# Patient Record
Sex: FEMALE | Race: WHITE | NOT HISPANIC OR LATINO | Employment: OTHER | URBAN - NONMETROPOLITAN AREA
[De-identification: names, ages, dates, MRNs, and addresses within clinical notes are randomized per-mention and may not be internally consistent; named-entity substitution may affect disease eponyms.]

---

## 2020-01-01 ENCOUNTER — OFFICE VISIT (OUTPATIENT)
Dept: SURGERY | Facility: CLINIC | Age: 65
End: 2020-01-01

## 2020-01-01 ENCOUNTER — TELEPHONE (OUTPATIENT)
Dept: GYNECOLOGIC ONCOLOGY | Facility: CLINIC | Age: 65
End: 2020-01-01

## 2020-01-01 ENCOUNTER — APPOINTMENT (OUTPATIENT)
Dept: PREADMISSION TESTING | Facility: HOSPITAL | Age: 65
End: 2020-01-01

## 2020-01-01 ENCOUNTER — APPOINTMENT (OUTPATIENT)
Dept: CT IMAGING | Facility: HOSPITAL | Age: 65
End: 2020-01-01

## 2020-01-01 ENCOUNTER — ANESTHESIA (OUTPATIENT)
Dept: GASTROENTEROLOGY | Facility: HOSPITAL | Age: 65
End: 2020-01-01

## 2020-01-01 ENCOUNTER — HOSPITAL ENCOUNTER (EMERGENCY)
Facility: HOSPITAL | Age: 65
Discharge: HOME | End: 2020-11-21
Attending: EMERGENCY MEDICINE | Admitting: EMERGENCY MEDICINE

## 2020-01-01 ENCOUNTER — APPOINTMENT (OUTPATIENT)
Dept: GENERAL RADIOLOGY | Facility: HOSPITAL | Age: 65
End: 2020-01-01

## 2020-01-01 ENCOUNTER — HOSPITAL ENCOUNTER (EMERGENCY)
Facility: HOSPITAL | Age: 65
Discharge: HOME | End: 2020-12-01
Attending: STUDENT IN AN ORGANIZED HEALTH CARE EDUCATION/TRAINING PROGRAM | Admitting: STUDENT IN AN ORGANIZED HEALTH CARE EDUCATION/TRAINING PROGRAM

## 2020-01-01 ENCOUNTER — ANESTHESIA EVENT (OUTPATIENT)
Dept: PERIOP | Facility: HOSPITAL | Age: 65
End: 2020-01-01

## 2020-01-01 ENCOUNTER — OFFICE VISIT (OUTPATIENT)
Dept: ONCOLOGY | Facility: CLINIC | Age: 65
End: 2020-01-01

## 2020-01-01 ENCOUNTER — LAB (OUTPATIENT)
Dept: LAB | Facility: HOSPITAL | Age: 65
End: 2020-01-01

## 2020-01-01 ENCOUNTER — TELEPHONE (OUTPATIENT)
Dept: ONCOLOGY | Facility: CLINIC | Age: 65
End: 2020-01-01

## 2020-01-01 ENCOUNTER — OFFICE VISIT (OUTPATIENT)
Dept: INTERNAL MEDICINE | Facility: CLINIC | Age: 65
End: 2020-01-01

## 2020-01-01 ENCOUNTER — INFUSION (OUTPATIENT)
Dept: ONCOLOGY | Facility: HOSPITAL | Age: 65
End: 2020-01-01

## 2020-01-01 ENCOUNTER — HOSPITAL ENCOUNTER (OUTPATIENT)
Facility: HOSPITAL | Age: 65
Setting detail: HOSPITAL OUTPATIENT SURGERY
Discharge: HOME | End: 2020-11-25
Attending: SURGERY | Admitting: SURGERY

## 2020-01-01 ENCOUNTER — TELEPHONE (OUTPATIENT)
Dept: UROLOGY | Facility: CLINIC | Age: 65
End: 2020-01-01

## 2020-01-01 ENCOUNTER — APPOINTMENT (OUTPATIENT)
Dept: CARDIOLOGY | Facility: HOSPITAL | Age: 65
End: 2020-01-01

## 2020-01-01 ENCOUNTER — HOSPITAL ENCOUNTER (EMERGENCY)
Facility: HOSPITAL | Age: 65
Discharge: HOME | End: 2020-10-07
Attending: EMERGENCY MEDICINE | Admitting: EMERGENCY MEDICINE

## 2020-01-01 ENCOUNTER — OFFICE VISIT (OUTPATIENT)
Dept: GYNECOLOGIC ONCOLOGY | Facility: CLINIC | Age: 65
End: 2020-01-01

## 2020-01-01 ENCOUNTER — PREP FOR SURGERY (OUTPATIENT)
Dept: OTHER | Facility: HOSPITAL | Age: 65
End: 2020-01-01

## 2020-01-01 ENCOUNTER — TELEPHONE (OUTPATIENT)
Dept: INTERNAL MEDICINE | Facility: CLINIC | Age: 65
End: 2020-01-01

## 2020-01-01 ENCOUNTER — HOSPITAL ENCOUNTER (OUTPATIENT)
Facility: HOSPITAL | Age: 65
Setting detail: HOSPITAL OUTPATIENT SURGERY
Discharge: HOME | End: 2020-10-12
Attending: SURGERY | Admitting: SURGERY

## 2020-01-01 ENCOUNTER — CONSULT (OUTPATIENT)
Dept: ONCOLOGY | Facility: CLINIC | Age: 65
End: 2020-01-01

## 2020-01-01 ENCOUNTER — ANESTHESIA EVENT (OUTPATIENT)
Dept: GASTROENTEROLOGY | Facility: HOSPITAL | Age: 65
End: 2020-01-01

## 2020-01-01 ENCOUNTER — OUTSIDE FACILITY SERVICE (OUTPATIENT)
Dept: INTERNAL MEDICINE | Facility: CLINIC | Age: 65
End: 2020-01-01

## 2020-01-01 ENCOUNTER — OFFICE VISIT (OUTPATIENT)
Dept: UROLOGY | Facility: CLINIC | Age: 65
End: 2020-01-01

## 2020-01-01 ENCOUNTER — RESULTS ENCOUNTER (OUTPATIENT)
Dept: INTERNAL MEDICINE | Facility: CLINIC | Age: 65
End: 2020-01-01

## 2020-01-01 ENCOUNTER — ANESTHESIA (OUTPATIENT)
Dept: PERIOP | Facility: HOSPITAL | Age: 65
End: 2020-01-01

## 2020-01-01 ENCOUNTER — DOCUMENTATION (OUTPATIENT)
Dept: SOCIAL WORK | Facility: HOSPITAL | Age: 65
End: 2020-01-01

## 2020-01-01 ENCOUNTER — HOSPITAL ENCOUNTER (INPATIENT)
Facility: HOSPITAL | Age: 65
LOS: 8 days | Discharge: HOSPICE | End: 2020-12-16
Attending: EMERGENCY MEDICINE | Admitting: FAMILY MEDICINE

## 2020-01-01 ENCOUNTER — HOSPITAL ENCOUNTER (INPATIENT)
Facility: HOSPITAL | Age: 65
LOS: 13 days | Discharge: NURSING, REHAB OR LONG-TERM CARE | End: 2020-10-29
Attending: FAMILY MEDICINE | Admitting: INTERNAL MEDICINE

## 2020-01-01 ENCOUNTER — DOCUMENTATION (OUTPATIENT)
Dept: NUTRITION | Facility: HOSPITAL | Age: 65
End: 2020-01-01

## 2020-01-01 ENCOUNTER — TELEPHONE (OUTPATIENT)
Dept: SURGERY | Facility: CLINIC | Age: 65
End: 2020-01-01

## 2020-01-01 ENCOUNTER — HOSPITAL ENCOUNTER (OUTPATIENT)
Facility: HOSPITAL | Age: 65
Setting detail: HOSPITAL OUTPATIENT SURGERY
End: 2020-01-01
Attending: UROLOGY | Admitting: UROLOGY

## 2020-01-01 VITALS
DIASTOLIC BLOOD PRESSURE: 102 MMHG | HEIGHT: 65 IN | HEART RATE: 102 BPM | BODY MASS INDEX: 22.49 KG/M2 | TEMPERATURE: 98.7 F | RESPIRATION RATE: 16 BRPM | SYSTOLIC BLOOD PRESSURE: 178 MMHG | WEIGHT: 135 LBS

## 2020-01-01 VITALS
SYSTOLIC BLOOD PRESSURE: 112 MMHG | TEMPERATURE: 98.2 F | OXYGEN SATURATION: 98 % | HEIGHT: 65 IN | WEIGHT: 135.6 LBS | HEART RATE: 97 BPM | DIASTOLIC BLOOD PRESSURE: 84 MMHG | BODY MASS INDEX: 22.59 KG/M2

## 2020-01-01 VITALS
TEMPERATURE: 98.1 F | SYSTOLIC BLOOD PRESSURE: 133 MMHG | WEIGHT: 133 LBS | BODY MASS INDEX: 22.16 KG/M2 | RESPIRATION RATE: 12 BRPM | HEART RATE: 122 BPM | DIASTOLIC BLOOD PRESSURE: 82 MMHG | HEIGHT: 65 IN | OXYGEN SATURATION: 98 %

## 2020-01-01 VITALS
WEIGHT: 135 LBS | HEART RATE: 101 BPM | HEIGHT: 65 IN | TEMPERATURE: 98.7 F | BODY MASS INDEX: 22.49 KG/M2 | OXYGEN SATURATION: 98 %

## 2020-01-01 VITALS
SYSTOLIC BLOOD PRESSURE: 107 MMHG | BODY MASS INDEX: 22.33 KG/M2 | DIASTOLIC BLOOD PRESSURE: 63 MMHG | WEIGHT: 134 LBS | HEIGHT: 65 IN | OXYGEN SATURATION: 100 % | RESPIRATION RATE: 15 BRPM | TEMPERATURE: 100.2 F | HEART RATE: 118 BPM

## 2020-01-01 VITALS
OXYGEN SATURATION: 98 % | WEIGHT: 135 LBS | DIASTOLIC BLOOD PRESSURE: 85 MMHG | HEIGHT: 65 IN | TEMPERATURE: 98.2 F | BODY MASS INDEX: 22.49 KG/M2 | RESPIRATION RATE: 17 BRPM | HEART RATE: 98 BPM | SYSTOLIC BLOOD PRESSURE: 143 MMHG

## 2020-01-01 VITALS
SYSTOLIC BLOOD PRESSURE: 151 MMHG | OXYGEN SATURATION: 99 % | HEIGHT: 65 IN | WEIGHT: 134 LBS | HEART RATE: 79 BPM | DIASTOLIC BLOOD PRESSURE: 80 MMHG | BODY MASS INDEX: 22.33 KG/M2 | RESPIRATION RATE: 16 BRPM | TEMPERATURE: 99.6 F

## 2020-01-01 VITALS
BODY MASS INDEX: 21.83 KG/M2 | HEART RATE: 110 BPM | HEIGHT: 65 IN | WEIGHT: 131 LBS | SYSTOLIC BLOOD PRESSURE: 108 MMHG | TEMPERATURE: 97.9 F | OXYGEN SATURATION: 93 % | DIASTOLIC BLOOD PRESSURE: 75 MMHG | RESPIRATION RATE: 17 BRPM

## 2020-01-01 VITALS
HEART RATE: 90 BPM | SYSTOLIC BLOOD PRESSURE: 148 MMHG | TEMPERATURE: 99.7 F | WEIGHT: 140.8 LBS | OXYGEN SATURATION: 97 % | RESPIRATION RATE: 18 BRPM | BODY MASS INDEX: 23.46 KG/M2 | HEIGHT: 65 IN | DIASTOLIC BLOOD PRESSURE: 87 MMHG

## 2020-01-01 VITALS
WEIGHT: 140 LBS | OXYGEN SATURATION: 95 % | DIASTOLIC BLOOD PRESSURE: 60 MMHG | BODY MASS INDEX: 23.32 KG/M2 | HEART RATE: 60 BPM | HEIGHT: 65 IN | SYSTOLIC BLOOD PRESSURE: 100 MMHG

## 2020-01-01 VITALS
WEIGHT: 133 LBS | BODY MASS INDEX: 22.16 KG/M2 | OXYGEN SATURATION: 97 % | HEART RATE: 97 BPM | SYSTOLIC BLOOD PRESSURE: 119 MMHG | RESPIRATION RATE: 16 BRPM | TEMPERATURE: 98.1 F | HEIGHT: 65 IN | DIASTOLIC BLOOD PRESSURE: 75 MMHG

## 2020-01-01 VITALS
RESPIRATION RATE: 18 BRPM | BODY MASS INDEX: 28.16 KG/M2 | HEIGHT: 65 IN | SYSTOLIC BLOOD PRESSURE: 112 MMHG | HEART RATE: 84 BPM | TEMPERATURE: 96.8 F | WEIGHT: 169 LBS | DIASTOLIC BLOOD PRESSURE: 71 MMHG

## 2020-01-01 VITALS
RESPIRATION RATE: 16 BRPM | HEIGHT: 65 IN | BODY MASS INDEX: 22.49 KG/M2 | WEIGHT: 135 LBS | DIASTOLIC BLOOD PRESSURE: 87 MMHG | TEMPERATURE: 98 F | OXYGEN SATURATION: 98 % | SYSTOLIC BLOOD PRESSURE: 150 MMHG | HEART RATE: 108 BPM

## 2020-01-01 VITALS
WEIGHT: 169.97 LBS | SYSTOLIC BLOOD PRESSURE: 119 MMHG | HEART RATE: 94 BPM | BODY MASS INDEX: 28.32 KG/M2 | DIASTOLIC BLOOD PRESSURE: 61 MMHG | HEIGHT: 65 IN | OXYGEN SATURATION: 97 % | TEMPERATURE: 98.6 F | RESPIRATION RATE: 16 BRPM

## 2020-01-01 VITALS — SYSTOLIC BLOOD PRESSURE: 126 MMHG | HEART RATE: 95 BPM | DIASTOLIC BLOOD PRESSURE: 74 MMHG

## 2020-01-01 VITALS
WEIGHT: 135 LBS | DIASTOLIC BLOOD PRESSURE: 86 MMHG | HEIGHT: 65 IN | TEMPERATURE: 99.2 F | SYSTOLIC BLOOD PRESSURE: 133 MMHG | HEART RATE: 104 BPM | OXYGEN SATURATION: 98 % | BODY MASS INDEX: 22.49 KG/M2 | RESPIRATION RATE: 18 BRPM

## 2020-01-01 VITALS
SYSTOLIC BLOOD PRESSURE: 142 MMHG | BODY MASS INDEX: 22.49 KG/M2 | OXYGEN SATURATION: 98 % | WEIGHT: 135 LBS | HEIGHT: 65 IN | DIASTOLIC BLOOD PRESSURE: 88 MMHG | HEART RATE: 114 BPM

## 2020-01-01 DIAGNOSIS — R80.9 PROTEINURIA, UNSPECIFIED TYPE: ICD-10-CM

## 2020-01-01 DIAGNOSIS — R30.0 DYSURIA: ICD-10-CM

## 2020-01-01 DIAGNOSIS — N93.9 VAGINAL BLEEDING: ICD-10-CM

## 2020-01-01 DIAGNOSIS — C78.7 LIVER METASTASES: ICD-10-CM

## 2020-01-01 DIAGNOSIS — C56.3 MALIGNANT NEOPLASM OF BOTH OVARIES (HCC): Primary | ICD-10-CM

## 2020-01-01 DIAGNOSIS — Z86.718 HISTORY OF DVT (DEEP VEIN THROMBOSIS): ICD-10-CM

## 2020-01-01 DIAGNOSIS — N39.0 ACUTE UTI: ICD-10-CM

## 2020-01-01 DIAGNOSIS — R10.31 RIGHT LOWER QUADRANT ABDOMINAL PAIN: Primary | ICD-10-CM

## 2020-01-01 DIAGNOSIS — K62.5 RECTAL BLEEDING: Primary | ICD-10-CM

## 2020-01-01 DIAGNOSIS — E87.6 HYPOKALEMIA: ICD-10-CM

## 2020-01-01 DIAGNOSIS — C56.9 RECURRENT MALIGNANT NEOPLASM OF OVARY (HCC): ICD-10-CM

## 2020-01-01 DIAGNOSIS — N13.30 BILATERAL HYDRONEPHROSIS: ICD-10-CM

## 2020-01-01 DIAGNOSIS — C18.7 MALIGNANT NEOPLASM OF SIGMOID COLON (HCC): ICD-10-CM

## 2020-01-01 DIAGNOSIS — R19.00 PELVIC MASS: Primary | ICD-10-CM

## 2020-01-01 DIAGNOSIS — Z78.9 IMPAIRED MOBILITY AND ACTIVITIES OF DAILY LIVING: ICD-10-CM

## 2020-01-01 DIAGNOSIS — K56.609 LARGE BOWEL OBSTRUCTION (HCC): Primary | ICD-10-CM

## 2020-01-01 DIAGNOSIS — R53.83 FATIGUE, UNSPECIFIED TYPE: Primary | ICD-10-CM

## 2020-01-01 DIAGNOSIS — C56.9 RECURRENT MALIGNANT NEOPLASM OF OVARY (HCC): Primary | ICD-10-CM

## 2020-01-01 DIAGNOSIS — R06.09 DYSPNEA ON EXERTION: ICD-10-CM

## 2020-01-01 DIAGNOSIS — B96.20 E-COLI UTI: Primary | ICD-10-CM

## 2020-01-01 DIAGNOSIS — C56.3 MALIGNANT NEOPLASM OF BOTH OVARIES (HCC): ICD-10-CM

## 2020-01-01 DIAGNOSIS — N13.30 HYDRONEPHROSIS, UNSPECIFIED HYDRONEPHROSIS TYPE: ICD-10-CM

## 2020-01-01 DIAGNOSIS — R19.00 PELVIC MASS: ICD-10-CM

## 2020-01-01 DIAGNOSIS — R31.0 GROSS HEMATURIA: ICD-10-CM

## 2020-01-01 DIAGNOSIS — Z79.899 CONTROLLED SUBSTANCE AGREEMENT SIGNED: ICD-10-CM

## 2020-01-01 DIAGNOSIS — C78.7 LIVER METASTASES: Primary | ICD-10-CM

## 2020-01-01 DIAGNOSIS — R73.09 ELEVATED GLUCOSE: ICD-10-CM

## 2020-01-01 DIAGNOSIS — Z74.09 IMPAIRED MOBILITY AND ACTIVITIES OF DAILY LIVING: ICD-10-CM

## 2020-01-01 DIAGNOSIS — Z01.818 PRE-OP TESTING: Primary | ICD-10-CM

## 2020-01-01 DIAGNOSIS — K63.89 COLONIC MASS: ICD-10-CM

## 2020-01-01 DIAGNOSIS — Z01.818 PRE-OP TESTING: ICD-10-CM

## 2020-01-01 DIAGNOSIS — Z98.890 POST-OPERATIVE STATE: ICD-10-CM

## 2020-01-01 DIAGNOSIS — N39.0 URINARY TRACT INFECTION WITHOUT HEMATURIA, SITE UNSPECIFIED: ICD-10-CM

## 2020-01-01 DIAGNOSIS — D64.9 ANEMIA, UNSPECIFIED TYPE: ICD-10-CM

## 2020-01-01 DIAGNOSIS — K63.89 MASS OF COLON: ICD-10-CM

## 2020-01-01 DIAGNOSIS — Z79.899 LONG TERM USE OF DRUG: ICD-10-CM

## 2020-01-01 DIAGNOSIS — N39.0 E-COLI UTI: Primary | ICD-10-CM

## 2020-01-01 DIAGNOSIS — R81 GLUCOSE FOUND IN URINE ON EXAMINATION: ICD-10-CM

## 2020-01-01 DIAGNOSIS — R10.32 LEFT LOWER QUADRANT ABDOMINAL PAIN: ICD-10-CM

## 2020-01-01 DIAGNOSIS — E55.9 VITAMIN D INSUFFICIENCY: ICD-10-CM

## 2020-01-01 DIAGNOSIS — R31.9 HEMATURIA, UNSPECIFIED TYPE: ICD-10-CM

## 2020-01-01 DIAGNOSIS — Z86.711 HISTORY OF PULMONARY EMBOLUS (PE): ICD-10-CM

## 2020-01-01 DIAGNOSIS — C56.9 MALIGNANT NEOPLASM OF OVARY, UNSPECIFIED LATERALITY (HCC): ICD-10-CM

## 2020-01-01 DIAGNOSIS — N13.39 OTHER HYDRONEPHROSIS: ICD-10-CM

## 2020-01-01 DIAGNOSIS — E78.2 MIXED HYPERLIPIDEMIA: ICD-10-CM

## 2020-01-01 DIAGNOSIS — Z79.899 CHRONIC PRESCRIPTION BENZODIAZEPINE USE: ICD-10-CM

## 2020-01-01 DIAGNOSIS — R16.0 LIVER MASS: ICD-10-CM

## 2020-01-01 DIAGNOSIS — R07.9 CHEST PAIN, UNSPECIFIED TYPE: ICD-10-CM

## 2020-01-01 DIAGNOSIS — Z85.3 HISTORY OF BREAST CANCER: ICD-10-CM

## 2020-01-01 DIAGNOSIS — R19.07 GENERALIZED ABDOMINAL MASS: ICD-10-CM

## 2020-01-01 DIAGNOSIS — N93.9 VAGINA BLEEDING: ICD-10-CM

## 2020-01-01 DIAGNOSIS — C56.9 MALIGNANT NEOPLASM OF OVARY, UNSPECIFIED LATERALITY (HCC): Primary | ICD-10-CM

## 2020-01-01 DIAGNOSIS — R56.9 SEIZURES (HCC): ICD-10-CM

## 2020-01-01 DIAGNOSIS — R10.9 ACUTE ABDOMINAL PAIN: ICD-10-CM

## 2020-01-01 DIAGNOSIS — R33.9 URINARY RETENTION: Primary | ICD-10-CM

## 2020-01-01 DIAGNOSIS — R19.8 RECTAL DISCHARGE: Primary | ICD-10-CM

## 2020-01-01 DIAGNOSIS — K63.89 MASS OF COLON: Primary | ICD-10-CM

## 2020-01-01 LAB
ABO GROUP BLD: NORMAL
ALBUMIN SERPL-MCNC: 2.3 G/DL (ref 3.5–5.2)
ALBUMIN SERPL-MCNC: 2.6 G/DL (ref 3.5–5.2)
ALBUMIN SERPL-MCNC: 2.7 G/DL (ref 3.5–5.2)
ALBUMIN SERPL-MCNC: 2.9 G/DL (ref 3.5–5.2)
ALBUMIN SERPL-MCNC: 3 G/DL (ref 3.5–5.2)
ALBUMIN SERPL-MCNC: 3.5 G/DL (ref 3.5–5.2)
ALBUMIN SERPL-MCNC: 3.9 G/DL (ref 3.5–5.2)
ALBUMIN/GLOB SERPL: 0.9 G/DL
ALBUMIN/GLOB SERPL: 1 G/DL
ALBUMIN/GLOB SERPL: 1.1 G/DL
ALBUMIN/GLOB SERPL: 1.1 G/DL
ALBUMIN/GLOB SERPL: 1.3 G/DL
ALP SERPL-CCNC: 48 U/L (ref 39–117)
ALP SERPL-CCNC: 52 U/L (ref 39–117)
ALP SERPL-CCNC: 52 U/L (ref 39–117)
ALP SERPL-CCNC: 54 U/L (ref 39–117)
ALP SERPL-CCNC: 54 U/L (ref 39–117)
ALP SERPL-CCNC: 69 U/L (ref 39–117)
ALP SERPL-CCNC: 75 U/L (ref 39–117)
ALP SERPL-CCNC: 76 U/L (ref 39–117)
ALP SERPL-CCNC: 78 U/L (ref 39–117)
ALP SERPL-CCNC: 81 U/L (ref 39–117)
ALT SERPL W P-5'-P-CCNC: 10 U/L (ref 1–33)
ALT SERPL W P-5'-P-CCNC: 10 U/L (ref 1–33)
ALT SERPL W P-5'-P-CCNC: 14 U/L (ref 1–33)
ALT SERPL W P-5'-P-CCNC: 16 U/L (ref 1–33)
ALT SERPL W P-5'-P-CCNC: 6 U/L (ref 1–33)
ALT SERPL W P-5'-P-CCNC: 7 U/L (ref 1–33)
ALT SERPL W P-5'-P-CCNC: 8 U/L (ref 1–33)
AMPHET+METHAMPHET UR QL: NEGATIVE
AMPHETAMINES UR QL: NEGATIVE
ANION GAP SERPL CALCULATED.3IONS-SCNC: 10 MMOL/L (ref 5–15)
ANION GAP SERPL CALCULATED.3IONS-SCNC: 10 MMOL/L (ref 5–15)
ANION GAP SERPL CALCULATED.3IONS-SCNC: 11 MMOL/L (ref 5–15)
ANION GAP SERPL CALCULATED.3IONS-SCNC: 12 MMOL/L (ref 5–15)
ANION GAP SERPL CALCULATED.3IONS-SCNC: 12.2 MMOL/L (ref 5–15)
ANION GAP SERPL CALCULATED.3IONS-SCNC: 13 MMOL/L (ref 5–15)
ANION GAP SERPL CALCULATED.3IONS-SCNC: 13 MMOL/L (ref 5–15)
ANION GAP SERPL CALCULATED.3IONS-SCNC: 13.5 MMOL/L (ref 5–15)
ANION GAP SERPL CALCULATED.3IONS-SCNC: 14 MMOL/L (ref 5–15)
ANION GAP SERPL CALCULATED.3IONS-SCNC: 15 MMOL/L (ref 5–15)
ANION GAP SERPL CALCULATED.3IONS-SCNC: 5 MMOL/L (ref 5–15)
ANION GAP SERPL CALCULATED.3IONS-SCNC: 5 MMOL/L (ref 5–15)
ANION GAP SERPL CALCULATED.3IONS-SCNC: 6 MMOL/L (ref 5–15)
ANION GAP SERPL CALCULATED.3IONS-SCNC: 6 MMOL/L (ref 5–15)
ANION GAP SERPL CALCULATED.3IONS-SCNC: 7 MMOL/L (ref 5–15)
ANION GAP SERPL CALCULATED.3IONS-SCNC: 7 MMOL/L (ref 5–15)
ANION GAP SERPL CALCULATED.3IONS-SCNC: 8 MMOL/L (ref 5–15)
ANION GAP SERPL CALCULATED.3IONS-SCNC: 9 MMOL/L (ref 5–15)
AST SERPL-CCNC: 14 U/L (ref 1–32)
AST SERPL-CCNC: 14 U/L (ref 1–32)
AST SERPL-CCNC: 16 U/L (ref 1–32)
AST SERPL-CCNC: 20 U/L (ref 1–32)
AST SERPL-CCNC: 21 U/L (ref 1–32)
AST SERPL-CCNC: 24 U/L (ref 1–32)
AST SERPL-CCNC: 28 U/L (ref 1–32)
AST SERPL-CCNC: 30 U/L (ref 1–32)
BACTERIA SPEC AEROBE CULT: NORMAL
BACTERIA UR QL AUTO: ABNORMAL /HPF
BARBITURATES UR QL SCN: NEGATIVE
BASOPHILS # BLD AUTO: 0.01 10*3/MM3 (ref 0–0.2)
BASOPHILS # BLD AUTO: 0.01 10*3/MM3 (ref 0–0.2)
BASOPHILS # BLD AUTO: 0.02 10*3/MM3 (ref 0–0.2)
BASOPHILS # BLD AUTO: 0.03 10*3/MM3 (ref 0–0.2)
BASOPHILS # BLD AUTO: 0.04 10*3/MM3 (ref 0–0.2)
BASOPHILS # BLD AUTO: 0.05 10*3/MM3 (ref 0–0.2)
BASOPHILS # BLD AUTO: 0.05 10*3/MM3 (ref 0–0.2)
BASOPHILS NFR BLD AUTO: 0.1 % (ref 0–1.5)
BASOPHILS NFR BLD AUTO: 0.2 % (ref 0–1.5)
BASOPHILS NFR BLD AUTO: 0.3 % (ref 0–1.5)
BASOPHILS NFR BLD AUTO: 0.4 % (ref 0–1.5)
BASOPHILS NFR BLD AUTO: 0.5 % (ref 0–1.5)
BASOPHILS NFR BLD AUTO: 0.5 % (ref 0–1.5)
BASOPHILS NFR BLD AUTO: 0.6 % (ref 0–1.5)
BENZODIAZ UR QL SCN: POSITIVE
BH BB BLOOD EXPIRATION DATE: NORMAL
BH BB BLOOD EXPIRATION DATE: NORMAL
BH BB BLOOD TYPE BARCODE: 5100
BH BB BLOOD TYPE BARCODE: 5100
BH BB DISPENSE STATUS: NORMAL
BH BB DISPENSE STATUS: NORMAL
BH BB PRODUCT CODE: NORMAL
BH BB PRODUCT CODE: NORMAL
BH BB UNIT NUMBER: NORMAL
BH BB UNIT NUMBER: NORMAL
BH CV ECHO MEAS - AO MAX PG (FULL): 2.4 MMHG
BH CV ECHO MEAS - AO MAX PG: 7.5 MMHG
BH CV ECHO MEAS - AO ROOT AREA (BSA CORRECTED): 2.4
BH CV ECHO MEAS - AO ROOT AREA: 13.3 CM^2
BH CV ECHO MEAS - AO ROOT DIAM: 4.1 CM
BH CV ECHO MEAS - AO V2 MAX: 136.8 CM/SEC
BH CV ECHO MEAS - AVA(V,A): 2.9 CM^2
BH CV ECHO MEAS - AVA(V,D): 2.9 CM^2
BH CV ECHO MEAS - BSA(HAYCOCK): 1.7 M^2
BH CV ECHO MEAS - BSA: 1.7 M^2
BH CV ECHO MEAS - BZI_BMI: 23.3 KILOGRAMS/M^2
BH CV ECHO MEAS - BZI_METRIC_HEIGHT: 165.1 CM
BH CV ECHO MEAS - BZI_METRIC_WEIGHT: 63.5 KG
BH CV ECHO MEAS - EDV(CUBED): 131.5 ML
BH CV ECHO MEAS - EDV(MOD-SP2): 62 ML
BH CV ECHO MEAS - EDV(MOD-SP4): 75 ML
BH CV ECHO MEAS - EDV(TEICH): 123 ML
BH CV ECHO MEAS - EF(CUBED): 75.4 %
BH CV ECHO MEAS - EF(MOD-BP): 68 %
BH CV ECHO MEAS - EF(MOD-SP2): 67.7 %
BH CV ECHO MEAS - EF(MOD-SP4): 69.3 %
BH CV ECHO MEAS - EF(TEICH): 67.1 %
BH CV ECHO MEAS - ESV(CUBED): 32.3 ML
BH CV ECHO MEAS - ESV(MOD-SP2): 20 ML
BH CV ECHO MEAS - ESV(MOD-SP4): 23 ML
BH CV ECHO MEAS - ESV(TEICH): 40.5 ML
BH CV ECHO MEAS - FS: 37.4 %
BH CV ECHO MEAS - IVS/LVPW: 0.81
BH CV ECHO MEAS - IVSD: 0.76 CM
BH CV ECHO MEAS - LA DIMENSION: 3.9 CM
BH CV ECHO MEAS - LA/AO: 0.95
BH CV ECHO MEAS - LAD MAJOR: 4.9 CM
BH CV ECHO MEAS - LAT PEAK E' VEL: 13.2 CM/SEC
BH CV ECHO MEAS - LATERAL E/E' RATIO: 5.5
BH CV ECHO MEAS - LV DIASTOLIC VOL/BSA (35-75): 44.1 ML/M^2
BH CV ECHO MEAS - LV MASS(C)D: 149.8 GRAMS
BH CV ECHO MEAS - LV MASS(C)DI: 88.1 GRAMS/M^2
BH CV ECHO MEAS - LV MAX PG: 5 MMHG
BH CV ECHO MEAS - LV MEAN PG: 2.5 MMHG
BH CV ECHO MEAS - LV SYSTOLIC VOL/BSA (12-30): 13.5 ML/M^2
BH CV ECHO MEAS - LV V1 MAX: 112.3 CM/SEC
BH CV ECHO MEAS - LV V1 MEAN: 70.6 CM/SEC
BH CV ECHO MEAS - LV V1 VTI: 16.2 CM
BH CV ECHO MEAS - LVIDD: 5.1 CM
BH CV ECHO MEAS - LVIDS: 3.2 CM
BH CV ECHO MEAS - LVLD AP2: 6.6 CM
BH CV ECHO MEAS - LVLD AP4: 6.8 CM
BH CV ECHO MEAS - LVLS AP2: 5.2 CM
BH CV ECHO MEAS - LVLS AP4: 5.3 CM
BH CV ECHO MEAS - LVOT AREA (M): 3.5 CM^2
BH CV ECHO MEAS - LVOT AREA: 3.6 CM^2
BH CV ECHO MEAS - LVOT DIAM: 2.1 CM
BH CV ECHO MEAS - LVPWD: 0.93 CM
BH CV ECHO MEAS - MED PEAK E' VEL: 6.1 CM/SEC
BH CV ECHO MEAS - MEDIAL E/E' RATIO: 11.7
BH CV ECHO MEAS - MV A MAX VEL: 121.5 CM/SEC
BH CV ECHO MEAS - MV DEC TIME: 0.16 SEC
BH CV ECHO MEAS - MV E MAX VEL: 74 CM/SEC
BH CV ECHO MEAS - MV E/A: 0.61
BH CV ECHO MEAS - PA ACC SLOPE: 1083 CM/SEC^2
BH CV ECHO MEAS - PA ACC TIME: 0.09 SEC
BH CV ECHO MEAS - PA MAX PG: 6.5 MMHG
BH CV ECHO MEAS - PA PR(ACCEL): 38.6 MMHG
BH CV ECHO MEAS - PA V2 MAX: 127.6 CM/SEC
BH CV ECHO MEAS - RAP SYSTOLE: 3 MMHG
BH CV ECHO MEAS - RVSP: 37 MMHG
BH CV ECHO MEAS - SI(CUBED): 58.4 ML/M^2
BH CV ECHO MEAS - SI(LVOT): 34 ML/M^2
BH CV ECHO MEAS - SI(MOD-SP2): 24.7 ML/M^2
BH CV ECHO MEAS - SI(MOD-SP4): 30.6 ML/M^2
BH CV ECHO MEAS - SI(TEICH): 48.5 ML/M^2
BH CV ECHO MEAS - SV(CUBED): 99.2 ML
BH CV ECHO MEAS - SV(LVOT): 57.7 ML
BH CV ECHO MEAS - SV(MOD-SP2): 42 ML
BH CV ECHO MEAS - SV(MOD-SP4): 52 ML
BH CV ECHO MEAS - SV(TEICH): 82.5 ML
BH CV ECHO MEAS - TAPSE (>1.6): 1.1 CM
BH CV ECHO MEAS - TR MAX PG: 34 MMHG
BH CV ECHO MEAS - TR MAX VEL: 291 CM/SEC
BH CV ECHO MEASUREMENTS AVERAGE E/E' RATIO: 7.67
BH CV VAS BP LEFT ARM: NORMAL MMHG
BH CV XLRA - RV BASE: 2.6 CM
BH CV XLRA - RV LENGTH: 6.3 CM
BH CV XLRA - RV MID: 2.1 CM
BH CV XLRA - TDI S': 12 CM/SEC
BILIRUB BLD-MCNC: NEGATIVE MG/DL
BILIRUB BLD-MCNC: NEGATIVE MG/DL
BILIRUB SERPL-MCNC: 0.2 MG/DL (ref 0–1.2)
BILIRUB SERPL-MCNC: 0.3 MG/DL (ref 0–1.2)
BILIRUB UR QL STRIP: NEGATIVE
BLD GP AB SCN SERPL QL: NEGATIVE
BLD GP AB SCN SERPL QL: NEGATIVE
BUN SERPL-MCNC: 10 MG/DL (ref 8–23)
BUN SERPL-MCNC: 11 MG/DL (ref 8–23)
BUN SERPL-MCNC: 12 MG/DL (ref 8–23)
BUN SERPL-MCNC: 14 MG/DL (ref 8–23)
BUN SERPL-MCNC: 15 MG/DL (ref 8–23)
BUN SERPL-MCNC: 16 MG/DL (ref 8–23)
BUN SERPL-MCNC: 17 MG/DL (ref 8–23)
BUN SERPL-MCNC: 20 MG/DL (ref 8–23)
BUN SERPL-MCNC: 23 MG/DL (ref 8–23)
BUN SERPL-MCNC: 24 MG/DL (ref 8–23)
BUN SERPL-MCNC: 8 MG/DL (ref 8–23)
BUN SERPL-MCNC: 8 MG/DL (ref 8–23)
BUN SERPL-MCNC: 9 MG/DL (ref 8–23)
BUN SERPL-MCNC: 9 MG/DL (ref 8–23)
BUN/CREAT SERPL: 18.5 (ref 7–25)
BUN/CREAT SERPL: 19.6 (ref 7–25)
BUN/CREAT SERPL: 20 (ref 7–25)
BUN/CREAT SERPL: 21.1 (ref 7–25)
BUN/CREAT SERPL: 21.3 (ref 7–25)
BUN/CREAT SERPL: 21.3 (ref 7–25)
BUN/CREAT SERPL: 23.2 (ref 7–25)
BUN/CREAT SERPL: 23.4 (ref 7–25)
BUN/CREAT SERPL: 23.5 (ref 7–25)
BUN/CREAT SERPL: 23.5 (ref 7–25)
BUN/CREAT SERPL: 23.7 (ref 7–25)
BUN/CREAT SERPL: 23.7 (ref 7–25)
BUN/CREAT SERPL: 25.6 (ref 7–25)
BUN/CREAT SERPL: 26.7 (ref 7–25)
BUN/CREAT SERPL: 26.8 (ref 7–25)
BUN/CREAT SERPL: 27.1 (ref 7–25)
BUN/CREAT SERPL: 29.3 (ref 7–25)
BUN/CREAT SERPL: 30.8 (ref 7–25)
BUN/CREAT SERPL: 31.3 (ref 7–25)
BUN/CREAT SERPL: 33.3 (ref 7–25)
BUN/CREAT SERPL: 33.8 (ref 7–25)
BUN/CREAT SERPL: 37.7 (ref 7–25)
BUN/CREAT SERPL: 39.2 (ref 7–25)
BUPRENORPHINE SERPL-MCNC: NEGATIVE NG/ML
CA-I SERPL ISE-MCNC: 1.2 MMOL/L (ref 1.12–1.32)
CA-I SERPL ISE-MCNC: 1.2 MMOL/L (ref 1.12–1.32)
CA-I SERPL ISE-MCNC: 1.26 MMOL/L (ref 1.12–1.32)
CA-I SERPL ISE-MCNC: 1.29 MMOL/L (ref 1.12–1.32)
CALCIUM SPEC-SCNC: 7.7 MG/DL (ref 8.6–10.5)
CALCIUM SPEC-SCNC: 8.3 MG/DL (ref 8.6–10.5)
CALCIUM SPEC-SCNC: 8.4 MG/DL (ref 8.6–10.5)
CALCIUM SPEC-SCNC: 8.5 MG/DL (ref 8.6–10.5)
CALCIUM SPEC-SCNC: 8.5 MG/DL (ref 8.6–10.5)
CALCIUM SPEC-SCNC: 8.6 MG/DL (ref 8.6–10.5)
CALCIUM SPEC-SCNC: 8.6 MG/DL (ref 8.6–10.5)
CALCIUM SPEC-SCNC: 8.7 MG/DL (ref 8.6–10.5)
CALCIUM SPEC-SCNC: 8.8 MG/DL (ref 8.6–10.5)
CALCIUM SPEC-SCNC: 8.8 MG/DL (ref 8.6–10.5)
CALCIUM SPEC-SCNC: 9 MG/DL (ref 8.6–10.5)
CALCIUM SPEC-SCNC: 9 MG/DL (ref 8.6–10.5)
CALCIUM SPEC-SCNC: 9.3 MG/DL (ref 8.6–10.5)
CALCIUM SPEC-SCNC: 9.4 MG/DL (ref 8.6–10.5)
CALCIUM SPEC-SCNC: 9.7 MG/DL (ref 8.6–10.5)
CALCIUM SPEC-SCNC: 9.9 MG/DL (ref 8.6–10.5)
CANCER AG125 SERPL QL: 1250 U/ML (ref 0–38.1)
CANNABINOIDS SERPL QL: NEGATIVE
CEA SERPL-MCNC: 2 NG/ML (ref 0–4.7)
CHLORIDE SERPL-SCNC: 100 MMOL/L (ref 98–107)
CHLORIDE SERPL-SCNC: 100 MMOL/L (ref 98–107)
CHLORIDE SERPL-SCNC: 102 MMOL/L (ref 98–107)
CHLORIDE SERPL-SCNC: 102 MMOL/L (ref 98–107)
CHLORIDE SERPL-SCNC: 103 MMOL/L (ref 98–107)
CHLORIDE SERPL-SCNC: 93 MMOL/L (ref 98–107)
CHLORIDE SERPL-SCNC: 94 MMOL/L (ref 98–107)
CHLORIDE SERPL-SCNC: 94 MMOL/L (ref 98–107)
CHLORIDE SERPL-SCNC: 95 MMOL/L (ref 98–107)
CHLORIDE SERPL-SCNC: 95 MMOL/L (ref 98–107)
CHLORIDE SERPL-SCNC: 96 MMOL/L (ref 98–107)
CHLORIDE SERPL-SCNC: 97 MMOL/L (ref 98–107)
CHLORIDE SERPL-SCNC: 97 MMOL/L (ref 98–107)
CHLORIDE SERPL-SCNC: 98 MMOL/L (ref 98–107)
CHLORIDE SERPL-SCNC: 99 MMOL/L (ref 98–107)
CHOLEST SERPL-MCNC: 98 MG/DL (ref 0–200)
CLARITY UR: ABNORMAL
CLARITY, POC: ABNORMAL
CLARITY, POC: ABNORMAL
CO2 SERPL-SCNC: 23 MMOL/L (ref 22–29)
CO2 SERPL-SCNC: 24 MMOL/L (ref 22–29)
CO2 SERPL-SCNC: 25 MMOL/L (ref 22–29)
CO2 SERPL-SCNC: 26 MMOL/L (ref 22–29)
CO2 SERPL-SCNC: 27 MMOL/L (ref 22–29)
CO2 SERPL-SCNC: 27 MMOL/L (ref 22–29)
CO2 SERPL-SCNC: 28.8 MMOL/L (ref 22–29)
CO2 SERPL-SCNC: 29 MMOL/L (ref 22–29)
CO2 SERPL-SCNC: 30 MMOL/L (ref 22–29)
CO2 SERPL-SCNC: 30 MMOL/L (ref 22–29)
CO2 SERPL-SCNC: 31 MMOL/L (ref 22–29)
CO2 SERPL-SCNC: 31.5 MMOL/L (ref 22–29)
CO2 SERPL-SCNC: 32 MMOL/L (ref 22–29)
CO2 SERPL-SCNC: 32 MMOL/L (ref 22–29)
CO2 SERPL-SCNC: 33 MMOL/L (ref 22–29)
CO2 SERPL-SCNC: 34 MMOL/L (ref 22–29)
CO2 SERPL-SCNC: 35 MMOL/L (ref 22–29)
COCAINE UR QL: NEGATIVE
COLOR UR: ABNORMAL
COLOR UR: YELLOW
CREAT SERPL-MCNC: 0.34 MG/DL (ref 0.57–1)
CREAT SERPL-MCNC: 0.38 MG/DL (ref 0.57–1)
CREAT SERPL-MCNC: 0.38 MG/DL (ref 0.57–1)
CREAT SERPL-MCNC: 0.39 MG/DL (ref 0.57–1)
CREAT SERPL-MCNC: 0.46 MG/DL (ref 0.57–1)
CREAT SERPL-MCNC: 0.47 MG/DL (ref 0.57–1)
CREAT SERPL-MCNC: 0.47 MG/DL (ref 0.57–1)
CREAT SERPL-MCNC: 0.5 MG/DL (ref 0.57–1)
CREAT SERPL-MCNC: 0.51 MG/DL (ref 0.57–1)
CREAT SERPL-MCNC: 0.51 MG/DL (ref 0.57–1)
CREAT SERPL-MCNC: 0.53 MG/DL (ref 0.57–1)
CREAT SERPL-MCNC: 0.54 MG/DL (ref 0.57–1)
CREAT SERPL-MCNC: 0.56 MG/DL (ref 0.57–1)
CREAT SERPL-MCNC: 0.58 MG/DL (ref 0.57–1)
CREAT SERPL-MCNC: 0.59 MG/DL (ref 0.57–1)
CREAT SERPL-MCNC: 0.6 MG/DL (ref 0.57–1)
CREAT SERPL-MCNC: 0.64 MG/DL (ref 0.57–1)
CREAT SERPL-MCNC: 0.68 MG/DL (ref 0.57–1)
CREAT SERPL-MCNC: 0.69 MG/DL (ref 0.57–1)
CREAT SERPL-MCNC: 0.71 MG/DL (ref 0.57–1)
CREAT SERPL-MCNC: 0.78 MG/DL (ref 0.57–1)
CREAT SERPL-MCNC: 0.8 MG/DL (ref 0.57–1)
CREAT SERPL-MCNC: 0.85 MG/DL (ref 0.57–1)
CROSSMATCH INTERPRETATION: NORMAL
CROSSMATCH INTERPRETATION: NORMAL
CRP SERPL-MCNC: 16.11 MG/DL (ref 0–0.5)
CRP SERPL-MCNC: 3.27 MG/DL (ref 0–0.5)
D-LACTATE SERPL-SCNC: 0.7 MMOL/L (ref 0.5–2)
D-LACTATE SERPL-SCNC: 1.4 MMOL/L (ref 0.5–2)
DEPRECATED RDW RBC AUTO: 38.2 FL (ref 37–54)
DEPRECATED RDW RBC AUTO: 40.4 FL (ref 37–54)
DEPRECATED RDW RBC AUTO: 40.8 FL (ref 37–54)
DEPRECATED RDW RBC AUTO: 41 FL (ref 37–54)
DEPRECATED RDW RBC AUTO: 41.4 FL (ref 37–54)
DEPRECATED RDW RBC AUTO: 41.7 FL (ref 37–54)
DEPRECATED RDW RBC AUTO: 41.8 FL (ref 37–54)
DEPRECATED RDW RBC AUTO: 41.9 FL (ref 37–54)
DEPRECATED RDW RBC AUTO: 42.3 FL (ref 37–54)
DEPRECATED RDW RBC AUTO: 42.3 FL (ref 37–54)
DEPRECATED RDW RBC AUTO: 42.5 FL (ref 37–54)
DEPRECATED RDW RBC AUTO: 42.5 FL (ref 37–54)
DEPRECATED RDW RBC AUTO: 42.7 FL (ref 37–54)
DEPRECATED RDW RBC AUTO: 42.7 FL (ref 37–54)
DEPRECATED RDW RBC AUTO: 43.2 FL (ref 37–54)
DEPRECATED RDW RBC AUTO: 43.3 FL (ref 37–54)
DEPRECATED RDW RBC AUTO: 43.3 FL (ref 37–54)
DEPRECATED RDW RBC AUTO: 43.4 FL (ref 37–54)
DEPRECATED RDW RBC AUTO: 43.6 FL (ref 37–54)
DEPRECATED RDW RBC AUTO: 44.2 FL (ref 37–54)
EOSINOPHIL # BLD AUTO: 0 10*3/MM3 (ref 0–0.4)
EOSINOPHIL # BLD AUTO: 0.01 10*3/MM3 (ref 0–0.4)
EOSINOPHIL # BLD AUTO: 0.02 10*3/MM3 (ref 0–0.4)
EOSINOPHIL # BLD AUTO: 0.11 10*3/MM3 (ref 0–0.4)
EOSINOPHIL # BLD AUTO: 0.15 10*3/MM3 (ref 0–0.4)
EOSINOPHIL # BLD AUTO: 0.19 10*3/MM3 (ref 0–0.4)
EOSINOPHIL # BLD AUTO: 0.21 10*3/MM3 (ref 0–0.4)
EOSINOPHIL # BLD AUTO: 0.23 10*3/MM3 (ref 0–0.4)
EOSINOPHIL # BLD AUTO: 0.23 10*3/MM3 (ref 0–0.4)
EOSINOPHIL # BLD AUTO: 0.24 10*3/MM3 (ref 0–0.4)
EOSINOPHIL # BLD AUTO: 0.26 10*3/MM3 (ref 0–0.4)
EOSINOPHIL # BLD AUTO: 0.28 10*3/MM3 (ref 0–0.4)
EOSINOPHIL # BLD AUTO: 0.29 10*3/MM3 (ref 0–0.4)
EOSINOPHIL NFR BLD AUTO: 0 % (ref 0.3–6.2)
EOSINOPHIL NFR BLD AUTO: 0.1 % (ref 0.3–6.2)
EOSINOPHIL NFR BLD AUTO: 0.3 % (ref 0.3–6.2)
EOSINOPHIL NFR BLD AUTO: 1 % (ref 0.3–6.2)
EOSINOPHIL NFR BLD AUTO: 1.4 % (ref 0.3–6.2)
EOSINOPHIL NFR BLD AUTO: 1.8 % (ref 0.3–6.2)
EOSINOPHIL NFR BLD AUTO: 1.8 % (ref 0.3–6.2)
EOSINOPHIL NFR BLD AUTO: 2 % (ref 0.3–6.2)
EOSINOPHIL NFR BLD AUTO: 2 % (ref 0.3–6.2)
EOSINOPHIL NFR BLD AUTO: 2.2 % (ref 0.3–6.2)
EOSINOPHIL NFR BLD AUTO: 2.3 % (ref 0.3–6.2)
EOSINOPHIL NFR BLD AUTO: 2.6 % (ref 0.3–6.2)
EOSINOPHIL NFR BLD AUTO: 2.9 % (ref 0.3–6.2)
EOSINOPHIL NFR BLD AUTO: 3.4 % (ref 0.3–6.2)
EOSINOPHIL NFR BLD AUTO: 3.5 % (ref 0.3–6.2)
EOSINOPHIL NFR BLD AUTO: 3.8 % (ref 0.3–6.2)
EOSINOPHIL NFR BLD AUTO: 4 % (ref 0.3–6.2)
ERYTHROCYTE [DISTWIDTH] IN BLOOD BY AUTOMATED COUNT: 11.7 % (ref 12.3–15.4)
ERYTHROCYTE [DISTWIDTH] IN BLOOD BY AUTOMATED COUNT: 12.1 % (ref 12.3–15.4)
ERYTHROCYTE [DISTWIDTH] IN BLOOD BY AUTOMATED COUNT: 12.2 % (ref 12.3–15.4)
ERYTHROCYTE [DISTWIDTH] IN BLOOD BY AUTOMATED COUNT: 12.2 % (ref 12.3–15.4)
ERYTHROCYTE [DISTWIDTH] IN BLOOD BY AUTOMATED COUNT: 12.4 % (ref 12.3–15.4)
ERYTHROCYTE [DISTWIDTH] IN BLOOD BY AUTOMATED COUNT: 12.5 % (ref 12.3–15.4)
ERYTHROCYTE [DISTWIDTH] IN BLOOD BY AUTOMATED COUNT: 12.7 % (ref 12.3–15.4)
ERYTHROCYTE [DISTWIDTH] IN BLOOD BY AUTOMATED COUNT: 12.7 % (ref 12.3–15.4)
ERYTHROCYTE [DISTWIDTH] IN BLOOD BY AUTOMATED COUNT: 12.8 % (ref 12.3–15.4)
ERYTHROCYTE [DISTWIDTH] IN BLOOD BY AUTOMATED COUNT: 12.9 % (ref 12.3–15.4)
ERYTHROCYTE [DISTWIDTH] IN BLOOD BY AUTOMATED COUNT: 13 % (ref 12.3–15.4)
ERYTHROCYTE [DISTWIDTH] IN BLOOD BY AUTOMATED COUNT: 13.2 % (ref 12.3–15.4)
ERYTHROCYTE [DISTWIDTH] IN BLOOD BY AUTOMATED COUNT: 13.3 % (ref 12.3–15.4)
ERYTHROCYTE [DISTWIDTH] IN BLOOD BY AUTOMATED COUNT: 13.4 % (ref 12.3–15.4)
ERYTHROCYTE [DISTWIDTH] IN BLOOD BY AUTOMATED COUNT: 13.5 % (ref 12.3–15.4)
ERYTHROCYTE [DISTWIDTH] IN BLOOD BY AUTOMATED COUNT: 13.6 % (ref 12.3–15.4)
ERYTHROCYTE [DISTWIDTH] IN BLOOD BY AUTOMATED COUNT: 14.4 % (ref 12.3–15.4)
GFR SERPL CREATININE-BSD FRML MDRD: 100 ML/MIN/1.73
GFR SERPL CREATININE-BSD FRML MDRD: 102 ML/MIN/1.73
GFR SERPL CREATININE-BSD FRML MDRD: 104 ML/MIN/1.73
GFR SERPL CREATININE-BSD FRML MDRD: 108 ML/MIN/1.73
GFR SERPL CREATININE-BSD FRML MDRD: 113 ML/MIN/1.73
GFR SERPL CREATININE-BSD FRML MDRD: 115 ML/MIN/1.73
GFR SERPL CREATININE-BSD FRML MDRD: 120 ML/MIN/1.73
GFR SERPL CREATININE-BSD FRML MDRD: 120 ML/MIN/1.73
GFR SERPL CREATININE-BSD FRML MDRD: 123 ML/MIN/1.73
GFR SERPL CREATININE-BSD FRML MDRD: 132 ML/MIN/1.73
GFR SERPL CREATININE-BSD FRML MDRD: 132 ML/MIN/1.73
GFR SERPL CREATININE-BSD FRML MDRD: 135 ML/MIN/1.73
GFR SERPL CREATININE-BSD FRML MDRD: 67 ML/MIN/1.73
GFR SERPL CREATININE-BSD FRML MDRD: 72 ML/MIN/1.73
GFR SERPL CREATININE-BSD FRML MDRD: 74 ML/MIN/1.73
GFR SERPL CREATININE-BSD FRML MDRD: 82 ML/MIN/1.73
GFR SERPL CREATININE-BSD FRML MDRD: 85 ML/MIN/1.73
GFR SERPL CREATININE-BSD FRML MDRD: 86 ML/MIN/1.73
GFR SERPL CREATININE-BSD FRML MDRD: 93 ML/MIN/1.73
GFR SERPL CREATININE-BSD FRML MDRD: >150 ML/MIN/1.73
GLOBULIN UR ELPH-MCNC: 2.4 GM/DL
GLOBULIN UR ELPH-MCNC: 2.5 GM/DL
GLOBULIN UR ELPH-MCNC: 2.5 GM/DL
GLOBULIN UR ELPH-MCNC: 2.6 GM/DL
GLOBULIN UR ELPH-MCNC: 2.9 GM/DL
GLOBULIN UR ELPH-MCNC: 2.9 GM/DL
GLOBULIN UR ELPH-MCNC: 3.1 GM/DL
GLOBULIN UR ELPH-MCNC: 3.6 GM/DL
GLOBULIN UR ELPH-MCNC: 3.8 GM/DL
GLOBULIN UR ELPH-MCNC: 4 GM/DL
GLUCOSE BLDC GLUCOMTR-MCNC: 101 MG/DL (ref 70–130)
GLUCOSE BLDC GLUCOMTR-MCNC: 110 MG/DL (ref 70–130)
GLUCOSE BLDC GLUCOMTR-MCNC: 117 MG/DL (ref 70–130)
GLUCOSE BLDC GLUCOMTR-MCNC: 122 MG/DL (ref 70–130)
GLUCOSE BLDC GLUCOMTR-MCNC: 122 MG/DL (ref 70–130)
GLUCOSE BLDC GLUCOMTR-MCNC: 125 MG/DL (ref 70–130)
GLUCOSE BLDC GLUCOMTR-MCNC: 129 MG/DL (ref 70–130)
GLUCOSE BLDC GLUCOMTR-MCNC: 132 MG/DL (ref 70–130)
GLUCOSE BLDC GLUCOMTR-MCNC: 139 MG/DL (ref 70–130)
GLUCOSE BLDC GLUCOMTR-MCNC: 140 MG/DL (ref 70–130)
GLUCOSE BLDC GLUCOMTR-MCNC: 142 MG/DL (ref 70–130)
GLUCOSE BLDC GLUCOMTR-MCNC: 143 MG/DL (ref 70–130)
GLUCOSE BLDC GLUCOMTR-MCNC: 144 MG/DL (ref 70–130)
GLUCOSE BLDC GLUCOMTR-MCNC: 146 MG/DL (ref 70–130)
GLUCOSE BLDC GLUCOMTR-MCNC: 149 MG/DL (ref 70–130)
GLUCOSE BLDC GLUCOMTR-MCNC: 150 MG/DL (ref 70–130)
GLUCOSE BLDC GLUCOMTR-MCNC: 152 MG/DL (ref 70–130)
GLUCOSE BLDC GLUCOMTR-MCNC: 153 MG/DL (ref 70–130)
GLUCOSE BLDC GLUCOMTR-MCNC: 154 MG/DL (ref 70–130)
GLUCOSE BLDC GLUCOMTR-MCNC: 155 MG/DL (ref 70–130)
GLUCOSE BLDC GLUCOMTR-MCNC: 160 MG/DL (ref 70–130)
GLUCOSE BLDC GLUCOMTR-MCNC: 88 MG/DL (ref 70–130)
GLUCOSE BLDC GLUCOMTR-MCNC: 89 MG/DL (ref 70–130)
GLUCOSE BLDC GLUCOMTR-MCNC: 90 MG/DL (ref 70–130)
GLUCOSE BLDC GLUCOMTR-MCNC: 91 MG/DL (ref 70–130)
GLUCOSE BLDC GLUCOMTR-MCNC: 96 MG/DL (ref 70–130)
GLUCOSE SERPL-MCNC: 100 MG/DL (ref 65–99)
GLUCOSE SERPL-MCNC: 101 MG/DL (ref 65–99)
GLUCOSE SERPL-MCNC: 105 MG/DL (ref 65–99)
GLUCOSE SERPL-MCNC: 108 MG/DL (ref 65–99)
GLUCOSE SERPL-MCNC: 109 MG/DL (ref 65–99)
GLUCOSE SERPL-MCNC: 110 MG/DL (ref 65–99)
GLUCOSE SERPL-MCNC: 111 MG/DL (ref 65–99)
GLUCOSE SERPL-MCNC: 112 MG/DL (ref 65–99)
GLUCOSE SERPL-MCNC: 117 MG/DL (ref 65–99)
GLUCOSE SERPL-MCNC: 119 MG/DL (ref 65–99)
GLUCOSE SERPL-MCNC: 119 MG/DL (ref 65–99)
GLUCOSE SERPL-MCNC: 121 MG/DL (ref 65–99)
GLUCOSE SERPL-MCNC: 126 MG/DL (ref 65–99)
GLUCOSE SERPL-MCNC: 127 MG/DL (ref 65–99)
GLUCOSE SERPL-MCNC: 132 MG/DL (ref 65–99)
GLUCOSE SERPL-MCNC: 166 MG/DL (ref 65–99)
GLUCOSE SERPL-MCNC: 75 MG/DL (ref 65–99)
GLUCOSE SERPL-MCNC: 79 MG/DL (ref 65–99)
GLUCOSE SERPL-MCNC: 80 MG/DL (ref 65–99)
GLUCOSE SERPL-MCNC: 88 MG/DL (ref 65–99)
GLUCOSE SERPL-MCNC: 93 MG/DL (ref 65–99)
GLUCOSE SERPL-MCNC: 98 MG/DL (ref 65–99)
GLUCOSE SERPL-MCNC: 99 MG/DL (ref 65–99)
GLUCOSE UR STRIP-MCNC: ABNORMAL MG/DL
GLUCOSE UR STRIP-MCNC: NEGATIVE MG/DL
HBA1C MFR BLD: 5.3 % (ref 4.8–5.6)
HCT VFR BLD AUTO: 23.6 % (ref 34–46.6)
HCT VFR BLD AUTO: 23.7 % (ref 34–46.6)
HCT VFR BLD AUTO: 23.7 % (ref 34–46.6)
HCT VFR BLD AUTO: 24 % (ref 34–46.6)
HCT VFR BLD AUTO: 25.4 % (ref 34–46.6)
HCT VFR BLD AUTO: 25.7 % (ref 34–46.6)
HCT VFR BLD AUTO: 25.9 % (ref 34–46.6)
HCT VFR BLD AUTO: 25.9 % (ref 34–46.6)
HCT VFR BLD AUTO: 26.5 % (ref 34–46.6)
HCT VFR BLD AUTO: 26.6 % (ref 34–46.6)
HCT VFR BLD AUTO: 26.6 % (ref 34–46.6)
HCT VFR BLD AUTO: 26.7 % (ref 34–46.6)
HCT VFR BLD AUTO: 26.8 % (ref 34–46.6)
HCT VFR BLD AUTO: 26.9 % (ref 34–46.6)
HCT VFR BLD AUTO: 27.1 % (ref 34–46.6)
HCT VFR BLD AUTO: 27.3 % (ref 34–46.6)
HCT VFR BLD AUTO: 27.6 % (ref 34–46.6)
HCT VFR BLD AUTO: 28.3 % (ref 34–46.6)
HCT VFR BLD AUTO: 28.5 % (ref 34–46.6)
HCT VFR BLD AUTO: 28.7 % (ref 34–46.6)
HCT VFR BLD AUTO: 28.9 % (ref 34–46.6)
HCT VFR BLD AUTO: 29 % (ref 34–46.6)
HCT VFR BLD AUTO: 29.6 % (ref 34–46.6)
HCT VFR BLD AUTO: 30.1 % (ref 34–46.6)
HCT VFR BLD AUTO: 30.7 % (ref 34–46.6)
HCT VFR BLD AUTO: 31.9 % (ref 34–46.6)
HCT VFR BLD AUTO: 32.2 % (ref 34–46.6)
HCT VFR BLD AUTO: 32.6 % (ref 34–46.6)
HCT VFR BLD AUTO: 33.1 % (ref 34–46.6)
HCT VFR BLD AUTO: 33.6 % (ref 34–46.6)
HCT VFR BLD AUTO: 33.9 % (ref 34–46.6)
HCT VFR BLD AUTO: 35.6 % (ref 34–46.6)
HGB BLD-MCNC: 10.1 G/DL (ref 12–15.9)
HGB BLD-MCNC: 10.2 G/DL (ref 12–15.9)
HGB BLD-MCNC: 10.4 G/DL (ref 12–15.9)
HGB BLD-MCNC: 10.5 G/DL (ref 12–15.9)
HGB BLD-MCNC: 10.7 G/DL (ref 12–15.9)
HGB BLD-MCNC: 10.9 G/DL (ref 12–15.9)
HGB BLD-MCNC: 11.2 G/DL (ref 12–15.9)
HGB BLD-MCNC: 7.2 G/DL (ref 12–15.9)
HGB BLD-MCNC: 7.2 G/DL (ref 12–15.9)
HGB BLD-MCNC: 7.5 G/DL (ref 12–15.9)
HGB BLD-MCNC: 7.5 G/DL (ref 12–15.9)
HGB BLD-MCNC: 7.8 G/DL (ref 12–15.9)
HGB BLD-MCNC: 7.8 G/DL (ref 12–15.9)
HGB BLD-MCNC: 7.9 G/DL (ref 12–15.9)
HGB BLD-MCNC: 8 G/DL (ref 12–15.9)
HGB BLD-MCNC: 8.1 G/DL (ref 12–15.9)
HGB BLD-MCNC: 8.2 G/DL (ref 12–15.9)
HGB BLD-MCNC: 8.2 G/DL (ref 12–15.9)
HGB BLD-MCNC: 8.3 G/DL (ref 12–15.9)
HGB BLD-MCNC: 8.3 G/DL (ref 12–15.9)
HGB BLD-MCNC: 8.4 G/DL (ref 12–15.9)
HGB BLD-MCNC: 8.8 G/DL (ref 12–15.9)
HGB BLD-MCNC: 8.9 G/DL (ref 12–15.9)
HGB BLD-MCNC: 8.9 G/DL (ref 12–15.9)
HGB BLD-MCNC: 9 G/DL (ref 12–15.9)
HGB BLD-MCNC: 9.1 G/DL (ref 12–15.9)
HGB BLD-MCNC: 9.2 G/DL (ref 12–15.9)
HGB BLD-MCNC: 9.5 G/DL (ref 12–15.9)
HGB BLD-MCNC: 9.5 G/DL (ref 12–15.9)
HGB BLD-MCNC: 9.7 G/DL (ref 12–15.9)
HGB UR QL STRIP.AUTO: ABNORMAL
HOLD SPECIMEN: NORMAL
HYALINE CASTS UR QL AUTO: ABNORMAL /LPF
IMM GRANULOCYTES # BLD AUTO: 0.02 10*3/MM3 (ref 0–0.05)
IMM GRANULOCYTES # BLD AUTO: 0.04 10*3/MM3 (ref 0–0.05)
IMM GRANULOCYTES # BLD AUTO: 0.05 10*3/MM3 (ref 0–0.05)
IMM GRANULOCYTES # BLD AUTO: 0.06 10*3/MM3 (ref 0–0.05)
IMM GRANULOCYTES # BLD AUTO: 0.06 10*3/MM3 (ref 0–0.05)
IMM GRANULOCYTES # BLD AUTO: 0.07 10*3/MM3 (ref 0–0.05)
IMM GRANULOCYTES # BLD AUTO: 0.07 10*3/MM3 (ref 0–0.05)
IMM GRANULOCYTES # BLD AUTO: 0.08 10*3/MM3 (ref 0–0.05)
IMM GRANULOCYTES NFR BLD AUTO: 0.2 % (ref 0–0.5)
IMM GRANULOCYTES NFR BLD AUTO: 0.2 % (ref 0–0.5)
IMM GRANULOCYTES NFR BLD AUTO: 0.3 % (ref 0–0.5)
IMM GRANULOCYTES NFR BLD AUTO: 0.3 % (ref 0–0.5)
IMM GRANULOCYTES NFR BLD AUTO: 0.4 % (ref 0–0.5)
IMM GRANULOCYTES NFR BLD AUTO: 0.5 % (ref 0–0.5)
IMM GRANULOCYTES NFR BLD AUTO: 0.5 % (ref 0–0.5)
IMM GRANULOCYTES NFR BLD AUTO: 0.6 % (ref 0–0.5)
IMM GRANULOCYTES NFR BLD AUTO: 0.7 % (ref 0–0.5)
IMM GRANULOCYTES NFR BLD AUTO: 0.8 % (ref 0–0.5)
IMM GRANULOCYTES NFR BLD AUTO: 0.9 % (ref 0–0.5)
IMM GRANULOCYTES NFR BLD AUTO: 1.1 % (ref 0–0.5)
INR PPP: 1.02 (ref 0.9–1.1)
INR PPP: 1.12 (ref 0.85–1.16)
KETONES UR QL STRIP: NEGATIVE
KETONES UR QL: ABNORMAL
KETONES UR QL: NEGATIVE
LAB AP CASE REPORT: NORMAL
LEFT ATRIUM VOLUME INDEX: 30.6 ML/M^2
LEFT ATRIUM VOLUME: 52 ML
LEUKOCYTE EST, POC: ABNORMAL
LEUKOCYTE EST, POC: ABNORMAL
LEUKOCYTE ESTERASE UR QL STRIP.AUTO: ABNORMAL
LIPASE SERPL-CCNC: 24 U/L (ref 13–60)
LIPASE SERPL-CCNC: 45 U/L (ref 13–60)
LV EF 2D ECHO EST: 70 %
LYMPHOCYTES # BLD AUTO: 0.83 10*3/MM3 (ref 0.7–3.1)
LYMPHOCYTES # BLD AUTO: 0.91 10*3/MM3 (ref 0.7–3.1)
LYMPHOCYTES # BLD AUTO: 1.02 10*3/MM3 (ref 0.7–3.1)
LYMPHOCYTES # BLD AUTO: 1.04 10*3/MM3 (ref 0.7–3.1)
LYMPHOCYTES # BLD AUTO: 1.07 10*3/MM3 (ref 0.7–3.1)
LYMPHOCYTES # BLD AUTO: 1.08 10*3/MM3 (ref 0.7–3.1)
LYMPHOCYTES # BLD AUTO: 1.16 10*3/MM3 (ref 0.7–3.1)
LYMPHOCYTES # BLD AUTO: 1.21 10*3/MM3 (ref 0.7–3.1)
LYMPHOCYTES # BLD AUTO: 1.22 10*3/MM3 (ref 0.7–3.1)
LYMPHOCYTES # BLD AUTO: 1.24 10*3/MM3 (ref 0.7–3.1)
LYMPHOCYTES # BLD AUTO: 1.29 10*3/MM3 (ref 0.7–3.1)
LYMPHOCYTES # BLD AUTO: 1.31 10*3/MM3 (ref 0.7–3.1)
LYMPHOCYTES # BLD AUTO: 1.34 10*3/MM3 (ref 0.7–3.1)
LYMPHOCYTES # BLD AUTO: 1.42 10*3/MM3 (ref 0.7–3.1)
LYMPHOCYTES # BLD AUTO: 1.68 10*3/MM3 (ref 0.7–3.1)
LYMPHOCYTES # BLD AUTO: 1.98 10*3/MM3 (ref 0.7–3.1)
LYMPHOCYTES # BLD AUTO: 2.19 10*3/MM3 (ref 0.7–3.1)
LYMPHOCYTES # BLD AUTO: 2.3 10*3/MM3 (ref 0.7–3.1)
LYMPHOCYTES NFR BLD AUTO: 11.5 % (ref 19.6–45.3)
LYMPHOCYTES NFR BLD AUTO: 12.7 % (ref 19.6–45.3)
LYMPHOCYTES NFR BLD AUTO: 13.3 % (ref 19.6–45.3)
LYMPHOCYTES NFR BLD AUTO: 14.7 % (ref 19.6–45.3)
LYMPHOCYTES NFR BLD AUTO: 15 % (ref 19.6–45.3)
LYMPHOCYTES NFR BLD AUTO: 15.3 % (ref 19.6–45.3)
LYMPHOCYTES NFR BLD AUTO: 15.7 % (ref 19.6–45.3)
LYMPHOCYTES NFR BLD AUTO: 17.3 % (ref 19.6–45.3)
LYMPHOCYTES NFR BLD AUTO: 18.7 % (ref 19.6–45.3)
LYMPHOCYTES NFR BLD AUTO: 19.1 % (ref 19.6–45.3)
LYMPHOCYTES NFR BLD AUTO: 19.3 % (ref 19.6–45.3)
LYMPHOCYTES NFR BLD AUTO: 19.6 % (ref 19.6–45.3)
LYMPHOCYTES NFR BLD AUTO: 22.2 % (ref 19.6–45.3)
LYMPHOCYTES NFR BLD AUTO: 6.9 % (ref 19.6–45.3)
LYMPHOCYTES NFR BLD AUTO: 8.3 % (ref 19.6–45.3)
LYMPHOCYTES NFR BLD AUTO: 8.8 % (ref 19.6–45.3)
LYMPHOCYTES NFR BLD AUTO: 9 % (ref 19.6–45.3)
LYMPHOCYTES NFR BLD AUTO: 9.4 % (ref 19.6–45.3)
MAGNESIUM SERPL-MCNC: 1.3 MG/DL (ref 1.6–2.4)
MAGNESIUM SERPL-MCNC: 1.4 MG/DL (ref 1.6–2.4)
MAGNESIUM SERPL-MCNC: 1.5 MG/DL (ref 1.6–2.4)
MAGNESIUM SERPL-MCNC: 1.6 MG/DL (ref 1.6–2.4)
MAGNESIUM SERPL-MCNC: 1.7 MG/DL (ref 1.6–2.4)
MAGNESIUM SERPL-MCNC: 1.8 MG/DL (ref 1.6–2.4)
MAGNESIUM SERPL-MCNC: 1.9 MG/DL (ref 1.6–2.4)
MAGNESIUM SERPL-MCNC: 2.4 MG/DL (ref 1.6–2.4)
MAXIMAL PREDICTED HEART RATE: 153 BPM
MCH RBC QN AUTO: 26.1 PG (ref 26.6–33)
MCH RBC QN AUTO: 26.2 PG (ref 26.6–33)
MCH RBC QN AUTO: 26.7 PG (ref 26.6–33)
MCH RBC QN AUTO: 26.9 PG (ref 26.6–33)
MCH RBC QN AUTO: 27.3 PG (ref 26.6–33)
MCH RBC QN AUTO: 27.4 PG (ref 26.6–33)
MCH RBC QN AUTO: 27.9 PG (ref 26.6–33)
MCH RBC QN AUTO: 28.2 PG (ref 26.6–33)
MCH RBC QN AUTO: 28.7 PG (ref 26.6–33)
MCH RBC QN AUTO: 28.8 PG (ref 26.6–33)
MCH RBC QN AUTO: 29.1 PG (ref 26.6–33)
MCH RBC QN AUTO: 29.2 PG (ref 26.6–33)
MCH RBC QN AUTO: 29.2 PG (ref 26.6–33)
MCH RBC QN AUTO: 29.4 PG (ref 26.6–33)
MCH RBC QN AUTO: 29.5 PG (ref 26.6–33)
MCH RBC QN AUTO: 29.7 PG (ref 26.6–33)
MCH RBC QN AUTO: 29.9 PG (ref 26.6–33)
MCH RBC QN AUTO: 30.1 PG (ref 26.6–33)
MCH RBC QN AUTO: 30.5 PG (ref 26.6–33)
MCHC RBC AUTO-ENTMCNC: 30.4 G/DL (ref 31.5–35.7)
MCHC RBC AUTO-ENTMCNC: 30.5 G/DL (ref 31.5–35.7)
MCHC RBC AUTO-ENTMCNC: 30.7 G/DL (ref 31.5–35.7)
MCHC RBC AUTO-ENTMCNC: 30.8 G/DL (ref 31.5–35.7)
MCHC RBC AUTO-ENTMCNC: 30.9 G/DL (ref 31.5–35.7)
MCHC RBC AUTO-ENTMCNC: 31 G/DL (ref 31.5–35.7)
MCHC RBC AUTO-ENTMCNC: 31.3 G/DL (ref 31.5–35.7)
MCHC RBC AUTO-ENTMCNC: 31.3 G/DL (ref 31.5–35.7)
MCHC RBC AUTO-ENTMCNC: 31.4 G/DL (ref 31.5–35.7)
MCHC RBC AUTO-ENTMCNC: 31.5 G/DL (ref 31.5–35.7)
MCHC RBC AUTO-ENTMCNC: 31.6 G/DL (ref 31.5–35.7)
MCHC RBC AUTO-ENTMCNC: 31.7 G/DL (ref 31.5–35.7)
MCHC RBC AUTO-ENTMCNC: 32.1 G/DL (ref 31.5–35.7)
MCHC RBC AUTO-ENTMCNC: 33 G/DL (ref 31.5–35.7)
MCHC RBC AUTO-ENTMCNC: 33.4 G/DL (ref 31.5–35.7)
MCV RBC AUTO: 84.6 FL (ref 79–97)
MCV RBC AUTO: 84.6 FL (ref 79–97)
MCV RBC AUTO: 85.8 FL (ref 79–97)
MCV RBC AUTO: 86 FL (ref 79–97)
MCV RBC AUTO: 86.1 FL (ref 79–97)
MCV RBC AUTO: 86.5 FL (ref 79–97)
MCV RBC AUTO: 90.1 FL (ref 79–97)
MCV RBC AUTO: 90.8 FL (ref 79–97)
MCV RBC AUTO: 91.1 FL (ref 79–97)
MCV RBC AUTO: 91.6 FL (ref 79–97)
MCV RBC AUTO: 91.7 FL (ref 79–97)
MCV RBC AUTO: 92 FL (ref 79–97)
MCV RBC AUTO: 92.6 FL (ref 79–97)
MCV RBC AUTO: 92.6 FL (ref 79–97)
MCV RBC AUTO: 92.9 FL (ref 79–97)
MCV RBC AUTO: 93 FL (ref 79–97)
MCV RBC AUTO: 93.5 FL (ref 79–97)
MCV RBC AUTO: 94.4 FL (ref 79–97)
MCV RBC AUTO: 95.9 FL (ref 79–97)
METHADONE UR QL SCN: NEGATIVE
MONOCYTES # BLD AUTO: 0.15 10*3/MM3 (ref 0.1–0.9)
MONOCYTES # BLD AUTO: 0.56 10*3/MM3 (ref 0.1–0.9)
MONOCYTES # BLD AUTO: 0.63 10*3/MM3 (ref 0.1–0.9)
MONOCYTES # BLD AUTO: 0.66 10*3/MM3 (ref 0.1–0.9)
MONOCYTES # BLD AUTO: 0.67 10*3/MM3 (ref 0.1–0.9)
MONOCYTES # BLD AUTO: 0.67 10*3/MM3 (ref 0.1–0.9)
MONOCYTES # BLD AUTO: 0.68 10*3/MM3 (ref 0.1–0.9)
MONOCYTES # BLD AUTO: 0.69 10*3/MM3 (ref 0.1–0.9)
MONOCYTES # BLD AUTO: 0.72 10*3/MM3 (ref 0.1–0.9)
MONOCYTES # BLD AUTO: 0.72 10*3/MM3 (ref 0.1–0.9)
MONOCYTES # BLD AUTO: 0.79 10*3/MM3 (ref 0.1–0.9)
MONOCYTES # BLD AUTO: 0.81 10*3/MM3 (ref 0.1–0.9)
MONOCYTES # BLD AUTO: 0.9 10*3/MM3 (ref 0.1–0.9)
MONOCYTES # BLD AUTO: 0.92 10*3/MM3 (ref 0.1–0.9)
MONOCYTES # BLD AUTO: 0.93 10*3/MM3 (ref 0.1–0.9)
MONOCYTES # BLD AUTO: 0.97 10*3/MM3 (ref 0.1–0.9)
MONOCYTES # BLD AUTO: 1.01 10*3/MM3 (ref 0.1–0.9)
MONOCYTES # BLD AUTO: 1.02 10*3/MM3 (ref 0.1–0.9)
MONOCYTES NFR BLD AUTO: 10.2 % (ref 5–12)
MONOCYTES NFR BLD AUTO: 11.2 % (ref 5–12)
MONOCYTES NFR BLD AUTO: 12.1 % (ref 5–12)
MONOCYTES NFR BLD AUTO: 2.3 % (ref 5–12)
MONOCYTES NFR BLD AUTO: 5 % (ref 5–12)
MONOCYTES NFR BLD AUTO: 5.7 % (ref 5–12)
MONOCYTES NFR BLD AUTO: 5.7 % (ref 5–12)
MONOCYTES NFR BLD AUTO: 5.9 % (ref 5–12)
MONOCYTES NFR BLD AUTO: 6 % (ref 5–12)
MONOCYTES NFR BLD AUTO: 6.2 % (ref 5–12)
MONOCYTES NFR BLD AUTO: 7.6 % (ref 5–12)
MONOCYTES NFR BLD AUTO: 8.1 % (ref 5–12)
MONOCYTES NFR BLD AUTO: 8.2 % (ref 5–12)
MONOCYTES NFR BLD AUTO: 8.4 % (ref 5–12)
MONOCYTES NFR BLD AUTO: 9.3 % (ref 5–12)
MONOCYTES NFR BLD AUTO: 9.4 % (ref 5–12)
MONOCYTES NFR BLD AUTO: 9.5 % (ref 5–12)
MONOCYTES NFR BLD AUTO: 9.7 % (ref 5–12)
NEUTROPHILS NFR BLD AUTO: 10.16 10*3/MM3 (ref 1.7–7)
NEUTROPHILS NFR BLD AUTO: 10.85 10*3/MM3 (ref 1.7–7)
NEUTROPHILS NFR BLD AUTO: 13.64 10*3/MM3 (ref 1.7–7)
NEUTROPHILS NFR BLD AUTO: 4.04 10*3/MM3 (ref 1.7–7)
NEUTROPHILS NFR BLD AUTO: 4.92 10*3/MM3 (ref 1.7–7)
NEUTROPHILS NFR BLD AUTO: 5.06 10*3/MM3 (ref 1.7–7)
NEUTROPHILS NFR BLD AUTO: 5.17 10*3/MM3 (ref 1.7–7)
NEUTROPHILS NFR BLD AUTO: 5.37 10*3/MM3 (ref 1.7–7)
NEUTROPHILS NFR BLD AUTO: 6.18 10*3/MM3 (ref 1.7–7)
NEUTROPHILS NFR BLD AUTO: 6.4 10*3/MM3 (ref 1.7–7)
NEUTROPHILS NFR BLD AUTO: 6.45 10*3/MM3 (ref 1.7–7)
NEUTROPHILS NFR BLD AUTO: 67.7 % (ref 42.7–76)
NEUTROPHILS NFR BLD AUTO: 68.4 % (ref 42.7–76)
NEUTROPHILS NFR BLD AUTO: 69.4 % (ref 42.7–76)
NEUTROPHILS NFR BLD AUTO: 69.4 % (ref 42.7–76)
NEUTROPHILS NFR BLD AUTO: 7.08 10*3/MM3 (ref 1.7–7)
NEUTROPHILS NFR BLD AUTO: 7.2 10*3/MM3 (ref 1.7–7)
NEUTROPHILS NFR BLD AUTO: 7.7 10*3/MM3 (ref 1.7–7)
NEUTROPHILS NFR BLD AUTO: 70.3 % (ref 42.7–76)
NEUTROPHILS NFR BLD AUTO: 70.5 % (ref 42.7–76)
NEUTROPHILS NFR BLD AUTO: 72.2 % (ref 42.7–76)
NEUTROPHILS NFR BLD AUTO: 72.4 % (ref 42.7–76)
NEUTROPHILS NFR BLD AUTO: 72.8 % (ref 42.7–76)
NEUTROPHILS NFR BLD AUTO: 73.6 % (ref 42.7–76)
NEUTROPHILS NFR BLD AUTO: 74 % (ref 42.7–76)
NEUTROPHILS NFR BLD AUTO: 77 % (ref 42.7–76)
NEUTROPHILS NFR BLD AUTO: 77.1 % (ref 42.7–76)
NEUTROPHILS NFR BLD AUTO: 8.22 10*3/MM3 (ref 1.7–7)
NEUTROPHILS NFR BLD AUTO: 8.32 10*3/MM3 (ref 1.7–7)
NEUTROPHILS NFR BLD AUTO: 80.1 % (ref 42.7–76)
NEUTROPHILS NFR BLD AUTO: 82.2 % (ref 42.7–76)
NEUTROPHILS NFR BLD AUTO: 84.8 % (ref 42.7–76)
NEUTROPHILS NFR BLD AUTO: 84.9 % (ref 42.7–76)
NEUTROPHILS NFR BLD AUTO: 86.2 % (ref 42.7–76)
NEUTROPHILS NFR BLD AUTO: 9.38 10*3/MM3 (ref 1.7–7)
NEUTROPHILS NFR BLD AUTO: 9.56 10*3/MM3 (ref 1.7–7)
NITRITE UR QL STRIP: NEGATIVE
NITRITE UR-MCNC: NEGATIVE MG/ML
NITRITE UR-MCNC: NEGATIVE MG/ML
NRBC BLD AUTO-RTO: 0 /100 WBC (ref 0–0.2)
NT-PROBNP SERPL-MCNC: 123.4 PG/ML (ref 0–900)
OPIATES UR QL: NEGATIVE
OXYCODONE UR QL SCN: NEGATIVE
PATH REPORT.ADDENDUM SPEC: NORMAL
PATH REPORT.FINAL DX SPEC: NORMAL
PCP UR QL SCN: NEGATIVE
PH UR STRIP.AUTO: 5.5 [PH] (ref 5–8)
PH UR STRIP.AUTO: 5.5 [PH] (ref 5–8)
PH UR STRIP.AUTO: 7.5 [PH] (ref 5–8)
PH UR: 6 [PH] (ref 5–8)
PH UR: 6 [PH] (ref 5–8)
PHOSPHATE SERPL-MCNC: 2.3 MG/DL (ref 2.5–4.5)
PHOSPHATE SERPL-MCNC: 2.4 MG/DL (ref 2.5–4.5)
PHOSPHATE SERPL-MCNC: 2.4 MG/DL (ref 2.5–4.5)
PHOSPHATE SERPL-MCNC: 2.8 MG/DL (ref 2.5–4.5)
PHOSPHATE SERPL-MCNC: 3 MG/DL (ref 2.5–4.5)
PHOSPHATE SERPL-MCNC: 3 MG/DL (ref 2.5–4.5)
PHOSPHATE SERPL-MCNC: 3.1 MG/DL (ref 2.5–4.5)
PHOSPHATE SERPL-MCNC: 3.3 MG/DL (ref 2.5–4.5)
PHOSPHATE SERPL-MCNC: 3.3 MG/DL (ref 2.5–4.5)
PHOSPHATE SERPL-MCNC: 3.5 MG/DL (ref 2.5–4.5)
PHOSPHATE SERPL-MCNC: 3.9 MG/DL (ref 2.5–4.5)
PHOSPHATE SERPL-MCNC: 4.1 MG/DL (ref 2.5–4.5)
PLATELET # BLD AUTO: 231 10*3/MM3 (ref 140–450)
PLATELET # BLD AUTO: 252 10*3/MM3 (ref 140–450)
PLATELET # BLD AUTO: 261 10*3/MM3 (ref 140–450)
PLATELET # BLD AUTO: 263 10*3/MM3 (ref 140–450)
PLATELET # BLD AUTO: 264 10*3/MM3 (ref 140–450)
PLATELET # BLD AUTO: 269 10*3/MM3 (ref 140–450)
PLATELET # BLD AUTO: 277 10*3/MM3 (ref 140–450)
PLATELET # BLD AUTO: 286 10*3/MM3 (ref 140–450)
PLATELET # BLD AUTO: 294 10*3/MM3 (ref 140–450)
PLATELET # BLD AUTO: 297 10*3/MM3 (ref 140–450)
PLATELET # BLD AUTO: 311 10*3/MM3 (ref 140–450)
PLATELET # BLD AUTO: 319 10*3/MM3 (ref 140–450)
PLATELET # BLD AUTO: 335 10*3/MM3 (ref 140–450)
PLATELET # BLD AUTO: 352 10*3/MM3 (ref 140–450)
PLATELET # BLD AUTO: 356 10*3/MM3 (ref 140–450)
PLATELET # BLD AUTO: 360 10*3/MM3 (ref 140–450)
PLATELET # BLD AUTO: 378 10*3/MM3 (ref 140–450)
PLATELET # BLD AUTO: 383 10*3/MM3 (ref 140–450)
PLATELET # BLD AUTO: 384 10*3/MM3 (ref 140–450)
PLATELET # BLD AUTO: 500 10*3/MM3 (ref 140–450)
PLATELET # BLD AUTO: 545 10*3/MM3 (ref 140–450)
PMV BLD AUTO: 10.4 FL (ref 6–12)
PMV BLD AUTO: 10.5 FL (ref 6–12)
PMV BLD AUTO: 10.5 FL (ref 6–12)
PMV BLD AUTO: 10.6 FL (ref 6–12)
PMV BLD AUTO: 10.6 FL (ref 6–12)
PMV BLD AUTO: 10.8 FL (ref 6–12)
PMV BLD AUTO: 10.9 FL (ref 6–12)
PMV BLD AUTO: 10.9 FL (ref 6–12)
PMV BLD AUTO: 11 FL (ref 6–12)
PMV BLD AUTO: 11.1 FL (ref 6–12)
PMV BLD AUTO: 11.2 FL (ref 6–12)
PMV BLD AUTO: 11.3 FL (ref 6–12)
PMV BLD AUTO: 11.4 FL (ref 6–12)
PMV BLD AUTO: 11.8 FL (ref 6–12)
PMV BLD AUTO: 8.1 FL (ref 6–12)
POTASSIUM SERPL-SCNC: 2.6 MMOL/L (ref 3.5–5.2)
POTASSIUM SERPL-SCNC: 2.6 MMOL/L (ref 3.5–5.2)
POTASSIUM SERPL-SCNC: 2.8 MMOL/L (ref 3.5–5.2)
POTASSIUM SERPL-SCNC: 2.8 MMOL/L (ref 3.5–5.2)
POTASSIUM SERPL-SCNC: 3 MMOL/L (ref 3.5–5.2)
POTASSIUM SERPL-SCNC: 3 MMOL/L (ref 3.5–5.2)
POTASSIUM SERPL-SCNC: 3.1 MMOL/L (ref 3.5–5.2)
POTASSIUM SERPL-SCNC: 3.3 MMOL/L (ref 3.5–5.2)
POTASSIUM SERPL-SCNC: 3.3 MMOL/L (ref 3.5–5.2)
POTASSIUM SERPL-SCNC: 3.5 MMOL/L (ref 3.5–5.2)
POTASSIUM SERPL-SCNC: 3.5 MMOL/L (ref 3.5–5.2)
POTASSIUM SERPL-SCNC: 3.6 MMOL/L (ref 3.5–5.2)
POTASSIUM SERPL-SCNC: 3.6 MMOL/L (ref 3.5–5.2)
POTASSIUM SERPL-SCNC: 3.7 MMOL/L (ref 3.5–5.2)
POTASSIUM SERPL-SCNC: 3.8 MMOL/L (ref 3.5–5.2)
POTASSIUM SERPL-SCNC: 3.8 MMOL/L (ref 3.5–5.2)
POTASSIUM SERPL-SCNC: 3.9 MMOL/L (ref 3.5–5.2)
POTASSIUM SERPL-SCNC: 4 MMOL/L (ref 3.5–5.2)
POTASSIUM SERPL-SCNC: 4.1 MMOL/L (ref 3.5–5.2)
POTASSIUM SERPL-SCNC: 4.2 MMOL/L (ref 3.5–5.2)
POTASSIUM SERPL-SCNC: 4.2 MMOL/L (ref 3.5–5.2)
POTASSIUM SERPL-SCNC: 4.4 MMOL/L (ref 3.5–5.2)
POTASSIUM SERPL-SCNC: 4.4 MMOL/L (ref 3.5–5.2)
PREALB SERPL-MCNC: 11.2 MG/DL (ref 20–40)
PREALB SERPL-MCNC: 4.8 MG/DL (ref 20–40)
PROCALCITONIN SERPL-MCNC: 0.12 NG/ML (ref 0–0.25)
PROPOXYPH UR QL: NEGATIVE
PROT SERPL-MCNC: 4.8 G/DL (ref 6–8.5)
PROT SERPL-MCNC: 5.1 G/DL (ref 6–8.5)
PROT SERPL-MCNC: 5.1 G/DL (ref 6–8.5)
PROT SERPL-MCNC: 5.5 G/DL (ref 6–8.5)
PROT SERPL-MCNC: 6.8 G/DL (ref 6–8.5)
PROT SERPL-MCNC: 6.8 G/DL (ref 6–8.5)
PROT SERPL-MCNC: 7 G/DL (ref 6–8.5)
PROT SERPL-MCNC: 7.1 G/DL (ref 6–8.5)
PROT SERPL-MCNC: 7.7 G/DL (ref 6–8.5)
PROT SERPL-MCNC: 7.9 G/DL (ref 6–8.5)
PROT UR QL STRIP: ABNORMAL
PROT UR STRIP-MCNC: ABNORMAL MG/DL
PROT UR STRIP-MCNC: ABNORMAL MG/DL
PROTHROMBIN TIME: 13.8 SECONDS (ref 12–15.1)
PROTHROMBIN TIME: 14.2 SECONDS (ref 11.5–14)
QT INTERVAL: 336 MS
QTC INTERVAL: 428 MS
RBC # BLD AUTO: 2.68 10*6/MM3 (ref 3.77–5.28)
RBC # BLD AUTO: 2.75 10*6/MM3 (ref 3.77–5.28)
RBC # BLD AUTO: 2.89 10*6/MM3 (ref 3.77–5.28)
RBC # BLD AUTO: 2.89 10*6/MM3 (ref 3.77–5.28)
RBC # BLD AUTO: 2.94 10*6/MM3 (ref 3.77–5.28)
RBC # BLD AUTO: 2.98 10*6/MM3 (ref 3.77–5.28)
RBC # BLD AUTO: 3.01 10*6/MM3 (ref 3.77–5.28)
RBC # BLD AUTO: 3.09 10*6/MM3 (ref 3.77–5.28)
RBC # BLD AUTO: 3.09 10*6/MM3 (ref 3.77–5.28)
RBC # BLD AUTO: 3.1 10*6/MM3 (ref 3.77–5.28)
RBC # BLD AUTO: 3.22 10*6/MM3 (ref 3.77–5.28)
RBC # BLD AUTO: 3.23 10*6/MM3 (ref 3.77–5.28)
RBC # BLD AUTO: 3.37 10*6/MM3 (ref 3.77–5.28)
RBC # BLD AUTO: 3.38 10*6/MM3 (ref 3.77–5.28)
RBC # BLD AUTO: 3.62 10*6/MM3 (ref 3.77–5.28)
RBC # BLD AUTO: 3.63 10*6/MM3 (ref 3.77–5.28)
RBC # BLD AUTO: 3.66 10*6/MM3 (ref 3.77–5.28)
RBC # BLD AUTO: 3.69 10*6/MM3 (ref 3.77–5.28)
RBC # BLD AUTO: 3.74 10*6/MM3 (ref 3.77–5.28)
RBC # BLD AUTO: 3.82 10*6/MM3 (ref 3.77–5.28)
RBC # BLD AUTO: 3.83 10*6/MM3 (ref 3.77–5.28)
RBC # UR STRIP: ABNORMAL /UL
RBC # UR STRIP: ABNORMAL /UL
RBC # UR: ABNORMAL /HPF
REF LAB TEST METHOD: ABNORMAL
RENAL EPI CELLS #/AREA URNS HPF: ABNORMAL /HPF
RH BLD: POSITIVE
SARS-COV-2 RDRP RESP QL NAA+PROBE: NOT DETECTED
SARS-COV-2 RNA RESP QL NAA+PROBE: NOT DETECTED
SODIUM SERPL-SCNC: 131 MMOL/L (ref 136–145)
SODIUM SERPL-SCNC: 133 MMOL/L (ref 136–145)
SODIUM SERPL-SCNC: 134 MMOL/L (ref 136–145)
SODIUM SERPL-SCNC: 134 MMOL/L (ref 136–145)
SODIUM SERPL-SCNC: 135 MMOL/L (ref 136–145)
SODIUM SERPL-SCNC: 135 MMOL/L (ref 136–145)
SODIUM SERPL-SCNC: 136 MMOL/L (ref 136–145)
SODIUM SERPL-SCNC: 137 MMOL/L (ref 136–145)
SODIUM SERPL-SCNC: 138 MMOL/L (ref 136–145)
SODIUM SERPL-SCNC: 139 MMOL/L (ref 136–145)
SODIUM SERPL-SCNC: 140 MMOL/L (ref 136–145)
SODIUM SERPL-SCNC: 142 MMOL/L (ref 136–145)
SP GR UR STRIP: 1.01 (ref 1–1.03)
SP GR UR STRIP: 1.02 (ref 1–1.03)
SP GR UR STRIP: 1.02 (ref 1–1.03)
SP GR UR: 1.01 (ref 1–1.03)
SP GR UR: 1.03 (ref 1–1.03)
SPECIMEN STATUS: NORMAL
SQUAMOUS #/AREA URNS HPF: ABNORMAL /HPF
STRESS TARGET HR: 130 BPM
T&S EXPIRATION DATE: NORMAL
T&S EXPIRATION DATE: NORMAL
T4 FREE SERPL-MCNC: 1.44 NG/DL (ref 0.93–1.7)
TRICYCLICS UR QL SCN: NEGATIVE
TRIGL SERPL-MCNC: 110 MG/DL (ref 0–150)
TROPONIN T SERPL-MCNC: <0.01 NG/ML (ref 0–0.03)
TSH SERPL DL<=0.05 MIU/L-ACNC: 1.06 UIU/ML (ref 0.27–4.2)
UNIT  ABO: NORMAL
UNIT  ABO: NORMAL
UNIT  RH: NORMAL
UNIT  RH: NORMAL
UROBILINOGEN UR QL STRIP: ABNORMAL
UROBILINOGEN UR QL: NORMAL
UROBILINOGEN UR QL: NORMAL
VANCOMYCIN TROUGH SERPL-MCNC: 9.9 MCG/ML (ref 5–20)
WBC # BLD AUTO: 10.4 10*3/MM3 (ref 3.4–10.8)
WBC # BLD AUTO: 10.58 10*3/MM3 (ref 3.4–10.8)
WBC # BLD AUTO: 10.79 10*3/MM3 (ref 3.4–10.8)
WBC # BLD AUTO: 11.37 10*3/MM3 (ref 3.4–10.8)
WBC # BLD AUTO: 11.43 10*3/MM3 (ref 3.4–10.8)
WBC # BLD AUTO: 11.67 10*3/MM3 (ref 3.4–10.8)
WBC # BLD AUTO: 11.93 10*3/MM3 (ref 3.4–10.8)
WBC # BLD AUTO: 11.96 10*3/MM3 (ref 3.4–10.8)
WBC # BLD AUTO: 12.58 10*3/MM3 (ref 3.4–10.8)
WBC # BLD AUTO: 16.09 10*3/MM3 (ref 3.4–10.8)
WBC # BLD AUTO: 3.92 10*3/MM3 (ref 3.4–10.8)
WBC # BLD AUTO: 5.7 10*3/MM3 (ref 3.4–10.8)
WBC # BLD AUTO: 5.96 10*3/MM3 (ref 3.4–10.8)
WBC # BLD AUTO: 6.57 10*3/MM3 (ref 3.4–10.8)
WBC # BLD AUTO: 6.8 10*3/MM3 (ref 3.4–10.8)
WBC # BLD AUTO: 7.56 10*3/MM3 (ref 3.4–10.8)
WBC # BLD AUTO: 7.73 10*3/MM3 (ref 3.4–10.8)
WBC # BLD AUTO: 8.72 10*3/MM3 (ref 3.4–10.8)
WBC # BLD AUTO: 8.79 10*3/MM3 (ref 3.4–10.8)
WBC # BLD AUTO: 9.09 10*3/MM3 (ref 3.4–10.8)
WBC # BLD AUTO: 9.62 10*3/MM3 (ref 3.4–10.8)
WBC UR QL AUTO: ABNORMAL /HPF
WHOLE BLOOD HOLD SPECIMEN: NORMAL

## 2020-01-01 PROCEDURE — 99233 SBSQ HOSP IP/OBS HIGH 50: CPT | Performed by: INTERNAL MEDICINE

## 2020-01-01 PROCEDURE — 85025 COMPLETE CBC W/AUTO DIFF WBC: CPT | Performed by: EMERGENCY MEDICINE

## 2020-01-01 PROCEDURE — 85027 COMPLETE CBC AUTOMATED: CPT | Performed by: NURSE PRACTITIONER

## 2020-01-01 PROCEDURE — 83690 ASSAY OF LIPASE: CPT | Performed by: EMERGENCY MEDICINE

## 2020-01-01 PROCEDURE — 87040 BLOOD CULTURE FOR BACTERIA: CPT | Performed by: FAMILY MEDICINE

## 2020-01-01 PROCEDURE — 96374 THER/PROPH/DIAG INJ IV PUSH: CPT

## 2020-01-01 PROCEDURE — 85014 HEMATOCRIT: CPT | Performed by: INTERNAL MEDICINE

## 2020-01-01 PROCEDURE — P9041 ALBUMIN (HUMAN),5%, 50ML: HCPCS | Performed by: NURSE ANESTHETIST, CERTIFIED REGISTERED

## 2020-01-01 PROCEDURE — 97530 THERAPEUTIC ACTIVITIES: CPT

## 2020-01-01 PROCEDURE — 44139 MOBILIZATION OF COLON: CPT | Performed by: OBSTETRICS & GYNECOLOGY

## 2020-01-01 PROCEDURE — 83735 ASSAY OF MAGNESIUM: CPT | Performed by: INTERNAL MEDICINE

## 2020-01-01 PROCEDURE — 82330 ASSAY OF CALCIUM: CPT | Performed by: INTERNAL MEDICINE

## 2020-01-01 PROCEDURE — 82962 GLUCOSE BLOOD TEST: CPT

## 2020-01-01 PROCEDURE — 83735 ASSAY OF MAGNESIUM: CPT | Performed by: STUDENT IN AN ORGANIZED HEALTH CARE EDUCATION/TRAINING PROGRAM

## 2020-01-01 PROCEDURE — 25010000003 POTASSIUM CHLORIDE 10 MEQ/100ML SOLUTION: Performed by: NURSE PRACTITIONER

## 2020-01-01 PROCEDURE — 87086 URINE CULTURE/COLONY COUNT: CPT | Performed by: NURSE PRACTITIONER

## 2020-01-01 PROCEDURE — 93010 ELECTROCARDIOGRAM REPORT: CPT | Performed by: INTERNAL MEDICINE

## 2020-01-01 PROCEDURE — 99214 OFFICE O/P EST MOD 30 MIN: CPT | Performed by: INTERNAL MEDICINE

## 2020-01-01 PROCEDURE — 80053 COMPREHEN METABOLIC PANEL: CPT | Performed by: NURSE PRACTITIONER

## 2020-01-01 PROCEDURE — 99232 SBSQ HOSP IP/OBS MODERATE 35: CPT | Performed by: INTERNAL MEDICINE

## 2020-01-01 PROCEDURE — 25010000002 SUCCINYLCHOLINE PER 20 MG: Performed by: NURSE ANESTHETIST, CERTIFIED REGISTERED

## 2020-01-01 PROCEDURE — 97110 THERAPEUTIC EXERCISES: CPT

## 2020-01-01 PROCEDURE — 85025 COMPLETE CBC W/AUTO DIFF WBC: CPT

## 2020-01-01 PROCEDURE — 88305 TISSUE EXAM BY PATHOLOGIST: CPT | Performed by: SURGERY

## 2020-01-01 PROCEDURE — 97535 SELF CARE MNGMENT TRAINING: CPT | Performed by: OCCUPATIONAL THERAPIST

## 2020-01-01 PROCEDURE — 25010000002 FUROSEMIDE PER 20 MG: Performed by: OBSTETRICS & GYNECOLOGY

## 2020-01-01 PROCEDURE — 25010000002 IOPAMIDOL 61 % SOLUTION: Performed by: EMERGENCY MEDICINE

## 2020-01-01 PROCEDURE — 25010000002 FUROSEMIDE PER 20 MG: Performed by: INTERNAL MEDICINE

## 2020-01-01 PROCEDURE — U0004 COV-19 TEST NON-CDC HGH THRU: HCPCS | Performed by: INTERNAL MEDICINE

## 2020-01-01 PROCEDURE — 51798 US URINE CAPACITY MEASURE: CPT | Performed by: UROLOGY

## 2020-01-01 PROCEDURE — 94799 UNLISTED PULMONARY SVC/PX: CPT

## 2020-01-01 PROCEDURE — 97110 THERAPEUTIC EXERCISES: CPT | Performed by: PHYSICAL THERAPIST

## 2020-01-01 PROCEDURE — 99284 EMERGENCY DEPT VISIT MOD MDM: CPT

## 2020-01-01 PROCEDURE — 84132 ASSAY OF SERUM POTASSIUM: CPT | Performed by: INTERNAL MEDICINE

## 2020-01-01 PROCEDURE — 25010000002 PIPERACILLIN SOD-TAZOBACTAM PER 1 G: Performed by: INTERNAL MEDICINE

## 2020-01-01 PROCEDURE — 97116 GAIT TRAINING THERAPY: CPT

## 2020-01-01 PROCEDURE — 80053 COMPREHEN METABOLIC PANEL: CPT | Performed by: INTERNAL MEDICINE

## 2020-01-01 PROCEDURE — 25010000002 METHYLNALTREXONE 12 MG/0.6ML SOLUTION: Performed by: INTERNAL MEDICINE

## 2020-01-01 PROCEDURE — 85025 COMPLETE CBC W/AUTO DIFF WBC: CPT | Performed by: STUDENT IN AN ORGANIZED HEALTH CARE EDUCATION/TRAINING PROGRAM

## 2020-01-01 PROCEDURE — 76000 FLUOROSCOPY <1 HR PHYS/QHP: CPT

## 2020-01-01 PROCEDURE — 25010000002 METOCLOPRAMIDE PER 10 MG: Performed by: INTERNAL MEDICINE

## 2020-01-01 PROCEDURE — 99283 EMERGENCY DEPT VISIT LOW MDM: CPT

## 2020-01-01 PROCEDURE — 96375 TX/PRO/DX INJ NEW DRUG ADDON: CPT

## 2020-01-01 PROCEDURE — 25010000002 VANCOMYCIN PER 500 MG

## 2020-01-01 PROCEDURE — 77412 RADIATION TX DELIVERY LVL 3: CPT | Performed by: RADIOLOGY

## 2020-01-01 PROCEDURE — 83735 ASSAY OF MAGNESIUM: CPT | Performed by: NURSE PRACTITIONER

## 2020-01-01 PROCEDURE — 80048 BASIC METABOLIC PNL TOTAL CA: CPT | Performed by: OBSTETRICS & GYNECOLOGY

## 2020-01-01 PROCEDURE — G0180 MD CERTIFICATION HHA PATIENT: HCPCS | Performed by: NURSE PRACTITIONER

## 2020-01-01 PROCEDURE — 99213 OFFICE O/P EST LOW 20 MIN: CPT | Performed by: SURGERY

## 2020-01-01 PROCEDURE — 71045 X-RAY EXAM CHEST 1 VIEW: CPT

## 2020-01-01 PROCEDURE — 85610 PROTHROMBIN TIME: CPT | Performed by: INTERNAL MEDICINE

## 2020-01-01 PROCEDURE — 25010000002 DIPHENHYDRAMINE PER 50 MG: Performed by: NURSE PRACTITIONER

## 2020-01-01 PROCEDURE — 80048 BASIC METABOLIC PNL TOTAL CA: CPT | Performed by: NURSE PRACTITIONER

## 2020-01-01 PROCEDURE — 88341 IMHCHEM/IMCYTCHM EA ADD ANTB: CPT | Performed by: SURGERY

## 2020-01-01 PROCEDURE — 25010000002 CALCIUM GLUCONATE PER 10 ML

## 2020-01-01 PROCEDURE — 83605 ASSAY OF LACTIC ACID: CPT | Performed by: NURSE PRACTITIONER

## 2020-01-01 PROCEDURE — 25010000003 MAGNESIUM SULFATE 4 GM/100ML SOLUTION: Performed by: OBSTETRICS & GYNECOLOGY

## 2020-01-01 PROCEDURE — 25010000002 ENOXAPARIN PER 10 MG: Performed by: OBSTETRICS & GYNECOLOGY

## 2020-01-01 PROCEDURE — 25010000002 CARBOPLATIN PER 50 MG: Performed by: INTERNAL MEDICINE

## 2020-01-01 PROCEDURE — 25010000002 PIPERACILLIN SOD-TAZOBACTAM PER 1 G: Performed by: FAMILY MEDICINE

## 2020-01-01 PROCEDURE — 82330 ASSAY OF CALCIUM: CPT

## 2020-01-01 PROCEDURE — 80053 COMPREHEN METABOLIC PANEL: CPT

## 2020-01-01 PROCEDURE — 96367 TX/PROPH/DG ADDL SEQ IV INF: CPT

## 2020-01-01 PROCEDURE — 85018 HEMOGLOBIN: CPT | Performed by: INTERNAL MEDICINE

## 2020-01-01 PROCEDURE — 80069 RENAL FUNCTION PANEL: CPT | Performed by: INTERNAL MEDICINE

## 2020-01-01 PROCEDURE — 25010000002 HEPARIN (PORCINE) PER 1000 UNITS: Performed by: SURGERY

## 2020-01-01 PROCEDURE — 80048 BASIC METABOLIC PNL TOTAL CA: CPT | Performed by: INTERNAL MEDICINE

## 2020-01-01 PROCEDURE — C1894 INTRO/SHEATH, NON-LASER: HCPCS

## 2020-01-01 PROCEDURE — 96368 THER/DIAG CONCURRENT INF: CPT

## 2020-01-01 PROCEDURE — 80048 BASIC METABOLIC PNL TOTAL CA: CPT

## 2020-01-01 PROCEDURE — 85025 COMPLETE CBC W/AUTO DIFF WBC: CPT | Performed by: INTERNAL MEDICINE

## 2020-01-01 PROCEDURE — 84100 ASSAY OF PHOSPHORUS: CPT | Performed by: INTERNAL MEDICINE

## 2020-01-01 PROCEDURE — 25010000002 METOCLOPRAMIDE PER 10 MG: Performed by: PHYSICIAN ASSISTANT

## 2020-01-01 PROCEDURE — 25010000002 FENTANYL CITRATE (PF) 100 MCG/2ML SOLUTION: Performed by: NURSE ANESTHETIST, CERTIFIED REGISTERED

## 2020-01-01 PROCEDURE — 86304 IMMUNOASSAY TUMOR CA 125: CPT

## 2020-01-01 PROCEDURE — 77300 RADIATION THERAPY DOSE PLAN: CPT | Performed by: RADIOLOGY

## 2020-01-01 PROCEDURE — 99223 1ST HOSP IP/OBS HIGH 75: CPT | Performed by: FAMILY MEDICINE

## 2020-01-01 PROCEDURE — 77417 THER RADIOLOGY PORT IMAGE(S): CPT | Performed by: RADIOLOGY

## 2020-01-01 PROCEDURE — 25010000002 MAGNESIUM SULFATE PER 500 MG OF MAGNESIUM

## 2020-01-01 PROCEDURE — 85610 PROTHROMBIN TIME: CPT | Performed by: EMERGENCY MEDICINE

## 2020-01-01 PROCEDURE — 44141 PARTIAL REMOVAL OF COLON: CPT | Performed by: OBSTETRICS & GYNECOLOGY

## 2020-01-01 PROCEDURE — 25010000002 ONDANSETRON PER 1 MG: Performed by: EMERGENCY MEDICINE

## 2020-01-01 PROCEDURE — 86923 COMPATIBILITY TEST ELECTRIC: CPT

## 2020-01-01 PROCEDURE — 85027 COMPLETE CBC AUTOMATED: CPT | Performed by: INTERNAL MEDICINE

## 2020-01-01 PROCEDURE — 80048 BASIC METABOLIC PNL TOTAL CA: CPT | Performed by: ANESTHESIOLOGY

## 2020-01-01 PROCEDURE — 25010000002 PALONOSETRON 0.25 MG/5ML SOLUTION PREFILLED SYRINGE: Performed by: INTERNAL MEDICINE

## 2020-01-01 PROCEDURE — 82378 CARCINOEMBRYONIC ANTIGEN: CPT

## 2020-01-01 PROCEDURE — 99222 1ST HOSP IP/OBS MODERATE 55: CPT | Performed by: INTERNAL MEDICINE

## 2020-01-01 PROCEDURE — 86850 RBC ANTIBODY SCREEN: CPT | Performed by: OBSTETRICS & GYNECOLOGY

## 2020-01-01 PROCEDURE — 25010000002 ONDANSETRON PER 1 MG: Performed by: NURSE PRACTITIONER

## 2020-01-01 PROCEDURE — 25010000002 HYDROMORPHONE PER 4 MG: Performed by: INTERNAL MEDICINE

## 2020-01-01 PROCEDURE — 25010000003 MAGNESIUM SULFATE 4 GM/100ML SOLUTION: Performed by: INTERNAL MEDICINE

## 2020-01-01 PROCEDURE — 36415 COLL VENOUS BLD VENIPUNCTURE: CPT

## 2020-01-01 PROCEDURE — 25010000002 MAGNESIUM SULFATE 2 GM/50ML SOLUTION: Performed by: OBSTETRICS & GYNECOLOGY

## 2020-01-01 PROCEDURE — 77295 3-D RADIOTHERAPY PLAN: CPT | Performed by: RADIOLOGY

## 2020-01-01 PROCEDURE — 74177 CT ABD & PELVIS W/CONTRAST: CPT

## 2020-01-01 PROCEDURE — 84100 ASSAY OF PHOSPHORUS: CPT | Performed by: STUDENT IN AN ORGANIZED HEALTH CARE EDUCATION/TRAINING PROGRAM

## 2020-01-01 PROCEDURE — 86140 C-REACTIVE PROTEIN: CPT

## 2020-01-01 PROCEDURE — 25010000002 ENOXAPARIN PER 10 MG: Performed by: INTERNAL MEDICINE

## 2020-01-01 PROCEDURE — 84134 ASSAY OF PREALBUMIN: CPT

## 2020-01-01 PROCEDURE — 99232 SBSQ HOSP IP/OBS MODERATE 35: CPT | Performed by: NURSE PRACTITIONER

## 2020-01-01 PROCEDURE — 84132 ASSAY OF SERUM POTASSIUM: CPT | Performed by: OBSTETRICS & GYNECOLOGY

## 2020-01-01 PROCEDURE — 99204 OFFICE O/P NEW MOD 45 MIN: CPT | Performed by: SURGERY

## 2020-01-01 PROCEDURE — U0004 COV-19 TEST NON-CDC HGH THRU: HCPCS

## 2020-01-01 PROCEDURE — 25010000003 LIDOCAINE 1 % SOLUTION: Performed by: SURGERY

## 2020-01-01 PROCEDURE — 25010000002 ALBUMIN HUMAN 5% PER 50 ML: Performed by: NURSE ANESTHETIST, CERTIFIED REGISTERED

## 2020-01-01 PROCEDURE — 94640 AIRWAY INHALATION TREATMENT: CPT

## 2020-01-01 PROCEDURE — 84100 ASSAY OF PHOSPHORUS: CPT

## 2020-01-01 PROCEDURE — 93005 ELECTROCARDIOGRAM TRACING: CPT | Performed by: NURSE PRACTITIONER

## 2020-01-01 PROCEDURE — 25010000002 INFLUENZA VAC SPLIT QUAD 0.5 ML SUSPENSION PREFILLED SYRINGE: Performed by: SURGERY

## 2020-01-01 PROCEDURE — 81003 URINALYSIS AUTO W/O SCOPE: CPT | Performed by: UROLOGY

## 2020-01-01 PROCEDURE — 25010000002 MAGNESIUM SULFATE 2 GM/50ML SOLUTION: Performed by: INTERNAL MEDICINE

## 2020-01-01 PROCEDURE — 99223 1ST HOSP IP/OBS HIGH 75: CPT | Performed by: OBSTETRICS & GYNECOLOGY

## 2020-01-01 PROCEDURE — 74018 RADEX ABDOMEN 1 VIEW: CPT

## 2020-01-01 PROCEDURE — 80048 BASIC METABOLIC PNL TOTAL CA: CPT | Performed by: STUDENT IN AN ORGANIZED HEALTH CARE EDUCATION/TRAINING PROGRAM

## 2020-01-01 PROCEDURE — 97162 PT EVAL MOD COMPLEX 30 MIN: CPT

## 2020-01-01 PROCEDURE — 97535 SELF CARE MNGMENT TRAINING: CPT

## 2020-01-01 PROCEDURE — 86900 BLOOD TYPING SEROLOGIC ABO: CPT

## 2020-01-01 PROCEDURE — 25010000002 HYDROMORPHONE PER 4 MG: Performed by: NURSE PRACTITIONER

## 2020-01-01 PROCEDURE — 25010000002 IOPAMIDOL 61 % SOLUTION: Performed by: FAMILY MEDICINE

## 2020-01-01 PROCEDURE — 25010000003 POTASSIUM CHLORIDE 10 MEQ/100ML SOLUTION: Performed by: INTERNAL MEDICINE

## 2020-01-01 PROCEDURE — 99239 HOSP IP/OBS DSCHRG MGMT >30: CPT | Performed by: INTERNAL MEDICINE

## 2020-01-01 PROCEDURE — C9803 HOPD COVID-19 SPEC COLLECT: HCPCS

## 2020-01-01 PROCEDURE — 25010000002 POTASSIUM CHLORIDE PER 2 MEQ OF POTASSIUM

## 2020-01-01 PROCEDURE — 86900 BLOOD TYPING SEROLOGIC ABO: CPT | Performed by: EMERGENCY MEDICINE

## 2020-01-01 PROCEDURE — 84443 ASSAY THYROID STIM HORMONE: CPT | Performed by: NURSE PRACTITIONER

## 2020-01-01 PROCEDURE — 25010000002 ALBUMIN HUMAN 5% PER 50 ML: Performed by: INTERNAL MEDICINE

## 2020-01-01 PROCEDURE — 25010000002 IOPAMIDOL 61 % SOLUTION: Performed by: INTERNAL MEDICINE

## 2020-01-01 PROCEDURE — 25010000003 POTASSIUM CHLORIDE 10 MEQ/100ML SOLUTION: Performed by: OBSTETRICS & GYNECOLOGY

## 2020-01-01 PROCEDURE — C1788 PORT, INDWELLING, IMP: HCPCS | Performed by: SURGERY

## 2020-01-01 PROCEDURE — 87635 SARS-COV-2 COVID-19 AMP PRB: CPT | Performed by: UROLOGY

## 2020-01-01 PROCEDURE — 74270 X-RAY XM COLON 1CNTRST STD: CPT

## 2020-01-01 PROCEDURE — 77001 FLUOROGUIDE FOR VEIN DEVICE: CPT | Performed by: SURGERY

## 2020-01-01 PROCEDURE — 83735 ASSAY OF MAGNESIUM: CPT | Performed by: FAMILY MEDICINE

## 2020-01-01 PROCEDURE — 86140 C-REACTIVE PROTEIN: CPT | Performed by: INTERNAL MEDICINE

## 2020-01-01 PROCEDURE — P9047 ALBUMIN (HUMAN), 25%, 50ML: HCPCS | Performed by: INTERNAL MEDICINE

## 2020-01-01 PROCEDURE — 25010000002 FOSAPREPITANT PER 1 MG: Performed by: INTERNAL MEDICINE

## 2020-01-01 PROCEDURE — 25010000002 DEXAMETHASONE SODIUM PHOSPHATE 10 MG/ML SOLUTION: Performed by: ANESTHESIOLOGY

## 2020-01-01 PROCEDURE — 80053 COMPREHEN METABOLIC PANEL: CPT | Performed by: FAMILY MEDICINE

## 2020-01-01 PROCEDURE — 88342 IMHCHEM/IMCYTCHM 1ST ANTB: CPT | Performed by: SURGERY

## 2020-01-01 PROCEDURE — 85025 COMPLETE CBC W/AUTO DIFF WBC: CPT | Performed by: PHYSICIAN ASSISTANT

## 2020-01-01 PROCEDURE — 81003 URINALYSIS AUTO W/O SCOPE: CPT | Performed by: NURSE PRACTITIONER

## 2020-01-01 PROCEDURE — 82465 ASSAY BLD/SERUM CHOLESTEROL: CPT | Performed by: INTERNAL MEDICINE

## 2020-01-01 PROCEDURE — 25010000002 PROPOFOL 10 MG/ML EMULSION: Performed by: NURSE ANESTHETIST, CERTIFIED REGISTERED

## 2020-01-01 PROCEDURE — 36430 TRANSFUSION BLD/BLD COMPNT: CPT

## 2020-01-01 PROCEDURE — 99233 SBSQ HOSP IP/OBS HIGH 50: CPT | Performed by: FAMILY MEDICINE

## 2020-01-01 PROCEDURE — 25010000002 VANCOMYCIN 10 G RECONSTITUTED SOLUTION

## 2020-01-01 PROCEDURE — 25010000002 ALBUMIN HUMAN 25% PER 50 ML: Performed by: INTERNAL MEDICINE

## 2020-01-01 PROCEDURE — 25010000002 ERTAPENEM PER 500 MG: Performed by: OBSTETRICS & GYNECOLOGY

## 2020-01-01 PROCEDURE — 99205 OFFICE O/P NEW HI 60 MIN: CPT | Performed by: OBSTETRICS & GYNECOLOGY

## 2020-01-01 PROCEDURE — 63710000001 DIPHENHYDRAMINE PER 50 MG: Performed by: INTERNAL MEDICINE

## 2020-01-01 PROCEDURE — 80053 COMPREHEN METABOLIC PANEL: CPT | Performed by: PHYSICIAN ASSISTANT

## 2020-01-01 PROCEDURE — 93306 TTE W/DOPPLER COMPLETE: CPT

## 2020-01-01 PROCEDURE — 86850 RBC ANTIBODY SCREEN: CPT | Performed by: EMERGENCY MEDICINE

## 2020-01-01 PROCEDURE — 83735 ASSAY OF MAGNESIUM: CPT

## 2020-01-01 PROCEDURE — 85027 COMPLETE CBC AUTOMATED: CPT | Performed by: FAMILY MEDICINE

## 2020-01-01 PROCEDURE — 85025 COMPLETE CBC W/AUTO DIFF WBC: CPT | Performed by: NURSE PRACTITIONER

## 2020-01-01 PROCEDURE — 80202 ASSAY OF VANCOMYCIN: CPT

## 2020-01-01 PROCEDURE — 99204 OFFICE O/P NEW MOD 45 MIN: CPT | Performed by: INTERNAL MEDICINE

## 2020-01-01 PROCEDURE — 25010000002 ERTAPENEM 1 GM/100ML SOLUTION: Performed by: OBSTETRICS & GYNECOLOGY

## 2020-01-01 PROCEDURE — 84478 ASSAY OF TRIGLYCERIDES: CPT | Performed by: INTERNAL MEDICINE

## 2020-01-01 PROCEDURE — 25010000002 METHYLNALTREXONE 12 MG/0.6ML SOLUTION: Performed by: PHYSICIAN ASSISTANT

## 2020-01-01 PROCEDURE — 81001 URINALYSIS AUTO W/SCOPE: CPT | Performed by: PHYSICIAN ASSISTANT

## 2020-01-01 PROCEDURE — 83880 ASSAY OF NATRIURETIC PEPTIDE: CPT | Performed by: EMERGENCY MEDICINE

## 2020-01-01 PROCEDURE — 97165 OT EVAL LOW COMPLEX 30 MIN: CPT

## 2020-01-01 PROCEDURE — 86900 BLOOD TYPING SEROLOGIC ABO: CPT | Performed by: OBSTETRICS & GYNECOLOGY

## 2020-01-01 PROCEDURE — 25010000002 DEXAMETHASONE PER 1 MG: Performed by: INTERNAL MEDICINE

## 2020-01-01 PROCEDURE — 87635 SARS-COV-2 COVID-19 AMP PRB: CPT | Performed by: NURSE PRACTITIONER

## 2020-01-01 PROCEDURE — 80306 DRUG TEST PRSMV INSTRMNT: CPT | Performed by: NURSE PRACTITIONER

## 2020-01-01 PROCEDURE — 57100 BIOPSY VAGINAL MUCOSA SIMPLE: CPT | Performed by: OBSTETRICS & GYNECOLOGY

## 2020-01-01 PROCEDURE — 81001 URINALYSIS AUTO W/SCOPE: CPT | Performed by: EMERGENCY MEDICINE

## 2020-01-01 PROCEDURE — 80053 COMPREHEN METABOLIC PANEL: CPT | Performed by: EMERGENCY MEDICINE

## 2020-01-01 PROCEDURE — 86901 BLOOD TYPING SEROLOGIC RH(D): CPT | Performed by: EMERGENCY MEDICINE

## 2020-01-01 PROCEDURE — 25010000002 NEOSTIGMINE 10 MG/10ML SOLUTION: Performed by: NURSE ANESTHETIST, CERTIFIED REGISTERED

## 2020-01-01 PROCEDURE — 97116 GAIT TRAINING THERAPY: CPT | Performed by: PHYSICAL THERAPIST

## 2020-01-01 PROCEDURE — P9016 RBC LEUKOCYTES REDUCED: HCPCS

## 2020-01-01 PROCEDURE — 77290 THER RAD SIMULAJ FIELD CPLX: CPT | Performed by: RADIOLOGY

## 2020-01-01 PROCEDURE — 77336 RADIATION PHYSICS CONSULT: CPT | Performed by: RADIOLOGY

## 2020-01-01 PROCEDURE — 25010000002 HYDROMORPHONE PER 4 MG: Performed by: OBSTETRICS & GYNECOLOGY

## 2020-01-01 PROCEDURE — 84132 ASSAY OF SERUM POTASSIUM: CPT | Performed by: FAMILY MEDICINE

## 2020-01-01 PROCEDURE — 99213 OFFICE O/P EST LOW 20 MIN: CPT | Performed by: OBSTETRICS & GYNECOLOGY

## 2020-01-01 PROCEDURE — G0008 ADMIN INFLUENZA VIRUS VAC: HCPCS | Performed by: SURGERY

## 2020-01-01 PROCEDURE — 85025 COMPLETE CBC W/AUTO DIFF WBC: CPT | Performed by: OBSTETRICS & GYNECOLOGY

## 2020-01-01 PROCEDURE — 84132 ASSAY OF SERUM POTASSIUM: CPT | Performed by: ANESTHESIOLOGY

## 2020-01-01 PROCEDURE — 96415 CHEMO IV INFUSION ADDL HR: CPT

## 2020-01-01 PROCEDURE — 25010000002 CEFTRIAXONE PER 250 MG: Performed by: FAMILY MEDICINE

## 2020-01-01 PROCEDURE — 99222 1ST HOSP IP/OBS MODERATE 55: CPT | Performed by: PHYSICIAN ASSISTANT

## 2020-01-01 PROCEDURE — 25010000002 DIPHENHYDRAMINE PER 50 MG: Performed by: INTERNAL MEDICINE

## 2020-01-01 PROCEDURE — 25010000002 BEVACIZUMAB-AWWB 400 MG/16ML SOLUTION 16 ML VIAL: Performed by: INTERNAL MEDICINE

## 2020-01-01 PROCEDURE — C1751 CATH, INF, PER/CENT/MIDLINE: HCPCS | Performed by: ANESTHESIOLOGY

## 2020-01-01 PROCEDURE — 25010000002 ONDANSETRON PER 1 MG: Performed by: NURSE ANESTHETIST, CERTIFIED REGISTERED

## 2020-01-01 PROCEDURE — 25010000002 BEVACIZUMAB-AWWB 100 MG/4ML SOLUTION 4 ML VIAL: Performed by: INTERNAL MEDICINE

## 2020-01-01 PROCEDURE — 25010000002 MIDAZOLAM PER 1MG: Performed by: NURSE ANESTHETIST, CERTIFIED REGISTERED

## 2020-01-01 PROCEDURE — 99239 HOSP IP/OBS DSCHRG MGMT >30: CPT | Performed by: NURSE PRACTITIONER

## 2020-01-01 PROCEDURE — 25010000002 DEXAMETHASONE PER 1 MG: Performed by: NURSE ANESTHETIST, CERTIFIED REGISTERED

## 2020-01-01 PROCEDURE — 99223 1ST HOSP IP/OBS HIGH 75: CPT | Performed by: INTERNAL MEDICINE

## 2020-01-01 PROCEDURE — 25010000002 HYDROMORPHONE PER 4 MG: Performed by: EMERGENCY MEDICINE

## 2020-01-01 PROCEDURE — 87040 BLOOD CULTURE FOR BACTERIA: CPT | Performed by: NURSE PRACTITIONER

## 2020-01-01 PROCEDURE — 25010000002 CEFTRIAXONE PER 250 MG: Performed by: NURSE PRACTITIONER

## 2020-01-01 PROCEDURE — 99231 SBSQ HOSP IP/OBS SF/LOW 25: CPT | Performed by: INTERNAL MEDICINE

## 2020-01-01 PROCEDURE — 25010000002 LORAZEPAM PER 2 MG: Performed by: OBSTETRICS & GYNECOLOGY

## 2020-01-01 PROCEDURE — 3E0436Z INTRODUCTION OF NUTRITIONAL SUBSTANCE INTO CENTRAL VEIN, PERCUTANEOUS APPROACH: ICD-10-PCS | Performed by: INTERNAL MEDICINE

## 2020-01-01 PROCEDURE — 25010000002 KETOROLAC TROMETHAMINE PER 15 MG: Performed by: NURSE PRACTITIONER

## 2020-01-01 PROCEDURE — 99214 OFFICE O/P EST MOD 30 MIN: CPT | Performed by: NURSE PRACTITIONER

## 2020-01-01 PROCEDURE — 96376 TX/PRO/DX INJ SAME DRUG ADON: CPT

## 2020-01-01 PROCEDURE — 74176 CT ABD & PELVIS W/O CONTRAST: CPT

## 2020-01-01 PROCEDURE — 25010000002 PACLITAXEL PER 30 MG: Performed by: INTERNAL MEDICINE

## 2020-01-01 PROCEDURE — C1751 CATH, INF, PER/CENT/MIDLINE: HCPCS

## 2020-01-01 PROCEDURE — 87635 SARS-COV-2 COVID-19 AMP PRB: CPT | Performed by: INTERNAL MEDICINE

## 2020-01-01 PROCEDURE — 83690 ASSAY OF LIPASE: CPT | Performed by: NURSE PRACTITIONER

## 2020-01-01 PROCEDURE — P9041 ALBUMIN (HUMAN),5%, 50ML: HCPCS | Performed by: INTERNAL MEDICINE

## 2020-01-01 PROCEDURE — 99204 OFFICE O/P NEW MOD 45 MIN: CPT | Performed by: UROLOGY

## 2020-01-01 PROCEDURE — 96413 CHEMO IV INFUSION 1 HR: CPT

## 2020-01-01 PROCEDURE — 84100 ASSAY OF PHOSPHORUS: CPT | Performed by: FAMILY MEDICINE

## 2020-01-01 PROCEDURE — 25010000003 HEPARIN LOCK FLUSH PER 10 UNITS: Performed by: INTERNAL MEDICINE

## 2020-01-01 PROCEDURE — 02HV33Z INSERTION OF INFUSION DEVICE INTO SUPERIOR VENA CAVA, PERCUTANEOUS APPROACH: ICD-10-PCS | Performed by: INTERNAL MEDICINE

## 2020-01-01 PROCEDURE — 93306 TTE W/DOPPLER COMPLETE: CPT | Performed by: INTERNAL MEDICINE

## 2020-01-01 PROCEDURE — 80048 BASIC METABOLIC PNL TOTAL CA: CPT | Performed by: FAMILY MEDICINE

## 2020-01-01 PROCEDURE — 99215 OFFICE O/P EST HI 40 MIN: CPT | Performed by: NURSE PRACTITIONER

## 2020-01-01 PROCEDURE — 84484 ASSAY OF TROPONIN QUANT: CPT | Performed by: EMERGENCY MEDICINE

## 2020-01-01 PROCEDURE — 77334 RADIATION TREATMENT AID(S): CPT | Performed by: RADIOLOGY

## 2020-01-01 PROCEDURE — 0D1M0Z4 BYPASS DESCENDING COLON TO CUTANEOUS, OPEN APPROACH: ICD-10-PCS | Performed by: OBSTETRICS & GYNECOLOGY

## 2020-01-01 PROCEDURE — 96417 CHEMO IV INFUS EACH ADDL SEQ: CPT

## 2020-01-01 PROCEDURE — 83605 ASSAY OF LACTIC ACID: CPT | Performed by: FAMILY MEDICINE

## 2020-01-01 PROCEDURE — 25010000003 HEPARIN LOCK FLUSH PER 10 UNITS: Performed by: FAMILY MEDICINE

## 2020-01-01 PROCEDURE — 36561 INSERT TUNNELED CV CATH: CPT | Performed by: SURGERY

## 2020-01-01 PROCEDURE — 71260 CT THORAX DX C+: CPT

## 2020-01-01 PROCEDURE — 85018 HEMOGLOBIN: CPT | Performed by: OBSTETRICS & GYNECOLOGY

## 2020-01-01 PROCEDURE — 84145 PROCALCITONIN (PCT): CPT | Performed by: NURSE PRACTITIONER

## 2020-01-01 PROCEDURE — 90686 IIV4 VACC NO PRSV 0.5 ML IM: CPT | Performed by: SURGERY

## 2020-01-01 PROCEDURE — 84439 ASSAY OF FREE THYROXINE: CPT | Performed by: NURSE PRACTITIONER

## 2020-01-01 PROCEDURE — 85014 HEMATOCRIT: CPT | Performed by: OBSTETRICS & GYNECOLOGY

## 2020-01-01 PROCEDURE — 93005 ELECTROCARDIOGRAM TRACING: CPT | Performed by: EMERGENCY MEDICINE

## 2020-01-01 PROCEDURE — 0 DIATRIZOATE MEGLUMINE & SODIUM PER 1 ML: Performed by: INTERNAL MEDICINE

## 2020-01-01 PROCEDURE — 86901 BLOOD TYPING SEROLOGIC RH(D): CPT

## 2020-01-01 PROCEDURE — 0 DIATRIZOATE MEGLUMINE & SODIUM PER 1 ML: Performed by: OBSTETRICS & GYNECOLOGY

## 2020-01-01 PROCEDURE — 25010000002 IOPAMIDOL 61 % SOLUTION: Performed by: STUDENT IN AN ORGANIZED HEALTH CARE EDUCATION/TRAINING PROGRAM

## 2020-01-01 PROCEDURE — 86901 BLOOD TYPING SEROLOGIC RH(D): CPT | Performed by: OBSTETRICS & GYNECOLOGY

## 2020-01-01 PROCEDURE — 85025 COMPLETE CBC W/AUTO DIFF WBC: CPT | Performed by: FAMILY MEDICINE

## 2020-01-01 PROCEDURE — 99231 SBSQ HOSP IP/OBS SF/LOW 25: CPT | Performed by: OBSTETRICS & GYNECOLOGY

## 2020-01-01 PROCEDURE — 83036 HEMOGLOBIN GLYCOSYLATED A1C: CPT | Performed by: NURSE PRACTITIONER

## 2020-01-01 PROCEDURE — 81001 URINALYSIS AUTO W/SCOPE: CPT | Performed by: NURSE PRACTITIONER

## 2020-01-01 PROCEDURE — 84134 ASSAY OF PREALBUMIN: CPT | Performed by: INTERNAL MEDICINE

## 2020-01-01 DEVICE — PROXIMATE LINEAR CUTTER  RELOAD (THICK)
Type: IMPLANTABLE DEVICE | Site: STOMACH | Status: FUNCTIONAL
Brand: PROXIMATE

## 2020-01-01 DEVICE — PROXIMATE RELOADABLE LINEAR CUTTER WITH SAFETY LOCK-OUT.  75MM LINEAR CUTTER.
Type: IMPLANTABLE DEVICE | Site: STOMACH | Status: FUNCTIONAL
Brand: PROXIMATE

## 2020-01-01 DEVICE — POWERPORT CLEARVUE IMPLANTABLE PORT WITH ATTACHABLE 8F POLYURETHANE OPEN-ENDED SINGLE-LUMEN VENOUS CATHETER INTERMEDIATE KIT
Type: IMPLANTABLE DEVICE | Status: FUNCTIONAL
Brand: POWERPORT CLEARVUE

## 2020-01-01 RX ORDER — ONDANSETRON 2 MG/ML
4 INJECTION INTRAMUSCULAR; INTRAVENOUS EVERY 6 HOURS PRN
Status: DISCONTINUED | OUTPATIENT
Start: 2020-01-01 | End: 2020-01-01 | Stop reason: HOSPADM

## 2020-01-01 RX ORDER — ALBUTEROL SULFATE 90 UG/1
2 AEROSOL, METERED RESPIRATORY (INHALATION) EVERY 4 HOURS PRN
Status: DISCONTINUED | OUTPATIENT
Start: 2020-01-01 | End: 2020-01-01 | Stop reason: HOSPADM

## 2020-01-01 RX ORDER — LIDOCAINE HYDROCHLORIDE 10 MG/ML
0.5 INJECTION, SOLUTION EPIDURAL; INFILTRATION; INTRACAUDAL; PERINEURAL ONCE AS NEEDED
Status: DISCONTINUED | OUTPATIENT
Start: 2020-01-01 | End: 2020-01-01

## 2020-01-01 RX ORDER — SODIUM CHLORIDE 0.9 % (FLUSH) 0.9 %
10 SYRINGE (ML) INJECTION AS NEEDED
Status: DISCONTINUED | OUTPATIENT
Start: 2020-01-01 | End: 2020-01-01

## 2020-01-01 RX ORDER — CLOPIDOGREL BISULFATE 75 MG/1
75 TABLET ORAL DAILY
Status: DISCONTINUED | OUTPATIENT
Start: 2020-01-01 | End: 2020-01-01 | Stop reason: HOSPADM

## 2020-01-01 RX ORDER — MAGNESIUM SULFATE HEPTAHYDRATE 40 MG/ML
4 INJECTION, SOLUTION INTRAVENOUS AS NEEDED
Status: DISCONTINUED | OUTPATIENT
Start: 2020-01-01 | End: 2020-01-01 | Stop reason: HOSPADM

## 2020-01-01 RX ORDER — METOCLOPRAMIDE 10 MG/1
10 TABLET ORAL EVERY 6 HOURS SCHEDULED
Status: DISCONTINUED | OUTPATIENT
Start: 2020-01-01 | End: 2020-01-01 | Stop reason: HOSPADM

## 2020-01-01 RX ORDER — POTASSIUM CHLORIDE 7.45 MG/ML
10 INJECTION INTRAVENOUS
Status: DISCONTINUED | OUTPATIENT
Start: 2020-01-01 | End: 2020-01-01

## 2020-01-01 RX ORDER — MAGNESIUM SULFATE HEPTAHYDRATE 40 MG/ML
2 INJECTION, SOLUTION INTRAVENOUS AS NEEDED
Status: DISCONTINUED | OUTPATIENT
Start: 2020-01-01 | End: 2020-01-01 | Stop reason: HOSPADM

## 2020-01-01 RX ORDER — POTASSIUM CHLORIDE 29.8 MG/ML
20 INJECTION INTRAVENOUS
Status: DISCONTINUED | OUTPATIENT
Start: 2020-01-01 | End: 2020-01-01 | Stop reason: HOSPADM

## 2020-01-01 RX ORDER — PROPOFOL 10 MG/ML
VIAL (ML) INTRAVENOUS AS NEEDED
Status: DISCONTINUED | OUTPATIENT
Start: 2020-01-01 | End: 2020-01-01 | Stop reason: SURG

## 2020-01-01 RX ORDER — AMLODIPINE BESYLATE 10 MG/1
10 TABLET ORAL DAILY
Status: DISCONTINUED | OUTPATIENT
Start: 2020-01-01 | End: 2020-01-01

## 2020-01-01 RX ORDER — POTASSIUM CHLORIDE 750 MG/1
40 CAPSULE, EXTENDED RELEASE ORAL ONCE
Status: COMPLETED | OUTPATIENT
Start: 2020-01-01 | End: 2020-01-01

## 2020-01-01 RX ORDER — ONDANSETRON 4 MG/1
4 TABLET, ORALLY DISINTEGRATING ORAL EVERY 6 HOURS PRN
Qty: 20 TABLET | Refills: 0 | Status: SHIPPED | OUTPATIENT
Start: 2020-01-01 | End: 2020-01-01 | Stop reason: HOSPADM

## 2020-01-01 RX ORDER — PANTOPRAZOLE SODIUM 40 MG/1
40 TABLET, DELAYED RELEASE ORAL
Status: DISCONTINUED | OUTPATIENT
Start: 2020-01-01 | End: 2020-01-01 | Stop reason: HOSPADM

## 2020-01-01 RX ORDER — SODIUM CHLORIDE 0.9 % (FLUSH) 0.9 %
10 SYRINGE (ML) INJECTION AS NEEDED
Status: DISCONTINUED | OUTPATIENT
Start: 2020-01-01 | End: 2020-01-01 | Stop reason: HOSPADM

## 2020-01-01 RX ORDER — LACTULOSE 10 G/15ML
20 SOLUTION ORAL 2 TIMES DAILY PRN
Status: DISCONTINUED | OUTPATIENT
Start: 2020-01-01 | End: 2020-01-01 | Stop reason: HOSPADM

## 2020-01-01 RX ORDER — SODIUM CHLORIDE 0.9 % (FLUSH) 0.9 %
20 SYRINGE (ML) INJECTION AS NEEDED
Status: DISCONTINUED | OUTPATIENT
Start: 2020-01-01 | End: 2020-01-01 | Stop reason: HOSPADM

## 2020-01-01 RX ORDER — CLINDAMYCIN PHOSPHATE 900 MG/50ML
900 INJECTION, SOLUTION INTRAVENOUS ONCE
Status: CANCELLED | OUTPATIENT
Start: 2020-01-01 | End: 2020-01-01

## 2020-01-01 RX ORDER — IPRATROPIUM BROMIDE AND ALBUTEROL SULFATE 2.5; .5 MG/3ML; MG/3ML
3 SOLUTION RESPIRATORY (INHALATION)
Status: DISCONTINUED | OUTPATIENT
Start: 2020-01-01 | End: 2020-01-01

## 2020-01-01 RX ORDER — DIPHENHYDRAMINE HCL 25 MG
25 CAPSULE ORAL NIGHTLY PRN
Status: DISCONTINUED | OUTPATIENT
Start: 2020-01-01 | End: 2020-01-01

## 2020-01-01 RX ORDER — OXYCODONE HYDROCHLORIDE 5 MG/1
5 TABLET ORAL EVERY 6 HOURS
Status: DISCONTINUED | OUTPATIENT
Start: 2020-01-01 | End: 2020-01-01 | Stop reason: HOSPADM

## 2020-01-01 RX ORDER — CLINDAMYCIN PHOSPHATE 900 MG/50ML
900 INJECTION, SOLUTION INTRAVENOUS ONCE
Status: COMPLETED | OUTPATIENT
Start: 2020-01-01 | End: 2020-01-01

## 2020-01-01 RX ORDER — MIDAZOLAM HYDROCHLORIDE 2 MG/2ML
INJECTION, SOLUTION INTRAMUSCULAR; INTRAVENOUS AS NEEDED
Status: DISCONTINUED | OUTPATIENT
Start: 2020-01-01 | End: 2020-01-01 | Stop reason: SURG

## 2020-01-01 RX ORDER — ALBUTEROL SULFATE 90 UG/1
2 AEROSOL, METERED RESPIRATORY (INHALATION) EVERY 4 HOURS PRN
COMMUNITY

## 2020-01-01 RX ORDER — ONDANSETRON 4 MG/1
4 TABLET, FILM COATED ORAL EVERY 6 HOURS PRN
Status: DISCONTINUED | OUTPATIENT
Start: 2020-01-01 | End: 2020-01-01

## 2020-01-01 RX ORDER — SODIUM CHLORIDE 9 MG/ML
250 INJECTION, SOLUTION INTRAVENOUS ONCE
Status: CANCELLED | OUTPATIENT
Start: 2021-01-01

## 2020-01-01 RX ORDER — HEPARIN SODIUM 5000 [USP'U]/ML
5000 INJECTION, SOLUTION INTRAVENOUS; SUBCUTANEOUS EVERY 8 HOURS SCHEDULED
Status: DISCONTINUED | OUTPATIENT
Start: 2020-01-01 | End: 2020-01-01 | Stop reason: HOSPADM

## 2020-01-01 RX ORDER — DIPHENHYDRAMINE HYDROCHLORIDE 50 MG/ML
50 INJECTION INTRAMUSCULAR; INTRAVENOUS AS NEEDED
Status: CANCELLED | OUTPATIENT
Start: 2021-01-01

## 2020-01-01 RX ORDER — PHYSOSTIGMINE SALICYLATE 1 MG/ML
0.01 INJECTION INTRAVENOUS ONCE
Status: COMPLETED | OUTPATIENT
Start: 2020-01-01 | End: 2020-01-01

## 2020-01-01 RX ORDER — KETOROLAC TROMETHAMINE 30 MG/ML
15 INJECTION, SOLUTION INTRAMUSCULAR; INTRAVENOUS ONCE
Status: COMPLETED | OUTPATIENT
Start: 2020-01-01 | End: 2020-01-01

## 2020-01-01 RX ORDER — ROCURONIUM BROMIDE 10 MG/ML
INJECTION, SOLUTION INTRAVENOUS AS NEEDED
Status: DISCONTINUED | OUTPATIENT
Start: 2020-01-01 | End: 2020-01-01 | Stop reason: SURG

## 2020-01-01 RX ORDER — PALONOSETRON 0.05 MG/ML
0.25 INJECTION, SOLUTION INTRAVENOUS ONCE
Status: CANCELLED | OUTPATIENT
Start: 2020-01-01

## 2020-01-01 RX ORDER — FAMOTIDINE 10 MG/ML
20 INJECTION, SOLUTION INTRAVENOUS AS NEEDED
Status: CANCELLED | OUTPATIENT
Start: 2020-01-01

## 2020-01-01 RX ORDER — SODIUM CHLORIDE, SODIUM LACTATE, POTASSIUM CHLORIDE, CALCIUM CHLORIDE 600; 310; 30; 20 MG/100ML; MG/100ML; MG/100ML; MG/100ML
1000 INJECTION, SOLUTION INTRAVENOUS CONTINUOUS
Status: DISCONTINUED | OUTPATIENT
Start: 2020-01-01 | End: 2020-01-01 | Stop reason: HOSPADM

## 2020-01-01 RX ORDER — FAMOTIDINE 10 MG/ML
20 INJECTION, SOLUTION INTRAVENOUS ONCE
Status: CANCELLED | OUTPATIENT
Start: 2021-01-01

## 2020-01-01 RX ORDER — LORAZEPAM 2 MG/ML
0.5 INJECTION INTRAMUSCULAR EVERY 6 HOURS PRN
Status: DISCONTINUED | OUTPATIENT
Start: 2020-01-01 | End: 2020-01-01 | Stop reason: SDUPTHER

## 2020-01-01 RX ORDER — OXYCODONE HYDROCHLORIDE 5 MG/1
5 TABLET ORAL
Status: DISCONTINUED | OUTPATIENT
Start: 2020-01-01 | End: 2020-01-01

## 2020-01-01 RX ORDER — HYDROMORPHONE HYDROCHLORIDE 1 MG/ML
0.5 INJECTION, SOLUTION INTRAMUSCULAR; INTRAVENOUS; SUBCUTANEOUS
Status: DISCONTINUED | OUTPATIENT
Start: 2020-01-01 | End: 2020-01-01 | Stop reason: HOSPADM

## 2020-01-01 RX ORDER — SODIUM CHLORIDE 9 MG/ML
100 INJECTION, SOLUTION INTRAVENOUS CONTINUOUS
Status: DISCONTINUED | OUTPATIENT
Start: 2020-01-01 | End: 2020-01-01

## 2020-01-01 RX ORDER — CALCIUM CARBONATE 750 MG/1
1500 TABLET, CHEWABLE ORAL 2 TIMES DAILY PRN
Status: DISCONTINUED | OUTPATIENT
Start: 2020-01-01 | End: 2020-01-01

## 2020-01-01 RX ORDER — PALONOSETRON 0.05 MG/ML
0.25 INJECTION, SOLUTION INTRAVENOUS ONCE
Status: COMPLETED | OUTPATIENT
Start: 2020-01-01 | End: 2020-01-01

## 2020-01-01 RX ORDER — PANTOPRAZOLE SODIUM 40 MG/10ML
40 INJECTION, POWDER, LYOPHILIZED, FOR SOLUTION INTRAVENOUS
Status: DISCONTINUED | OUTPATIENT
Start: 2020-01-01 | End: 2020-01-01

## 2020-01-01 RX ORDER — ALBUTEROL SULFATE 2.5 MG/3ML
2.5 SOLUTION RESPIRATORY (INHALATION) EVERY 4 HOURS PRN
Status: DISCONTINUED | OUTPATIENT
Start: 2020-01-01 | End: 2020-01-01 | Stop reason: HOSPADM

## 2020-01-01 RX ORDER — ACETAMINOPHEN 160 MG/5ML
650 SOLUTION ORAL EVERY 4 HOURS PRN
Status: DISCONTINUED | OUTPATIENT
Start: 2020-01-01 | End: 2020-01-01

## 2020-01-01 RX ORDER — ACETAMINOPHEN 325 MG/1
650 TABLET ORAL EVERY 6 HOURS PRN
Start: 2020-01-01

## 2020-01-01 RX ORDER — CEFAZOLIN SODIUM 2 G/50ML
2 SOLUTION INTRAVENOUS ONCE
Status: CANCELLED | OUTPATIENT
Start: 2020-01-01 | End: 2020-01-01

## 2020-01-01 RX ORDER — AMOXICILLIN AND CLAVULANATE POTASSIUM 875; 125 MG/1; MG/1
1 TABLET, FILM COATED ORAL 2 TIMES DAILY
Qty: 20 TABLET | Refills: 0 | Status: SHIPPED | OUTPATIENT
Start: 2020-01-01 | End: 2020-01-01

## 2020-01-01 RX ORDER — ISOSORBIDE MONONITRATE 60 MG/1
60 TABLET, EXTENDED RELEASE ORAL DAILY
Status: DISCONTINUED | OUTPATIENT
Start: 2020-01-01 | End: 2020-01-01 | Stop reason: HOSPADM

## 2020-01-01 RX ORDER — POLYETHYLENE GLYCOL 3350 17 G/17G
17 POWDER, FOR SOLUTION ORAL DAILY
Status: DISCONTINUED | OUTPATIENT
Start: 2020-01-01 | End: 2020-01-01

## 2020-01-01 RX ORDER — ACETAMINOPHEN 325 MG/1
650 TABLET ORAL EVERY 4 HOURS PRN
Status: DISCONTINUED | OUTPATIENT
Start: 2020-01-01 | End: 2020-01-01

## 2020-01-01 RX ORDER — ACETAMINOPHEN 325 MG/1
650 TABLET ORAL EVERY 6 HOURS PRN
Status: DISCONTINUED | OUTPATIENT
Start: 2020-01-01 | End: 2020-01-01 | Stop reason: HOSPADM

## 2020-01-01 RX ORDER — FERRIC SUBSULFATE 0.21 G/G
LIQUID TOPICAL AS NEEDED
Status: DISCONTINUED | OUTPATIENT
Start: 2020-01-01 | End: 2020-01-01 | Stop reason: HOSPADM

## 2020-01-01 RX ORDER — FENTANYL CITRATE 50 UG/ML
INJECTION, SOLUTION INTRAMUSCULAR; INTRAVENOUS AS NEEDED
Status: DISCONTINUED | OUTPATIENT
Start: 2020-01-01 | End: 2020-01-01 | Stop reason: SURG

## 2020-01-01 RX ORDER — NEOSTIGMINE METHYLSULFATE 1 MG/ML
INJECTION, SOLUTION INTRAVENOUS AS NEEDED
Status: DISCONTINUED | OUTPATIENT
Start: 2020-01-01 | End: 2020-01-01 | Stop reason: SURG

## 2020-01-01 RX ORDER — OXYCODONE HYDROCHLORIDE 5 MG/1
10 TABLET ORAL EVERY 4 HOURS PRN
Status: DISPENSED | OUTPATIENT
Start: 2020-01-01 | End: 2020-01-01

## 2020-01-01 RX ORDER — ECHINACEA PURPUREA EXTRACT 125 MG
1 TABLET ORAL AS NEEDED
Start: 2020-01-01

## 2020-01-01 RX ORDER — SODIUM CHLORIDE 0.9 % (FLUSH) 0.9 %
10 SYRINGE (ML) INJECTION EVERY 12 HOURS SCHEDULED
Status: DISCONTINUED | OUTPATIENT
Start: 2020-01-01 | End: 2020-01-01 | Stop reason: HOSPADM

## 2020-01-01 RX ORDER — FLUOXETINE HYDROCHLORIDE 20 MG/1
20 CAPSULE ORAL DAILY
Status: DISCONTINUED | OUTPATIENT
Start: 2020-01-01 | End: 2020-01-01 | Stop reason: HOSPADM

## 2020-01-01 RX ORDER — DEXTROSE AND SODIUM CHLORIDE 5; .45 G/100ML; G/100ML
100 INJECTION, SOLUTION INTRAVENOUS CONTINUOUS
Status: DISCONTINUED | OUTPATIENT
Start: 2020-01-01 | End: 2020-01-01

## 2020-01-01 RX ORDER — OXYCODONE HYDROCHLORIDE 5 MG/1
5 TABLET ORAL EVERY 4 HOURS PRN
Status: DISCONTINUED | OUTPATIENT
Start: 2020-01-01 | End: 2020-01-01

## 2020-01-01 RX ORDER — OXYCODONE HYDROCHLORIDE 10 MG/1
10 TABLET ORAL EVERY 4 HOURS PRN
Qty: 18 TABLET | Refills: 0 | Status: SHIPPED | OUTPATIENT
Start: 2020-01-01 | End: 2020-01-01

## 2020-01-01 RX ORDER — OXYCODONE HYDROCHLORIDE 10 MG/1
TABLET ORAL
Start: 2020-01-01 | End: 2021-01-01 | Stop reason: SDUPTHER

## 2020-01-01 RX ORDER — HYDROMORPHONE HYDROCHLORIDE 1 MG/ML
0.5 INJECTION, SOLUTION INTRAMUSCULAR; INTRAVENOUS; SUBCUTANEOUS
Status: DISCONTINUED | OUTPATIENT
Start: 2020-01-01 | End: 2020-01-01 | Stop reason: SDUPTHER

## 2020-01-01 RX ORDER — ONDANSETRON 4 MG/1
4 TABLET, FILM COATED ORAL ONCE AS NEEDED
Status: DISCONTINUED | OUTPATIENT
Start: 2020-01-01 | End: 2020-01-01 | Stop reason: HOSPADM

## 2020-01-01 RX ORDER — ACETAMINOPHEN 650 MG/1
650 SUPPOSITORY RECTAL EVERY 4 HOURS PRN
Status: DISCONTINUED | OUTPATIENT
Start: 2020-01-01 | End: 2020-01-01

## 2020-01-01 RX ORDER — SODIUM CHLORIDE 9 MG/ML
INJECTION, SOLUTION INTRAVENOUS CONTINUOUS PRN
Status: DISCONTINUED | OUTPATIENT
Start: 2020-01-01 | End: 2020-01-01 | Stop reason: SURG

## 2020-01-01 RX ORDER — NALOXONE HCL 0.4 MG/ML
0.4 VIAL (ML) INJECTION
Status: DISCONTINUED | OUTPATIENT
Start: 2020-01-01 | End: 2020-01-01 | Stop reason: HOSPADM

## 2020-01-01 RX ORDER — CEPHALEXIN 500 MG/1
500 CAPSULE ORAL 2 TIMES DAILY
Qty: 10 CAPSULE | Refills: 0 | Status: SHIPPED | OUTPATIENT
Start: 2020-01-01 | End: 2020-01-01

## 2020-01-01 RX ORDER — ALBUMIN, HUMAN INJ 5% 5 %
SOLUTION INTRAVENOUS CONTINUOUS PRN
Status: DISCONTINUED | OUTPATIENT
Start: 2020-01-01 | End: 2020-01-01 | Stop reason: SURG

## 2020-01-01 RX ORDER — POTASSIUM CHLORIDE 20 MEQ/1
20 TABLET, EXTENDED RELEASE ORAL DAILY
Qty: 7 TABLET | Refills: 0 | Status: SHIPPED | OUTPATIENT
Start: 2020-01-01 | End: 2020-01-01 | Stop reason: HOSPADM

## 2020-01-01 RX ORDER — MAGNESIUM HYDROXIDE 1200 MG/15ML
LIQUID ORAL AS NEEDED
Status: DISCONTINUED | OUTPATIENT
Start: 2020-01-01 | End: 2020-01-01 | Stop reason: HOSPADM

## 2020-01-01 RX ORDER — GLYCOPYRROLATE 0.2 MG/ML
INJECTION INTRAMUSCULAR; INTRAVENOUS AS NEEDED
Status: DISCONTINUED | OUTPATIENT
Start: 2020-01-01 | End: 2020-01-01 | Stop reason: SURG

## 2020-01-01 RX ORDER — OXCARBAZEPINE 150 MG/1
150 TABLET, FILM COATED ORAL 2 TIMES DAILY
Status: DISCONTINUED | OUTPATIENT
Start: 2020-01-01 | End: 2020-01-01

## 2020-01-01 RX ORDER — POTASSIUM CHLORIDE 750 MG/1
40 CAPSULE, EXTENDED RELEASE ORAL AS NEEDED
Status: DISCONTINUED | OUTPATIENT
Start: 2020-01-01 | End: 2020-01-01

## 2020-01-01 RX ORDER — DEXAMETHASONE 4 MG/1
TABLET ORAL
Qty: 6 TABLET | Refills: 5 | Status: SHIPPED | OUTPATIENT
Start: 2020-01-01 | End: 2020-01-01 | Stop reason: SDUPTHER

## 2020-01-01 RX ORDER — FLUTICASONE PROPIONATE 50 MCG
2 SPRAY, SUSPENSION (ML) NASAL DAILY
Status: DISCONTINUED | OUTPATIENT
Start: 2020-01-01 | End: 2020-01-01 | Stop reason: HOSPADM

## 2020-01-01 RX ORDER — ISOSORBIDE MONONITRATE 60 MG/1
60 TABLET, EXTENDED RELEASE ORAL DAILY
Status: DISCONTINUED | OUTPATIENT
Start: 2020-01-01 | End: 2020-01-01

## 2020-01-01 RX ORDER — ACETAMINOPHEN 325 MG/1
650 TABLET ORAL EVERY 6 HOURS SCHEDULED
Status: DISCONTINUED | OUTPATIENT
Start: 2020-01-01 | End: 2020-01-01

## 2020-01-01 RX ORDER — SUCCINYLCHOLINE CHLORIDE 20 MG/ML
INJECTION INTRAMUSCULAR; INTRAVENOUS AS NEEDED
Status: DISCONTINUED | OUTPATIENT
Start: 2020-01-01 | End: 2020-01-01 | Stop reason: SURG

## 2020-01-01 RX ORDER — ISOSORBIDE MONONITRATE 60 MG/1
60 TABLET, EXTENDED RELEASE ORAL DAILY
COMMUNITY

## 2020-01-01 RX ORDER — MINERAL OIL 100 G/100G
1 OIL RECTAL ONCE
Status: DISCONTINUED | OUTPATIENT
Start: 2020-01-01 | End: 2020-01-01

## 2020-01-01 RX ORDER — FAMOTIDINE 10 MG/ML
20 INJECTION, SOLUTION INTRAVENOUS AS NEEDED
Status: CANCELLED | OUTPATIENT
Start: 2021-01-01

## 2020-01-01 RX ORDER — HYDROCODONE BITARTRATE AND ACETAMINOPHEN 5; 325 MG/1; MG/1
1 TABLET ORAL EVERY 6 HOURS PRN
Qty: 12 TABLET | Refills: 0 | Status: SHIPPED | OUTPATIENT
Start: 2020-01-01 | End: 2020-01-01

## 2020-01-01 RX ORDER — CEFUROXIME AXETIL 250 MG/1
250 TABLET ORAL EVERY 12 HOURS SCHEDULED
Status: DISCONTINUED | OUTPATIENT
Start: 2020-01-01 | End: 2020-01-01

## 2020-01-01 RX ORDER — ONDANSETRON 2 MG/ML
4 INJECTION INTRAMUSCULAR; INTRAVENOUS EVERY 6 HOURS PRN
Status: DISCONTINUED | OUTPATIENT
Start: 2020-01-01 | End: 2020-01-01 | Stop reason: SDUPTHER

## 2020-01-01 RX ORDER — ALBUMIN (HUMAN) 12.5 G/50ML
12.5 SOLUTION INTRAVENOUS ONCE
Status: COMPLETED | OUTPATIENT
Start: 2020-01-01 | End: 2020-01-01

## 2020-01-01 RX ORDER — FAMOTIDINE 10 MG/ML
20 INJECTION, SOLUTION INTRAVENOUS ONCE
Status: CANCELLED | OUTPATIENT
Start: 2020-01-01

## 2020-01-01 RX ORDER — SODIUM CHLORIDE 0.9 % (FLUSH) 0.9 %
10 SYRINGE (ML) INJECTION EVERY 12 HOURS SCHEDULED
Status: DISCONTINUED | OUTPATIENT
Start: 2020-01-01 | End: 2020-01-01

## 2020-01-01 RX ORDER — SODIUM CHLORIDE, SODIUM LACTATE, POTASSIUM CHLORIDE, CALCIUM CHLORIDE 600; 310; 30; 20 MG/100ML; MG/100ML; MG/100ML; MG/100ML
60 INJECTION, SOLUTION INTRAVENOUS CONTINUOUS
Status: ACTIVE | OUTPATIENT
Start: 2020-01-01 | End: 2020-01-01

## 2020-01-01 RX ORDER — CHOLECALCIFEROL (VITAMIN D3) 125 MCG
10 CAPSULE ORAL NIGHTLY
Start: 2020-01-01 | End: 2020-01-01

## 2020-01-01 RX ORDER — HEPARIN SODIUM (PORCINE) LOCK FLUSH IV SOLN 100 UNIT/ML 100 UNIT/ML
500 SOLUTION INTRAVENOUS ONCE
Status: COMPLETED | OUTPATIENT
Start: 2020-01-01 | End: 2020-01-01

## 2020-01-01 RX ORDER — ONDANSETRON 2 MG/ML
4 INJECTION INTRAMUSCULAR; INTRAVENOUS ONCE AS NEEDED
Status: DISCONTINUED | OUTPATIENT
Start: 2020-01-01 | End: 2020-01-01 | Stop reason: HOSPADM

## 2020-01-01 RX ORDER — MULTIPLE VITAMINS W/ MINERALS TAB 9MG-400MCG
1 TAB ORAL DAILY
Status: DISCONTINUED | OUTPATIENT
Start: 2020-01-01 | End: 2020-01-01

## 2020-01-01 RX ORDER — PALONOSETRON 0.05 MG/ML
0.25 INJECTION, SOLUTION INTRAVENOUS ONCE
Status: CANCELLED | OUTPATIENT
Start: 2021-01-01

## 2020-01-01 RX ORDER — HYDROMORPHONE HYDROCHLORIDE 1 MG/ML
0.5 INJECTION, SOLUTION INTRAMUSCULAR; INTRAVENOUS; SUBCUTANEOUS
Status: DISPENSED | OUTPATIENT
Start: 2020-01-01 | End: 2020-01-01

## 2020-01-01 RX ORDER — MINERAL OIL 100 G/100G
1 OIL RECTAL ONCE AS NEEDED
Status: DISCONTINUED | OUTPATIENT
Start: 2020-01-01 | End: 2020-01-01 | Stop reason: HOSPADM

## 2020-01-01 RX ORDER — OXYCODONE HYDROCHLORIDE 5 MG/1
10 TABLET ORAL EVERY 4 HOURS PRN
Qty: 84 TABLET | Refills: 0 | Status: SHIPPED | OUTPATIENT
Start: 2020-01-01 | End: 2020-01-01 | Stop reason: SDUPTHER

## 2020-01-01 RX ORDER — LABETALOL HYDROCHLORIDE 5 MG/ML
10 INJECTION, SOLUTION INTRAVENOUS EVERY 4 HOURS PRN
Status: DISCONTINUED | OUTPATIENT
Start: 2020-01-01 | End: 2020-01-01 | Stop reason: HOSPADM

## 2020-01-01 RX ORDER — FAMOTIDINE 20 MG/1
20 TABLET, FILM COATED ORAL ONCE
Status: CANCELLED | OUTPATIENT
Start: 2020-01-01 | End: 2020-01-01

## 2020-01-01 RX ORDER — ALBUMIN, HUMAN INJ 5% 5 %
SOLUTION INTRAVENOUS
Status: DISPENSED
Start: 2020-01-01 | End: 2020-01-01

## 2020-01-01 RX ORDER — BISACODYL 5 MG/1
10 TABLET, DELAYED RELEASE ORAL DAILY PRN
Status: DISCONTINUED | OUTPATIENT
Start: 2020-01-01 | End: 2020-01-01 | Stop reason: HOSPADM

## 2020-01-01 RX ORDER — OXYCODONE HYDROCHLORIDE 5 MG/1
5 TABLET ORAL EVERY 4 HOURS PRN
Status: DISCONTINUED | OUTPATIENT
Start: 2020-01-01 | End: 2020-01-01 | Stop reason: HOSPADM

## 2020-01-01 RX ORDER — SODIUM CHLORIDE 9 MG/ML
250 INJECTION, SOLUTION INTRAVENOUS ONCE
Status: CANCELLED | OUTPATIENT
Start: 2020-01-01

## 2020-01-01 RX ORDER — ONDANSETRON 2 MG/ML
4 INJECTION INTRAMUSCULAR; INTRAVENOUS EVERY 6 HOURS PRN
Status: DISCONTINUED | OUTPATIENT
Start: 2020-01-01 | End: 2020-01-01

## 2020-01-01 RX ORDER — LIDOCAINE HYDROCHLORIDE 10 MG/ML
INJECTION, SOLUTION EPIDURAL; INFILTRATION; INTRACAUDAL; PERINEURAL AS NEEDED
Status: DISCONTINUED | OUTPATIENT
Start: 2020-01-01 | End: 2020-01-01 | Stop reason: SURG

## 2020-01-01 RX ORDER — ONDANSETRON 2 MG/ML
4 INJECTION INTRAMUSCULAR; INTRAVENOUS ONCE
Status: COMPLETED | OUTPATIENT
Start: 2020-01-01 | End: 2020-01-01

## 2020-01-01 RX ORDER — PROMETHAZINE HYDROCHLORIDE 12.5 MG/1
12.5 TABLET ORAL EVERY 6 HOURS PRN
Status: DISCONTINUED | OUTPATIENT
Start: 2020-01-01 | End: 2020-01-01

## 2020-01-01 RX ORDER — HEPARIN SODIUM (PORCINE) LOCK FLUSH IV SOLN 100 UNIT/ML 100 UNIT/ML
500 SOLUTION INTRAVENOUS AS NEEDED
Status: CANCELLED | OUTPATIENT
Start: 2020-01-01

## 2020-01-01 RX ORDER — METOCLOPRAMIDE 10 MG/1
10 TABLET ORAL EVERY 6 HOURS PRN
Start: 2020-01-01

## 2020-01-01 RX ORDER — OXYCODONE HYDROCHLORIDE 15 MG/1
7.5 TABLET ORAL EVERY 4 HOURS PRN
Status: DISCONTINUED | OUTPATIENT
Start: 2020-01-01 | End: 2020-01-01

## 2020-01-01 RX ORDER — HYDROCODONE BITARTRATE AND ACETAMINOPHEN 7.5; 325 MG/1; MG/1
1 TABLET ORAL EVERY 6 HOURS PRN
Qty: 15 TABLET | Refills: 0 | Status: SHIPPED | OUTPATIENT
Start: 2020-01-01 | End: 2020-01-01

## 2020-01-01 RX ORDER — POTASSIUM CHLORIDE 1.5 G/1.77G
40 POWDER, FOR SOLUTION ORAL AS NEEDED
Status: DISCONTINUED | OUTPATIENT
Start: 2020-01-01 | End: 2020-01-01 | Stop reason: HOSPADM

## 2020-01-01 RX ORDER — OXCARBAZEPINE 150 MG/1
150 TABLET, FILM COATED ORAL 2 TIMES DAILY
Status: DISCONTINUED | OUTPATIENT
Start: 2020-01-01 | End: 2020-01-01 | Stop reason: HOSPADM

## 2020-01-01 RX ORDER — AMLODIPINE BESYLATE 5 MG/1
5 TABLET ORAL DAILY
Status: DISCONTINUED | OUTPATIENT
Start: 2020-01-01 | End: 2020-01-01

## 2020-01-01 RX ORDER — ONDANSETRON 4 MG/1
4 TABLET, FILM COATED ORAL EVERY 6 HOURS PRN
Status: DISCONTINUED | OUTPATIENT
Start: 2020-01-01 | End: 2020-01-01 | Stop reason: HOSPADM

## 2020-01-01 RX ORDER — ONDANSETRON 2 MG/ML
INJECTION INTRAMUSCULAR; INTRAVENOUS AS NEEDED
Status: DISCONTINUED | OUTPATIENT
Start: 2020-01-01 | End: 2020-01-01 | Stop reason: SURG

## 2020-01-01 RX ORDER — POTASSIUM CHLORIDE 750 MG/1
40 CAPSULE, EXTENDED RELEASE ORAL AS NEEDED
Status: DISCONTINUED | OUTPATIENT
Start: 2020-01-01 | End: 2020-01-01 | Stop reason: HOSPADM

## 2020-01-01 RX ORDER — FUROSEMIDE 10 MG/ML
20 INJECTION INTRAMUSCULAR; INTRAVENOUS ONCE
Status: COMPLETED | OUTPATIENT
Start: 2020-01-01 | End: 2020-01-01

## 2020-01-01 RX ORDER — ALBUMIN, HUMAN INJ 5% 5 %
500 SOLUTION INTRAVENOUS ONCE AS NEEDED
Status: COMPLETED | OUTPATIENT
Start: 2020-01-01 | End: 2020-01-01

## 2020-01-01 RX ORDER — CLOBAZAM 10 MG/1
10 TABLET ORAL 2 TIMES DAILY
COMMUNITY
Start: 2020-09-05 | End: 2020-01-01 | Stop reason: HOSPADM

## 2020-01-01 RX ORDER — LORAZEPAM 2 MG/ML
0.5 INJECTION INTRAMUSCULAR EVERY 12 HOURS
Status: DISCONTINUED | OUTPATIENT
Start: 2020-01-01 | End: 2020-01-01

## 2020-01-01 RX ORDER — CEPHALEXIN 500 MG/1
500 CAPSULE ORAL 2 TIMES DAILY
Qty: 14 CAPSULE | Refills: 0 | Status: SHIPPED | OUTPATIENT
Start: 2020-01-01 | End: 2020-01-01

## 2020-01-01 RX ORDER — KETAMINE HYDROCHLORIDE 50 MG/ML
INJECTION, SOLUTION, CONCENTRATE INTRAMUSCULAR; INTRAVENOUS AS NEEDED
Status: DISCONTINUED | OUTPATIENT
Start: 2020-01-01 | End: 2020-01-01 | Stop reason: SURG

## 2020-01-01 RX ORDER — CHLORAL HYDRATE 500 MG
1000 CAPSULE ORAL
COMMUNITY
End: 2020-01-01 | Stop reason: HOSPADM

## 2020-01-01 RX ORDER — AMOXICILLIN 250 MG
2 CAPSULE ORAL 2 TIMES DAILY
Status: DISCONTINUED | OUTPATIENT
Start: 2020-01-01 | End: 2020-01-01 | Stop reason: HOSPADM

## 2020-01-01 RX ORDER — VANCOMYCIN HYDROCHLORIDE 1 G/200ML
1000 INJECTION, SOLUTION INTRAVENOUS EVERY 12 HOURS SCHEDULED
Status: DISCONTINUED | OUTPATIENT
Start: 2020-01-01 | End: 2020-01-01

## 2020-01-01 RX ORDER — ACETAMINOPHEN 325 MG/1
650 TABLET ORAL EVERY 6 HOURS SCHEDULED
Status: DISCONTINUED | OUTPATIENT
Start: 2020-01-01 | End: 2020-01-01 | Stop reason: HOSPADM

## 2020-01-01 RX ORDER — OXYCODONE HYDROCHLORIDE 5 MG/1
10 TABLET ORAL EVERY 4 HOURS PRN
Status: DISCONTINUED | OUTPATIENT
Start: 2020-01-01 | End: 2020-01-01

## 2020-01-01 RX ORDER — OXYCODONE HYDROCHLORIDE 15 MG/1
7.5 TABLET ORAL 3 TIMES DAILY
Status: DISCONTINUED | OUTPATIENT
Start: 2020-01-01 | End: 2020-01-01

## 2020-01-01 RX ORDER — DOCUSATE SODIUM 100 MG/1
100 CAPSULE, LIQUID FILLED ORAL 2 TIMES DAILY
Status: DISCONTINUED | OUTPATIENT
Start: 2020-01-01 | End: 2020-01-01

## 2020-01-01 RX ORDER — PANTOPRAZOLE SODIUM 40 MG/1
40 TABLET, DELAYED RELEASE ORAL DAILY
Qty: 30 TABLET | Refills: 0
Start: 2020-01-01 | End: 2020-01-01

## 2020-01-01 RX ORDER — FAMOTIDINE 10 MG/ML
20 INJECTION, SOLUTION INTRAVENOUS ONCE
Status: COMPLETED | OUTPATIENT
Start: 2020-01-01 | End: 2020-01-01

## 2020-01-01 RX ORDER — OXYCODONE HYDROCHLORIDE 5 MG/1
5 TABLET ORAL EVERY 6 HOURS
Status: DISCONTINUED | OUTPATIENT
Start: 2020-01-01 | End: 2020-01-01

## 2020-01-01 RX ORDER — FLUTICASONE PROPIONATE 50 MCG
2 SPRAY, SUSPENSION (ML) NASAL DAILY
COMMUNITY
End: 2020-01-01 | Stop reason: HOSPADM

## 2020-01-01 RX ORDER — CLOBAZAM 10 MG/1
10 TABLET ORAL 2 TIMES DAILY
COMMUNITY
End: 2020-01-01 | Stop reason: HOSPADM

## 2020-01-01 RX ORDER — DEXAMETHASONE SODIUM PHOSPHATE 10 MG/ML
INJECTION, SOLUTION INTRAMUSCULAR; INTRAVENOUS
Status: COMPLETED | OUTPATIENT
Start: 2020-01-01 | End: 2020-01-01

## 2020-01-01 RX ORDER — HYDROMORPHONE HYDROCHLORIDE 1 MG/ML
0.5 INJECTION, SOLUTION INTRAMUSCULAR; INTRAVENOUS; SUBCUTANEOUS ONCE
Status: COMPLETED | OUTPATIENT
Start: 2020-01-01 | End: 2020-01-01

## 2020-01-01 RX ORDER — IPRATROPIUM BROMIDE AND ALBUTEROL SULFATE 2.5; .5 MG/3ML; MG/3ML
3 SOLUTION RESPIRATORY (INHALATION) 4 TIMES DAILY PRN
Status: DISCONTINUED | OUTPATIENT
Start: 2020-01-01 | End: 2020-01-01 | Stop reason: HOSPADM

## 2020-01-01 RX ORDER — OXYCODONE HYDROCHLORIDE 10 MG/1
10 TABLET ORAL EVERY 4 HOURS PRN
Qty: 180 TABLET | Refills: 0 | Status: ON HOLD | OUTPATIENT
Start: 2020-01-01 | End: 2020-01-01 | Stop reason: SDUPTHER

## 2020-01-01 RX ORDER — ASPIRIN 81 MG/1
81 TABLET ORAL DAILY
COMMUNITY
End: 2020-01-01 | Stop reason: HOSPADM

## 2020-01-01 RX ORDER — OXYCODONE HYDROCHLORIDE 5 MG/1
10 TABLET ORAL EVERY 4 HOURS PRN
Status: DISCONTINUED | OUTPATIENT
Start: 2020-01-01 | End: 2020-01-01 | Stop reason: HOSPADM

## 2020-01-01 RX ORDER — POTASSIUM CHLORIDE 7.45 MG/ML
10 INJECTION INTRAVENOUS
Status: DISCONTINUED | OUTPATIENT
Start: 2020-01-01 | End: 2020-01-01 | Stop reason: HOSPADM

## 2020-01-01 RX ORDER — DIPHENHYDRAMINE HYDROCHLORIDE 50 MG/ML
50 INJECTION INTRAMUSCULAR; INTRAVENOUS AS NEEDED
Status: CANCELLED | OUTPATIENT
Start: 2020-01-01

## 2020-01-01 RX ORDER — FENTANYL CITRATE 50 UG/ML
50 INJECTION, SOLUTION INTRAMUSCULAR; INTRAVENOUS
Status: DISCONTINUED | OUTPATIENT
Start: 2020-01-01 | End: 2020-01-01 | Stop reason: HOSPADM

## 2020-01-01 RX ORDER — FUROSEMIDE 10 MG/ML
40 INJECTION INTRAMUSCULAR; INTRAVENOUS ONCE
Status: COMPLETED | OUTPATIENT
Start: 2020-01-01 | End: 2020-01-01

## 2020-01-01 RX ORDER — LIDOCAINE AND PRILOCAINE 25; 25 MG/G; MG/G
CREAM TOPICAL AS NEEDED
Qty: 30 G | Refills: 3 | Status: SHIPPED | OUTPATIENT
Start: 2020-01-01 | End: 2020-01-01 | Stop reason: HOSPADM

## 2020-01-01 RX ORDER — NALOXONE HCL 0.4 MG/ML
0.4 VIAL (ML) INJECTION
Status: DISCONTINUED | OUTPATIENT
Start: 2020-01-01 | End: 2020-01-01

## 2020-01-01 RX ORDER — LACTULOSE 10 G/15ML
20 SOLUTION ORAL 2 TIMES DAILY
Status: DISCONTINUED | OUTPATIENT
Start: 2020-01-01 | End: 2020-01-01

## 2020-01-01 RX ORDER — NALOXONE HCL 0.4 MG/ML
0.1 VIAL (ML) INJECTION
Status: DISCONTINUED | OUTPATIENT
Start: 2020-01-01 | End: 2020-01-01 | Stop reason: SDUPTHER

## 2020-01-01 RX ORDER — SODIUM CHLORIDE, SODIUM LACTATE, POTASSIUM CHLORIDE, CALCIUM CHLORIDE 600; 310; 30; 20 MG/100ML; MG/100ML; MG/100ML; MG/100ML
100 INJECTION, SOLUTION INTRAVENOUS CONTINUOUS
Status: CANCELLED | OUTPATIENT
Start: 2020-01-01

## 2020-01-01 RX ORDER — FAMOTIDINE 10 MG/ML
20 INJECTION, SOLUTION INTRAVENOUS ONCE
Status: DISCONTINUED | OUTPATIENT
Start: 2020-01-01 | End: 2020-01-01

## 2020-01-01 RX ORDER — OXYCODONE HCL 5 MG/5 ML
5 SOLUTION, ORAL ORAL EVERY 4 HOURS PRN
Status: DISCONTINUED | OUTPATIENT
Start: 2020-01-01 | End: 2020-01-01

## 2020-01-01 RX ORDER — ECHINACEA PURPUREA EXTRACT 125 MG
1 TABLET ORAL AS NEEDED
Status: DISCONTINUED | OUTPATIENT
Start: 2020-01-01 | End: 2020-01-01 | Stop reason: HOSPADM

## 2020-01-01 RX ORDER — LORAZEPAM 2 MG/ML
0.5 INJECTION INTRAMUSCULAR EVERY 4 HOURS PRN
Status: DISCONTINUED | OUTPATIENT
Start: 2020-01-01 | End: 2020-01-01

## 2020-01-01 RX ORDER — SODIUM CHLORIDE 0.9 % (FLUSH) 0.9 %
10 SYRINGE (ML) INJECTION AS NEEDED
Status: CANCELLED | OUTPATIENT
Start: 2020-01-01

## 2020-01-01 RX ORDER — HEPARIN SODIUM 1000 [USP'U]/ML
INJECTION, SOLUTION INTRAVENOUS; SUBCUTANEOUS AS NEEDED
Status: DISCONTINUED | OUTPATIENT
Start: 2020-01-01 | End: 2020-01-01 | Stop reason: HOSPADM

## 2020-01-01 RX ORDER — CHOLECALCIFEROL (VITAMIN D3) 125 MCG
10 CAPSULE ORAL NIGHTLY
Status: DISCONTINUED | OUTPATIENT
Start: 2020-01-01 | End: 2020-01-01

## 2020-01-01 RX ORDER — BUPIVACAINE HYDROCHLORIDE 2.5 MG/ML
INJECTION, SOLUTION EPIDURAL; INFILTRATION; INTRACAUDAL
Status: COMPLETED | OUTPATIENT
Start: 2020-01-01 | End: 2020-01-01

## 2020-01-01 RX ORDER — OXYCODONE HYDROCHLORIDE 5 MG/1
5 TABLET ORAL EVERY 4 HOURS PRN
Status: DISPENSED | OUTPATIENT
Start: 2020-01-01 | End: 2020-01-01

## 2020-01-01 RX ORDER — ONDANSETRON HYDROCHLORIDE 8 MG/1
8 TABLET, FILM COATED ORAL 3 TIMES DAILY PRN
Qty: 30 TABLET | Refills: 5 | Status: SHIPPED | OUTPATIENT
Start: 2020-01-01 | End: 2020-01-01

## 2020-01-01 RX ORDER — MORPHINE SULFATE 2 MG/ML
1 INJECTION, SOLUTION INTRAMUSCULAR; INTRAVENOUS EVERY 4 HOURS PRN
Status: DISCONTINUED | OUTPATIENT
Start: 2020-01-01 | End: 2020-01-01

## 2020-01-01 RX ORDER — DIPHENHYDRAMINE HCL 25 MG
25 CAPSULE ORAL NIGHTLY PRN
Start: 2020-01-01 | End: 2020-01-01

## 2020-01-01 RX ORDER — DEXAMETHASONE SODIUM PHOSPHATE 4 MG/ML
INJECTION, SOLUTION INTRA-ARTICULAR; INTRALESIONAL; INTRAMUSCULAR; INTRAVENOUS; SOFT TISSUE AS NEEDED
Status: DISCONTINUED | OUTPATIENT
Start: 2020-01-01 | End: 2020-01-01 | Stop reason: SURG

## 2020-01-01 RX ORDER — AMOXICILLIN 250 MG
2 CAPSULE ORAL 2 TIMES DAILY
Start: 2020-01-01

## 2020-01-01 RX ORDER — ACETAMINOPHEN 650 MG/1
650 SUPPOSITORY RECTAL EVERY 4 HOURS PRN
Status: DISCONTINUED | OUTPATIENT
Start: 2020-01-01 | End: 2020-01-01 | Stop reason: HOSPADM

## 2020-01-01 RX ORDER — HEPARIN SODIUM 5000 [USP'U]/ML
INJECTION, SOLUTION INTRAVENOUS; SUBCUTANEOUS AS NEEDED
Status: DISCONTINUED | OUTPATIENT
Start: 2020-01-01 | End: 2020-01-01 | Stop reason: HOSPADM

## 2020-01-01 RX ORDER — SODIUM CHLORIDE, SODIUM LACTATE, POTASSIUM CHLORIDE, CALCIUM CHLORIDE 600; 310; 30; 20 MG/100ML; MG/100ML; MG/100ML; MG/100ML
9 INJECTION, SOLUTION INTRAVENOUS CONTINUOUS
Status: DISCONTINUED | OUTPATIENT
Start: 2020-01-01 | End: 2020-01-01

## 2020-01-01 RX ORDER — CHOLECALCIFEROL (VITAMIN D3) 125 MCG
10 CAPSULE ORAL NIGHTLY
Status: DISCONTINUED | OUTPATIENT
Start: 2020-01-01 | End: 2020-01-01 | Stop reason: HOSPADM

## 2020-01-01 RX ORDER — CLONAZEPAM 0.5 MG/1
0.5 TABLET ORAL EVERY 12 HOURS SCHEDULED
Qty: 6 TABLET | Refills: 0 | Status: SHIPPED | OUTPATIENT
Start: 2020-01-01

## 2020-01-01 RX ORDER — DIPHENHYDRAMINE HCL 25 MG
25 CAPSULE ORAL NIGHTLY PRN
Status: DISCONTINUED | OUTPATIENT
Start: 2020-01-01 | End: 2020-01-01 | Stop reason: HOSPADM

## 2020-01-01 RX ORDER — FLUOXETINE HYDROCHLORIDE 20 MG/1
20 CAPSULE ORAL DAILY
Qty: 14 CAPSULE | Refills: 0
Start: 2020-01-01 | End: 2020-01-01

## 2020-01-01 RX ORDER — CLONAZEPAM 0.5 MG/1
0.5 TABLET ORAL EVERY 12 HOURS SCHEDULED
Status: DISCONTINUED | OUTPATIENT
Start: 2020-01-01 | End: 2020-01-01

## 2020-01-01 RX ORDER — OXCARBAZEPINE 150 MG/1
150 TABLET, FILM COATED ORAL 2 TIMES DAILY
COMMUNITY
Start: 2020-09-05 | End: 2020-01-01

## 2020-01-01 RX ORDER — HEPARIN SODIUM (PORCINE) LOCK FLUSH IV SOLN 100 UNIT/ML 100 UNIT/ML
500 SOLUTION INTRAVENOUS AS NEEDED
Status: DISCONTINUED | OUTPATIENT
Start: 2020-01-01 | End: 2020-01-01 | Stop reason: HOSPADM

## 2020-01-01 RX ORDER — BISACODYL 10 MG
10 SUPPOSITORY, RECTAL RECTAL ONCE
Status: COMPLETED | OUTPATIENT
Start: 2020-01-01 | End: 2020-01-01

## 2020-01-01 RX ORDER — LIDOCAINE HYDROCHLORIDE 10 MG/ML
INJECTION, SOLUTION INFILTRATION; PERINEURAL AS NEEDED
Status: DISCONTINUED | OUTPATIENT
Start: 2020-01-01 | End: 2020-01-01 | Stop reason: HOSPADM

## 2020-01-01 RX ORDER — SODIUM CHLORIDE 9 MG/ML
250 INJECTION, SOLUTION INTRAVENOUS ONCE
Status: DISCONTINUED | OUTPATIENT
Start: 2020-01-01 | End: 2020-01-01 | Stop reason: HOSPADM

## 2020-01-01 RX ORDER — LACTULOSE 10 G/15ML
20 SOLUTION ORAL DAILY
Status: DISCONTINUED | OUTPATIENT
Start: 2020-01-01 | End: 2020-01-01

## 2020-01-01 RX ORDER — WITCH HAZEL 50 %
1 PADS, MEDICATED (EA) TOPICAL 2 TIMES DAILY
COMMUNITY
End: 2020-01-01 | Stop reason: HOSPADM

## 2020-01-01 RX ORDER — METOCLOPRAMIDE HYDROCHLORIDE 5 MG/ML
10 INJECTION INTRAMUSCULAR; INTRAVENOUS EVERY 6 HOURS
Status: DISCONTINUED | OUTPATIENT
Start: 2020-01-01 | End: 2020-01-01

## 2020-01-01 RX ORDER — POTASSIUM CHLORIDE 1.5 G/1.77G
40 POWDER, FOR SOLUTION ORAL AS NEEDED
Status: DISCONTINUED | OUTPATIENT
Start: 2020-01-01 | End: 2020-01-01

## 2020-01-01 RX ORDER — DEXAMETHASONE 4 MG/1
TABLET ORAL
Qty: 6 TABLET | Refills: 5 | Status: SHIPPED | OUTPATIENT
Start: 2020-01-01 | End: 2020-01-01 | Stop reason: HOSPADM

## 2020-01-01 RX ORDER — BISACODYL 10 MG
10 SUPPOSITORY, RECTAL RECTAL DAILY
Status: DISCONTINUED | OUTPATIENT
Start: 2020-01-01 | End: 2020-01-01 | Stop reason: HOSPADM

## 2020-01-01 RX ORDER — DIPHENHYDRAMINE HYDROCHLORIDE 50 MG/ML
25 INJECTION INTRAMUSCULAR; INTRAVENOUS ONCE
Status: COMPLETED | OUTPATIENT
Start: 2020-01-01 | End: 2020-01-01

## 2020-01-01 RX ORDER — PREDNISONE 20 MG/1
20 TABLET ORAL DAILY
Qty: 10 TABLET | Refills: 0 | Status: SHIPPED | OUTPATIENT
Start: 2020-01-01 | End: 2020-01-01

## 2020-01-01 RX ORDER — CLONAZEPAM 0.5 MG/1
0.5 TABLET ORAL EVERY 12 HOURS SCHEDULED
Status: DISCONTINUED | OUTPATIENT
Start: 2020-01-01 | End: 2020-01-01 | Stop reason: HOSPADM

## 2020-01-01 RX ORDER — IBUPROFEN 600 MG/1
600 TABLET ORAL EVERY 6 HOURS PRN
Status: DISCONTINUED | OUTPATIENT
Start: 2020-01-01 | End: 2020-01-01 | Stop reason: HOSPADM

## 2020-01-01 RX ADMIN — POTASSIUM CHLORIDE 40 MEQ: 10 CAPSULE, COATED, EXTENDED RELEASE ORAL at 13:47

## 2020-01-01 RX ADMIN — METOPROLOL TARTRATE 25 MG: 25 TABLET, FILM COATED ORAL at 16:55

## 2020-01-01 RX ADMIN — OXYCODONE HYDROCHLORIDE 7.5 MG: 15 TABLET ORAL at 03:45

## 2020-01-01 RX ADMIN — ENOXAPARIN SODIUM 40 MG: 40 INJECTION SUBCUTANEOUS at 09:05

## 2020-01-01 RX ADMIN — SENNOSIDES AND DOCUSATE SODIUM 2 TABLET: 8.6; 5 TABLET ORAL at 21:40

## 2020-01-01 RX ADMIN — CLONAZEPAM 0.5 MG: 0.5 TABLET ORAL at 20:51

## 2020-01-01 RX ADMIN — POTASSIUM CHLORIDE 40 MEQ: 1.5 FOR SOLUTION ORAL at 12:41

## 2020-01-01 RX ADMIN — CLONAZEPAM 0.5 MG: 0.5 TABLET ORAL at 09:36

## 2020-01-01 RX ADMIN — ACETAMINOPHEN 650 MG: 325 TABLET, FILM COATED ORAL at 12:19

## 2020-01-01 RX ADMIN — SODIUM CHLORIDE, POTASSIUM CHLORIDE, SODIUM LACTATE AND CALCIUM CHLORIDE 125 ML/HR: 600; 310; 30; 20 INJECTION, SOLUTION INTRAVENOUS at 21:21

## 2020-01-01 RX ADMIN — HYDROMORPHONE HYDROCHLORIDE 0.5 MG: 1 INJECTION, SOLUTION INTRAMUSCULAR; INTRAVENOUS; SUBCUTANEOUS at 11:38

## 2020-01-01 RX ADMIN — HYDROMORPHONE HYDROCHLORIDE 0.5 MG: 1 INJECTION, SOLUTION INTRAMUSCULAR; INTRAVENOUS; SUBCUTANEOUS at 21:22

## 2020-01-01 RX ADMIN — POTASSIUM PHOSPHATE, MONOBASIC POTASSIUM PHOSPHATE, DIBASIC: 224; 236 INJECTION, SOLUTION, CONCENTRATE INTRAVENOUS at 17:00

## 2020-01-01 RX ADMIN — ACETAMINOPHEN 650 MG: 325 TABLET, FILM COATED ORAL at 11:22

## 2020-01-01 RX ADMIN — CLONAZEPAM 0.5 MG: 0.5 TABLET ORAL at 08:38

## 2020-01-01 RX ADMIN — ACETAMINOPHEN 650 MG: 325 TABLET, FILM COATED ORAL at 17:00

## 2020-01-01 RX ADMIN — OXYCODONE HYDROCHLORIDE 5 MG: 5 TABLET ORAL at 15:38

## 2020-01-01 RX ADMIN — OXCARBAZEPINE 150 MG: 150 TABLET, FILM COATED ORAL at 08:20

## 2020-01-01 RX ADMIN — OXYCODONE HYDROCHLORIDE 10 MG: 5 TABLET ORAL at 10:29

## 2020-01-01 RX ADMIN — SODIUM CHLORIDE, PRESERVATIVE FREE 10 ML: 5 INJECTION INTRAVENOUS at 09:06

## 2020-01-01 RX ADMIN — OXYCODONE HYDROCHLORIDE 5 MG: 5 TABLET ORAL at 06:28

## 2020-01-01 RX ADMIN — ACETAMINOPHEN 650 MG: 325 TABLET, FILM COATED ORAL at 18:03

## 2020-01-01 RX ADMIN — DEXAMETHASONE SODIUM PHOSPHATE 4 MG: 4 INJECTION, SOLUTION INTRAMUSCULAR; INTRAVENOUS at 15:27

## 2020-01-01 RX ADMIN — FLUTICASONE PROPIONATE 2 SPRAY: 50 SPRAY, METERED NASAL at 08:19

## 2020-01-01 RX ADMIN — FENTANYL CITRATE 50 MCG: 50 INJECTION, SOLUTION INTRAMUSCULAR; INTRAVENOUS at 13:26

## 2020-01-01 RX ADMIN — ENOXAPARIN SODIUM 40 MG: 40 INJECTION SUBCUTANEOUS at 08:19

## 2020-01-01 RX ADMIN — OXYCODONE HYDROCHLORIDE 5 MG: 5 SOLUTION ORAL at 15:29

## 2020-01-01 RX ADMIN — PANTOPRAZOLE SODIUM 40 MG: 40 INJECTION, POWDER, FOR SOLUTION INTRAVENOUS at 05:01

## 2020-01-01 RX ADMIN — DEXTROSE AND SODIUM CHLORIDE 100 ML/HR: 5; 450 INJECTION, SOLUTION INTRAVENOUS at 21:24

## 2020-01-01 RX ADMIN — ACETAMINOPHEN 650 MG: 325 TABLET, FILM COATED ORAL at 12:51

## 2020-01-01 RX ADMIN — MAGNESIUM SULFATE 2 G: 2 INJECTION INTRAVENOUS at 10:13

## 2020-01-01 RX ADMIN — PANTOPRAZOLE SODIUM 40 MG: 40 INJECTION, POWDER, FOR SOLUTION INTRAVENOUS at 05:42

## 2020-01-01 RX ADMIN — OXYCODONE HYDROCHLORIDE 10 MG: 5 TABLET ORAL at 20:42

## 2020-01-01 RX ADMIN — MAGNESIUM SULFATE HEPTAHYDRATE 4 G: 40 INJECTION, SOLUTION INTRAVENOUS at 08:48

## 2020-01-01 RX ADMIN — PROPOFOL 20 MG: 10 INJECTION, EMULSION INTRAVENOUS at 13:44

## 2020-01-01 RX ADMIN — IOPAMIDOL 100 ML: 612 INJECTION, SOLUTION INTRAVENOUS at 16:30

## 2020-01-01 RX ADMIN — ACETAMINOPHEN 650 MG: 325 TABLET, FILM COATED ORAL at 06:03

## 2020-01-01 RX ADMIN — CLONAZEPAM 0.5 MG: 0.5 TABLET ORAL at 09:45

## 2020-01-01 RX ADMIN — CLONAZEPAM 0.5 MG: 0.5 TABLET ORAL at 09:48

## 2020-01-01 RX ADMIN — OXYCODONE HYDROCHLORIDE 10 MG: 5 TABLET ORAL at 18:03

## 2020-01-01 RX ADMIN — BISACODYL 10 MG: 5 TABLET, COATED ORAL at 10:40

## 2020-01-01 RX ADMIN — PROPOFOL 20 MG: 10 INJECTION, EMULSION INTRAVENOUS at 13:46

## 2020-01-01 RX ADMIN — ACETAMINOPHEN 650 MG: 325 TABLET, FILM COATED ORAL at 17:17

## 2020-01-01 RX ADMIN — ENOXAPARIN SODIUM 40 MG: 40 INJECTION SUBCUTANEOUS at 08:57

## 2020-01-01 RX ADMIN — OXYCODONE HYDROCHLORIDE 5 MG: 5 TABLET ORAL at 10:25

## 2020-01-01 RX ADMIN — PANTOPRAZOLE SODIUM 40 MG: 40 INJECTION, POWDER, FOR SOLUTION INTRAVENOUS at 05:52

## 2020-01-01 RX ADMIN — IPRATROPIUM BROMIDE AND ALBUTEROL SULFATE 3 ML: 2.5; .5 SOLUTION RESPIRATORY (INHALATION) at 13:30

## 2020-01-01 RX ADMIN — ERTAPENEM SODIUM 1 G: 1 INJECTION, POWDER, LYOPHILIZED, FOR SOLUTION INTRAMUSCULAR; INTRAVENOUS at 15:02

## 2020-01-01 RX ADMIN — ACETAMINOPHEN 650 MG: 325 TABLET, FILM COATED ORAL at 11:52

## 2020-01-01 RX ADMIN — TAZOBACTAM SODIUM AND PIPERACILLIN SODIUM 3.38 G: 375; 3 INJECTION, SOLUTION INTRAVENOUS at 17:27

## 2020-01-01 RX ADMIN — LACTULOSE 20 G: 20 SOLUTION ORAL at 15:02

## 2020-01-01 RX ADMIN — PROPOFOL 20 MG: 10 INJECTION, EMULSION INTRAVENOUS at 14:02

## 2020-01-01 RX ADMIN — KETOROLAC TROMETHAMINE 15 MG: 30 INJECTION, SOLUTION INTRAMUSCULAR; INTRAVENOUS at 13:23

## 2020-01-01 RX ADMIN — SODIUM CHLORIDE, POTASSIUM CHLORIDE, SODIUM LACTATE AND CALCIUM CHLORIDE 60 ML/HR: 600; 310; 30; 20 INJECTION, SOLUTION INTRAVENOUS at 00:01

## 2020-01-01 RX ADMIN — PROPOFOL 20 MG: 10 INJECTION, EMULSION INTRAVENOUS at 14:06

## 2020-01-01 RX ADMIN — OXYCODONE HYDROCHLORIDE 10 MG: 5 TABLET ORAL at 21:30

## 2020-01-01 RX ADMIN — FAMOTIDINE 20 MG: 10 INJECTION INTRAVENOUS at 09:15

## 2020-01-01 RX ADMIN — SODIUM CHLORIDE, POTASSIUM CHLORIDE, SODIUM LACTATE AND CALCIUM CHLORIDE 100 ML/HR: 600; 310; 30; 20 INJECTION, SOLUTION INTRAVENOUS at 20:19

## 2020-01-01 RX ADMIN — BISACODYL 10 MG: 10 SUPPOSITORY RECTAL at 12:39

## 2020-01-01 RX ADMIN — OXCARBAZEPINE 150 MG: 150 TABLET, FILM COATED ORAL at 08:18

## 2020-01-01 RX ADMIN — ACETAMINOPHEN 650 MG: 325 TABLET, FILM COATED ORAL at 12:47

## 2020-01-01 RX ADMIN — ACETAMINOPHEN 650 MG: 325 TABLET, FILM COATED ORAL at 06:12

## 2020-01-01 RX ADMIN — SMOFLIPID 100 ML: 6; 6; 5; 3 INJECTION, EMULSION INTRAVENOUS at 18:30

## 2020-01-01 RX ADMIN — PANTOPRAZOLE SODIUM 40 MG: 40 INJECTION, POWDER, FOR SOLUTION INTRAVENOUS at 05:38

## 2020-01-01 RX ADMIN — HYDROMORPHONE HYDROCHLORIDE 0.5 MG: 1 INJECTION, SOLUTION INTRAMUSCULAR; INTRAVENOUS; SUBCUTANEOUS at 02:35

## 2020-01-01 RX ADMIN — VANCOMYCIN HYDROCHLORIDE 1000 MG: 1 INJECTION, SOLUTION INTRAVENOUS at 19:24

## 2020-01-01 RX ADMIN — IOPAMIDOL 80 ML: 612 INJECTION, SOLUTION INTRAVENOUS at 13:51

## 2020-01-01 RX ADMIN — METOPROLOL TARTRATE 25 MG: 25 TABLET, FILM COATED ORAL at 05:01

## 2020-01-01 RX ADMIN — SODIUM CHLORIDE, PRESERVATIVE FREE 10 ML: 5 INJECTION INTRAVENOUS at 08:17

## 2020-01-01 RX ADMIN — METOCLOPRAMIDE 10 MG: 5 INJECTION, SOLUTION INTRAMUSCULAR; INTRAVENOUS at 18:03

## 2020-01-01 RX ADMIN — METOCLOPRAMIDE 10 MG: 5 INJECTION, SOLUTION INTRAMUSCULAR; INTRAVENOUS at 01:07

## 2020-01-01 RX ADMIN — SODIUM CHLORIDE, PRESERVATIVE FREE 10 ML: 5 INJECTION INTRAVENOUS at 08:50

## 2020-01-01 RX ADMIN — CLONAZEPAM 0.5 MG: 0.5 TABLET ORAL at 21:40

## 2020-01-01 RX ADMIN — ENOXAPARIN SODIUM 40 MG: 40 INJECTION SUBCUTANEOUS at 08:17

## 2020-01-01 RX ADMIN — SODIUM CHLORIDE, PRESERVATIVE FREE 10 ML: 5 INJECTION INTRAVENOUS at 21:31

## 2020-01-01 RX ADMIN — TAZOBACTAM SODIUM AND PIPERACILLIN SODIUM 3.38 G: 375; 3 INJECTION, SOLUTION INTRAVENOUS at 08:38

## 2020-01-01 RX ADMIN — OXYCODONE HYDROCHLORIDE 5 MG: 5 TABLET ORAL at 15:12

## 2020-01-01 RX ADMIN — SODIUM CHLORIDE, PRESERVATIVE FREE 10 ML: 5 INJECTION INTRAVENOUS at 09:48

## 2020-01-01 RX ADMIN — ACETAMINOPHEN 650 MG: 325 TABLET, FILM COATED ORAL at 12:04

## 2020-01-01 RX ADMIN — METHYLNALTREXONE BROMIDE 12 MG: 12 INJECTION, SOLUTION SUBCUTANEOUS at 08:56

## 2020-01-01 RX ADMIN — HYDROMORPHONE HYDROCHLORIDE 0.5 MG: 1 INJECTION, SOLUTION INTRAMUSCULAR; INTRAVENOUS; SUBCUTANEOUS at 05:56

## 2020-01-01 RX ADMIN — OXYCODONE HYDROCHLORIDE 10 MG: 5 TABLET ORAL at 21:20

## 2020-01-01 RX ADMIN — MELATONIN TAB 5 MG 10 MG: 5 TAB at 20:23

## 2020-01-01 RX ADMIN — ROCURONIUM BROMIDE 10 MG: 10 INJECTION INTRAVENOUS at 16:33

## 2020-01-01 RX ADMIN — HYDROMORPHONE HYDROCHLORIDE 0.5 MG: 1 INJECTION, SOLUTION INTRAMUSCULAR; INTRAVENOUS; SUBCUTANEOUS at 22:01

## 2020-01-01 RX ADMIN — CEFTRIAXONE 2 G: 2 INJECTION, POWDER, FOR SOLUTION INTRAMUSCULAR; INTRAVENOUS at 16:26

## 2020-01-01 RX ADMIN — OXYCODONE HYDROCHLORIDE 5 MG: 5 TABLET ORAL at 06:17

## 2020-01-01 RX ADMIN — SODIUM CHLORIDE, PRESERVATIVE FREE 10 ML: 5 INJECTION INTRAVENOUS at 20:23

## 2020-01-01 RX ADMIN — MAGNESIUM SULFATE 2 G: 2 INJECTION INTRAVENOUS at 06:10

## 2020-01-01 RX ADMIN — IPRATROPIUM BROMIDE AND ALBUTEROL SULFATE 3 ML: 2.5; .5 SOLUTION RESPIRATORY (INHALATION) at 19:16

## 2020-01-01 RX ADMIN — TAZOBACTAM SODIUM AND PIPERACILLIN SODIUM 3.38 G: 375; 3 INJECTION, SOLUTION INTRAVENOUS at 00:13

## 2020-01-01 RX ADMIN — OXYCODONE HYDROCHLORIDE 5 MG: 5 SOLUTION ORAL at 02:27

## 2020-01-01 RX ADMIN — ACETAMINOPHEN 650 MG: 325 TABLET, FILM COATED ORAL at 00:15

## 2020-01-01 RX ADMIN — OXYCODONE 5 MG: 5 TABLET ORAL at 10:17

## 2020-01-01 RX ADMIN — ALBUMIN HUMAN 12.5 G: 0.25 SOLUTION INTRAVENOUS at 13:13

## 2020-01-01 RX ADMIN — METHYLNALTREXONE BROMIDE 12 MG: 12 INJECTION, SOLUTION SUBCUTANEOUS at 09:17

## 2020-01-01 RX ADMIN — PROPOFOL 20 MG: 10 INJECTION, EMULSION INTRAVENOUS at 13:53

## 2020-01-01 RX ADMIN — SODIUM CHLORIDE, POTASSIUM CHLORIDE, SODIUM LACTATE AND CALCIUM CHLORIDE 125 ML/HR: 600; 310; 30; 20 INJECTION, SOLUTION INTRAVENOUS at 17:11

## 2020-01-01 RX ADMIN — OXYCODONE HYDROCHLORIDE 10 MG: 5 TABLET ORAL at 12:19

## 2020-01-01 RX ADMIN — ACETAMINOPHEN 650 MG: 325 TABLET, FILM COATED ORAL at 23:05

## 2020-01-01 RX ADMIN — OXYCODONE HYDROCHLORIDE 7.5 MG: 15 TABLET ORAL at 08:39

## 2020-01-01 RX ADMIN — HYDROMORPHONE HYDROCHLORIDE 0.5 MG: 1 INJECTION, SOLUTION INTRAMUSCULAR; INTRAVENOUS; SUBCUTANEOUS at 15:57

## 2020-01-01 RX ADMIN — METOCLOPRAMIDE 10 MG: 5 INJECTION, SOLUTION INTRAMUSCULAR; INTRAVENOUS at 12:56

## 2020-01-01 RX ADMIN — OXYCODONE HYDROCHLORIDE 10 MG: 5 TABLET ORAL at 17:40

## 2020-01-01 RX ADMIN — METOPROLOL TARTRATE 25 MG: 25 TABLET, FILM COATED ORAL at 06:13

## 2020-01-01 RX ADMIN — METOCLOPRAMIDE 10 MG: 5 INJECTION, SOLUTION INTRAMUSCULAR; INTRAVENOUS at 11:52

## 2020-01-01 RX ADMIN — SODIUM CHLORIDE, PRESERVATIVE FREE 10 ML: 5 INJECTION INTRAVENOUS at 08:52

## 2020-01-01 RX ADMIN — OXCARBAZEPINE 150 MG: 150 TABLET, FILM COATED ORAL at 08:50

## 2020-01-01 RX ADMIN — OXCARBAZEPINE 150 MG: 150 TABLET, FILM COATED ORAL at 20:17

## 2020-01-01 RX ADMIN — LORAZEPAM 0.5 MG: 2 INJECTION INTRAMUSCULAR; INTRAVENOUS at 00:09

## 2020-01-01 RX ADMIN — OXYCODONE HYDROCHLORIDE 10 MG: 5 TABLET ORAL at 14:04

## 2020-01-01 RX ADMIN — HYDROMORPHONE HYDROCHLORIDE 0.5 MG: 1 INJECTION, SOLUTION INTRAMUSCULAR; INTRAVENOUS; SUBCUTANEOUS at 04:54

## 2020-01-01 RX ADMIN — DOCUSATE SODIUM 100 MG: 100 CAPSULE, LIQUID FILLED ORAL at 09:45

## 2020-01-01 RX ADMIN — ACETAMINOPHEN 650 MG: 325 TABLET, FILM COATED ORAL at 00:24

## 2020-01-01 RX ADMIN — OXCARBAZEPINE 150 MG: 150 TABLET, FILM COATED ORAL at 21:00

## 2020-01-01 RX ADMIN — ACETAMINOPHEN 650 MG: 325 TABLET, FILM COATED ORAL at 12:22

## 2020-01-01 RX ADMIN — ACETAMINOPHEN 650 MG: 325 TABLET, FILM COATED ORAL at 05:42

## 2020-01-01 RX ADMIN — VANCOMYCIN HYDROCHLORIDE 750 MG: 750 INJECTION, SOLUTION INTRAVENOUS at 18:44

## 2020-01-01 RX ADMIN — MIDAZOLAM HYDROCHLORIDE 2 MG: 1 INJECTION, SOLUTION INTRAMUSCULAR; INTRAVENOUS at 13:25

## 2020-01-01 RX ADMIN — CLONAZEPAM 0.5 MG: 0.5 TABLET ORAL at 22:01

## 2020-01-01 RX ADMIN — SENNOSIDES AND DOCUSATE SODIUM 2 TABLET: 8.6; 5 TABLET ORAL at 20:52

## 2020-01-01 RX ADMIN — OXYCODONE HYDROCHLORIDE 5 MG: 5 TABLET ORAL at 06:19

## 2020-01-01 RX ADMIN — TAZOBACTAM SODIUM AND PIPERACILLIN SODIUM 3.38 G: 375; 3 INJECTION, SOLUTION INTRAVENOUS at 23:51

## 2020-01-01 RX ADMIN — METOCLOPRAMIDE 10 MG: 5 INJECTION, SOLUTION INTRAMUSCULAR; INTRAVENOUS at 13:06

## 2020-01-01 RX ADMIN — OXYCODONE HYDROCHLORIDE 5 MG: 5 TABLET ORAL at 04:39

## 2020-01-01 RX ADMIN — OXYCODONE HYDROCHLORIDE 10 MG: 5 TABLET ORAL at 12:56

## 2020-01-01 RX ADMIN — MELATONIN TAB 5 MG 10 MG: 5 TAB at 20:17

## 2020-01-01 RX ADMIN — HYDROMORPHONE HYDROCHLORIDE 0.5 MG: 1 INJECTION, SOLUTION INTRAMUSCULAR; INTRAVENOUS; SUBCUTANEOUS at 09:06

## 2020-01-01 RX ADMIN — ACETAMINOPHEN 650 MG: 325 TABLET, FILM COATED ORAL at 17:27

## 2020-01-01 RX ADMIN — ACETAMINOPHEN 650 MG: 325 TABLET, FILM COATED ORAL at 17:40

## 2020-01-01 RX ADMIN — FLUTICASONE PROPIONATE 2 SPRAY: 50 SPRAY, METERED NASAL at 08:21

## 2020-01-01 RX ADMIN — POTASSIUM CHLORIDE 10 MEQ: 7.46 INJECTION, SOLUTION INTRAVENOUS at 21:23

## 2020-01-01 RX ADMIN — POTASSIUM CHLORIDE 40 MEQ: 10 CAPSULE, COATED, EXTENDED RELEASE ORAL at 18:28

## 2020-01-01 RX ADMIN — INFLUENZA VIRUS VACCINE 0.5 ML: 15; 15; 15; 15 SUSPENSION INTRAMUSCULAR at 11:18

## 2020-01-01 RX ADMIN — TAZOBACTAM SODIUM AND PIPERACILLIN SODIUM 3.38 G: 375; 3 INJECTION, SOLUTION INTRAVENOUS at 18:18

## 2020-01-01 RX ADMIN — SENNOSIDES AND DOCUSATE SODIUM 2 TABLET: 8.6; 5 TABLET ORAL at 22:01

## 2020-01-01 RX ADMIN — OXCARBAZEPINE 150 MG: 150 TABLET, FILM COATED ORAL at 20:19

## 2020-01-01 RX ADMIN — CLONAZEPAM 0.5 MG: 0.5 TABLET ORAL at 22:13

## 2020-01-01 RX ADMIN — TAZOBACTAM SODIUM AND PIPERACILLIN SODIUM 3.38 G: 375; 3 INJECTION, SOLUTION INTRAVENOUS at 16:33

## 2020-01-01 RX ADMIN — TAZOBACTAM SODIUM AND PIPERACILLIN SODIUM 3.38 G: 375; 3 INJECTION, SOLUTION INTRAVENOUS at 09:51

## 2020-01-01 RX ADMIN — SODIUM CHLORIDE, POTASSIUM CHLORIDE, SODIUM LACTATE AND CALCIUM CHLORIDE 1000 ML: 600; 310; 30; 20 INJECTION, SOLUTION INTRAVENOUS at 08:27

## 2020-01-01 RX ADMIN — ISOSORBIDE MONONITRATE 60 MG: 60 TABLET, EXTENDED RELEASE ORAL at 09:11

## 2020-01-01 RX ADMIN — ACETAMINOPHEN 650 MG: 325 TABLET, FILM COATED ORAL at 05:48

## 2020-01-01 RX ADMIN — VANCOMYCIN HYDROCHLORIDE 1500 MG: 100 INJECTION, POWDER, LYOPHILIZED, FOR SOLUTION INTRAVENOUS at 19:13

## 2020-01-01 RX ADMIN — HYDROMORPHONE HYDROCHLORIDE 0.5 MG: 1 INJECTION, SOLUTION INTRAMUSCULAR; INTRAVENOUS; SUBCUTANEOUS at 01:04

## 2020-01-01 RX ADMIN — TAZOBACTAM SODIUM AND PIPERACILLIN SODIUM 3.38 G: 375; 3 INJECTION, SOLUTION INTRAVENOUS at 16:21

## 2020-01-01 RX ADMIN — METOCLOPRAMIDE 10 MG: 10 TABLET ORAL at 12:19

## 2020-01-01 RX ADMIN — POTASSIUM CHLORIDE 10 MEQ: 7.46 INJECTION, SOLUTION INTRAVENOUS at 16:09

## 2020-01-01 RX ADMIN — ISOSORBIDE MONONITRATE 60 MG: 60 TABLET, EXTENDED RELEASE ORAL at 10:40

## 2020-01-01 RX ADMIN — ACETAMINOPHEN 650 MG: 325 TABLET, FILM COATED ORAL at 05:38

## 2020-01-01 RX ADMIN — SODIUM CHLORIDE, POTASSIUM CHLORIDE, SODIUM LACTATE AND CALCIUM CHLORIDE 125 ML/HR: 600; 310; 30; 20 INJECTION, SOLUTION INTRAVENOUS at 09:06

## 2020-01-01 RX ADMIN — FUROSEMIDE 20 MG: 10 INJECTION, SOLUTION INTRAMUSCULAR; INTRAVENOUS at 11:17

## 2020-01-01 RX ADMIN — POTASSIUM CHLORIDE 40 MEQ: 10 CAPSULE, COATED, EXTENDED RELEASE ORAL at 09:05

## 2020-01-01 RX ADMIN — METOPROLOL TARTRATE 25 MG: 25 TABLET, FILM COATED ORAL at 17:41

## 2020-01-01 RX ADMIN — FLUTICASONE PROPIONATE 2 SPRAY: 50 SPRAY, METERED NASAL at 08:50

## 2020-01-01 RX ADMIN — MELATONIN TAB 5 MG 10 MG: 5 TAB at 21:09

## 2020-01-01 RX ADMIN — METOCLOPRAMIDE 10 MG: 5 INJECTION, SOLUTION INTRAMUSCULAR; INTRAVENOUS at 12:42

## 2020-01-01 RX ADMIN — SODIUM CHLORIDE, PRESERVATIVE FREE 500 UNITS: 5 INJECTION INTRAVENOUS at 15:21

## 2020-01-01 RX ADMIN — IPRATROPIUM BROMIDE AND ALBUTEROL SULFATE 3 ML: 2.5; .5 SOLUTION RESPIRATORY (INHALATION) at 07:45

## 2020-01-01 RX ADMIN — TAZOBACTAM SODIUM AND PIPERACILLIN SODIUM 3.38 G: 375; 3 INJECTION, SOLUTION INTRAVENOUS at 01:12

## 2020-01-01 RX ADMIN — SODIUM CHLORIDE, POTASSIUM CHLORIDE, SODIUM LACTATE AND CALCIUM CHLORIDE 125 ML/HR: 600; 310; 30; 20 INJECTION, SOLUTION INTRAVENOUS at 05:42

## 2020-01-01 RX ADMIN — ENOXAPARIN SODIUM 40 MG: 40 INJECTION SUBCUTANEOUS at 08:07

## 2020-01-01 RX ADMIN — FENTANYL CITRATE 100 MCG: 50 INJECTION, SOLUTION INTRAMUSCULAR; INTRAVENOUS at 15:44

## 2020-01-01 RX ADMIN — FENTANYL CITRATE 50 MCG: 50 INJECTION, SOLUTION INTRAMUSCULAR; INTRAVENOUS at 13:29

## 2020-01-01 RX ADMIN — AMLODIPINE BESYLATE 5 MG: 5 TABLET ORAL at 08:40

## 2020-01-01 RX ADMIN — ACETAMINOPHEN 650 MG: 325 TABLET, FILM COATED ORAL at 00:33

## 2020-01-01 RX ADMIN — FUROSEMIDE 40 MG: 10 INJECTION, SOLUTION INTRAMUSCULAR; INTRAVENOUS at 12:45

## 2020-01-01 RX ADMIN — METOCLOPRAMIDE 10 MG: 5 INJECTION, SOLUTION INTRAMUSCULAR; INTRAVENOUS at 23:34

## 2020-01-01 RX ADMIN — ONDANSETRON 4 MG: 2 INJECTION INTRAMUSCULAR; INTRAVENOUS at 13:24

## 2020-01-01 RX ADMIN — HYDROMORPHONE HYDROCHLORIDE 0.5 MG: 1 INJECTION, SOLUTION INTRAMUSCULAR; INTRAVENOUS; SUBCUTANEOUS at 00:09

## 2020-01-01 RX ADMIN — CEFUROXIME AXETIL 250 MG: 250 TABLET ORAL at 09:06

## 2020-01-01 RX ADMIN — FUROSEMIDE 20 MG: 10 INJECTION, SOLUTION INTRAMUSCULAR; INTRAVENOUS at 12:42

## 2020-01-01 RX ADMIN — ACETAMINOPHEN 650 MG: 325 TABLET, FILM COATED ORAL at 05:49

## 2020-01-01 RX ADMIN — ACETAMINOPHEN 650 MG: 325 TABLET, FILM COATED ORAL at 17:18

## 2020-01-01 RX ADMIN — HYDROMORPHONE HYDROCHLORIDE 0.5 MG: 1 INJECTION, SOLUTION INTRAMUSCULAR; INTRAVENOUS; SUBCUTANEOUS at 18:38

## 2020-01-01 RX ADMIN — ENOXAPARIN SODIUM 40 MG: 40 INJECTION SUBCUTANEOUS at 09:51

## 2020-01-01 RX ADMIN — PANTOPRAZOLE SODIUM 40 MG: 40 INJECTION, POWDER, FOR SOLUTION INTRAVENOUS at 06:06

## 2020-01-01 RX ADMIN — PROPOFOL 20 MG: 10 INJECTION, EMULSION INTRAVENOUS at 14:09

## 2020-01-01 RX ADMIN — SODIUM CHLORIDE, PRESERVATIVE FREE 10 ML: 5 INJECTION INTRAVENOUS at 20:19

## 2020-01-01 RX ADMIN — METOCLOPRAMIDE 10 MG: 5 INJECTION, SOLUTION INTRAMUSCULAR; INTRAVENOUS at 12:39

## 2020-01-01 RX ADMIN — ENOXAPARIN SODIUM 40 MG: 40 INJECTION SUBCUTANEOUS at 08:50

## 2020-01-01 RX ADMIN — TAZOBACTAM SODIUM AND PIPERACILLIN SODIUM 3.38 G: 375; 3 INJECTION, SOLUTION INTRAVENOUS at 07:41

## 2020-01-01 RX ADMIN — ENOXAPARIN SODIUM 40 MG: 40 INJECTION SUBCUTANEOUS at 08:20

## 2020-01-01 RX ADMIN — METOCLOPRAMIDE 10 MG: 5 INJECTION, SOLUTION INTRAMUSCULAR; INTRAVENOUS at 05:42

## 2020-01-01 RX ADMIN — OXCARBAZEPINE 150 MG: 150 TABLET, FILM COATED ORAL at 09:51

## 2020-01-01 RX ADMIN — PROPOFOL 20 MG: 10 INJECTION, EMULSION INTRAVENOUS at 13:34

## 2020-01-01 RX ADMIN — ACETAMINOPHEN 650 MG: 325 TABLET, FILM COATED ORAL at 00:38

## 2020-01-01 RX ADMIN — BISACODYL 10 MG: 10 SUPPOSITORY RECTAL at 08:43

## 2020-01-01 RX ADMIN — ACETAMINOPHEN 650 MG: 325 TABLET, FILM COATED ORAL at 12:00

## 2020-01-01 RX ADMIN — AMLODIPINE BESYLATE 10 MG: 10 TABLET ORAL at 08:07

## 2020-01-01 RX ADMIN — SMOFLIPID 100 ML: 6; 6; 5; 3 INJECTION, EMULSION INTRAVENOUS at 17:23

## 2020-01-01 RX ADMIN — DEXTROSE AND SODIUM CHLORIDE 100 ML/HR: 5; 450 INJECTION, SOLUTION INTRAVENOUS at 05:52

## 2020-01-01 RX ADMIN — METOPROLOL TARTRATE 25 MG: 25 TABLET, FILM COATED ORAL at 17:55

## 2020-01-01 RX ADMIN — SODIUM CHLORIDE, PRESERVATIVE FREE 10 ML: 5 INJECTION INTRAVENOUS at 20:40

## 2020-01-01 RX ADMIN — OXCARBAZEPINE 150 MG: 150 TABLET, FILM COATED ORAL at 21:31

## 2020-01-01 RX ADMIN — METOCLOPRAMIDE 10 MG: 5 INJECTION, SOLUTION INTRAMUSCULAR; INTRAVENOUS at 05:38

## 2020-01-01 RX ADMIN — METOCLOPRAMIDE 10 MG: 5 INJECTION, SOLUTION INTRAMUSCULAR; INTRAVENOUS at 14:05

## 2020-01-01 RX ADMIN — ISOSORBIDE MONONITRATE 60 MG: 60 TABLET, EXTENDED RELEASE ORAL at 09:46

## 2020-01-01 RX ADMIN — METOPROLOL TARTRATE 25 MG: 25 TABLET, FILM COATED ORAL at 18:03

## 2020-01-01 RX ADMIN — AMLODIPINE BESYLATE 10 MG: 10 TABLET ORAL at 08:49

## 2020-01-01 RX ADMIN — ONDANSETRON 4 MG: 2 INJECTION INTRAMUSCULAR; INTRAVENOUS at 11:01

## 2020-01-01 RX ADMIN — CLONAZEPAM 0.5 MG: 0.5 TABLET ORAL at 21:22

## 2020-01-01 RX ADMIN — ACETAMINOPHEN 650 MG: 325 TABLET, FILM COATED ORAL at 05:41

## 2020-01-01 RX ADMIN — SODIUM CHLORIDE, POTASSIUM CHLORIDE, SODIUM LACTATE AND CALCIUM CHLORIDE 125 ML/HR: 600; 310; 30; 20 INJECTION, SOLUTION INTRAVENOUS at 04:48

## 2020-01-01 RX ADMIN — ONDANSETRON 4 MG: 2 INJECTION INTRAMUSCULAR; INTRAVENOUS at 11:49

## 2020-01-01 RX ADMIN — TAZOBACTAM SODIUM AND PIPERACILLIN SODIUM 3.38 G: 375; 3 INJECTION, SOLUTION INTRAVENOUS at 00:08

## 2020-01-01 RX ADMIN — METHYLNALTREXONE BROMIDE 12 MG: 12 INJECTION, SOLUTION SUBCUTANEOUS at 08:17

## 2020-01-01 RX ADMIN — LORAZEPAM 0.5 MG: 2 INJECTION INTRAMUSCULAR; INTRAVENOUS at 01:56

## 2020-01-01 RX ADMIN — POTASSIUM CHLORIDE 10 MEQ: 7.46 INJECTION, SOLUTION INTRAVENOUS at 14:49

## 2020-01-01 RX ADMIN — SODIUM CHLORIDE, PRESERVATIVE FREE 10 ML: 5 INJECTION INTRAVENOUS at 20:50

## 2020-01-01 RX ADMIN — OXYCODONE HYDROCHLORIDE 10 MG: 5 TABLET ORAL at 08:26

## 2020-01-01 RX ADMIN — LORAZEPAM 0.5 MG: 2 INJECTION INTRAMUSCULAR; INTRAVENOUS at 23:27

## 2020-01-01 RX ADMIN — OXYCODONE HYDROCHLORIDE 10 MG: 5 TABLET ORAL at 09:06

## 2020-01-01 RX ADMIN — SODIUM CHLORIDE, POTASSIUM CHLORIDE, SODIUM LACTATE AND CALCIUM CHLORIDE 125 ML/HR: 600; 310; 30; 20 INJECTION, SOLUTION INTRAVENOUS at 17:27

## 2020-01-01 RX ADMIN — SODIUM CHLORIDE, POTASSIUM CHLORIDE, SODIUM LACTATE AND CALCIUM CHLORIDE 125 ML/HR: 600; 310; 30; 20 INJECTION, SOLUTION INTRAVENOUS at 20:42

## 2020-01-01 RX ADMIN — SODIUM CHLORIDE 1000 ML: 9 INJECTION, SOLUTION INTRAVENOUS at 11:00

## 2020-01-01 RX ADMIN — IBUPROFEN 400 MG: 100 SUSPENSION ORAL at 20:11

## 2020-01-01 RX ADMIN — HYDROMORPHONE HYDROCHLORIDE 0.5 MG: 1 INJECTION, SOLUTION INTRAMUSCULAR; INTRAVENOUS; SUBCUTANEOUS at 11:01

## 2020-01-01 RX ADMIN — PACLITAXEL 290 MG: 6 INJECTION, SOLUTION INTRAVENOUS at 11:47

## 2020-01-01 RX ADMIN — SODIUM CHLORIDE: 9 INJECTION, SOLUTION INTRAVENOUS at 15:15

## 2020-01-01 RX ADMIN — PROPOFOL 20 MG: 10 INJECTION, EMULSION INTRAVENOUS at 13:40

## 2020-01-01 RX ADMIN — OXYCODONE HYDROCHLORIDE 5 MG: 5 SOLUTION ORAL at 21:30

## 2020-01-01 RX ADMIN — OXYCODONE HYDROCHLORIDE 7.5 MG: 15 TABLET ORAL at 12:47

## 2020-01-01 RX ADMIN — IOPAMIDOL 100 ML: 612 INJECTION, SOLUTION INTRAVENOUS at 14:32

## 2020-01-01 RX ADMIN — OXYCODONE HYDROCHLORIDE 10 MG: 5 TABLET ORAL at 21:13

## 2020-01-01 RX ADMIN — METOCLOPRAMIDE 10 MG: 5 INJECTION, SOLUTION INTRAMUSCULAR; INTRAVENOUS at 16:56

## 2020-01-01 RX ADMIN — FLUTICASONE PROPIONATE 2 SPRAY: 50 SPRAY, METERED NASAL at 08:17

## 2020-01-01 RX ADMIN — MELATONIN TAB 5 MG 10 MG: 5 TAB at 21:30

## 2020-01-01 RX ADMIN — OXYCODONE 5 MG: 5 TABLET ORAL at 20:06

## 2020-01-01 RX ADMIN — OXYCODONE HYDROCHLORIDE 5 MG: 5 TABLET ORAL at 11:43

## 2020-01-01 RX ADMIN — ACETAMINOPHEN 650 MG: 325 TABLET, FILM COATED ORAL at 00:10

## 2020-01-01 RX ADMIN — CLONAZEPAM 0.5 MG: 0.5 TABLET ORAL at 10:40

## 2020-01-01 RX ADMIN — PROPOFOL 20 MG: 10 INJECTION, EMULSION INTRAVENOUS at 13:56

## 2020-01-01 RX ADMIN — MAGNESIUM SULFATE 2 G: 2 INJECTION INTRAVENOUS at 21:43

## 2020-01-01 RX ADMIN — OXCARBAZEPINE 150 MG: 150 TABLET, FILM COATED ORAL at 08:16

## 2020-01-01 RX ADMIN — ACETAMINOPHEN 650 MG: 325 TABLET, FILM COATED ORAL at 14:04

## 2020-01-01 RX ADMIN — SODIUM CHLORIDE, PRESERVATIVE FREE 10 ML: 5 INJECTION INTRAVENOUS at 21:14

## 2020-01-01 RX ADMIN — DIPHENHYDRAMINE HYDROCHLORIDE 25 MG: 50 INJECTION INTRAMUSCULAR; INTRAVENOUS at 02:42

## 2020-01-01 RX ADMIN — ACETAMINOPHEN 650 MG: 325 TABLET, FILM COATED ORAL at 23:03

## 2020-01-01 RX ADMIN — HYDROMORPHONE HYDROCHLORIDE 0.5 MG: 1 INJECTION, SOLUTION INTRAMUSCULAR; INTRAVENOUS; SUBCUTANEOUS at 21:51

## 2020-01-01 RX ADMIN — OXYCODONE HYDROCHLORIDE 10 MG: 5 TABLET ORAL at 21:01

## 2020-01-01 RX ADMIN — CLONAZEPAM 0.5 MG: 0.5 TABLET ORAL at 20:00

## 2020-01-01 RX ADMIN — PANTOPRAZOLE SODIUM 40 MG: 40 INJECTION, POWDER, FOR SOLUTION INTRAVENOUS at 05:14

## 2020-01-01 RX ADMIN — PANTOPRAZOLE SODIUM 40 MG: 40 INJECTION, POWDER, FOR SOLUTION INTRAVENOUS at 05:49

## 2020-01-01 RX ADMIN — MELATONIN TAB 5 MG 10 MG: 5 TAB at 20:42

## 2020-01-01 RX ADMIN — MAGNESIUM SULFATE HEPTAHYDRATE 4 G: 40 INJECTION, SOLUTION INTRAVENOUS at 11:17

## 2020-01-01 RX ADMIN — OXYCODONE HYDROCHLORIDE 5 MG: 5 SOLUTION ORAL at 20:23

## 2020-01-01 RX ADMIN — ISOSORBIDE MONONITRATE 60 MG: 60 TABLET, EXTENDED RELEASE ORAL at 09:36

## 2020-01-01 RX ADMIN — OXYCODONE HYDROCHLORIDE 5 MG: 5 TABLET ORAL at 11:46

## 2020-01-01 RX ADMIN — METHYLNALTREXONE BROMIDE 12 MG: 12 INJECTION, SOLUTION SUBCUTANEOUS at 08:49

## 2020-01-01 RX ADMIN — OXCARBAZEPINE 150 MG: 150 TABLET, FILM COATED ORAL at 21:22

## 2020-01-01 RX ADMIN — SODIUM CHLORIDE, PRESERVATIVE FREE 10 ML: 5 INJECTION INTRAVENOUS at 21:23

## 2020-01-01 RX ADMIN — DIPHENHYDRAMINE HYDROCHLORIDE 25 MG: 25 CAPSULE ORAL at 20:42

## 2020-01-01 RX ADMIN — SENNOSIDES AND DOCUSATE SODIUM 2 TABLET: 8.6; 5 TABLET ORAL at 22:13

## 2020-01-01 RX ADMIN — BISACODYL 10 MG: 5 TABLET, COATED ORAL at 05:50

## 2020-01-01 RX ADMIN — OXYCODONE HYDROCHLORIDE 10 MG: 5 TABLET ORAL at 16:18

## 2020-01-01 RX ADMIN — ALBUMIN HUMAN 500 ML: 0.05 INJECTION, SOLUTION INTRAVENOUS at 20:16

## 2020-01-01 RX ADMIN — MAGNESIUM SULFATE HEPTAHYDRATE 4 G: 40 INJECTION, SOLUTION INTRAVENOUS at 08:57

## 2020-01-01 RX ADMIN — OXCARBAZEPINE 150 MG: 150 TABLET, FILM COATED ORAL at 20:40

## 2020-01-01 RX ADMIN — HYDROMORPHONE HYDROCHLORIDE 0.5 MG: 1 INJECTION, SOLUTION INTRAMUSCULAR; INTRAVENOUS; SUBCUTANEOUS at 09:51

## 2020-01-01 RX ADMIN — GLYCOPYRROLATE 0.2 MG: 0.2 INJECTION INTRAMUSCULAR; INTRAVENOUS at 17:10

## 2020-01-01 RX ADMIN — PHENOL 2 SPRAY: 1.5 LIQUID ORAL at 12:46

## 2020-01-01 RX ADMIN — METOCLOPRAMIDE 10 MG: 5 INJECTION, SOLUTION INTRAMUSCULAR; INTRAVENOUS at 17:46

## 2020-01-01 RX ADMIN — OXCARBAZEPINE 150 MG: 150 TABLET, FILM COATED ORAL at 09:06

## 2020-01-01 RX ADMIN — PROPOFOL 20 MG: 10 INJECTION, EMULSION INTRAVENOUS at 13:30

## 2020-01-01 RX ADMIN — METOCLOPRAMIDE 10 MG: 5 INJECTION, SOLUTION INTRAMUSCULAR; INTRAVENOUS at 06:06

## 2020-01-01 RX ADMIN — PHENOL 1 SPRAY: 1.5 LIQUID ORAL at 05:53

## 2020-01-01 RX ADMIN — POTASSIUM CHLORIDE 40 MEQ: 10 CAPSULE, COATED, EXTENDED RELEASE ORAL at 16:25

## 2020-01-01 RX ADMIN — POTASSIUM CHLORIDE 10 MEQ: 7.46 INJECTION, SOLUTION INTRAVENOUS at 03:43

## 2020-01-01 RX ADMIN — SODIUM CHLORIDE, POTASSIUM CHLORIDE, SODIUM LACTATE AND CALCIUM CHLORIDE 125 ML/HR: 600; 310; 30; 20 INJECTION, SOLUTION INTRAVENOUS at 01:44

## 2020-01-01 RX ADMIN — OXYCODONE HYDROCHLORIDE 5 MG: 5 TABLET ORAL at 17:27

## 2020-01-01 RX ADMIN — POTASSIUM CHLORIDE 10 MEQ: 7.46 INJECTION, SOLUTION INTRAVENOUS at 22:06

## 2020-01-01 RX ADMIN — MAGNESIUM SULFATE HEPTAHYDRATE 4 G: 40 INJECTION, SOLUTION INTRAVENOUS at 06:16

## 2020-01-01 RX ADMIN — ONDANSETRON 4 MG: 2 INJECTION INTRAMUSCULAR; INTRAVENOUS at 16:48

## 2020-01-01 RX ADMIN — SENNOSIDES AND DOCUSATE SODIUM 2 TABLET: 8.6; 5 TABLET ORAL at 09:36

## 2020-01-01 RX ADMIN — SMOFLIPID 100 ML: 6; 6; 5; 3 INJECTION, EMULSION INTRAVENOUS at 17:17

## 2020-01-01 RX ADMIN — PROPOFOL 20 MG: 10 INJECTION, EMULSION INTRAVENOUS at 13:38

## 2020-01-01 RX ADMIN — MELATONIN TAB 5 MG 10 MG: 5 TAB at 21:13

## 2020-01-01 RX ADMIN — OXYCODONE HYDROCHLORIDE 10 MG: 5 TABLET ORAL at 08:47

## 2020-01-01 RX ADMIN — METOCLOPRAMIDE 10 MG: 5 INJECTION, SOLUTION INTRAMUSCULAR; INTRAVENOUS at 23:11

## 2020-01-01 RX ADMIN — METOCLOPRAMIDE 10 MG: 5 INJECTION, SOLUTION INTRAMUSCULAR; INTRAVENOUS at 16:37

## 2020-01-01 RX ADMIN — ACETAMINOPHEN 650 MG: 325 TABLET, FILM COATED ORAL at 23:53

## 2020-01-01 RX ADMIN — POTASSIUM CHLORIDE 10 MEQ: 7.46 INJECTION, SOLUTION INTRAVENOUS at 15:10

## 2020-01-01 RX ADMIN — IPRATROPIUM BROMIDE AND ALBUTEROL SULFATE 3 ML: 2.5; .5 SOLUTION RESPIRATORY (INHALATION) at 09:06

## 2020-01-01 RX ADMIN — OXYCODONE HYDROCHLORIDE 5 MG: 5 TABLET ORAL at 19:24

## 2020-01-01 RX ADMIN — TAZOBACTAM SODIUM AND PIPERACILLIN SODIUM 3.38 G: 375; 3 INJECTION, SOLUTION INTRAVENOUS at 16:12

## 2020-01-01 RX ADMIN — BISACODYL 10 MG: 5 TABLET, COATED ORAL at 09:45

## 2020-01-01 RX ADMIN — SODIUM CHLORIDE, PRESERVATIVE FREE 10 ML: 5 INJECTION INTRAVENOUS at 08:22

## 2020-01-01 RX ADMIN — METOPROLOL TARTRATE 25 MG: 25 TABLET, FILM COATED ORAL at 05:42

## 2020-01-01 RX ADMIN — ROCURONIUM BROMIDE 5 MG: 10 INJECTION INTRAVENOUS at 15:00

## 2020-01-01 RX ADMIN — FENTANYL CITRATE 100 MCG: 50 INJECTION, SOLUTION INTRAMUSCULAR; INTRAVENOUS at 15:00

## 2020-01-01 RX ADMIN — MAGNESIUM SULFATE HEPTAHYDRATE 4 G: 40 INJECTION, SOLUTION INTRAVENOUS at 16:33

## 2020-01-01 RX ADMIN — VANCOMYCIN HYDROCHLORIDE 1000 MG: 1 INJECTION, SOLUTION INTRAVENOUS at 05:44

## 2020-01-01 RX ADMIN — ACETAMINOPHEN 650 MG: 325 TABLET, FILM COATED ORAL at 17:41

## 2020-01-01 RX ADMIN — HYDROMORPHONE HYDROCHLORIDE 0.5 MG: 1 INJECTION, SOLUTION INTRAMUSCULAR; INTRAVENOUS; SUBCUTANEOUS at 11:27

## 2020-01-01 RX ADMIN — CLONAZEPAM 0.5 MG: 0.5 TABLET ORAL at 20:03

## 2020-01-01 RX ADMIN — METOCLOPRAMIDE 10 MG: 5 INJECTION, SOLUTION INTRAMUSCULAR; INTRAVENOUS at 23:53

## 2020-01-01 RX ADMIN — OXYCODONE HYDROCHLORIDE 5 MG: 5 TABLET ORAL at 09:11

## 2020-01-01 RX ADMIN — BISACODYL 10 MG: 10 SUPPOSITORY RECTAL at 08:18

## 2020-01-01 RX ADMIN — POTASSIUM CHLORIDE 10 MEQ: 7.46 INJECTION, SOLUTION INTRAVENOUS at 11:14

## 2020-01-01 RX ADMIN — POTASSIUM CHLORIDE 40 MEQ: 1.5 FOR SOLUTION ORAL at 10:24

## 2020-01-01 RX ADMIN — SODIUM CHLORIDE, PRESERVATIVE FREE 10 ML: 5 INJECTION INTRAVENOUS at 08:19

## 2020-01-01 RX ADMIN — IOPAMIDOL 100 ML: 612 INJECTION, SOLUTION INTRAVENOUS at 12:14

## 2020-01-01 RX ADMIN — METOCLOPRAMIDE 10 MG: 5 INJECTION, SOLUTION INTRAMUSCULAR; INTRAVENOUS at 06:03

## 2020-01-01 RX ADMIN — OXYCODONE HYDROCHLORIDE 10 MG: 5 TABLET ORAL at 09:09

## 2020-01-01 RX ADMIN — DOCUSATE SODIUM 100 MG: 100 CAPSULE, LIQUID FILLED ORAL at 20:03

## 2020-01-01 RX ADMIN — MAGNESIUM SULFATE 2 G: 2 INJECTION INTRAVENOUS at 06:13

## 2020-01-01 RX ADMIN — SUCCINYLCHOLINE CHLORIDE 120 MG: 20 INJECTION, SOLUTION INTRAMUSCULAR; INTRAVENOUS; PARENTERAL at 15:00

## 2020-01-01 RX ADMIN — SENNOSIDES AND DOCUSATE SODIUM 2 TABLET: 8.6; 5 TABLET ORAL at 08:38

## 2020-01-01 RX ADMIN — ACETAMINOPHEN 650 MG: 325 TABLET, FILM COATED ORAL at 05:00

## 2020-01-01 RX ADMIN — OXYCODONE HYDROCHLORIDE 5 MG: 5 SOLUTION ORAL at 03:43

## 2020-01-01 RX ADMIN — METOCLOPRAMIDE 10 MG: 5 INJECTION, SOLUTION INTRAMUSCULAR; INTRAVENOUS at 05:14

## 2020-01-01 RX ADMIN — FLUTICASONE PROPIONATE 2 SPRAY: 50 SPRAY, METERED NASAL at 10:30

## 2020-01-01 RX ADMIN — OXYCODONE HYDROCHLORIDE 7.5 MG: 15 TABLET ORAL at 10:07

## 2020-01-01 RX ADMIN — OXYCODONE HYDROCHLORIDE 5 MG: 5 TABLET ORAL at 16:12

## 2020-01-01 RX ADMIN — VANCOMYCIN HYDROCHLORIDE 1000 MG: 1 INJECTION, SOLUTION INTRAVENOUS at 06:58

## 2020-01-01 RX ADMIN — IPRATROPIUM BROMIDE AND ALBUTEROL SULFATE 3 ML: 2.5; .5 SOLUTION RESPIRATORY (INHALATION) at 20:21

## 2020-01-01 RX ADMIN — MAGNESIUM SULFATE HEPTAHYDRATE 4 G: 40 INJECTION, SOLUTION INTRAVENOUS at 10:28

## 2020-01-01 RX ADMIN — OXCARBAZEPINE 150 MG: 150 TABLET, FILM COATED ORAL at 08:22

## 2020-01-01 RX ADMIN — METOCLOPRAMIDE 10 MG: 5 INJECTION, SOLUTION INTRAMUSCULAR; INTRAVENOUS at 17:17

## 2020-01-01 RX ADMIN — HYDROMORPHONE HYDROCHLORIDE 0.5 MG: 1 INJECTION, SOLUTION INTRAMUSCULAR; INTRAVENOUS; SUBCUTANEOUS at 12:49

## 2020-01-01 RX ADMIN — SODIUM CHLORIDE, POTASSIUM CHLORIDE, SODIUM LACTATE AND CALCIUM CHLORIDE 125 ML/HR: 600; 310; 30; 20 INJECTION, SOLUTION INTRAVENOUS at 00:15

## 2020-01-01 RX ADMIN — BISACODYL 10 MG: 10 SUPPOSITORY RECTAL at 10:23

## 2020-01-01 RX ADMIN — SENNOSIDES AND DOCUSATE SODIUM 2 TABLET: 8.6; 5 TABLET ORAL at 20:51

## 2020-01-01 RX ADMIN — IPRATROPIUM BROMIDE AND ALBUTEROL SULFATE 3 ML: 2.5; .5 SOLUTION RESPIRATORY (INHALATION) at 07:20

## 2020-01-01 RX ADMIN — FLUTICASONE PROPIONATE 2 SPRAY: 50 SPRAY, METERED NASAL at 08:22

## 2020-01-01 RX ADMIN — METOPROLOL TARTRATE 25 MG: 25 TABLET, FILM COATED ORAL at 05:00

## 2020-01-01 RX ADMIN — OXYCODONE HYDROCHLORIDE 10 MG: 5 TABLET ORAL at 08:28

## 2020-01-01 RX ADMIN — SENNOSIDES AND DOCUSATE SODIUM 2 TABLET: 8.6; 5 TABLET ORAL at 09:49

## 2020-01-01 RX ADMIN — DOCUSATE SODIUM 100 MG: 100 CAPSULE, LIQUID FILLED ORAL at 20:40

## 2020-01-01 RX ADMIN — OXYCODONE HYDROCHLORIDE 5 MG: 5 TABLET ORAL at 17:18

## 2020-01-01 RX ADMIN — CLOPIDOGREL BISULFATE 75 MG: 75 TABLET ORAL at 08:17

## 2020-01-01 RX ADMIN — ISOSORBIDE MONONITRATE 60 MG: 60 TABLET, EXTENDED RELEASE ORAL at 09:48

## 2020-01-01 RX ADMIN — PALONOSETRON 0.25 MG: 0.25 INJECTION, SOLUTION INTRAVENOUS at 09:39

## 2020-01-01 RX ADMIN — ACETAMINOPHEN 650 MG: 325 TABLET, FILM COATED ORAL at 05:52

## 2020-01-01 RX ADMIN — SODIUM CHLORIDE, POTASSIUM CHLORIDE, SODIUM LACTATE AND CALCIUM CHLORIDE 1000 ML: 600; 310; 30; 20 INJECTION, SOLUTION INTRAVENOUS at 12:04

## 2020-01-01 RX ADMIN — PROPOFOL 20 MG: 10 INJECTION, EMULSION INTRAVENOUS at 13:31

## 2020-01-01 RX ADMIN — MAGNESIUM SULFATE HEPTAHYDRATE 4 G: 40 INJECTION, SOLUTION INTRAVENOUS at 11:14

## 2020-01-01 RX ADMIN — BISACODYL 10 MG: 10 SUPPOSITORY RECTAL at 09:54

## 2020-01-01 RX ADMIN — IPRATROPIUM BROMIDE AND ALBUTEROL SULFATE 3 ML: 2.5; .5 SOLUTION RESPIRATORY (INHALATION) at 13:05

## 2020-01-01 RX ADMIN — FLUTICASONE PROPIONATE 2 SPRAY: 50 SPRAY, METERED NASAL at 09:05

## 2020-01-01 RX ADMIN — MAGNESIUM SULFATE 2 G: 2 INJECTION INTRAVENOUS at 18:29

## 2020-01-01 RX ADMIN — ISOSORBIDE MONONITRATE 60 MG: 60 TABLET, EXTENDED RELEASE ORAL at 08:07

## 2020-01-01 RX ADMIN — OXYCODONE HYDROCHLORIDE 7.5 MG: 15 TABLET ORAL at 02:03

## 2020-01-01 RX ADMIN — ISOSORBIDE MONONITRATE 60 MG: 60 TABLET, EXTENDED RELEASE ORAL at 08:21

## 2020-01-01 RX ADMIN — MAGNESIUM SULFATE HEPTAHYDRATE 4 G: 40 INJECTION, SOLUTION INTRAVENOUS at 08:19

## 2020-01-01 RX ADMIN — FUROSEMIDE 40 MG: 10 INJECTION, SOLUTION INTRAMUSCULAR; INTRAVENOUS at 14:06

## 2020-01-01 RX ADMIN — MAGNESIUM SULFATE HEPTAHYDRATE 4 G: 40 INJECTION, SOLUTION INTRAVENOUS at 09:50

## 2020-01-01 RX ADMIN — METOCLOPRAMIDE 10 MG: 5 INJECTION, SOLUTION INTRAMUSCULAR; INTRAVENOUS at 11:46

## 2020-01-01 RX ADMIN — OXCARBAZEPINE 150 MG: 150 TABLET, FILM COATED ORAL at 08:40

## 2020-01-01 RX ADMIN — PROPOFOL 20 MG: 10 INJECTION, EMULSION INTRAVENOUS at 14:12

## 2020-01-01 RX ADMIN — SODIUM CHLORIDE 1000 ML: 9 INJECTION, SOLUTION INTRAVENOUS at 13:24

## 2020-01-01 RX ADMIN — SODIUM CHLORIDE, PRESERVATIVE FREE 10 ML: 5 INJECTION INTRAVENOUS at 08:41

## 2020-01-01 RX ADMIN — ACETAMINOPHEN 650 MG: 325 TABLET, FILM COATED ORAL at 12:39

## 2020-01-01 RX ADMIN — OXYCODONE HYDROCHLORIDE 5 MG: 5 TABLET ORAL at 06:24

## 2020-01-01 RX ADMIN — HYDROMORPHONE HYDROCHLORIDE 0.5 MG: 1 INJECTION, SOLUTION INTRAMUSCULAR; INTRAVENOUS; SUBCUTANEOUS at 05:47

## 2020-01-01 RX ADMIN — POTASSIUM PHOSPHATE, MONOBASIC POTASSIUM PHOSPHATE, DIBASIC: 224; 236 INJECTION, SOLUTION, CONCENTRATE INTRAVENOUS at 18:31

## 2020-01-01 RX ADMIN — MAGNESIUM SULFATE 2 G: 2 INJECTION INTRAVENOUS at 08:07

## 2020-01-01 RX ADMIN — POTASSIUM & SODIUM PHOSPHATES POWDER PACK 280-160-250 MG 2 PACKET: 280-160-250 PACK at 12:42

## 2020-01-01 RX ADMIN — OXYCODONE HYDROCHLORIDE 5 MG: 5 TABLET ORAL at 22:02

## 2020-01-01 RX ADMIN — SODIUM CHLORIDE, PRESERVATIVE FREE 10 ML: 5 INJECTION INTRAVENOUS at 21:13

## 2020-01-01 RX ADMIN — OXYCODONE HYDROCHLORIDE 10 MG: 5 TABLET ORAL at 14:31

## 2020-01-01 RX ADMIN — BISACODYL 10 MG: 10 SUPPOSITORY RECTAL at 08:50

## 2020-01-01 RX ADMIN — BISACODYL 10 MG: 10 SUPPOSITORY RECTAL at 21:01

## 2020-01-01 RX ADMIN — OXYCODONE HYDROCHLORIDE 10 MG: 5 TABLET ORAL at 06:03

## 2020-01-01 RX ADMIN — POTASSIUM CHLORIDE 10 MEQ: 7.46 INJECTION, SOLUTION INTRAVENOUS at 02:00

## 2020-01-01 RX ADMIN — TAZOBACTAM SODIUM AND PIPERACILLIN SODIUM 3.38 G: 375; 3 INJECTION, SOLUTION INTRAVENOUS at 16:27

## 2020-01-01 RX ADMIN — OXYCODONE HYDROCHLORIDE 5 MG: 5 TABLET ORAL at 13:24

## 2020-01-01 RX ADMIN — TAZOBACTAM SODIUM AND PIPERACILLIN SODIUM 3.38 G: 375; 3 INJECTION, SOLUTION INTRAVENOUS at 09:49

## 2020-01-01 RX ADMIN — POTASSIUM CHLORIDE 10 MEQ: 7.46 INJECTION, SOLUTION INTRAVENOUS at 10:47

## 2020-01-01 RX ADMIN — SODIUM CHLORIDE 150 MG: 9 INJECTION, SOLUTION INTRAVENOUS at 09:39

## 2020-01-01 RX ADMIN — OXCARBAZEPINE 150 MG: 150 TABLET, FILM COATED ORAL at 21:14

## 2020-01-01 RX ADMIN — SODIUM CHLORIDE, PRESERVATIVE FREE 10 ML: 5 INJECTION INTRAVENOUS at 09:47

## 2020-01-01 RX ADMIN — KETAMINE HYDROCHLORIDE 20 MG: 50 INJECTION, SOLUTION INTRAMUSCULAR; INTRAVENOUS at 09:53

## 2020-01-01 RX ADMIN — SODIUM CHLORIDE, PRESERVATIVE FREE 10 ML: 5 INJECTION INTRAVENOUS at 21:09

## 2020-01-01 RX ADMIN — DOCUSATE SODIUM 100 MG: 100 CAPSULE, LIQUID FILLED ORAL at 10:40

## 2020-01-01 RX ADMIN — ACETAMINOPHEN 650 MG: 325 TABLET, FILM COATED ORAL at 17:55

## 2020-01-01 RX ADMIN — MAGNESIUM SULFATE 2 G: 2 INJECTION INTRAVENOUS at 19:59

## 2020-01-01 RX ADMIN — POTASSIUM CHLORIDE 10 MEQ: 7.46 INJECTION, SOLUTION INTRAVENOUS at 00:09

## 2020-01-01 RX ADMIN — METOPROLOL TARTRATE 25 MG: 25 TABLET, FILM COATED ORAL at 06:06

## 2020-01-01 RX ADMIN — DOCUSATE SODIUM 100 MG: 100 CAPSULE, LIQUID FILLED ORAL at 13:22

## 2020-01-01 RX ADMIN — ACETAMINOPHEN 650 MG: 325 TABLET, FILM COATED ORAL at 01:09

## 2020-01-01 RX ADMIN — SODIUM CHLORIDE, POTASSIUM CHLORIDE, SODIUM LACTATE AND CALCIUM CHLORIDE 125 ML/HR: 600; 310; 30; 20 INJECTION, SOLUTION INTRAVENOUS at 02:49

## 2020-01-01 RX ADMIN — OXCARBAZEPINE 150 MG: 150 TABLET, FILM COATED ORAL at 20:07

## 2020-01-01 RX ADMIN — ALBUMIN HUMAN: 0.05 INJECTION, SOLUTION INTRAVENOUS at 15:30

## 2020-01-01 RX ADMIN — DIATRIZOATE MEGLUMINE AND DIATRIZOATE SODIUM 120 ML: 660; 100 LIQUID ORAL; RECTAL at 14:33

## 2020-01-01 RX ADMIN — ACETAMINOPHEN 650 MG: 325 TABLET, FILM COATED ORAL at 16:33

## 2020-01-01 RX ADMIN — ENOXAPARIN SODIUM 40 MG: 40 INJECTION SUBCUTANEOUS at 08:49

## 2020-01-01 RX ADMIN — CEFTRIAXONE SODIUM 2 G: 2 INJECTION, POWDER, FOR SOLUTION INTRAMUSCULAR; INTRAVENOUS at 13:21

## 2020-01-01 RX ADMIN — PROPOFOL 20 MG: 10 INJECTION, EMULSION INTRAVENOUS at 13:59

## 2020-01-01 RX ADMIN — POTASSIUM CHLORIDE 40 MEQ: 10 CAPSULE, COATED, EXTENDED RELEASE ORAL at 17:00

## 2020-01-01 RX ADMIN — OXYCODONE HYDROCHLORIDE 7.5 MG: 15 TABLET ORAL at 20:04

## 2020-01-01 RX ADMIN — POTASSIUM PHOSPHATE, MONOBASIC POTASSIUM PHOSPHATE, DIBASIC: 224; 236 INJECTION, SOLUTION, CONCENTRATE INTRAVENOUS at 17:17

## 2020-01-01 RX ADMIN — OXYCODONE 5 MG: 5 TABLET ORAL at 10:24

## 2020-01-01 RX ADMIN — DIPHENHYDRAMINE HYDROCHLORIDE 25 MG: 25 CAPSULE ORAL at 21:13

## 2020-01-01 RX ADMIN — DEXAMETHASONE SODIUM PHOSPHATE 20 MG: 4 INJECTION, SOLUTION INTRAMUSCULAR; INTRAVENOUS at 09:12

## 2020-01-01 RX ADMIN — ACETAMINOPHEN 650 MG: 325 TABLET, FILM COATED ORAL at 01:05

## 2020-01-01 RX ADMIN — OXCARBAZEPINE 150 MG: 150 TABLET, FILM COATED ORAL at 09:47

## 2020-01-01 RX ADMIN — METOPROLOL TARTRATE 25 MG: 25 TABLET, FILM COATED ORAL at 05:38

## 2020-01-01 RX ADMIN — METOCLOPRAMIDE 10 MG: 5 INJECTION, SOLUTION INTRAMUSCULAR; INTRAVENOUS at 12:32

## 2020-01-01 RX ADMIN — ACETAMINOPHEN 650 MG: 325 TABLET, FILM COATED ORAL at 12:56

## 2020-01-01 RX ADMIN — FLUTICASONE PROPIONATE 2 SPRAY: 50 SPRAY, METERED NASAL at 09:46

## 2020-01-01 RX ADMIN — OXYCODONE HYDROCHLORIDE 5 MG: 5 TABLET ORAL at 21:40

## 2020-01-01 RX ADMIN — OXCARBAZEPINE 150 MG: 150 TABLET, FILM COATED ORAL at 11:17

## 2020-01-01 RX ADMIN — BUPIVACAINE HYDROCHLORIDE 50 ML: 2.5 INJECTION, SOLUTION EPIDURAL; INFILTRATION; INTRACAUDAL; PERINEURAL at 15:15

## 2020-01-01 RX ADMIN — PHENOL 1 SPRAY: 1.5 LIQUID ORAL at 23:05

## 2020-01-01 RX ADMIN — CLONAZEPAM 0.5 MG: 0.5 TABLET ORAL at 09:11

## 2020-01-01 RX ADMIN — METOCLOPRAMIDE 10 MG: 5 INJECTION, SOLUTION INTRAMUSCULAR; INTRAVENOUS at 17:41

## 2020-01-01 RX ADMIN — CLONAZEPAM 0.5 MG: 0.5 TABLET ORAL at 08:51

## 2020-01-01 RX ADMIN — POTASSIUM CHLORIDE 10 MEQ: 7.46 INJECTION, SOLUTION INTRAVENOUS at 21:10

## 2020-01-01 RX ADMIN — HYDROMORPHONE HYDROCHLORIDE 0.5 MG: 1 INJECTION, SOLUTION INTRAMUSCULAR; INTRAVENOUS; SUBCUTANEOUS at 03:27

## 2020-01-01 RX ADMIN — SODIUM CHLORIDE 1000 ML: 9 INJECTION, SOLUTION INTRAVENOUS at 12:03

## 2020-01-01 RX ADMIN — BISACODYL 10 MG: 10 SUPPOSITORY RECTAL at 09:05

## 2020-01-01 RX ADMIN — SODIUM CHLORIDE, PRESERVATIVE FREE 10 ML: 5 INJECTION INTRAVENOUS at 10:29

## 2020-01-01 RX ADMIN — OXCARBAZEPINE 150 MG: 150 TABLET, FILM COATED ORAL at 20:23

## 2020-01-01 RX ADMIN — METOPROLOL TARTRATE 25 MG: 25 TABLET, FILM COATED ORAL at 16:37

## 2020-01-01 RX ADMIN — ACETAMINOPHEN 650 MG: 325 TABLET, FILM COATED ORAL at 01:07

## 2020-01-01 RX ADMIN — METOCLOPRAMIDE 10 MG: 5 INJECTION, SOLUTION INTRAMUSCULAR; INTRAVENOUS at 05:01

## 2020-01-01 RX ADMIN — ACETAMINOPHEN 650 MG: 325 TABLET, FILM COATED ORAL at 23:34

## 2020-01-01 RX ADMIN — NEOSTIGMINE 3 MG: 1 INJECTION INTRAVENOUS at 17:10

## 2020-01-01 RX ADMIN — PANTOPRAZOLE SODIUM 40 MG: 40 INJECTION, POWDER, FOR SOLUTION INTRAVENOUS at 05:48

## 2020-01-01 RX ADMIN — ENOXAPARIN SODIUM 40 MG: 40 INJECTION SUBCUTANEOUS at 09:06

## 2020-01-01 RX ADMIN — ACETAMINOPHEN 650 MG: 325 TABLET, FILM COATED ORAL at 05:01

## 2020-01-01 RX ADMIN — POLYETHYLENE GLYCOL 3350 17 G: 17 POWDER, FOR SOLUTION ORAL at 13:21

## 2020-01-01 RX ADMIN — SODIUM CHLORIDE, POTASSIUM CHLORIDE, SODIUM LACTATE AND CALCIUM CHLORIDE 60 ML/HR: 600; 310; 30; 20 INJECTION, SOLUTION INTRAVENOUS at 15:01

## 2020-01-01 RX ADMIN — OXYCODONE HYDROCHLORIDE 5 MG: 5 TABLET ORAL at 00:38

## 2020-01-01 RX ADMIN — PANTOPRAZOLE SODIUM 40 MG: 40 INJECTION, POWDER, FOR SOLUTION INTRAVENOUS at 05:47

## 2020-01-01 RX ADMIN — ACETAMINOPHEN 650 MG: 325 TABLET, FILM COATED ORAL at 17:54

## 2020-01-01 RX ADMIN — POTASSIUM CHLORIDE 10 MEQ: 7.46 INJECTION, SOLUTION INTRAVENOUS at 23:07

## 2020-01-01 RX ADMIN — TAZOBACTAM SODIUM AND PIPERACILLIN SODIUM 3.38 G: 375; 3 INJECTION, SOLUTION INTRAVENOUS at 09:10

## 2020-01-01 RX ADMIN — PROPOFOL 20 MG: 10 INJECTION, EMULSION INTRAVENOUS at 13:29

## 2020-01-01 RX ADMIN — ISOSORBIDE MONONITRATE 60 MG: 60 TABLET, EXTENDED RELEASE ORAL at 08:51

## 2020-01-01 RX ADMIN — PROPOFOL 20 MG: 10 INJECTION, EMULSION INTRAVENOUS at 13:42

## 2020-01-01 RX ADMIN — OXYCODONE HYDROCHLORIDE 10 MG: 5 TABLET ORAL at 14:27

## 2020-01-01 RX ADMIN — OXCARBAZEPINE 150 MG: 150 TABLET, FILM COATED ORAL at 09:05

## 2020-01-01 RX ADMIN — POTASSIUM PHOSPHATE, MONOBASIC POTASSIUM PHOSPHATE, DIBASIC: 224; 236 INJECTION, SOLUTION, CONCENTRATE INTRAVENOUS at 17:56

## 2020-01-01 RX ADMIN — MELATONIN TAB 5 MG 10 MG: 5 TAB at 20:40

## 2020-01-01 RX ADMIN — METHYLNALTREXONE BROMIDE 12 MG: 12 INJECTION, SOLUTION SUBCUTANEOUS at 08:18

## 2020-01-01 RX ADMIN — OXYCODONE HYDROCHLORIDE 10 MG: 5 TABLET ORAL at 07:24

## 2020-01-01 RX ADMIN — PANTOPRAZOLE SODIUM 40 MG: 40 TABLET, DELAYED RELEASE ORAL at 05:02

## 2020-01-01 RX ADMIN — SODIUM CHLORIDE, POTASSIUM CHLORIDE, SODIUM LACTATE AND CALCIUM CHLORIDE 500 ML: 600; 310; 30; 20 INJECTION, SOLUTION INTRAVENOUS at 07:38

## 2020-01-01 RX ADMIN — METOPROLOL TARTRATE 25 MG: 25 TABLET, FILM COATED ORAL at 17:17

## 2020-01-01 RX ADMIN — MELATONIN TAB 5 MG 10 MG: 5 TAB at 21:00

## 2020-01-01 RX ADMIN — POTASSIUM CHLORIDE 40 MEQ: 1.5 FOR SOLUTION ORAL at 06:13

## 2020-01-01 RX ADMIN — POTASSIUM CHLORIDE 10 MEQ: 7.46 INJECTION, SOLUTION INTRAVENOUS at 13:35

## 2020-01-01 RX ADMIN — SODIUM CHLORIDE, PRESERVATIVE FREE 10 ML: 5 INJECTION INTRAVENOUS at 11:19

## 2020-01-01 RX ADMIN — CLONAZEPAM 0.5 MG: 0.5 TABLET ORAL at 20:52

## 2020-01-01 RX ADMIN — LIDOCAINE HYDROCHLORIDE 50 MG: 10 INJECTION, SOLUTION EPIDURAL; INFILTRATION; INTRACAUDAL; PERINEURAL at 15:00

## 2020-01-01 RX ADMIN — CLONAZEPAM 0.5 MG: 0.5 TABLET ORAL at 09:10

## 2020-01-01 RX ADMIN — SODIUM CHLORIDE, PRESERVATIVE FREE 10 ML: 5 INJECTION INTRAVENOUS at 09:51

## 2020-01-01 RX ADMIN — ENOXAPARIN SODIUM 40 MG: 40 INJECTION SUBCUTANEOUS at 08:40

## 2020-01-01 RX ADMIN — METOCLOPRAMIDE 10 MG: 5 INJECTION, SOLUTION INTRAMUSCULAR; INTRAVENOUS at 12:51

## 2020-01-01 RX ADMIN — CLOPIDOGREL BISULFATE 75 MG: 75 TABLET ORAL at 12:51

## 2020-01-01 RX ADMIN — METOCLOPRAMIDE 10 MG: 5 INJECTION, SOLUTION INTRAMUSCULAR; INTRAVENOUS at 17:00

## 2020-01-01 RX ADMIN — POTASSIUM CHLORIDE 10 MEQ: 7.46 INJECTION, SOLUTION INTRAVENOUS at 20:03

## 2020-01-01 RX ADMIN — SODIUM CHLORIDE, PRESERVATIVE FREE 10 ML: 5 INJECTION INTRAVENOUS at 08:39

## 2020-01-01 RX ADMIN — OXYCODONE HYDROCHLORIDE 5 MG: 5 TABLET ORAL at 05:44

## 2020-01-01 RX ADMIN — METOCLOPRAMIDE 10 MG: 5 INJECTION, SOLUTION INTRAMUSCULAR; INTRAVENOUS at 01:09

## 2020-01-01 RX ADMIN — MELATONIN TAB 5 MG 10 MG: 5 TAB at 21:14

## 2020-01-01 RX ADMIN — SODIUM CHLORIDE, POTASSIUM CHLORIDE, SODIUM LACTATE AND CALCIUM CHLORIDE 9 ML/HR: 600; 310; 30; 20 INJECTION, SOLUTION INTRAVENOUS at 14:00

## 2020-01-01 RX ADMIN — ACETAMINOPHEN 650 MG: 325 TABLET, FILM COATED ORAL at 05:14

## 2020-01-01 RX ADMIN — ACETAMINOPHEN 650 MG: 325 TABLET, FILM COATED ORAL at 06:06

## 2020-01-01 RX ADMIN — ALBUMIN HUMAN: 0.05 INJECTION, SOLUTION INTRAVENOUS at 16:04

## 2020-01-01 RX ADMIN — MAGNESIUM SULFATE HEPTAHYDRATE 4 G: 40 INJECTION, SOLUTION INTRAVENOUS at 16:21

## 2020-01-01 RX ADMIN — FLUTICASONE PROPIONATE 2 SPRAY: 50 SPRAY, METERED NASAL at 08:56

## 2020-01-01 RX ADMIN — OXYCODONE HYDROCHLORIDE 10 MG: 5 TABLET ORAL at 00:10

## 2020-01-01 RX ADMIN — OXYCODONE HYDROCHLORIDE 7.5 MG: 15 TABLET ORAL at 20:40

## 2020-01-01 RX ADMIN — PROPOFOL 75 MCG/KG/MIN: 10 INJECTION, EMULSION INTRAVENOUS at 09:55

## 2020-01-01 RX ADMIN — OXYCODONE 5 MG: 5 TABLET ORAL at 20:19

## 2020-01-01 RX ADMIN — OXYCODONE HYDROCHLORIDE 5 MG: 5 TABLET ORAL at 09:48

## 2020-01-01 RX ADMIN — CARBOPLATIN 430 MG: 10 INJECTION, SOLUTION INTRAVENOUS at 14:47

## 2020-01-01 RX ADMIN — METOCLOPRAMIDE 10 MG: 5 INJECTION, SOLUTION INTRAMUSCULAR; INTRAVENOUS at 17:55

## 2020-01-01 RX ADMIN — METOPROLOL TARTRATE 25 MG: 25 TABLET, FILM COATED ORAL at 14:33

## 2020-01-01 RX ADMIN — DIPHENHYDRAMINE HYDROCHLORIDE 50 MG: 50 INJECTION INTRAMUSCULAR; INTRAVENOUS at 09:18

## 2020-01-01 RX ADMIN — SMOFLIPID 100 ML: 6; 6; 5; 3 INJECTION, EMULSION INTRAVENOUS at 17:01

## 2020-01-01 RX ADMIN — CLINDAMYCIN PHOSPHATE 900 MG: 900 INJECTION, SOLUTION INTRAVENOUS at 09:49

## 2020-01-01 RX ADMIN — PANTOPRAZOLE SODIUM 40 MG: 40 INJECTION, POWDER, FOR SOLUTION INTRAVENOUS at 06:12

## 2020-01-01 RX ADMIN — IOPAMIDOL 85 ML: 612 INJECTION, SOLUTION INTRAVENOUS at 19:00

## 2020-01-01 RX ADMIN — CEFUROXIME AXETIL 250 MG: 250 TABLET ORAL at 20:00

## 2020-01-01 RX ADMIN — SODIUM CHLORIDE 1000 ML: 9 INJECTION, SOLUTION INTRAVENOUS at 14:57

## 2020-01-01 RX ADMIN — AMLODIPINE BESYLATE 10 MG: 10 TABLET ORAL at 08:21

## 2020-01-01 RX ADMIN — HYDROMORPHONE HYDROCHLORIDE 0.5 MG: 1 INJECTION, SOLUTION INTRAMUSCULAR; INTRAVENOUS; SUBCUTANEOUS at 18:06

## 2020-01-01 RX ADMIN — METOCLOPRAMIDE 10 MG: 5 INJECTION, SOLUTION INTRAMUSCULAR; INTRAVENOUS at 18:31

## 2020-01-01 RX ADMIN — POTASSIUM CHLORIDE 10 MEQ: 7.46 INJECTION, SOLUTION INTRAVENOUS at 12:22

## 2020-01-01 RX ADMIN — PROPOFOL 150 MG: 10 INJECTION, EMULSION INTRAVENOUS at 15:00

## 2020-01-01 RX ADMIN — ISOSORBIDE MONONITRATE 60 MG: 60 TABLET, EXTENDED RELEASE ORAL at 08:38

## 2020-01-01 RX ADMIN — ACETAMINOPHEN 650 MG: 325 TABLET, FILM COATED ORAL at 20:19

## 2020-01-01 RX ADMIN — MAGNESIUM SULFATE HEPTAHYDRATE 4 G: 40 INJECTION, SOLUTION INTRAVENOUS at 09:05

## 2020-01-01 RX ADMIN — IPRATROPIUM BROMIDE AND ALBUTEROL SULFATE 3 ML: 2.5; .5 SOLUTION RESPIRATORY (INHALATION) at 20:36

## 2020-01-01 RX ADMIN — POTASSIUM & SODIUM PHOSPHATES POWDER PACK 280-160-250 MG 2 PACKET: 280-160-250 PACK at 12:22

## 2020-01-01 RX ADMIN — HYDROMORPHONE HYDROCHLORIDE 0.5 MG: 1 INJECTION, SOLUTION INTRAMUSCULAR; INTRAVENOUS; SUBCUTANEOUS at 18:56

## 2020-01-01 RX ADMIN — SENNOSIDES AND DOCUSATE SODIUM 2 TABLET: 8.6; 5 TABLET ORAL at 08:51

## 2020-01-01 RX ADMIN — METOCLOPRAMIDE 10 MG: 5 INJECTION, SOLUTION INTRAMUSCULAR; INTRAVENOUS at 00:24

## 2020-01-01 RX ADMIN — HEPARIN 500 UNITS: 100 SYRINGE at 09:37

## 2020-01-01 RX ADMIN — PHYSOSTIGMINE SALICYLATE 0.64 MG: 1 INJECTION INTRAVENOUS at 12:43

## 2020-01-01 RX ADMIN — OXYCODONE HYDROCHLORIDE 7.5 MG: 15 TABLET ORAL at 10:41

## 2020-01-01 RX ADMIN — POTASSIUM CHLORIDE 40 MEQ: 10 CAPSULE, COATED, EXTENDED RELEASE ORAL at 16:22

## 2020-01-01 RX ADMIN — ISOSORBIDE MONONITRATE 60 MG: 60 TABLET, EXTENDED RELEASE ORAL at 09:10

## 2020-01-01 RX ADMIN — OXYCODONE HYDROCHLORIDE 10 MG: 5 TABLET ORAL at 00:37

## 2020-01-01 RX ADMIN — PROPOFOL 20 MG: 10 INJECTION, EMULSION INTRAVENOUS at 13:48

## 2020-01-01 RX ADMIN — POTASSIUM CHLORIDE 10 MEQ: 7.46 INJECTION, SOLUTION INTRAVENOUS at 22:01

## 2020-01-01 RX ADMIN — SODIUM CHLORIDE, PRESERVATIVE FREE 10 ML: 5 INJECTION INTRAVENOUS at 08:40

## 2020-01-01 RX ADMIN — AMLODIPINE BESYLATE 10 MG: 10 TABLET ORAL at 09:47

## 2020-01-01 RX ADMIN — VANCOMYCIN HYDROCHLORIDE 750 MG: 750 INJECTION, SOLUTION INTRAVENOUS at 05:04

## 2020-01-01 RX ADMIN — POTASSIUM PHOSPHATE, MONOBASIC POTASSIUM PHOSPHATE, DIBASIC: 224; 236 INJECTION, SOLUTION, CONCENTRATE INTRAVENOUS at 17:23

## 2020-01-01 RX ADMIN — SODIUM CHLORIDE, PRESERVATIVE FREE 10 ML: 5 INJECTION INTRAVENOUS at 20:18

## 2020-01-01 RX ADMIN — OXYCODONE HYDROCHLORIDE 5 MG: 5 TABLET ORAL at 04:47

## 2020-01-01 RX ADMIN — POTASSIUM CHLORIDE 40 MEQ: 1.5 FOR SOLUTION ORAL at 16:33

## 2020-01-01 RX ADMIN — OXCARBAZEPINE 150 MG: 150 TABLET, FILM COATED ORAL at 21:13

## 2020-01-01 RX ADMIN — TAZOBACTAM SODIUM AND PIPERACILLIN SODIUM 3.38 G: 375; 3 INJECTION, SOLUTION INTRAVENOUS at 23:10

## 2020-01-01 RX ADMIN — METOCLOPRAMIDE 10 MG: 5 INJECTION, SOLUTION INTRAMUSCULAR; INTRAVENOUS at 01:04

## 2020-01-01 RX ADMIN — SMOFLIPID 100 ML: 6; 6; 5; 3 INJECTION, EMULSION INTRAVENOUS at 17:55

## 2020-01-01 RX ADMIN — LACTULOSE 20 G: 20 SOLUTION ORAL at 20:40

## 2020-01-01 RX ADMIN — ACETAMINOPHEN 650 MG: 325 TABLET, FILM COATED ORAL at 16:37

## 2020-01-01 RX ADMIN — DEXAMETHASONE SODIUM PHOSPHATE 4 MG: 10 INJECTION, SOLUTION INTRAMUSCULAR; INTRAVENOUS at 15:15

## 2020-01-01 RX ADMIN — SENNOSIDES AND DOCUSATE SODIUM 2 TABLET: 8.6; 5 TABLET ORAL at 09:11

## 2020-01-01 RX ADMIN — OXCARBAZEPINE 150 MG: 150 TABLET, FILM COATED ORAL at 20:42

## 2020-01-01 RX ADMIN — METOCLOPRAMIDE 10 MG: 5 INJECTION, SOLUTION INTRAMUSCULAR; INTRAVENOUS at 05:02

## 2020-01-01 RX ADMIN — DIATRIZOATE MEGLUMINE AND DIATRIZOATE SODIUM 100 ML: 660; 100 LIQUID ORAL; RECTAL at 13:44

## 2020-01-01 RX ADMIN — FLUOXETINE HYDROCHLORIDE 20 MG: 20 CAPSULE ORAL at 08:27

## 2020-01-01 RX ADMIN — METOCLOPRAMIDE 10 MG: 5 INJECTION, SOLUTION INTRAMUSCULAR; INTRAVENOUS at 00:33

## 2020-01-01 RX ADMIN — TAZOBACTAM SODIUM AND PIPERACILLIN SODIUM 3.38 G: 375; 3 INJECTION, SOLUTION INTRAVENOUS at 10:41

## 2020-01-01 RX ADMIN — MAGNESIUM SULFATE HEPTAHYDRATE 4 G: 40 INJECTION, SOLUTION INTRAVENOUS at 05:57

## 2020-01-01 RX ADMIN — SODIUM CHLORIDE, PRESERVATIVE FREE 10 ML: 5 INJECTION INTRAVENOUS at 21:01

## 2020-01-01 RX ADMIN — OXYCODONE HYDROCHLORIDE 10 MG: 5 TABLET ORAL at 12:59

## 2020-01-01 RX ADMIN — METOCLOPRAMIDE 10 MG: 5 INJECTION, SOLUTION INTRAMUSCULAR; INTRAVENOUS at 06:12

## 2020-01-01 RX ADMIN — HYDROMORPHONE HYDROCHLORIDE 0.5 MG: 1 INJECTION, SOLUTION INTRAMUSCULAR; INTRAVENOUS; SUBCUTANEOUS at 16:17

## 2020-01-01 RX ADMIN — HYDROMORPHONE HYDROCHLORIDE 0.5 MG: 1 INJECTION, SOLUTION INTRAMUSCULAR; INTRAVENOUS; SUBCUTANEOUS at 10:06

## 2020-01-01 RX ADMIN — OXCARBAZEPINE 150 MG: 150 TABLET, FILM COATED ORAL at 21:09

## 2020-01-01 RX ADMIN — HYDROMORPHONE HYDROCHLORIDE 0.5 MG: 1 INJECTION, SOLUTION INTRAMUSCULAR; INTRAVENOUS; SUBCUTANEOUS at 23:05

## 2020-01-01 RX ADMIN — OXYCODONE HYDROCHLORIDE 7.5 MG: 15 TABLET ORAL at 16:20

## 2020-01-01 RX ADMIN — POTASSIUM CHLORIDE 10 MEQ: 7.46 INJECTION, SOLUTION INTRAVENOUS at 05:00

## 2020-01-01 RX ADMIN — OXYCODONE HYDROCHLORIDE 5 MG: 5 TABLET ORAL at 00:13

## 2020-01-01 RX ADMIN — HYDROMORPHONE HYDROCHLORIDE 0.5 MG: 1 INJECTION, SOLUTION INTRAMUSCULAR; INTRAVENOUS; SUBCUTANEOUS at 20:17

## 2020-01-01 RX ADMIN — METOPROLOL TARTRATE 25 MG: 25 TABLET, FILM COATED ORAL at 17:00

## 2020-01-01 RX ADMIN — BEVACIZUMAB-AWWB 450 MG: 400 INJECTION, SOLUTION INTRAVENOUS at 10:03

## 2020-01-01 RX ADMIN — SODIUM CHLORIDE 500 ML: 9 INJECTION, SOLUTION INTRAVENOUS at 17:39

## 2020-01-01 RX ADMIN — OXYCODONE HYDROCHLORIDE 10 MG: 5 TABLET ORAL at 03:27

## 2020-01-01 RX ADMIN — PROPOFOL 20 MG: 10 INJECTION, EMULSION INTRAVENOUS at 13:50

## 2020-01-01 RX ADMIN — PANTOPRAZOLE SODIUM 40 MG: 40 INJECTION, POWDER, FOR SOLUTION INTRAVENOUS at 06:03

## 2020-01-01 RX ADMIN — FLUTICASONE PROPIONATE 2 SPRAY: 50 SPRAY, METERED NASAL at 10:57

## 2020-01-01 RX ADMIN — OXCARBAZEPINE 150 MG: 150 TABLET, FILM COATED ORAL at 08:56

## 2020-01-01 RX ADMIN — ACETAMINOPHEN 650 MG: 325 TABLET, FILM COATED ORAL at 00:04

## 2020-01-01 RX ADMIN — SODIUM CHLORIDE, POTASSIUM CHLORIDE, SODIUM LACTATE AND CALCIUM CHLORIDE 125 ML/HR: 600; 310; 30; 20 INJECTION, SOLUTION INTRAVENOUS at 17:39

## 2020-01-01 RX ADMIN — ACETAMINOPHEN 650 MG: 325 TABLET, FILM COATED ORAL at 09:11

## 2020-01-01 RX ADMIN — TAZOBACTAM SODIUM AND PIPERACILLIN SODIUM 3.38 G: 375; 3 INJECTION, SOLUTION INTRAVENOUS at 15:12

## 2020-01-01 RX ADMIN — BISACODYL 10 MG: 10 SUPPOSITORY RECTAL at 08:21

## 2020-01-01 RX ADMIN — SODIUM CHLORIDE, POTASSIUM CHLORIDE, SODIUM LACTATE AND CALCIUM CHLORIDE 125 ML/HR: 600; 310; 30; 20 INJECTION, SOLUTION INTRAVENOUS at 08:12

## 2020-01-01 RX ADMIN — METOPROLOL TARTRATE 25 MG: 25 TABLET, FILM COATED ORAL at 05:14

## 2020-01-01 RX ADMIN — HYDROMORPHONE HYDROCHLORIDE 0.5 MG: 1 INJECTION, SOLUTION INTRAMUSCULAR; INTRAVENOUS; SUBCUTANEOUS at 15:19

## 2020-01-01 RX ADMIN — ACETAMINOPHEN 650 MG: 325 TABLET, FILM COATED ORAL at 13:06

## 2020-01-01 RX ADMIN — OXCARBAZEPINE 150 MG: 150 TABLET, FILM COATED ORAL at 08:07

## 2020-01-01 RX ADMIN — BISACODYL 10 MG: 10 SUPPOSITORY RECTAL at 06:24

## 2020-01-01 RX ADMIN — PROPOFOL 20 MG: 10 INJECTION, EMULSION INTRAVENOUS at 13:36

## 2020-01-01 RX ADMIN — METOCLOPRAMIDE 10 MG: 5 INJECTION, SOLUTION INTRAMUSCULAR; INTRAVENOUS at 00:14

## 2020-01-01 RX ADMIN — TAZOBACTAM SODIUM AND PIPERACILLIN SODIUM 3.38 G: 375; 3 INJECTION, SOLUTION INTRAVENOUS at 23:09

## 2020-01-01 RX ADMIN — ERTAPENEM SODIUM 1 G: 1 INJECTION, POWDER, LYOPHILIZED, FOR SOLUTION INTRAMUSCULAR; INTRAVENOUS at 20:07

## 2020-01-01 RX ADMIN — PROPOFOL 100 MG: 10 INJECTION, EMULSION INTRAVENOUS at 09:53

## 2020-01-01 RX ADMIN — CLONAZEPAM 0.5 MG: 0.5 TABLET ORAL at 20:40

## 2020-01-01 RX ADMIN — SODIUM CHLORIDE, POTASSIUM CHLORIDE, SODIUM LACTATE AND CALCIUM CHLORIDE 100 ML/HR: 600; 310; 30; 20 INJECTION, SOLUTION INTRAVENOUS at 05:48

## 2020-01-01 RX ADMIN — ACETAMINOPHEN 650 MG: 325 TABLET, FILM COATED ORAL at 16:56

## 2020-01-01 RX ADMIN — METOPROLOL TARTRATE 25 MG: 25 TABLET, FILM COATED ORAL at 05:41

## 2020-01-01 RX ADMIN — LACTULOSE 20 G: 20 SOLUTION ORAL at 08:38

## 2020-01-01 RX ADMIN — AMLODIPINE BESYLATE 10 MG: 10 TABLET ORAL at 09:06

## 2020-01-01 RX ADMIN — POTASSIUM & SODIUM PHOSPHATES POWDER PACK 280-160-250 MG 2 PACKET: 280-160-250 PACK at 06:16

## 2020-09-04 ENCOUNTER — HOSPITAL ENCOUNTER (EMERGENCY)
Facility: HOSPITAL | Age: 65
Discharge: HOME | End: 2020-09-04
Attending: EMERGENCY MEDICINE | Admitting: EMERGENCY MEDICINE

## 2020-09-04 ENCOUNTER — APPOINTMENT (OUTPATIENT)
Dept: GENERAL RADIOLOGY | Facility: HOSPITAL | Age: 65
End: 2020-09-04

## 2020-09-04 ENCOUNTER — APPOINTMENT (OUTPATIENT)
Dept: CT IMAGING | Facility: HOSPITAL | Age: 65
End: 2020-09-04

## 2020-09-04 VITALS
BODY MASS INDEX: 24.99 KG/M2 | DIASTOLIC BLOOD PRESSURE: 91 MMHG | WEIGHT: 150 LBS | RESPIRATION RATE: 18 BRPM | TEMPERATURE: 99.3 F | OXYGEN SATURATION: 98 % | HEIGHT: 65 IN | HEART RATE: 89 BPM | SYSTOLIC BLOOD PRESSURE: 138 MMHG

## 2020-09-04 DIAGNOSIS — R07.9 CHEST PAIN, UNSPECIFIED TYPE: Primary | ICD-10-CM
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
## 2020-09-04 LAB
ALBUMIN SERPL-MCNC: 3.6 G/DL (ref 3.5–5.2)
ALBUMIN/GLOB SERPL: 1.2 G/DL
ALP SERPL-CCNC: 88 U/L (ref 39–117)
ALT SERPL W P-5'-P-CCNC: 7 U/L (ref 1–33)
ANION GAP SERPL CALCULATED.3IONS-SCNC: 8.5 MMOL/L (ref 5–15)
AST SERPL-CCNC: 25 U/L (ref 1–32)
BASOPHILS # BLD AUTO: 0.04 10*3/MM3 (ref 0–0.2)
BASOPHILS NFR BLD AUTO: 0.5 % (ref 0–1.5)
BILIRUB SERPL-MCNC: 0.3 MG/DL (ref 0–1.2)
BUN SERPL-MCNC: 13 MG/DL (ref 8–23)
BUN/CREAT SERPL: 18.3 (ref 7–25)
CALCIUM SPEC-SCNC: 8.8 MG/DL (ref 8.6–10.5)
CHLORIDE SERPL-SCNC: 104 MMOL/L (ref 98–107)
CO2 SERPL-SCNC: 27.5 MMOL/L (ref 22–29)
CREAT SERPL-MCNC: 0.71 MG/DL (ref 0.57–1)
DEPRECATED RDW RBC AUTO: 41.7 FL (ref 37–54)
EOSINOPHIL # BLD AUTO: 0.21 10*3/MM3 (ref 0–0.4)
EOSINOPHIL NFR BLD AUTO: 2.7 % (ref 0.3–6.2)
ERYTHROCYTE [DISTWIDTH] IN BLOOD BY AUTOMATED COUNT: 12.3 % (ref 12.3–15.4)
GFR SERPL CREATININE-BSD FRML MDRD: 100 ML/MIN/1.73
GFR SERPL CREATININE-BSD FRML MDRD: 82 ML/MIN/1.73
GLOBULIN UR ELPH-MCNC: 2.9 GM/DL
GLUCOSE SERPL-MCNC: 166 MG/DL (ref 65–99)
HCT VFR BLD AUTO: 32.6 % (ref 34–46.6)
HGB BLD-MCNC: 11.1 G/DL (ref 12–15.9)
HOLD SPECIMEN: NORMAL
HOLD SPECIMEN: NORMAL
IMM GRANULOCYTES # BLD AUTO: 0.02 10*3/MM3 (ref 0–0.05)
IMM GRANULOCYTES NFR BLD AUTO: 0.3 % (ref 0–0.5)
LYMPHOCYTES # BLD AUTO: 1.5 10*3/MM3 (ref 0.7–3.1)
LYMPHOCYTES NFR BLD AUTO: 18.9 % (ref 19.6–45.3)
MCH RBC QN AUTO: 31.4 PG (ref 26.6–33)
MCHC RBC AUTO-ENTMCNC: 34 G/DL (ref 31.5–35.7)
MCV RBC AUTO: 92.1 FL (ref 79–97)
MONOCYTES # BLD AUTO: 0.58 10*3/MM3 (ref 0.1–0.9)
MONOCYTES NFR BLD AUTO: 7.3 % (ref 5–12)
NEUTROPHILS NFR BLD AUTO: 5.57 10*3/MM3 (ref 1.7–7)
NEUTROPHILS NFR BLD AUTO: 70.3 % (ref 42.7–76)
NRBC BLD AUTO-RTO: 0 /100 WBC (ref 0–0.2)
PLATELET # BLD AUTO: 253 10*3/MM3 (ref 140–450)
PMV BLD AUTO: 10.2 FL (ref 6–12)
POTASSIUM SERPL-SCNC: 3.9 MMOL/L (ref 3.5–5.2)
PROT SERPL-MCNC: 6.5 G/DL (ref 6–8.5)
RBC # BLD AUTO: 3.54 10*6/MM3 (ref 3.77–5.28)
SODIUM SERPL-SCNC: 140 MMOL/L (ref 136–145)
TROPONIN I SERPL-MCNC: 0 NG/ML (ref 0–0.05)
TROPONIN T SERPL-MCNC: <0.01 NG/ML (ref 0–0.03)
TROPONIN T SERPL-MCNC: <0.01 NG/ML (ref 0–0.03)
WBC # BLD AUTO: 7.92 10*3/MM3 (ref 3.4–10.8)
WHOLE BLOOD HOLD SPECIMEN: NORMAL
WHOLE BLOOD HOLD SPECIMEN: NORMAL

## 2020-09-04 PROCEDURE — 70450 CT HEAD/BRAIN W/O DYE: CPT

## 2020-09-04 PROCEDURE — 85025 COMPLETE CBC W/AUTO DIFF WBC: CPT | Performed by: EMERGENCY MEDICINE

## 2020-09-04 PROCEDURE — 71046 X-RAY EXAM CHEST 2 VIEWS: CPT

## 2020-09-04 PROCEDURE — 93005 ELECTROCARDIOGRAM TRACING: CPT | Performed by: EMERGENCY MEDICINE

## 2020-09-04 PROCEDURE — 84484 ASSAY OF TROPONIN QUANT: CPT | Performed by: EMERGENCY MEDICINE

## 2020-09-04 PROCEDURE — 99284 EMERGENCY DEPT VISIT MOD MDM: CPT

## 2020-09-04 PROCEDURE — 80053 COMPREHEN METABOLIC PANEL: CPT | Performed by: EMERGENCY MEDICINE

## 2020-09-04 PROCEDURE — 93005 ELECTROCARDIOGRAM TRACING: CPT

## 2020-09-04 RX ORDER — CHLORTHALIDONE 25 MG/1
25 TABLET ORAL DAILY
Qty: 30 TABLET | Refills: 0 | Status: SHIPPED | OUTPATIENT
Start: 2020-09-04 | End: 2020-01-01 | Stop reason: HOSPADM

## 2020-09-04 RX ORDER — ISOSORBIDE MONONITRATE 60 MG/1
60 TABLET, EXTENDED RELEASE ORAL DAILY
Qty: 30 TABLET | Refills: 0 | Status: SHIPPED | OUTPATIENT
Start: 2020-09-04 | End: 2020-01-01 | Stop reason: HOSPADM

## 2020-09-04 RX ORDER — SODIUM CHLORIDE 0.9 % (FLUSH) 0.9 %
10 SYRINGE (ML) INJECTION AS NEEDED
Status: DISCONTINUED | OUTPATIENT
Start: 2020-09-04 | End: 2020-09-04 | Stop reason: HOSPADM

## 2020-09-04 RX ORDER — AMLODIPINE BESYLATE 10 MG/1
10 TABLET ORAL DAILY
Qty: 30 TABLET | Refills: 0 | Status: SHIPPED | OUTPATIENT
Start: 2020-09-04 | End: 2020-01-01 | Stop reason: HOSPADM

## 2020-09-04 RX ORDER — CLOPIDOGREL BISULFATE 75 MG/1
75 TABLET ORAL DAILY
Qty: 30 TABLET | Refills: 0 | Status: SHIPPED | OUTPATIENT
Start: 2020-09-04 | End: 2020-01-01

## 2020-09-05 NOTE — ED PROVIDER NOTES
Subjective   History of Present Illness    Chief Complaint: Chest pain, pain tingling of arms legs head and neck after recent fall  History of Present Illness: [age]-year-old female with a history of coronary artery disease and stents, moved to the area in February, has not established with cardiology here due to coronavirus pandemic, reports chest pain for the last few days.  Needs refills on her cardiac medications  Onset: 3 days  Duration: Intermittent  Exacerbating / Alleviating factors: None  Associated symptoms: None      Nurses Notes reviewed and agree, including vitals, allergies, social history and prior medical history.     REVIEW OF SYSTEMS: All systems reviewed and not pertinent unless noted.    Positive for: Chest pain, tingling arms legs head and neck after recent falls    Negative for: LOC vomiting confusion weakness cough shortness of breath palpitations  Review of Systems    Past Medical History:   Diagnosis Date   • Arthritis    • Cancer (CMS/HCC)    • CHF (congestive heart failure) (CMS/HCC)    • COPD (chronic obstructive pulmonary disease) (CMS/HCC)    • Coronary artery disease    • Disease of thyroid gland    • History of transfusion    • Hyperlipidemia    • Hypertension    • Injury of back    • Myocardial infarction (CMS/HCC)    • Seizures (CMS/HCC)        Allergies   Allergen Reactions   • Lisinopril Cough   • Losartan Other (See Comments)     HAS STOMACH ULCERS   • Citrus Cough   • Latex Itching   • Pollen Extract Other (See Comments)     RED, ITCHY EYES       Past Surgical History:   Procedure Laterality Date   • BREAST SURGERY     • CARDIAC SURGERY     • COLONOSCOPY     • DILATATION AND CURETTAGE     • HYSTERECTOMY     • MASTECTOMY Bilateral    • SKIN BIOPSY         History reviewed. No pertinent family history.    Social History     Socioeconomic History   • Marital status:      Spouse name: Not on file   • Number of children: Not on file   • Years of education: Not on file   •  Highest education level: Not on file   Tobacco Use   • Smoking status: Former Smoker     Years: 10.00     Types: Cigarettes     Last attempt to quit: 1996     Years since quittin.0   Substance and Sexual Activity   • Alcohol use: Never     Frequency: Never   • Drug use: Never           Objective   Physical Exam    GENERAL APPEARANCE: Well developed, [age]-year-old white female,  in no acute distress.  VITAL SIGNS: per nursing, reviewed and noted  SKIN: exposed skin with no rashes, ulcerations or petechiae.  Small areas of ecchymoses to the extremities  Head: Normocephalic, atraumatic.   EYES: perrla. EOMI.  ENT: Normal voice.  Patient maintained wearing a mask throughout patient encounter due to coronavirus pandemic  LUNGS:  No increased work of breathing. No retractions.   CARDIOVASCULAR:  regular rate and rhythm, no murmurs.  Good Peripheral pulses. Good cap refill to extremities.   ABDOMEN: Soft, nontender, normal bowel sounds. No hernia. No ascites.  MUSCULOSKELETAL:  No tenderness. Full ROM. Strength and tone normal.  NEUROLOGIC: Alert, oriented x 3. No gross deficits. GCS 15.   NECK: Supple, symmetric. No tenderness, no masses. Full ROM  Back: full rom, no paraspinal spasm. No CVA tenderness.   PSYCH: appropriate affect.  : no bladder tenderness or distention, no CVA tenderness      Procedures     No attending physician procedures were performed on this patient.      ED Course  ED Course as of Sep 05 1629   Fri Sep 04, 2020   1559 EKG interpreted by me reveals sinus rhythm at a rate of 102.  No obvious ischemic changes.  No ectopy.    [PF]   1703 Troponin T: <0.010 [PF]   1710 Repeat EKG interpreted by me reveals sinus rhythm rate 78.  No ectopy no ischemic changes normal EKG.    [PF]   Sat Sep 05, 2020   1628 Glucose(!): 166 [PF]   1628 BUN: 13 [PF]   1628 Creatinine: 0.71 [PF]   1628 Sodium: 140 [PF]   1628 Potassium: 3.9 [PF]   1628 Chloride: 104 [PF]   1628 CO2: 27.5 [PF]   1628 Calcium: 8.8 [PF]    1628 Total Protein: 6.5 [PF]   1628 Albumin: 3.60 [PF]   1628 ALT (SGPT): 7 [PF]   1628 AST (SGOT): 25 [PF]   1628 Alkaline Phosphatase: 88 [PF]   1628 Total Bilirubin: 0.3 [PF]   1628 Troponin T: <0.010 [PF]   1628 WBC: 7.92 [PF]   1628 Hemoglobin(!): 11.1 [PF]   1628 Hematocrit(!): 32.6 [PF]   1628 Platelets: 253 [PF]   1628    FINDINGS:  Axial images of the head were obtained without contrast. Coronal  reformatted images were also obtained.This study was performed  with techniques to keep radiation doses as low as reasonably  achievable (ALARA). Individualized dose reduction techniques  using automated exposure control or adjustment of mA and/or kV  according to the patient's size were employed.  There is no  evidence of intracranial hemorrhage or mass. The ventricular  size is within normal limits. There is no evidence of shift of  the midline structures. No abnormal extra axial fluid collection  is identified. No skull abnormality is seen on the bone window  images.  There is fluid or mucosal thickening in the left  sphenoid sinus.     IMPRESSION:  No acute intracranial abnormality.     Authenticated by Jem Worley III, MD on 09/04/2020  04:45:19 PM          [PF]   1628 PROCEDURE: XR CHEST 2 VW-        HISTORY: Chest Pain Triage Protocol     COMPARISON: None.     FINDINGS: The patient is status post median sternotomy and CABG  procedure. The heart is normal in size. The mediastinum is unremarkable.  The lungs are clear. There is no pneumothorax. There are no acute  osseous abnormalities.     IMPRESSION:  No acute cardiopulmonary process.           This report was finalized on 9/4/2020 2:56 PM by Dewey Sánchez M.D..    [PF]      ED Course User Index  [PF] Wilner Russell, DO                                           MDM    Reassuring cardiac work-up with troponins negative x2.  No evidence of ischemic changes on EKG.  Chest x-ray without acute findings.  Discharged home in stable condition with  outpatient follow-up with local cardiologist, information provided, outpatient recommendations and strict return precautions discussed.  Will refill patient's cardiac meds.    Final diagnoses:   Chest pain, unspecified type   Fall, initial encounter            Wilner Russell,   09/05/20 [id number]

## 2020-09-08 ENCOUNTER — TELEPHONE (OUTPATIENT)
Dept: INTERNAL MEDICINE | Facility: CLINIC | Age: 65
End: 2020-09-08

## 2020-09-08 NOTE — TELEPHONE ENCOUNTER
Caller:  EJ ARRIAGA     Relationship to patient:     Best call back number: [phone]  New or established patient?  [x] New  [] Established    Date of discharge: 9-4-20    Facility discharged from: [facility]    Diagnosis/Symptoms: CHEST PAIN, ARM PAIN     Length of stay (If applicable):     Specialty Only: Did you see a [facility] provider?    [x] Yes  [] No  If so, who? PATIENT DID NOT REMEMBER
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
## 2020-09-21 NOTE — PROGRESS NOTES
Please contact patient and let her know urine culture results and also please let her know that we can send Augmentin in as it is susceptible.

## 2020-10-08 NOTE — PROGRESS NOTES
Patient: Hailee Booker    YOB: 1955    Date: 10/08/2020    Primary Care Provider: Sheila Bran APRN    Chief Complaint   Patient presents with   • Establish Care   • Mass     colon    • Fecal Impaction   • Abdominal Pain       SUBJECTIVE:    History of present illness:  I saw the patient in the office today as a consultation for evaluation and treatment of bouts of constipation with fecal impaction.  Patient was seen at [facility] Emergency Department yesterday, CT scan of the abdomen and pelvis showed a pelvic mass felt to arise from the sigmoid colon.  Enlarged pelvic and abdominal lymph nodes were seen as well as a mass within the liver consistent with metastatic disease.    The patient does not give a history significant for weight loss, she does have a history significant for crampy bilateral lower quadrant abdominal discomfort over the past several months.  This seems to be worse with meals, not relieved, associated with constipation, there is a difficulty with urination recently.    The following portions of the patient's history were reviewed and updated as appropriate: allergies, current medications, past family history, past medical history, past social history, past surgical history and problem list.    Review of Systems   Constitutional: Negative for chills, fever and unexpected weight change.   HENT: Negative for hearing loss, trouble swallowing and voice change.    Eyes: Negative for visual disturbance.   Respiratory: Negative for apnea, cough, chest tightness, shortness of breath and wheezing.    Cardiovascular: Negative for chest pain, palpitations and leg swelling.   Gastrointestinal: Positive for abdominal pain and constipation. Negative for abdominal distention, anal bleeding, blood in stool, diarrhea, nausea, rectal pain and vomiting.   Endocrine: Negative for cold intolerance and heat intolerance.   Genitourinary: Negative for difficulty urinating, dysuria and flank  pain.   Musculoskeletal: Negative for back pain and gait problem.   Skin: Negative for color change, rash and wound.   Neurological: Negative for dizziness, syncope, speech difficulty, weakness, light-headedness, numbness and headaches.   Hematological: Negative for adenopathy. Does not bruise/bleed easily.   Psychiatric/Behavioral: Negative for confusion. The patient is not nervous/anxious.        History:  Past Medical History:   Diagnosis Date   • Arthritis    • Cancer (CMS/HCC)    • CHF (congestive heart failure) (CMS/HCC)    • COPD (chronic obstructive pulmonary disease) (CMS/HCC)    • Coronary artery disease    • Disease of thyroid gland    • History of transfusion    • Hyperlipidemia    • Hypertension    • Injury of back    • Myocardial infarction (CMS/HCC)    • Seizures (CMS/HCC)    • Stroke (CMS/HCC)        Past Surgical History:   Procedure Laterality Date   • BREAST SURGERY     • CARDIAC SURGERY     • COLONOSCOPY     • DILATATION AND CURETTAGE     • HYSTERECTOMY     • MASTECTOMY Bilateral    • SKIN BIOPSY         Family History   Problem Relation Age of Onset   • Cancer Mother    • Hypertension Mother    • Hyperlipidemia Mother    • Stroke Father        Social History     Tobacco Use   • Smoking status: Former Smoker     Years: 10.00     Types: Cigarettes     Quit date: 1996     Years since quittin.1   • Smokeless tobacco: Never Used   Substance Use Topics   • Alcohol use: Never     Frequency: Never   • Drug use: Never       Allergies:  Allergies   Allergen Reactions   • Lisinopril Cough   • Losartan Other (See Comments)     HAS STOMACH ULCERS   • Citrus Cough   • Latex Itching   • Pollen Extract Other (See Comments)     RED, ITCHY EYES       Medications:    Current Outpatient Medications:   •  albuterol sulfate  (90 Base) MCG/ACT inhaler, Inhale 2 puffs Every 4 (Four) Hours As Needed for Wheezing., Disp: , Rfl:   •  amLODIPine (NORVASC) 10 MG tablet, Take 1 tablet by mouth Daily., Disp:  "30 tablet, Rfl: 0  •  cephalexin (KEFLEX) 500 MG capsule, Take 1 capsule by mouth 2 (Two) Times a Day., Disp: 14 capsule, Rfl: 0  •  chlorthalidone (HYGROTON) 25 MG tablet, Take 1 tablet by mouth Daily., Disp: 30 tablet, Rfl: 0  •  cloBAZam (ONFI) 10 MG tablet, Take 10 mg by mouth 2 (Two) Times a Day., Disp: , Rfl:   •  clopidogrel (Plavix) 75 MG tablet, Take 1 tablet by mouth Daily., Disp: 30 tablet, Rfl: 0  •  fluticasone (FLONASE) 50 MCG/ACT nasal spray, 2 sprays into the nostril(s) as directed by provider Daily., Disp: , Rfl:   •  HYDROcodone-acetaminophen (NORCO) 5-325 MG per tablet, Take 1 tablet by mouth Every 6 (Six) Hours As Needed for Moderate Pain ., Disp: 12 tablet, Rfl: 0  •  isosorbide mononitrate (IMDUR) 60 MG 24 hr tablet, Take 1 tablet by mouth Daily., Disp: 30 tablet, Rfl: 0  •  Multiple Vitamins-Minerals (ONE-A-DAY 50 PLUS PO), Take  by mouth., Disp: , Rfl:   •  Omega-3 Fatty Acids (fish oil) 1000 MG capsule capsule, Take  by mouth Daily With Breakfast., Disp: , Rfl:   •  ondansetron ODT (ZOFRAN-ODT) 4 MG disintegrating tablet, Place 1 tablet on the tongue Every 6 (Six) Hours As Needed for Nausea., Disp: 20 tablet, Rfl: 0  •  OXcarbazepine (TRILEPTAL) 150 MG tablet, Take 150 mg by mouth 2 (Two) Times a Day., Disp: , Rfl:   •  Unable to find, 1 each 1 (One) Time. L-carnitine tartrate 500mg qd, Disp: , Rfl:   No current facility-administered medications for this visit.     OBJECTIVE:    Vital Signs:   Vitals:    10/08/20 1334   BP: 100/60   Pulse: 60   SpO2: 95%   Weight: 63.5 kg (140 lb)   Height: 165.1 cm (65\")       Physical Exam:   General Appearance:    Alert, cooperative, in no acute distress   Head:    Normocephalic, without obvious abnormality, atraumatic   Eyes:            Lids and lashes normal, conjunctivae and sclerae normal, no   icterus, no pallor, corneas clear, PERRL   Ears:    Ears appear intact with no abnormalities noted   Throat:   No oral lesions, no thrush, oral mucosa moist "   Neck:   No adenopathy, supple, trachea midline, no thyromegaly,  no JVD   Lungs:     Clear to auscultation,respirations regular, even and                  unlabored    Heart:    Regular rhythm and normal rate, normal S1 and S2, no            murmur   Abdomen:     no masses, no organomegaly, soft non-tender, non-distended, no guarding, there is no evidence of tenderness, there is some aspects of tenderness in the lower quadrants bilaterally   Extremities:   Moves all extremities well, no edema, no cyanosis, no             redness   Pulses:   Pulses palpable and equal bilaterally   Skin:   No bleeding, bruising or rash   Lymph nodes:   No palpable adenopathy   Neurologic:   Cranial nerves 2 - 12 grossly intact, sensation intact      Results Review:   I reviewed the patient's new clinical results.  I reviewed the patient's new imaging results and agree with the interpretation.  I reviewed the patient's other test results and agree with the interpretation    Review of Systems was reviewed and confirmed as accurate as documented by the MA.    ASSESSMENT/PLAN:    1. Pelvic mass    2. Colonic mass    3. Liver mass    4. Other hydronephrosis        I recommend a colonoscopy for further evaluation. The procedure was explained as well as the risks which include but are not limited to bleeding, infection, perforation, abdominal pain etc. The patient understands these risks and the procedure and wishes to proceed.     I did have a detailed and extensive discussion with the patient and her daughter in the office today.  They fully understand that I am concerned about the CT scan findings including the possibility of sigmoid colon mass with metastatic disease to the liver.  I have reviewed the above-mentioned CT scan myself and with the radiologist today.    I am also concerned that she has obstructive type symptomatology and basically will not tolerate an oral mechanical bowel prep.  At the time of endoscopy I am hopeful to  just perform abbreviated colonoscopy with biopsy of any colon mass in order to make a diagnosis.    Electronically signed by Himanshu Shen MD  10/08/20 12:48 EDT

## 2020-10-08 NOTE — H&P (VIEW-ONLY)
Patient: Hailee Booker    YOB: 1955    Date: 10/08/2020    Primary Care Provider: Sheila Bran APRN    Chief Complaint   Patient presents with   • Establish Care   • Mass     colon    • Fecal Impaction   • Abdominal Pain       SUBJECTIVE:    History of present illness:  I saw the patient in the office today as a consultation for evaluation and treatment of bouts of constipation with fecal impaction.  Patient was seen at [facility] Emergency Department yesterday, CT scan of the abdomen and pelvis showed a pelvic mass felt to arise from the sigmoid colon.  Enlarged pelvic and abdominal lymph nodes were seen as well as a mass within the liver consistent with metastatic disease.    The patient does not give a history significant for weight loss, she does have a history significant for crampy bilateral lower quadrant abdominal discomfort over the past several months.  This seems to be worse with meals, not relieved, associated with constipation, there is a difficulty with urination recently.    The following portions of the patient's history were reviewed and updated as appropriate: allergies, current medications, past family history, past medical history, past social history, past surgical history and problem list.    Review of Systems   Constitutional: Negative for chills, fever and unexpected weight change.   HENT: Negative for hearing loss, trouble swallowing and voice change.    Eyes: Negative for visual disturbance.   Respiratory: Negative for apnea, cough, chest tightness, shortness of breath and wheezing.    Cardiovascular: Negative for chest pain, palpitations and leg swelling.   Gastrointestinal: Positive for abdominal pain and constipation. Negative for abdominal distention, anal bleeding, blood in stool, diarrhea, nausea, rectal pain and vomiting.   Endocrine: Negative for cold intolerance and heat intolerance.   Genitourinary: Negative for difficulty urinating, dysuria and flank  pain.   Musculoskeletal: Negative for back pain and gait problem.   Skin: Negative for color change, rash and wound.   Neurological: Negative for dizziness, syncope, speech difficulty, weakness, light-headedness, numbness and headaches.   Hematological: Negative for adenopathy. Does not bruise/bleed easily.   Psychiatric/Behavioral: Negative for confusion. The patient is not nervous/anxious.        History:  Past Medical History:   Diagnosis Date   • Arthritis    • Cancer (CMS/HCC)    • CHF (congestive heart failure) (CMS/HCC)    • COPD (chronic obstructive pulmonary disease) (CMS/HCC)    • Coronary artery disease    • Disease of thyroid gland    • History of transfusion    • Hyperlipidemia    • Hypertension    • Injury of back    • Myocardial infarction (CMS/HCC)    • Seizures (CMS/HCC)    • Stroke (CMS/HCC)        Past Surgical History:   Procedure Laterality Date   • BREAST SURGERY     • CARDIAC SURGERY     • COLONOSCOPY     • DILATATION AND CURETTAGE     • HYSTERECTOMY     • MASTECTOMY Bilateral    • SKIN BIOPSY         Family History   Problem Relation Age of Onset   • Cancer Mother    • Hypertension Mother    • Hyperlipidemia Mother    • Stroke Father        Social History     Tobacco Use   • Smoking status: Former Smoker     Years: 10.00     Types: Cigarettes     Quit date: 1996     Years since quittin.1   • Smokeless tobacco: Never Used   Substance Use Topics   • Alcohol use: Never     Frequency: Never   • Drug use: Never       Allergies:  Allergies   Allergen Reactions   • Lisinopril Cough   • Losartan Other (See Comments)     HAS STOMACH ULCERS   • Citrus Cough   • Latex Itching   • Pollen Extract Other (See Comments)     RED, ITCHY EYES       Medications:    Current Outpatient Medications:   •  albuterol sulfate  (90 Base) MCG/ACT inhaler, Inhale 2 puffs Every 4 (Four) Hours As Needed for Wheezing., Disp: , Rfl:   •  amLODIPine (NORVASC) 10 MG tablet, Take 1 tablet by mouth Daily., Disp:  "30 tablet, Rfl: 0  •  cephalexin (KEFLEX) 500 MG capsule, Take 1 capsule by mouth 2 (Two) Times a Day., Disp: 14 capsule, Rfl: 0  •  chlorthalidone (HYGROTON) 25 MG tablet, Take 1 tablet by mouth Daily., Disp: 30 tablet, Rfl: 0  •  cloBAZam (ONFI) 10 MG tablet, Take 10 mg by mouth 2 (Two) Times a Day., Disp: , Rfl:   •  clopidogrel (Plavix) 75 MG tablet, Take 1 tablet by mouth Daily., Disp: 30 tablet, Rfl: 0  •  fluticasone (FLONASE) 50 MCG/ACT nasal spray, 2 sprays into the nostril(s) as directed by provider Daily., Disp: , Rfl:   •  HYDROcodone-acetaminophen (NORCO) 5-325 MG per tablet, Take 1 tablet by mouth Every 6 (Six) Hours As Needed for Moderate Pain ., Disp: 12 tablet, Rfl: 0  •  isosorbide mononitrate (IMDUR) 60 MG 24 hr tablet, Take 1 tablet by mouth Daily., Disp: 30 tablet, Rfl: 0  •  Multiple Vitamins-Minerals (ONE-A-DAY 50 PLUS PO), Take  by mouth., Disp: , Rfl:   •  Omega-3 Fatty Acids (fish oil) 1000 MG capsule capsule, Take  by mouth Daily With Breakfast., Disp: , Rfl:   •  ondansetron ODT (ZOFRAN-ODT) 4 MG disintegrating tablet, Place 1 tablet on the tongue Every 6 (Six) Hours As Needed for Nausea., Disp: 20 tablet, Rfl: 0  •  OXcarbazepine (TRILEPTAL) 150 MG tablet, Take 150 mg by mouth 2 (Two) Times a Day., Disp: , Rfl:   •  Unable to find, 1 each 1 (One) Time. L-carnitine tartrate 500mg qd, Disp: , Rfl:   No current facility-administered medications for this visit.     OBJECTIVE:    Vital Signs:   Vitals:    10/08/20 1334   BP: 100/60   Pulse: 60   SpO2: 95%   Weight: 63.5 kg (140 lb)   Height: 165.1 cm (65\")       Physical Exam:   General Appearance:    Alert, cooperative, in no acute distress   Head:    Normocephalic, without obvious abnormality, atraumatic   Eyes:            Lids and lashes normal, conjunctivae and sclerae normal, no   icterus, no pallor, corneas clear, PERRL   Ears:    Ears appear intact with no abnormalities noted   Throat:   No oral lesions, no thrush, oral mucosa moist "   Neck:   No adenopathy, supple, trachea midline, no thyromegaly,  no JVD   Lungs:     Clear to auscultation,respirations regular, even and                  unlabored    Heart:    Regular rhythm and normal rate, normal S1 and S2, no            murmur   Abdomen:     no masses, no organomegaly, soft non-tender, non-distended, no guarding, there is no evidence of tenderness, there is some aspects of tenderness in the lower quadrants bilaterally   Extremities:   Moves all extremities well, no edema, no cyanosis, no             redness   Pulses:   Pulses palpable and equal bilaterally   Skin:   No bleeding, bruising or rash   Lymph nodes:   No palpable adenopathy   Neurologic:   Cranial nerves 2 - 12 grossly intact, sensation intact      Results Review:   I reviewed the patient's new clinical results.  I reviewed the patient's new imaging results and agree with the interpretation.  I reviewed the patient's other test results and agree with the interpretation    Review of Systems was reviewed and confirmed as accurate as documented by the MA.    ASSESSMENT/PLAN:    1. Pelvic mass    2. Colonic mass    3. Liver mass    4. Other hydronephrosis        I recommend a colonoscopy for further evaluation. The procedure was explained as well as the risks which include but are not limited to bleeding, infection, perforation, abdominal pain etc. The patient understands these risks and the procedure and wishes to proceed.     I did have a detailed and extensive discussion with the patient and her daughter in the office today.  They fully understand that I am concerned about the CT scan findings including the possibility of sigmoid colon mass with metastatic disease to the liver.  I have reviewed the above-mentioned CT scan myself and with the radiologist today.    I am also concerned that she has obstructive type symptomatology and basically will not tolerate an oral mechanical bowel prep.  At the time of endoscopy I am hopeful to  just perform abbreviated colonoscopy with biopsy of any colon mass in order to make a diagnosis.    Electronically signed by Himanshu Shen MD  10/08/20 12:48 EDT

## 2020-10-09 PROBLEM — R19.00 PELVIC MASS: Status: ACTIVE | Noted: 2020-01-01

## 2020-10-12 NOTE — OP NOTE
[facility]  Hailee Booker  : 1955  MRN: [id number]  [facility]: [id number]  Date: 10/12/2020    Operative Report    Pre-op Diagnosis:  Pelvic mass [R19.00]   Previous hysterectomy  History of stage III ovarian cancer   Post-op Diagnosis:  Same   Procedure: VAGINAL BIOPSIES UNDER SEDATION   Surgeon:  Surgical assistant: FERDINAND Perez was responsible for performing the following activities: Retraction and Suction and their skilled assistance was necessary for the success of this case.     OR Staff: Circulator: Katrina Higgins RN  Scrub Person: Giovany Good CSA; Evi Khalil     Anesthesia: Sedation   Estimated Blood Loss: 20 mls   ABx: None   Specimens:  Vaginal biopsies - RUS   Findings: See below   Complications: none     Description of Procedure:    I was consulted intraoperatively by Dr. Shen today while the patient was undergoing a colonoscopy.  He found a pelvic mass on exam while the patient was under sedation.  Upon arrival to the endoscopy suite, I performed a speculum and bimanual exam.  A large pelvic mass was noted on bimanual exam and significant nodularity was noted on the vaginal cuff, both anteriorly and posteriorly.  The mass did seem to be eroding through the vaginal mucosa.  I performed several biopsies with Kevorkian biopsy forceps.  The specimens were labeled and sent to pathology.  I achieved sufficient hemostasis with Monsel's solution.  A Monsel's-coated tampon was placed at the end of the procedure.  No complications.    Vimal Demarco MD  Obstetrics and Gynecology  [facility]

## 2020-10-12 NOTE — PROGRESS NOTES
LOS: 0 days   Patient Care Team:  Sheila Bran APRN as PCP - General (Nurse Practitioner)  Himanshu Shen MD as Consulting Physician (General Surgery)      Chief Complaint: she does have pelvic pain      Interval History:     Patient Complaints: See Above, passing flatus    History taken from: patient family    Vital Signs  Temp:  [98.2 °F (36.8 °C)-98.4 °F (36.9 °C)] 98.4 °F (36.9 °C)  Heart Rate:  [] 79  Resp:  [16-20] 16  BP: (119-136)/(77-88) 124/77    Physical Exam:     General Appearance:    Alert, cooperative, in no acute distress   Head:    Normocephalic, without obvious abnormality, atraumatic   Lungs:     Clear to auscultation,respirations regular, even and                  unlabored    Heart:    Regular rhythm and normal rate, normal S1 and S2, no            murmur, no gallop, no rub, no click   Abdomen:     Normal bowel sounds, no masses, no organomegaly, soft        non-tender, non-distended, no guarding, no rebound                tenderness   Extremities:   Moves all extremities well, no edema, no cyanosis, no             redness   Pulses:   Pulses palpable and equal bilaterally   Skin:   No bleeding, bruising or rash        Results Review:       Lab Results (last 24 hours)     Procedure Component Value Units Date/Time    POC Glucose Once [[id number]]  (Normal) Collected: 10/12/20 1318    Specimen: Blood Updated: 10/12/20 1343     Glucose 89 mg/dL      Comment: Serial Number: [id number]:  [id number]                 Assessment/Plan       Pelvic mass      65-year-old white female has multiple medical problems and also has a large pelvic mass encroaching on the right ureter causing hydronephrosis and I would imagine some aspects of bladder outlet obstruction.  There is also some aspects of colonic obstruction but I do not think this is complete and she is passing flatus and thin stool.    Today I did perform colonoscopy on the patient and she did have a reddened hard mass at 8 cm  on the anterior rectal wall.  Biopsies were taken.  The remainder of the colon up to 30 cm was fairly clean but I could not get past the 30 cm carolina due to extrinsic compression.  After colonoscopy was performed I did perform a vaginal examination and there was a hard mass noted posteriorly on the vagina and in the dome of the vaginal vault.    I asked Dr. Vimal Demarco of the gynecology service to see the patient with me while the patient was sedated.  I did discuss the situation with the patient's daughter-in-law several times over the phone and obtained informed consent for vaginal examination, speculum exam, and biopsy is of vaginal mass.  Risk and benefits were explained to her including the possibility of bleeding.    This was performed by Dr. Demarco of the gynecology service, specimens will be sent to pathology for further delineation of this tumor mass.  It is interesting to note on history that the patient has had a previous hysterectomy performed in the 70s but she does not know if her cervix was removed at that time.  She also had stage III ovarian carcinoma treated 3 years ago with oophorectomy and chemotherapy at [place] in [place].    I did have the patient go over to x-ray post procedure for a barium enema.  I reviewed this with the radiologist and this showed fairly nice sigmoid colon up to 30 cm from the anal verge corresponding to the area that I could not traverse with the colonoscope.  There was no evidence of barium flow into the left colon.  I also reviewed the patient's previous CT scan with the radiologist.    I had a full detailed and extensive discussion with the patient and her daughter-in-law today after the above-mentioned barium enema was performed.  I explained to them the findings at the time of colonoscopy, the need for gynecological evaluation and biopsy, and the need for barium enema as outlined above.  I am suspicious that she has metastatic cervical or ovarian  carcinoma but we will rush the pathology and I will see her back in the office this coming Thursday.  I do think she needs to start on some oral stool softeners and she needs a low fiber diet, she knows to call me if she has any further problems prior to Thursday.    I did discuss the situation with Dr. Pascual Mtz over the phone today, he is to see the patient this coming Friday for right hydronephrosis most likely from obstructive pelvic mass.  Also will send her notes to Dr. Fatemeh Humphries of the oncology service since I am sure that she will need to be seen by that service sometime in the near future.      Himanshu Shen MD  10/12/20  16:40 EDT

## 2020-10-12 NOTE — ANESTHESIA PREPROCEDURE EVALUATION
Anesthesia Evaluation     Patient summary reviewed and Nursing notes reviewed   NPO Solid Status: > 8 hours  NPO Liquid Status: > 8 hours           Airway   Mallampati: II  TM distance: >3 FB  Neck ROM: full  No difficulty expected  Dental      Pulmonary    (+) a smoker Former, COPD, shortness of breath, decreased breath sounds,   Cardiovascular     (+) hypertension, valvular problems/murmurs, past MI , CAD, CHF , hyperlipidemia,       Neuro/Psych  (+) seizures, CVA,     GI/Hepatic/Renal/Endo    (+)   thyroid problem hypothyroidism    Musculoskeletal     (+) arthralgias, back pain, chronic pain,   Abdominal   (+) obese,    Substance History      OB/GYN          Other   arthritis,    history of cancer                    Anesthesia Plan    ASA 3     MAC   (Risks and benefits discussed including risk of aspiration, recall and dental damage. All patient questions answered.    Will continue with plan of care.)  intravenous induction     Anesthetic plan, all risks, benefits, and alternatives have been provided, discussed and informed consent has been obtained with: patient.

## 2020-10-12 NOTE — DISCHARGE INSTRUCTIONS
Rest today  No pushing,pulling,tugging,heavy lifting, or strenuous activity   No major decision making,driving,or drinking alcoholic beverages for 24 hours due to the sedation you received  Always use good hand hygiene/washing technique  No driving on pain medication.    To assist you in voiding:  Drink plenty of fluids  Listen to running water while attempting to void.    If you are unable to urinate and you have an uncomfortable urge to void or it has been   6 hours since you were discharged, return to the Emergency Room.

## 2020-10-14 NOTE — PROGRESS NOTES
Patient: Hailee Booker    YOB: 1955    Date: 10/15/2020    Primary Care Provider: Sheila Bran APRN    Reason for Consultation: Follow-up colonoscopy    Chief Complaint:  Lower quadrant pain    History of present illness:  I saw the patient in the office today as a followup from their recent colonoscopy with biopsy of mass within the rectum, the pathology report did show carcinoma consistent with ovarian primary.  They state that they have done well and are having no complaints.    She does have a pelvic mass evident on CT scan, she did have a hard vaginal mass present on exam at the time of colonoscopy.  Biopsies were taken by Dr. Demarco of the GYN service at the time via speculum examination.    She does have a history significant for stage III ovarian cancer treated in [facility] 3 years ago with oophorectomy and chemotherapy.    The following portions of the patient's history were reviewed and updated as appropriate: allergies, current medications, past family history, past medical history, past social history, past surgical history and problem list.    Review of Systems   Constitutional: Negative for chills, fever and unexpected weight change.   HENT: Negative for hearing loss, trouble swallowing and voice change.    Eyes: Negative for visual disturbance.   Respiratory: Negative for apnea, cough, chest tightness, shortness of breath and wheezing.    Cardiovascular: Negative for chest pain, palpitations and leg swelling.   Gastrointestinal: Negative for abdominal distention, abdominal pain, anal bleeding, blood in stool, constipation, diarrhea, nausea, rectal pain and vomiting.   Endocrine: Negative for cold intolerance and heat intolerance.   Genitourinary: Negative for difficulty urinating, dysuria and flank pain.   Musculoskeletal: Negative for back pain and gait problem.   Skin: Negative for color change, rash and wound.   Neurological: Negative for dizziness,  syncope, speech difficulty, weakness, light-headedness, numbness and headaches.   Hematological: Negative for adenopathy. Does not bruise/bleed easily.   Psychiatric/Behavioral: Negative for confusion. The patient is not nervous/anxious.        Allergies:  Allergies   Allergen Reactions   • Lisinopril Cough   • Losartan Other (See Comments)     HAS STOMACH ULCERS   • Citrus Cough   • Latex Itching   • Pollen Extract Other (See Comments)     RED, ITCHY EYES       Medications:    Current Outpatient Medications:   •  albuterol sulfate  (90 Base) MCG/ACT inhaler, Inhale 2 puffs Every 4 (Four) Hours As Needed for Wheezing., Disp: , Rfl:   •  amLODIPine (NORVASC) 10 MG tablet, Take 1 tablet by mouth Daily., Disp: 30 tablet, Rfl: 0  •  cephalexin (KEFLEX) 500 MG capsule, Take 1 capsule by mouth 2 (Two) Times a Day., Disp: 14 capsule, Rfl: 0  •  chlorthalidone (HYGROTON) 25 MG tablet, Take 1 tablet by mouth Daily., Disp: 30 tablet, Rfl: 0  •  cloBAZam (ONFI) 10 MG tablet, Take 10 mg by mouth 2 (Two) Times a Day., Disp: , Rfl:   •  clopidogrel (Plavix) 75 MG tablet, Take 1 tablet by mouth Daily., Disp: 30 tablet, Rfl: 0  •  fluticasone (FLONASE) 50 MCG/ACT nasal spray, 2 sprays into the nostril(s) as directed by provider Daily., Disp: , Rfl:   •  HYDROcodone-acetaminophen (NORCO) 5-325 MG per tablet, Take 1 tablet by mouth Every 6 (Six) Hours As Needed for Moderate Pain ., Disp: 12 tablet, Rfl: 0  •  isosorbide mononitrate (IMDUR) 60 MG 24 hr tablet, Take 1 tablet by mouth Daily., Disp: 30 tablet, Rfl: 0  •  Multiple Vitamins-Minerals (ONE-A-DAY 50 PLUS PO), Take  by mouth., Disp: , Rfl:   •  Omega-3 Fatty Acids (fish oil) 1000 MG capsule capsule, Take  by mouth Daily With Breakfast., Disp: , Rfl:   •  ondansetron ODT (ZOFRAN-ODT) 4 MG disintegrating tablet, Place 1 tablet on the tongue Every 6 (Six) Hours As Needed for Nausea., Disp: 20 tablet, Rfl: 0  •  OXcarbazepine (TRILEPTAL) 150 MG tablet, Take 150 mg by mouth  2 (Two) Times a Day., Disp: , Rfl:   •  Unable to find, 1 each 1 (One) Time. L-carnitine tartrate 500mg qd, Disp: , Rfl:     Vital Signs:  There were no vitals filed for this visit.    Physical Exam:   General Appearance:    Alert, cooperative, in no acute distress   Abdomen:     no masses, no organomegaly, soft non-tender, non-distended, no guarding, wounds are well healed, no evidence of recurrent hernia, no peritoneal signs   Chest:      Clear to ausculation            Cor:     Regular rate and rhythm    Results Review:   I reviewed the patient's new clinical results.  I reviewed the patient's new imaging results and agree with the interpretation.  I reviewed the patient's other test results and agree with the interpretation    Assessment / Plan:    1. Pelvic mass        I did discuss the situation with the patient and her daughter-in-law today in the office and have also discussed the situation over the phone with the pathology department.  This does seem to be an ovarian tumor given her history of stage III ovarian carcinoma, it looks like this accounts for the large pelvic mass and vaginal mass and there is extrinsic compression on the sigmoid colon.    I think the patient needs to be referred to GYN oncology at [facility] and we will make that referral at this time.  I have asked her to continue with stool softeners as needed and I need to see the patient back in the office only if she has further problems.      Electronically signed by Himanshu Shen MD  10/15/20

## 2020-10-16 PROBLEM — N13.30 HYDRONEPHROSIS: Status: ACTIVE | Noted: 2020-01-01

## 2020-10-16 PROBLEM — C56.3 MALIGNANT NEOPLASM OF BOTH OVARIES (HCC): Status: ACTIVE | Noted: 2020-01-01

## 2020-10-16 PROBLEM — Z86.718 HISTORY OF MATERNAL DEEP VEIN THROMBOSIS (DVT): Status: ACTIVE | Noted: 2020-01-01

## 2020-10-16 PROBLEM — Z86.711 HISTORY OF PULMONARY EMBOLUS (PE): Status: ACTIVE | Noted: 2020-01-01

## 2020-10-16 PROBLEM — Z87.59 HISTORY OF MATERNAL DEEP VEIN THROMBOSIS (DVT): Status: ACTIVE | Noted: 2020-01-01

## 2020-10-16 PROBLEM — K56.609 LARGE BOWEL OBSTRUCTION (HCC): Status: ACTIVE | Noted: 2020-01-01

## 2020-10-16 PROBLEM — C56.9: Status: ACTIVE | Noted: 2020-01-01

## 2020-10-16 PROBLEM — C78.7 LIVER METASTASES: Status: ACTIVE | Noted: 2020-01-01

## 2020-10-16 PROBLEM — E87.1 HYPONATREMIA: Status: ACTIVE | Noted: 2020-01-01

## 2020-10-16 PROBLEM — Z85.3 HISTORY OF BREAST CANCER: Status: ACTIVE | Noted: 2020-01-01

## 2020-10-16 NOTE — TELEPHONE ENCOUNTER
I called and spoke with .    Left message for Dr. Mtz or  about patients surgery that is scheduled.    Dr. Winter would like to see if that could be moved up any sooner.

## 2020-10-16 NOTE — PROGRESS NOTES
Hailee Booker  [id number]  1955      Reason for visit: Recurrent ovarian cancer with liver metastasis, large bowel obstruction, right hydronephrosis, significant comorbidities    Consultation:  Patient is being seen at the request of Dr. Shen    History of present illness:  The patient is a 65 y.o. year old female who presents today for treatment and evaluation of the above issues.    She has a history of stage III ovarian cancer treated in [place] 3 years ago with surgery and chemotherapy with PARPi maintenance per GOG 3005.  Starting in May she developed constipation with fecal impaction. Her symptoms continued to worsen and she told her son and daughter in law about the symptoms 2 weeks ago. She presented to the emergency department and CT scan of the abdomen and pelvis showed a pelvic mass felt to arise from the sigmoid colon.  Enlarged pelvic and abdominal lymph nodes were also present along with a liver mass.  She was referred to Dr. Shen who recently performed a colonoscopy which showed rectal and vaginal masses.  Biopsy showed carcinoma consistent with gynecologic primary.  She did have a hard vaginal mass present at the time of colonoscopy and biopsies were taken by Dr. 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    Today she reports vomiting that started earlier this week. She was able to eat normally yesterday but began vomiting at 2am today. She has not passed flatus or had a bowel movement since her colonoscopy. She denies nausea. She has had vaginal spotting since her biopsy.     For new patients, Replaced by [facility] intake form from 10/16/20 was reviewed and confirmed.    She has a PMH significant for breast cancer status post mastectomies in 1979 and 1989. She did not receive chemo or radiation for this. She has had multiple basal cell carcinomas status post removal.     She has a significant cardiac history with CABG in 2008 and cardiac stent in November 2019. 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 She is currently on Plavix and aspirin. She reports her last heart catheterization was in 2019 in [place], [place] at [facility].     OBGYN History:  She is a .  She does not use HRT. She denies a history of abnormal pap smears. She underwent hysterectomy in  due to pelvic pain.     Oncologic History:  Oncology/Hematology History    No history exists.         Past Medical History:   Diagnosis Date   • Arthritis    • Cancer (CMS/HCC)     breast cancer twice, basal cell carcinoma, ovarian   • CHF (congestive heart failure) (CMS/HCC)    • COPD (chronic obstructive pulmonary disease) (CMS/HCC)    • Coronary artery disease    • Disease of thyroid gland    • History of transfusion    • Hyperlipidemia    • Hypertension    • Injury of back    • Myocardial infarction (CMS/HCC)    • Seizures (CMS/HCC)    • Stroke (CMS/HCC)        Past Surgical History:   Procedure Laterality Date   • BREAST SURGERY     • CARDIAC CATHETERIZATION  2019    one stent   • CARDIAC SURGERY      CABG two vessel    • COLONOSCOPY     • COLONOSCOPY N/A 10/12/2020    Procedure: COLONOSCOPY;  Surgeon: Himanshu Shen MD;  Location: [facility] ENDOSCOPY;  Service: Gastroenterology;  Laterality: N/A;   • DILATATION AND CURETTAGE     • HYSTERECTOMY     • MASTECTOMY Bilateral     ,    • SKIN BIOPSY     • VAGINAL BIOPSY N/A 10/12/2020    Procedure: VAGINAL BIOPSY;  Surgeon: Himanshu Shen MD;  Location: [facility] ENDOSCOPY;  Service: Gastroenterology;  Laterality: N/A;       MEDICATIONS: The current medication list was reviewed with the patient and updated in the EMR this date per the Medical Assistant. Medication dosages and frequencies were confirmed to be accurate.      Allergies:  is allergic to lisinopril; losartan; citrus; latex; and pollen extract.    Social History:   Social History     Socioeconomic History   • Marital status: Legally       Spouse name: Not on file   • Number of children: Not on file   • Years of education: Not on file   • Highest education level: Not on file   Tobacco Use   • Smoking status: Former Smoker     Years: 10.00     Types: Cigarettes     Quit date: 1996     Years since quittin.1   • Smokeless tobacco: Never Used   Substance and Sexual Activity   • Alcohol use: Never     Frequency: Never   • Drug use: Never   • Sexual activity: Defer       Family History:    Family History   Problem Relation Age of Onset   • Cancer Mother    • Hypertension Mother    • Hyperlipidemia Mother    • Stroke Father        Health Maintenance:    Health Maintenance   Topic Date Due   • TDAP/TD VACCINES (1 - Tdap) 1972   • ZOSTER VACCINE (1 of 2) 2003   • Pneumococcal Vaccine 65+ (1 of 1 - PPSV23) 2018   • INFLUENZA VACCINE  2020   • HEPATITIS C SCREENING  2020   • ANNUAL WELLNESS VISIT  2020   • LIPID PANEL  2020   • COLONOSCOPY  10/12/2030       Review of Systems   Constitutional: Negative for appetite change, chills, fatigue, fever and unexpected weight change.   HENT: Negative for ear discharge, ear pain, hearing loss, mouth sores, nosebleeds, postnasal drip, sinus pressure, sinus pain, sore throat, tinnitus and trouble swallowing.    Eyes: Negative for pain, itching and visual disturbance.   Respiratory: Negative for cough, shortness of breath and wheezing.    Cardiovascular: Negative for chest pain and palpitations.   Gastrointestinal: Positive for abdominal distention, abdominal pain, constipation and vomiting. Negative for blood in stool, diarrhea and nausea.   Endocrine: Negative for heat intolerance.   Genitourinary: Positive for vaginal bleeding. Negative for difficulty urinating, flank pain, frequency, hematuria, urgency and vaginal discharge.   Musculoskeletal: Positive for gait problem (difficulty walking and balance). Negative for arthralgias and myalgias.   Skin: Negative for color  "change, rash and wound.   Allergic/Immunologic: Negative for immunocompromised state.   Neurological: Positive for weakness. Negative for dizziness, seizures, syncope, numbness and headaches.        Balance problems   Hematological: Negative for adenopathy. Does not bruise/bleed easily.   Psychiatric/Behavioral: Negative for agitation, confusion, dysphoric mood and sleep disturbance. The patient is not nervous/anxious.        Physical Exam    Vitals:    10/16/20 1425   BP: (!) 178/102   Pulse: 102   Resp: 16   Temp: 98.7 °F (37.1 °C)   TempSrc: Temporal   Weight: 61.2 kg (135 lb)   Height: 165.1 cm (65\")   PainSc:   7   PainLoc: Groin       Body mass index is 22.47 kg/m².    Wt Readings from Last 3 Encounters:   10/16/20 61.2 kg (135 lb)   10/16/20 61.2 kg (135 lb)   10/15/20 61.5 kg (135 lb 9.6 oz)         GENERAL: Alert, ill-appearing female appearing her stated age who appears uncomfortable and unable to sit upright due to pain.  HEENT: Sclera anicteric. Head normocephalic, atraumatic. Mucus membranes moist.   NECK: Trachea midline, supple, without masses.  No thyromegaly.   BREASTS: Deferred  CARDIOVASCULAR: Normal rate, regular rhythm, no murmurs, rubs, or gallops.  No peripheral edema.  RESPIRATORY: Clear to auscultation bilaterally, normal respiratory effort  BACK:  No CVA tenderness, no vertebral tenderness on palpation  GASTROINTESTINAL:  Abdomen is distended with large pelvic mass, tender to light palpation, no rebound or guarding,  SKIN:  Warm, dry, well-perfused.  All visible areas intact.  No rashes, lesions, ulcers.  PSYCHIATRIC: AO x3, with appropriate affect, normal thought processes.  NEUROLOGIC: No focal deficits.  Moves extremities well.  MUSCULOSKELETAL: Normal gait and station.   EXTREMITIES:   No cyanosis, clubbing, symmetric.  LYMPHATICS:  No cervical or inguinal adenopathy noted.     PELVIC exam:    External genitalia are free from lesion. On speculum examination, there is a 4cm mass at " vaginal cuff with brown discharge and evidence of recent biopsy.  On bimanual examination posterior fullness and nodularity were noted.  Uterus, cervix and adnexa were absent.  There was no significant tenderness.  Rectovaginal exam was deferred.    ECOG PS 2    Diagnostic Data:    Dr. Winter personally reviewed CT of the abdomen and pelvis from 10/7/2020 images on 10/15/2020 as she is speaking to Dr. Shen.  Large pelvic mass was noted which was impinging the right ureter and causing right hydronephrosis.  No obstruction at that time.  Liver metastasis at right dome noted.    Xr Chest 2 View    Result Date: 9/4/2020  No acute cardiopulmonary process.    This report was finalized on 9/4/2020 2:56 PM by Dewey Sánchez M.D..    Ct Head Without Contrast    Result Date: 9/4/2020  No acute intracranial abnormality. Authenticated by Jem Worley III, MD on 09/04/2020 04:45:19 PM    Ct Abdomen Pelvis With Contrast    Result Date: 10/7/2020  7.0 x 8.0 x 9.5 cm heterogeneous mass within the pelvis which is felt to arise from the sigmoid colon consistent with a carcinoma. This mass produces significant mass effect upon the urinary bladder and distal right ureter resulting in moderate to severe right hydronephrosis. There are enlarged pelvic and abdominal lymph nodes as well as a mass in the dome of the liver consistent with metastatic disease.  538.48 mGy.cm   This study was performed with techniques to keep radiation doses as low as reasonably achievable (ALARA). Individualized dose reduction techniques using automated exposure control or adjustment of mA and/or kV according to the patient size were employed.  This report was finalized on 10/7/2020 2:43 PM by Dewey Sánchez M.D..    Fl Barium Enema Single Contrast    Result Date: 10/12/2020  Findings consistent with a high-grade obstruction involving the proximal sigmoid colon.  This report was finalized on 10/12/2020 3:47 PM by Dewey Sánchez  M.D..      Lab Results   Component Value Date    WBC 8.79 10/07/2020    HGB 10.9 (L) 10/07/2020    HCT 32.6 (L) 10/07/2020    MCV 90.1 10/07/2020     10/07/2020    NEUTROABS 6.18 10/07/2020    GLUCOSE 119 (H) 10/07/2020    BUN 17 10/07/2020    CREATININE 0.80 10/07/2020    EGFRIFNONA 72 10/07/2020    EGFRIFAFRI 100 09/04/2020     10/07/2020    K 3.0 (L) 10/07/2020    CL 95 (L) 10/07/2020    CO2 31.5 (H) 10/07/2020    CALCIUM 9.3 10/07/2020    ALBUMIN 3.90 10/07/2020    AST 21 10/07/2020    ALT 8 10/07/2020    BILITOT 0.2 10/07/2020     No results found for:         Assessment/Plan   This is a 65 y.o. woman presenting for evaluation of recurrent ovarian cancer  Encounter Diagnoses   Name Primary?   • Large bowel obstruction (CMS/HCC) Yes   • Recurrent malignant neoplasm of ovary (CMS/HCC)    • Malignant neoplasm of both ovaries (CMS/HCC)    • History of breast cancer    • History of pulmonary embolus (PE)    • History of DVT (deep vein thrombosis)    • Pelvic mass    • Other hydronephrosis    • Liver metastases (CMS/HCC)      Recurrent Ovarian Cancer  Patient reports initially diagnosed and treated at [facility]. She was on GOG 3005 and completed PARPi maintenance. She has not continued surveillance. She underwent colonoscopy for constipation and sigmoid mass. Rectal and vaginal biopsies were consistent with a serous adenocarcinoma of gynecologic origin.  Will contact former provider to obtain treatment records. Will contact for GOG study records and BRCA and genetic testing.  -Discussed treatment for recurrence is chemotherapy, however patient must have large bowel obstruction and hydronephrosis addressed prior to starting treatment.  -In addition, we did have an initial discussion regarding end-of-life issues.  Patient was informed that her cancer is treatable, but not curable.  And will also be very difficult for her to tolerate chemotherapy which would typically include neurotoxic drugs.   "She has baseline neuropathy, follows regularly related to seizure disorder and history of CVA, and uses a walker for ambulation.    Large Bowel Obstruction  Patient had impending obstruction on colonoscopy and today presents with vomiting and absence of flatus or bowel movement.   Needs admission to hospital from clinic due to obstruction. Spoke with Hospitalist on call and she will be admitted to their service. She will require surgical intervention with colostomy, possible bowel resection.  No attempt will be made today about this cancer at this time.  Goal is for perioperative risk assessment and conservative management until OR on Monday 10/19/2020, though may require emergent intervention over the weekend. She will need cardiac clearance prior to proceeding to the OR given her significant cardiac history.  -Patient needs repeat imaging and may benefit from an NG tube given her persistent emesis.    Right hydronephrosis  -Hopefully will be able to coordinate care and patient undergo right ureteral stent at the same time of diverting ostomy.    History of DVT/PE  Has IVC filter in place. On Plavix/ASA for cardiac stent.    CHF/CAD  Status post CABG in 2008 and cardiac stent in November 2019, \"mild\" MI at that time.  ([facility]; [place], [place])  Continue home medications  Will require cardiology perioperative risk assessment and optimization prior to proceeding to OR    COPD  continue home inhaler    Thyroid disease   continue Synthroid    H/o breast cancer   status post mastectomies in 1979 and 1989. She did not receive further treatment or surveillance. Had BRCA testing with GOG 3005 but does not know result.  We will try to obtain old records from [place].    Seizures   continue home medications  -Frequent falls    H/o CVA  Reports no residual effects though patient currently ambulating with walker and has balance issues per daughter in law.   -Frequent falls    Neuropathy  Secondary to " initial chemotherapy treatment  -Frequent falls    Pain assessment was performed today as a part of patient’s care.  For patients with pain related to surgery, gynecologic malignancy or cancer treatment, the plan is as noted in the assessment/plan.  For patients with pain not related to these issues, they are to seek any further needed care from a more appropriate provider, such as PCP.      No orders of the defined types were placed in this encounter.      FOLLOW UP: Admit to hospital, discussed with hospitalist    Patient was seen and examined with Dr. Gimenez,  resident, who performed portions of the examination and documentation for this patient's care under my direct supervision.  I agree with the above documentation and plan.    Juana Winter MD  10/16/20  16:23 EDT

## 2020-10-16 NOTE — PROGRESS NOTES
Chief Complaint  Pelvic mass  Right hydronephrosis    HPI  Ms. Booker is a 65 y.o. female with history of breast and ovarian cancer who presents with large pelvic mass and right hydroureteronephrosis.    She was seen in ER recently with pelvic pain, right flank pain, and constipation.  She recently saw Dr. Shen, who performed colonoscopy and biopsy of rectal mass, as well as examination by Dr. Demarco for vaginal mass.  Pathology returned likely ovarian primary.    Today she is not having any gross hematuria.  She does have urgency and frequency, as well as mild dysuria from mass-effect.  This is intermittent, moderately bothersome, and not associated with any fevers.    She has a history of stage III ovarian cancer treated with SARAH/BSO and chemotherapy in [place].    Past Medical History  Past Medical History:   Diagnosis Date   • Arthritis    • Cancer (CMS/HCC)     breast cancer twice, basal cell carcinoma, ovarian   • CHF (congestive heart failure) (CMS/HCC)    • COPD (chronic obstructive pulmonary disease) (CMS/HCC)    • Coronary artery disease    • Disease of thyroid gland    • History of transfusion    • Hyperlipidemia    • Hypertension    • Injury of back    • Myocardial infarction (CMS/HCC)    • Seizures (CMS/HCC)    • Stroke (CMS/HCC) 2016       Past Surgical History  Past Surgical History:   Procedure Laterality Date   • BREAST SURGERY     • CARDIAC CATHETERIZATION  11/2019    one stent   • CARDIAC SURGERY      CABG two vessel 2008   • COLONOSCOPY     • COLONOSCOPY N/A 10/12/2020    Procedure: COLONOSCOPY;  Surgeon: Himanshu Shen MD;  Location: [facility];  Service: Gastroenterology;  Laterality: N/A;   • DILATATION AND CURETTAGE     • HYSTERECTOMY     • MASTECTOMY Bilateral     1979, 1989   • SKIN BIOPSY     • VAGINAL BIOPSY N/A 10/12/2020    Procedure: VAGINAL BIOPSY;  Surgeon: Himanshu Shen MD;  Location: [facility];  Service: Gastroenterology;  Laterality: N/A;        Medications    Current Outpatient Medications:   •  albuterol sulfate  (90 Base) MCG/ACT inhaler, Inhale 2 puffs Every 4 (Four) Hours As Needed for Wheezing., Disp: , Rfl:   •  amLODIPine (NORVASC) 10 MG tablet, Take 1 tablet by mouth Daily., Disp: 30 tablet, Rfl: 0  •  cephalexin (KEFLEX) 500 MG capsule, Take 1 capsule by mouth 2 (Two) Times a Day., Disp: 14 capsule, Rfl: 0  •  chlorthalidone (HYGROTON) 25 MG tablet, Take 1 tablet by mouth Daily., Disp: 30 tablet, Rfl: 0  •  cloBAZam (ONFI) 10 MG tablet, Take 10 mg by mouth 2 (Two) Times a Day., Disp: , Rfl:   •  clopidogrel (Plavix) 75 MG tablet, Take 1 tablet by mouth Daily., Disp: 30 tablet, Rfl: 0  •  fluticasone (FLONASE) 50 MCG/ACT nasal spray, 2 sprays into the nostril(s) as directed by provider Daily., Disp: , Rfl:   •  HYDROcodone-acetaminophen (NORCO) 5-325 MG per tablet, Take 1 tablet by mouth Every 6 (Six) Hours As Needed for Moderate Pain ., Disp: 12 tablet, Rfl: 0  •  isosorbide mononitrate (IMDUR) 60 MG 24 hr tablet, Take 1 tablet by mouth Daily., Disp: 30 tablet, Rfl: 0  •  Multiple Vitamins-Minerals (ONE-A-DAY 50 PLUS PO), Take  by mouth., Disp: , Rfl:   •  Omega-3 Fatty Acids (fish oil) 1000 MG capsule capsule, Take  by mouth Daily With Breakfast., Disp: , Rfl:   •  ondansetron ODT (ZOFRAN-ODT) 4 MG disintegrating tablet, Place 1 tablet on the tongue Every 6 (Six) Hours As Needed for Nausea., Disp: 20 tablet, Rfl: 0  •  OXcarbazepine (TRILEPTAL) 150 MG tablet, Take 150 mg by mouth 2 (Two) Times a Day., Disp: , Rfl:   •  Unable to find, 1 each 1 (One) Time. L-carnitine tartrate 500mg qd, Disp: , Rfl:     Allergies  Allergies   Allergen Reactions   • Lisinopril Cough   • Losartan Other (See Comments)     HAS STOMACH ULCERS   • Citrus Cough   • Latex Itching   • Pollen Extract Other (See Comments)     RED, ITCHY EYES       Social History  Social History     Socioeconomic History   • Marital status: Legally      Spouse name: Not  "on file   • Number of children: Not on file   • Years of education: Not on file   • Highest education level: Not on file   Tobacco Use   • Smoking status: Former Smoker     Years: 10.00     Types: Cigarettes     Quit date: 1996     Years since quittin.1   • Smokeless tobacco: Never Used   Substance and Sexual Activity   • Alcohol use: Never     Frequency: Never   • Drug use: Never   • Sexual activity: Defer       Family History  She has no family history of bladder or kidney cancer  Family History   Problem Relation Age of Onset   • Cancer Mother    • Hypertension Mother    • Hyperlipidemia Mother    • Stroke Father          Review of Systems  Constitutional: No fevers or chills  Skin: Negative for rash  Endocrine: No heat/cold intolerance   Cardiovascular: Negative for chest pain or dyspnea on exertion  Respiratory: Negative for shortness of breath or wheezing  Gastrointestinal: No constipation, nausea or vomiting  Genitourinary: Positive for new lower urinary tract symptoms, no gross hematuria but mild dysuria.  Musculoskeletal: No flank pain  Neurological:  Negative for frequent headaches or dizziness  Lymph/Heme: Negative for leg swelling or calf pain.      Physical Exam  Visit Vitals  Pulse 101   Temp 98.7 °F (37.1 °C) (Temporal)   Ht 165.1 cm (65\")   Wt 61.2 kg (135 lb)   SpO2 98%   BMI 22.47 kg/m²     Constitutional: NAD, WDWN; weak and lightheaded  HEENT: NCAT. Conjunctivae normal  MMM  Cardiovascular: Regular rate  Pulmonary/Chest: Respirations are even and non-labored bilaterally  Abdominal: Soft with no distension, tenderness, masses, guarding or CVA tenderness  Neurological: A + O x 3,  cranial nerves II-XII grossly intact  Extremities: CEDRICK x 4, warm, no clubbing, no cyanosis  Skin:pale.  Psychiatric:  Normal mood and affect      Labs  No results found for: URINECX    Brief Urine Lab Results  (Last result in the past 365 days)      Color   Clarity   Blood   Leuk Est   Nitrite   Protein   CREAT   " Urine HCG        10/16/20 1055 Orange Cloudy 3+ Small (1+) Negative 1+               Lab Results   Component Value Date    GLUCOSE 119 (H) 10/07/2020    CALCIUM 9.3 10/07/2020     10/07/2020    K 3.0 (L) 10/07/2020    CO2 31.5 (H) 10/07/2020    CL 95 (L) 10/07/2020    BUN 17 10/07/2020    CREATININE 0.80 10/07/2020    EGFRIFAFRI 100 09/04/2020    EGFRIFNONA 72 10/07/2020    BCR 21.3 10/07/2020    ANIONGAP 13.5 10/07/2020       Lab Results   Component Value Date    WBC 8.79 10/07/2020    HGB 10.9 (L) 10/07/2020    HCT 32.6 (L) 10/07/2020    MCV 90.1 10/07/2020     10/07/2020           Radiographic Studies  Ct Abdomen Pelvis With Contrast    Result Date: 10/7/2020  Impression: 7.0 x 8.0 x 9.5 cm heterogeneous mass within the pelvis which is felt to arise from the sigmoid colon consistent with a carcinoma. This mass produces significant mass effect upon the urinary bladder and distal right ureter resulting in moderate to severe right hydronephrosis. There are enlarged pelvic and abdominal lymph nodes as well as a mass in the dome of the liver consistent with metastatic disease.  538.48 mGy.cm   This study was performed with techniques to keep radiation doses as low as reasonably achievable (ALARA). Individualized dose reduction techniques using automated exposure control or adjustment of mA and/or kV according to the patient size were employed.  This report was finalized on 10/7/2020 2:43 PM by Dewey Sánchez M.D..    Fl Barium Enema Single Contrast    Result Date: 10/12/2020  Impression: Findings consistent with a high-grade obstruction involving the proximal sigmoid colon.  This report was finalized on 10/12/2020 3:47 PM by Dewey Sánchez M.D..      PVR  Post-void residual performed with ultrasound scanner by staff and interpreted by me -       Assessment  Ms. Booker is a 65 y.o. female with history of breast and ovarian cancer who presents with a large pelvic mass, thought to be ovarian in  nature, resulting right hydronephrosis.  She has an appointment with Dr. Lombardi GYN oncology today to discuss her treatment options.  It is unclear to me at this point what her treatment and trajectory will be.  Regardless, she does have flank pain, which would likely benefit from relief of obstruction.  Her overall renal function is normal at this point, so we are not in an emergent situation.    Plan  1. Cysto right stent 11/3/20.  Covid testing 10/30/2020.  2.  If we are not able to get a stent up, she would need a right nephrostomy tube if future chemotherapy or resection was to be planned.      Pascual Mtz MD

## 2020-10-17 NOTE — ANESTHESIA PROCEDURE NOTES
Central Line      Patient reassessed immediately prior to procedure    Patient location during procedure: OR  Start time: 10/17/2020 3:20 PM  Stop Time:10/17/2020 3:29 PM  Indications: vascular access  Preanesthetic Checklist  Completed: patient identified, site marked, surgical consent, pre-op evaluation, timeout performed, IV checked, risks and benefits discussed and monitors and equipment checked  Central Line Prep  Sterile Tech:cap, gloves, gown, mask and sterile barriers  Prep: chloraprep  Patient monitoring: blood pressure monitoring, continuous pulse oximetry and EKG  Central Line Procedure  Laterality:right  Location:internal jugular  Catheter Type:Cordis  Catheter Size:9 Fr  Guidance:ultrasound guided  PROCEDURE NOTE/ULTRASOUND INTERPRETATION.  Using ultrasound guidance the potential vascular sites for insertion of the catheter were visualized to determine the patency of the vessel to be used for vascular access.  After selecting the appropriate site for insertion, the needle was visualized under ultrasound being inserted into the internal jugular vein, followed by ultrasound confirmation of wire and catheter placement. There were no abnormalities seen on ultrasound; an image was taken; and the patient tolerated the procedure with no complications. Images: still images obtained, printed/placed on chart  Assessment  Post procedure:biopatch applied, line sutured, occlusive dressing applied and secured with tape  Assessement:blood return through all ports, free fluid flow and chest x-ray ordered  Complications:no  Patient Tolerance:patient tolerated the procedure well with no apparent complications

## 2020-10-17 NOTE — ANESTHESIA PROCEDURE NOTES
Arterial Line      Patient reassessed immediately prior to procedure    Patient location during procedure: pre-op  Start time: 10/17/2020 2:52 PM  Stop Time:10/17/2020 2:59 PM       Line placed for hemodynamic monitoring.  Preanesthetic Checklist  Completed: patient identified, site marked, surgical consent, pre-op evaluation, timeout performed, IV checked, risks and benefits discussed and monitors and equipment checked  Arterial Line Prep   Sterile Tech: cap, gloves and sterile barriers  Prep: ChloraPrep  Patient monitoring: blood pressure monitoring, continuous pulse oximetry and EKG  Arterial Line Procedure   Laterality:left  Location:  radial artery  Catheter size: 20 G   Guidance: ultrasound guided and palpation technique  Number of attempts: 1  Successful placement: yes  Post Assessment   Dressing Type: line sutured, occlusive dressing applied, secured with tape and wrist guard applied.   Complications no  Circ/Move/Sens Assessment: normal and unchanged.   Patient Tolerance: patient tolerated the procedure well with no apparent complications

## 2020-10-17 NOTE — ANESTHESIA POSTPROCEDURE EVALUATION
Patient: Hailee Booker    Procedure Summary     Date: 10/17/20 Room / Location:  [facility] / [facility]    Anesthesia Start: 1452 Anesthesia Stop: 1742    Procedure: LAPAROTOMY EXPLORATORY, COLOSTOMY, LYSIS OF ADHESIONS (N/A Abdomen) Diagnosis:       Large bowel obstruction (CMS/HCC)      Pelvic mass      (Large bowel obstruction (CMS/HCC) [K56.609])      (Pelvic mass [R19.00])    Surgeon: Juana Winter MD Provider: Isaac Gonzalez MD    Anesthesia Type: general ASA Status: 4 - Emergent          Anesthesia Type: general    Vitals  No vitals data found for the desired time range.          Post Anesthesia Care and Evaluation    Patient location during evaluation: PACU  Patient participation: complete - patient participated  Level of consciousness: awake and alert  Pain score: 0  Pain management: adequate  Airway patency: patent  Anesthetic complications: No anesthetic complications  PONV Status: none  Cardiovascular status: hemodynamically stable and acceptable  Respiratory status: nonlabored ventilation, acceptable and nasal cannula  Hydration status: acceptable

## 2020-10-17 NOTE — ANESTHESIA PREPROCEDURE EVALUATION
" Anesthesia Evaluation     Patient summary reviewed and Nursing notes reviewed                Airway   Mallampati: II  TM distance: >3 FB  Neck ROM: full  No difficulty expected  Dental - normal exam     Pulmonary - normal exam   (+) COPD,   Cardiovascular - normal exam    (+) hypertension, CAD, cardiac stents () hyperlipidemia,   CAB.    ROS comment: \"low cardiac risk\" per cadiology    Neuro/Psych  (+) seizures (last 3 weeks ago), CVA,     GI/Hepatic/Renal/Endo    (+)   liver disease, renal disease,     Musculoskeletal     Abdominal  - normal exam    Bowel sounds: normal.   Substance History - negative use     OB/GYN negative ob/gyn ROS         Other   arthritis,    history of cancer (breast; ovarian; mets to liver and now with pelvic mass and bowel obstruction)      Other Comment: Hypokalemia 2.6, surgeon notified and wishes to proceed as this is an emergency; will replace safely intraop  Anemia HCT 33                Anesthesia Plan    ASA 4 - emergent     general   Rapid sequence(TAP blocks, a line, CVL)  intravenous induction     Anesthetic plan, all risks, benefits, and alternatives have been provided, discussed and informed consent has been obtained with: patient.    Plan discussed with CRNA.      "

## 2020-10-17 NOTE — ANESTHESIA PROCEDURE NOTES
Peripheral Block      Patient reassessed immediately prior to procedure    Patient location during procedure: OR  Reason for block: at surgeon's request and post-op pain management  Preanesthetic Checklist  Completed: patient identified, site marked, surgical consent, pre-op evaluation, timeout performed, IV checked, risks and benefits discussed and monitors and equipment checked  Prep:  Pt Position: supine  Sterile barriers:cap, gloves, sterile barriers and mask  Prep: ChloraPrep  Patient monitoring: blood pressure monitoring, continuous pulse oximetry and EKG  Procedure  Sedation:yes  Performed under: general  Guidance:ultrasound guided  Images:still images obtained, printed/placed on chart    Laterality:Bilateral  Block Type:TAP  Injection Technique:single-shot  Needle Type:short-bevel and echogenic  Needle Gauge:20 G  Resistance on Injection: none    Medications Used: bupivacaine PF (MARCAINE) 0.25 % injection, 50 mL  dexamethasone sodium phosphate injection, 4 mg  Med admintered at 10/17/2020 3:15 PM      Medications  Comment:Block Injection:  LA dose divided between Right and Left block        Post Assessment  Injection Assessment: negative aspiration for heme, incremental injection and no paresthesia on injection  Patient Tolerance:comfortable throughout block  Complications:no  Additional Notes      Under Ultrasound guidance, a BBraun 4inch 360 degree needle was advanced with Normal Saline hydro dissection of tissue.  The Internal Oblique and Transversus Abdominus muscles where visualized.  At or before the aponeurosis of Internal Oblique, local anesthetic spread was visualized in the Transversus Abdominus Plane. Injection was made incrementally with aspiration every 5 mls.  There was no  intravascular injection,  injection pressure was normal, there was no neural injection, and the procedure was completed without difficulty.  Thank You.

## 2020-10-17 NOTE — ANESTHESIA PROCEDURE NOTES
Airway  Urgency: elective    Date/Time: 10/17/2020 3:41 PM  Airway not difficult    General Information and Staff    Patient location during procedure: OR    Indications and Patient Condition  Indications for airway management: airway protection    Preoxygenated: yes  MILS not maintained throughout  Mask difficulty assessment: 1 - vent by mask    Final Airway Details  Final airway type: endotracheal airway      Successful airway: ETT  Cuffed: yes   Successful intubation technique: direct laryngoscopy and RSI  Endotracheal tube insertion site: oral  Blade: Fanta  Blade size: 3  ETT size (mm): 7.0  Cormack-Lehane Classification: grade I - full view of glottis  Placement verified by: chest auscultation and capnometry   Measured from: lips  ETT/EBT  to lips (cm): 20  Number of attempts at approach: 1  Assessment: lips, teeth, and gum same as pre-op and atraumatic intubation

## 2020-10-26 NOTE — TELEPHONE ENCOUNTER
Celia Please call OR and have them cancel her stent placement in [place] for tomorrow. Dr. Staples I would recommend consulting the [facility] urologist on call to get the stent while inpatient if she still plans to get chemo in the future. If not or if it is going to be delayed while healing from colostomy I am happy to see her in follow up in the next couple weeks.

## 2020-10-29 NOTE — TELEPHONE ENCOUNTER
Isela transferred to Wendy Rahman, where I left her a detailed message.    Jolie at [facility] called to schedule pt's 2 week hospital follow up appt.    Please give Jolie a call back at [phone].    If not today, you may give pt a call at [phone].

## 2020-11-04 NOTE — PROGRESS NOTES
Continued Stay Note       Patient Name: Hailee Booker  MRN: [id number]  Today's Date: 11/4/2020    Admit Date: (Not on file)    Discharge Plan     Row Name 11/04/20 1548       Plan    Plan  [facility] Case    Plan Comments  SW spoke with [facility] and confirmed the case was approved and accepted.        Discharge Codes    No documentation.             ALFONSO Carver (Kay)

## 2020-11-10 NOTE — TELEPHONE ENCOUNTER
Annie from [facility] called to schedule Post Op appt for pt I confirmed appt for tomorrow 11/11 she was unaware that this had been schedule she is not sure about transportation, can this be rescheduled for Friday 11/13?  Best call back number [phone]

## 2020-11-12 NOTE — PROGRESS NOTES
"Hailee Booker  [id number]  1955      Reason for Visit: Treatment planning for recurrent ovarian cancer, Postoperative evaluation    History of Present Illness:  Patient is a very pleasant 65 y.o. woman who presents for a post operative evaluation status post exploratory laparotomy, colostomy, lysis of adhesions, mobilization of colon performed on 10/17/2020.      Hospital course was complicated.  She was admitted to the ICU postoperatively.  She received a blood transfusion for symptomatic anemia.  She was started on TPN due to limited p.o. intake.  Her diet was advanced and she was weaned from TPN on postop day 10.  On postop day 12 she was discharged to [facility] rehab. Today, patient notes her energy is improved. She continues to have therapy twice daily at her rehab facility. She is out of bed most of the day. She states she has been able to walk the entire facility and climb 40 steps. Her bladder function is normal. Her ostomy is functioning well, she had some constipation but that is improved.  Her pain is well controlled. She states she will be discharge from her rehab on 11/18 and return to [place] after that.     Past Medical History, Past Surgical History, Social History, Family History have been reviewed and are without significant changes except as mentioned.    Review of Systems   All other systems were reviewed and are negative except as mentioned above.    Medications:  The current medication list was reviewed in the EMR    ALLERGIES:    Allergies   Allergen Reactions   • Lisinopril Cough   • Losartan Other (See Comments)     HAS STOMACH ULCERS   • Citrus Cough     Citrus blossoms   • Latex Itching   • Pollen Extract Other (See Comments)     RED, ITCHY EYES           /71   Pulse 84   Temp 96.8 °F (36 °C) (Temporal)   Resp 18   Ht 165.1 cm (65\")   Wt 76.7 kg (169 lb)   BMI 28.12 kg/m²        Physical Exam  Constitutional:  Patient is a pleasant woman in no acute " distress.  Gastrointestinal: Abdomen is soft and appropriately tender.  There is no mass palpated.  There is no rebound or guarding.  Incision is clean, dry and intact. Ostomy pink, viable, small amount of gas in bag.  Extremities:  Bilateral lower extremities are non-tender.  Gynecologic:deferred    PATHOLOGY:  No specimens removed.    ASSESSMENT/PLAN:  Hailee Booker returns for a post-operative evaluation today. She underwent exploratory laparotomy and colostomy creation due to large bowel obstruction secondary to recurrent ovarian cancer on 10/17/2020. She is recovering well. She continues with [facility] at [facility]. She states that she expects discharge from rehab 11/18. She does plan to resume living with her son and daughter in law after discharge.     Recurrent ovarian cancer  Discussed treatment options including chemotherapy and comfort care options.  She will need to complete rehab and continue to regain strength and improve her overall health before she will be ready for chemotherapy.  She states that she does wish to proceed with chemotherapy when she is able.  We discussed repeat treatment with carboplatin plus paclitaxel combined with bevacizumab.  Would anticipate 6 cycles chemotherapy and bevacizumab with continuation of bevacizumab as a maintenance drug.  Risks and benefits of treatment were reviewed.  Patient states that she does not have significant neuropathy related to her prior treatment and think she would tolerate the paclitaxel just fine.  Risks of bevacizumab such as hypertension, proteinuria, mental status changes, intestinal perforation, and bleeding with major surgeries were discussed.  Typical patient complaint of sinusitis as a toxicity was reviewed.     We further discussed that her recurrent ovarian cancer is treatable, but not curable.  We discussed IV access and patient would like a Port-A-Cath.    Patient verbalized understanding.  Patient is to be discharged  from facility next week as detailed above.  She would like to start chemotherapy as soon as possible after that.  In keeping with her wishes, referral was made for hematology oncology with treatment to be done in [place].  Fortunately, Dr. Anderson, hematology oncology was available today to evaluate the patient.  He also practices in [place].  I personally discussed the case with  who kindly made, and is this is scheduled to see the patient today for her convenience.  Dr. Anderson and I discussed how patient has been on NRG 3005 which was carboplatin plus paclitaxel plus minus PARPi as upfront treatment/maintenance.  Patient would have undergone both tumor and genomic testing as a part of the research trial.  I have been trying to get records from [place], Dr. Anderson said that he was going to continue this endeavor.  There is a possibility patient could undergo ostomy reversal if she had an excellent response to her chemotherapy.    She is to follow-up with me on a as needed basis and is to follow-up with Dr. Anderson for chemotherapy and regimen.  Port-A-Cath referral to general surgery in [place]: Patient has seen Dr. Himanshu Shen in the past.    I personally spent 15 minutes face-to-face with the patient of which >15 was spent performing counseling/coordiantion of care.    Patient was seen and examined with Dr. Gimenez,  resident, who performed portions of the examination and documentation for this patient's care under my direct supervision.  I agree with the above documentation and plan.    Juana Winter MD  11/13/20  15:09 EST

## 2020-11-13 NOTE — PROGRESS NOTES
New Patient Office Visit      Date: 2020     Patient Name: Hailee Booker  MRN: [id number]  : 1955  Referring Physician: Juana Winter    Chief Complaint: Metastatic ovarian cancer    History of Present Illness: Hailee Booker is a pleasant 65 y.o. female past medical history of hypertension, anxiety, CAD, GERD, bilateral breast cancer, ovarian cancer [facility] who presents today for evaluation of recurrent/metastatic ovarian cancer.  The patient had been in her usual state of health when she started noticing worsening abdominal pain as well as constipation.  She underwent a colonoscopy which was noted for a rectal mass.  Around same time she was seen by Dr. Demarco in [place] for a gynecologic exam which was also noted for vaginal mass.  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  CT scan of her abdomen/pelvis was concerning for obstructive disease as well as a liver lesion.  She underwent a diversion with Dr. Winter with ostomy placement.  Her hospital course was complicated by hypotension acute blood loss.  She since recovered and is currently residing at a rehab facility which she is expected to be discharged next week.  Today she is still using a wheelchair for ambulation but states she is getting stronger with rehab and ambulating more easily on her own.  She denies any other bleeding or bruising episodes.  She is tolerating her ostomy well and denies any worsening abdominal pain.    In regards to her cancer history, she states she was initially diagnosed in 2017 and [facility]  States she received surgery as well as chemotherapy.  Records are unavailable at this time    Oncology History:    Oncology/Hematology History   Recurrent malignant neoplasm of ovary (CMS/HCC)   10/16/2020 Initial Diagnosis    Recurrent malignant neoplasm of ovary (CMS/HCC)     12/3/2020 -  Chemotherapy    OP OVARIAN  PACLitaxel / CARBOplatin AUC=5 / Bevacizumab-awwb 7.5 mg/kg         Subjective      Review of Systems:     Constitutional: Negative for fevers, chills, or weight loss  Eyes: Negative for blurred vision or discharge         Ear/Nose/Throat: Negative for difficulty swallowing, sore throat, LAD                                                       Respiratory: Negative for cough, SOA, wheezing                                                                                        Cardiovascular: Negative for chest pain or palpitations                                                                  Gastrointestinal: Negative for nausea, vomiting or diarrhea                                                                     Genitourinary: Negative for dysuria or hematuria                                                                                           Musculoskeletal: Negative for any joint pains or muscle aches                                                                        Neurologic: Negative for any weakness, headaches, dizziness                                                                         Hematologic: Negative for any easy bleeding or bruising                                                                                   Psychiatric: Negative for anxiety or depression                             Past Medical History:   Past Medical History:   Diagnosis Date   • Arthritis    • Cancer (CMS/HCC)     breast cancer twice, basal cell carcinoma, ovarian   • CHF (congestive heart failure) (CMS/HCC)    • COPD (chronic obstructive pulmonary disease) (CMS/HCC)    • Coronary artery disease    • Disease of thyroid gland    • History of transfusion    • Hyperlipidemia    • Hypertension    • Injury of back    • Myocardial infarction (CMS/HCC)    • Seizures (CMS/HCC)    • Stroke (CMS/HCC) 2016       Past Surgical History:   Past Surgical History:   Procedure Laterality Date   • BREAST SURGERY     •  CARDIAC CATHETERIZATION  2019    one stent   • CARDIAC SURGERY      CABG two vessel    • COLONOSCOPY     • COLONOSCOPY N/A 10/12/2020    Procedure: COLONOSCOPY;  Surgeon: Himanshu Shen MD;  Location: [facility] ENDOSCOPY;  Service: Gastroenterology;  Laterality: N/A;   • COLOSTOMY N/A 10/17/2020    Procedure: LAPAROTOMY EXPLORATORY, COLOSTOMY, LYSIS OF ADHESIONS;  Surgeon: Juana Winter MD;  Location: [facility] OR;  Service: Gynecology Oncology;  Laterality: N/A;   • DILATATION AND CURETTAGE     • HYSTERECTOMY     • MASTECTOMY Bilateral     1989   • SKIN BIOPSY     • VAGINAL BIOPSY N/A 10/12/2020    Procedure: VAGINAL BIOPSY;  Surgeon: Himanshu Shen MD;  Location: [facility] ENDOSCOPY;  Service: Gastroenterology;  Laterality: N/A;       Family History:   Family History   Problem Relation Age of Onset   • Cancer Mother    • Hypertension Mother    • Hyperlipidemia Mother    • Stroke Father        Social History:   Social History     Socioeconomic History   • Marital status: Legally      Spouse name: Not on file   • Number of children: Not on file   • Years of education: Not on file   • Highest education level: Not on file   Tobacco Use   • Smoking status: Former Smoker     Years: 10.00     Types: Cigarettes     Quit date: 1996     Years since quittin.2   • Smokeless tobacco: Never Used   Substance and Sexual Activity   • Alcohol use: Never     Frequency: Never   • Drug use: Never   • Sexual activity: Defer       Medications:     Current Outpatient Medications:   •  albuterol sulfate  (90 Base) MCG/ACT inhaler, Inhale 2 puffs Every 4 (Four) Hours As Needed for Wheezing., Disp: , Rfl:   •  amLODIPine (NORVASC) 10 MG tablet, Take 1 tablet by mouth Daily., Disp: 30 tablet, Rfl: 0  •  chlorthalidone (HYGROTON) 25 MG tablet, Take 1 tablet by mouth Daily., Disp: 30 tablet, Rfl: 0  •  clopidogrel (Plavix) 75 MG tablet, Take 1 tablet by mouth Daily., Disp: 30 tablet, Rfl: 0  •   diphenhydrAMINE (BENADRYL) 25 mg capsule, Take 1 capsule by mouth At Night As Needed for Sleep., Disp:  , Rfl:   •  FLUoxetine (PROzac) 20 MG capsule, Take 1 capsule by mouth Daily for 14 days., Disp: 14 capsule, Rfl: 0  •  fluticasone (FLONASE) 50 MCG/ACT nasal spray, 2 sprays into the nostril(s) as directed by provider Daily., Disp: , Rfl:   •  melatonin 5 MG tablet tablet, 2 tablets by Nasogastric route Every Night., Disp:  , Rfl:   •  methylnaltrexone (RELISTOR) 12 MG/0.6ML solution, Inject 0.6 mL under the skin into the appropriate area as directed Every Other Day As Needed for Constipation., Disp: 2.1 mL, Rfl:   •  metoclopramide (REGLAN) 10 MG tablet, Take 1 tablet by mouth Every 6 (Six) Hours As Needed (nausea, vomiting)., Disp: , Rfl:   •  metoprolol tartrate (LOPRESSOR) 25 MG tablet, Take 1 tablet by mouth Every 12 (Twelve) Hours for 30 days., Disp: 60 tablet, Rfl: 0  •  Multiple Vitamins-Minerals (ONE-A-DAY 50 PLUS PO), Take  by mouth., Disp: , Rfl:   •  Omega-3 Fatty Acids (fish oil) 1000 MG capsule capsule, Take  by mouth Daily With Breakfast., Disp: , Rfl:   •  ondansetron ODT (ZOFRAN-ODT) 4 MG disintegrating tablet, Place 1 tablet on the tongue Every 6 (Six) Hours As Needed for Nausea., Disp: 20 tablet, Rfl: 0  •  OXcarbazepine (TRILEPTAL) 150 MG tablet, Take 150 mg by mouth 2 (Two) Times a Day., Disp: , Rfl:   •  pantoprazole (PROTONIX) 40 MG EC tablet, Take 1 tablet by mouth Daily for 30 days., Disp: 30 tablet, Rfl: 0    Allergies:   Allergies   Allergen Reactions   • Lisinopril Cough   • Losartan Other (See Comments)     HAS STOMACH ULCERS   • Citrus Cough     Citrus blossoms   • Latex Itching   • Pollen Extract Other (See Comments)     RED, ITCHY EYES       Objective     Physical Exam:  Vital Signs: There were no vitals filed for this visit.  There were no vitals filed for this visit.  ECOG Performance Status: 2 - Symptomatic, <50% confined to bed    Constitutional: NAD, ECOG 2  Eyes: PERRLA,  scleral anicteric  ENT: No LAD, no thyromegaly  Respiratory: CTAB, no wheezing, rales, rhonchi  Cardiovascular: RRR, no murmurs, pulses 2+ bilaterally  Abdomen: soft, NT/ND, no HSM, ostomy in place  Musculoskeletal: strength 5/5 bilaterally, no c/c/e  Neurologic: A&O x 3, CN II-XII intact grossly  Psych: mood and affect congruent, no SI or HI    Results Review:   No visits with results within 2 Week(s) from this visit.   Latest known visit with results is:   No results displayed because visit has over 200 results.          Ct Abdomen Pelvis Without Contrast    Result Date: 10/16/2020  Narrative: CT Abdomen Pelvis WO INDICATION: Suspected bowel obstruction pelvic swelling, follow-up large bowel obstruction and lower abdominal pain history of ovarian cancer TECHNIQUE: CT of the abdomen and pelvis without IV contrast. Coronal and sagittal reconstructions were obtained.  Radiation dose reduction techniques included automated exposure control or exposure modulation based on body size. Count of known CT and cardiac nuc med studies performed in previous 12 months: 0. COMPARISON: None available. FINDINGS: Abdomen: The lung bases are clear. The right hemidiaphragm is elevated. The liver, gallbladder, spleen, pancreas and adrenal glands are normal. Left kidney has a 2 separate 7 mm fatty lesions within it consistent with angiomyolipomas. The right kidney has moderate hydronephrosis. Right ureter is dilated following down to the pelvis. Aorta is normal in size. There is no adenopathy. There is an IVC filter present. The right colon and transverse colon are significantly distended and filled with inspissated fecal material and air. The right colon is up to 8 cm in transverse dimension. The cecum is up to 10 cm in transverse dimension. There is also air and inspissated fecal material in the left colon there is an abrupt change in caliber in the lower sigmoid colon strongly suspicious for a mass. There is ill-defined soft tissue  density filling this region measuring up to 9.3 x 6.9 cm on the axial images the mass appears to be 6.5 cm from top to bottom.. Pelvis: There is a smaller free fluid in the pelvis. Bladder is collapsed. Uterus is not visible. Bones are unremarkable.     Impression: There is a large soft tissue mass in the central pelvis measuring at least 9 cm in maximum dimension. This may  may be arising from the sigmoid colon but I cannot exclude a uterine or ovarian mass and clinical correlation with surgical history is recommended. The mass is causing a distal colonic obstruction marked distention of the rest of the more proximal colon. There Is also distention of the right ureter and right hydronephrosis presumably caused by the same mass. There are some retroperitoneal lymph nodes with largest one measuring 14 mm in size. This is a left periaortic node. Endoscopy is recommended. Signer Name: Main Portillo MD  Signed: 10/16/2020 8:33 PM  Workstation Name: [facility]  [facility]    Ct Abdomen Pelvis With Contrast    Result Date: 10/23/2020  Narrative: EXAMINATION: CT ABDOMEN PELVIS W CONTRAST-  INDICATION: Large bowel obstruction, postop follow-up; K56.609-Unspecified intestinal obstruction, unspecified as to partial versus complete obstruction; R19.00-Intra-abdominal and pelvic swelling, mass and lump, unspecified site  TECHNIQUE: Axial IV contrast-enhanced CT of the abdomen and pelvis with multiplanar reconstruction.  The radiation dose reduction device was turned on for each scan per the ALARA (As Low as Reasonably Achievable) protocol.  COMPARISON: 10/16/2020  FINDINGS: The lung bases demonstrate some right lower lobe atelectasis and trace effusion. There is a irregular area of nonenhancement in the right hepatic lobe measuring 4.9 x 3.5 cm concerning for possible metastatic disease versus less likely abscess. The gallbladder is distended but otherwise unremarkable. The spleen, pancreas and bilateral  adrenal glands are unremarkable. Redemonstrated right-sided hydroureteronephrosis nephrosis, likely related to distal ureteral obstruction. The left kidney is mildly dilated, increased from comparison. On delayed images the left ureter empties into the bladder wall of the right ureter is likely obstructed. There are contiguous dilated segments of small bowel, increased from preoperative comparison concerning for component of obstruction. There is marked distention of the stomach. Postsurgical changes from colostomy are noted. Redemonstrated bulky heterogeneous soft tissue mass in the pelvis. No overt pneumoperitoneum. IVC filter in place.      Impression: Interval colostomy with persistent stool filled segments of large bowel, now with additional component of contiguous small bowel distention and gastric distention, somewhat concerning for component of obstruction. No discrete transition point is noted. No evidence of perforation.  Persistent obstruction of the right distal ureter with associated right hydronephrosis. Increasing collecting system dilatation on the left concerning for evolving obstruction, with the left ureter noted to still empty into the bladder on delayed phase images.  Heterogeneous hypoenhancing right hepatic lobe 4.9 cm lesion concerning for metastasis versus less likely abscess.   This report was finalized on 10/23/2020 8:38 AM by Malvin Jackson.      Xr Chest 1 View    Result Date: 10/17/2020  Narrative: EXAMINATION: XR CHEST 1 VW-  INDICATION: central line; K56.609-Unspecified intestinal obstruction, unspecified as to partial versus complete obstruction; R19.00-Intra-abdominal and pelvic swelling, mass and lump, unspecified site  COMPARISON: Chest x-ray performed same day  FINDINGS: Improved bowel gas dilation with persistently elevated right hemidiaphragm. No acute osseous findings. Right central venous catheter is in place with tip terminating near the cavoatrial junction. Median  sternotomy with CABG stable cardiomediastinal silhouette. Low lung volumes with basilar atelectasis. No pneumothorax.      Impression: Internal jugular central venous line placement on the right with tip terminating at the cavoatrial junction.  Low lung volumes with atelectasis.  This report was finalized on 10/17/2020 6:18 PM by Chase Monterroso.      Xr Chest 1 View    Result Date: 10/17/2020  Narrative: EXAMINATION: XR CHEST 1 VW-  INDICATION: surgical c;learance; K56.609-Unspecified intestinal obstruction, unspecified as to partial versus complete obstruction; R19.00-Intra-abdominal and pelvic swelling, mass and lump, unspecified site  COMPARISON: NONE  FINDINGS: Markedly dilated loops of colon with elevation of the right hemidiaphragm. Nasogastric tube projects off the field of view. Low lung volumes with atelectasis. Surgical clips are seen throughout the mediastinum. Prior median sternotomy. No focal lobar opacities. No pleural effusion. No pneumothorax. No acute osseous findings.      Impression: No acute cardiopulmonary process. Low lung volumes with atelectasis.  This report was finalized on 10/17/2020 9:13 AM by hCase Monterroso.      Xr Abdomen Kub    Result Date: 10/17/2020  Narrative: EXAMINATION: XR ABDOMEN KUB-  INDICATION: NG tube placement; K56.609-Unspecified intestinal obstruction, unspecified as to partial versus complete obstruction; R19.00-Intra-abdominal and pelvic swelling, mass and lump, unspecified site  COMPARISON: CT abdomen pelvis performed one day prior  FINDINGS: Marked colonic dilatation with stool burden seen in the right lower quadrant. The nasogastric tube appears to curl within the stomach with the tip terminating near the midline.      Impression: Nasogastric tube tip terminates within the stomach.  Marked colonic dilation with stool burden in the right lower quadrant.  This report was finalized on 10/17/2020 9:11 AM by Chase Monterroso.        Assessment / Plan      Assessment/Plan:   1.  Recurrent malignant neoplasm of ovary (CMS/HCC) (Primary)  -Biopsy-proven for colonoscopy and vaginal biopsies concerning for serous adenocarcinoma consistent with her original ovarian primary  -Initial ovarian cancer treated at [facility] in 2017 with surgery and chemotherapy with part B maintenance per GOG 3005  -We will get CT chest for complete staging.  -We will get CA-125 for baseline lab value  -Refer to Dr. Dean in [place] for port placement  -We will plan to start patient on carboplatin/Taxol/bevacizumab for metastatic disease  -We will attempt to get her records from [facility] to see if they performed NGS at that time.  -We will consider part inhibition in the future if BRCA positive    -     Ambulatory Referral to General Surgery  -     CT Chest With Contrast; Future  -     CBC & Differential; Future  -     ; Future  -     Comprehensive Metabolic Panel; Future  -     CBC and Differential; Future  -     Comprehensive metabolic panel; Future  -     Urinalysis without microscopic (no culture) - Urine, Clean Catch; Future  -     Lab Appointment Request; Future  -     Clinic Appointment Request; Future  -     Infusion Appointment Request 7; Future    2. Liver metastases (CMS/HCC)  -Likely secondary to metastatic ovarian cancer  -We will continue to monitor on future scans    Follow Up:   Follow-up in 3 weeks for cycle 1 day 1 of carbo/Taxol/bevacizumab     Shelton Anderson MD  Hematology and Oncology     Please note that portions of this note may have been completed with a voice recognition program. Efforts were made to edit the dictations, but occasionally words are mistranscribed.

## 2020-11-17 NOTE — TELEPHONE ENCOUNTER
Called patient to set up port placement. Daughter states she will call back after checking schedule

## 2020-11-18 PROBLEM — C56.9 MALIGNANT NEOPLASM OF OVARY (HCC): Status: ACTIVE | Noted: 2020-01-01

## 2020-11-21 NOTE — ED PROVIDER NOTES
Subjective   65-year-old female presents to the ED with a chief complaint of rectal bleeding.  The patient states that she is status post colostomy approximately 5 weeks ago secondary to ovarian cancer and a large mass causing obstruction.  She underwent the bowel resection without difficulty and has been well following the procedure.  She presents today noting that she developed passage of dark blood and clots from her rectum.  She does take Plavix.  She denies abdominal pain.  No fever chills.  No nausea vomiting or increased output into her ostomy bag.  No other complaints at this time.          Review of Systems   Gastrointestinal: Positive for anal bleeding.   All other systems reviewed and are negative.      Past Medical History:   Diagnosis Date   • Arthritis    • Cancer (CMS/HCC)     breast cancer twice, basal cell carcinoma, ovarian   • CHF (congestive heart failure) (CMS/HCC)    • COPD (chronic obstructive pulmonary disease) (CMS/HCC)    • Coronary artery disease    • Disease of thyroid gland    • History of transfusion    • Hyperlipidemia    • Hypertension    • Injury of back    • Myocardial infarction (CMS/HCC)    • Seizures (CMS/HCC)    • Stroke (CMS/HCC) 2016       Allergies   Allergen Reactions   • Lisinopril Cough   • Losartan Other (See Comments)     HAS STOMACH ULCERS   • Citrus Cough     Citrus blossoms   • Latex Itching   • Pollen Extract Other (See Comments)     RED, ITCHY EYES       Past Surgical History:   Procedure Laterality Date   • BREAST SURGERY     • CARDIAC CATHETERIZATION  11/2019    one stent   • CARDIAC SURGERY      CABG two vessel 2008   • COLONOSCOPY     • COLONOSCOPY N/A 10/12/2020    Procedure: COLONOSCOPY;  Surgeon: Himanshu Shen MD;  Location: [facility] ENDOSCOPY;  Service: Gastroenterology;  Laterality: N/A;   • COLOSTOMY N/A 10/17/2020    Procedure: LAPAROTOMY EXPLORATORY, COLOSTOMY, LYSIS OF ADHESIONS;  Surgeon: Juana Winter MD;  Location: [facility] OR;  Service:  Gynecology Oncology;  Laterality: N/A;   • DILATATION AND CURETTAGE     • HYSTERECTOMY     • MASTECTOMY Bilateral     ,    • SKIN BIOPSY     • VAGINAL BIOPSY N/A 10/12/2020    Procedure: VAGINAL BIOPSY;  Surgeon: Himanshu Shen MD;  Location: [facility] ENDOSCOPY;  Service: Gastroenterology;  Laterality: N/A;       Family History   Problem Relation Age of Onset   • Cancer Mother    • Hypertension Mother    • Hyperlipidemia Mother    • Stroke Father        Social History     Socioeconomic History   • Marital status: Legally      Spouse name: Not on file   • Number of children: Not on file   • Years of education: Not on file   • Highest education level: Not on file   Tobacco Use   • Smoking status: Former Smoker     Years: 10.00     Types: Cigarettes     Quit date: 1996     Years since quittin.2   • Smokeless tobacco: Never Used   Substance and Sexual Activity   • Alcohol use: Never     Frequency: Never   • Drug use: Never   • Sexual activity: Defer           Objective   Physical Exam  Vitals signs and nursing note reviewed.   Constitutional:       General: She is not in acute distress.     Appearance: She is well-developed. She is not diaphoretic.   HENT:      Head: Normocephalic and atraumatic.      Nose: Nose normal.   Eyes:      Conjunctiva/sclera: Conjunctivae normal.   Cardiovascular:      Rate and Rhythm: Normal rate and regular rhythm.   Pulmonary:      Effort: Pulmonary effort is normal. No respiratory distress.      Breath sounds: Normal breath sounds.   Abdominal:      General: There is no distension.      Palpations: Abdomen is soft.      Tenderness: There is no abdominal tenderness. There is no guarding.      Comments: Left-sided ostomy present   Genitourinary:     Comments: Small amount of dark clots rectal vault  Musculoskeletal:         General: No deformity.   Neurological:      Mental Status: She is alert and oriented to person, place, and time.      Cranial Nerves: No cranial  nerve deficit.         Procedures           ED Course  ED Course as of Nov 21 1403   Sat Nov 21, 2020   1025 Spoke with Dr. Shen, he was familiar with the patient as he had seen and evaluated her before.  He had actually performed a colonoscopy and noted that she had some erosion into her bowel from the cancer.  He feels that this is likely where she is bleeding from.  He recommends stopping her Plavix.  She is hemodynamically stable feel that she is appropriate for discharge and he will see her in office on Monday at 9 AM.    [CG]      ED Course User Index  [CG] Boogie Maravilla, DO                                           MDM  65-year-old female complaining of blood per rectum.  On exam she had some small clots.  CT abdomen pelvis shows large known mass.  Laboratories also reassuring.  Discussed with general surgery, please see ED course note above.  Patient is appropriate for discharge follow-up on Monday.      Final diagnoses:   Rectal bleeding            Boogie Maravilla, DO  11/21/20 1407

## 2020-11-23 NOTE — PROGRESS NOTES
Patient: Hailee Booker    YOB: 1955    Date: 11/23/2020    Primary Care Provider: Sheila Bran APRN    Chief Complaint   Patient presents with   • Pelvic Pain       SUBJECTIVE:    History of present illness: Patient is in the office today for evaluation and follow up from their recent ED visit on 11/21/20. Patient had colonoscopy that showed some erosion from cancer and where bleeding was most likely coming from. She states she is feeling very bad today.     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  She has subsequently been seen by the GYN oncology service at [facility] in [place] and had a diverting ostomy performed.    She presented to the emergency room in [place] this weekend with blood per rectum, she was subsequently taken off her blood thinners.  She is stopped bleeding at this time, she is in need of a Port-A-Cath placement for chemotherapy to treat her ovarian carcinoma.    The following portions of the patient's history were reviewed and updated as appropriate: allergies, current medications, past family history, past medical history, past social history, past surgical history and problem list.      Review of Systems   Constitutional: Negative for chills, fever and unexpected weight change.   HENT: Negative for trouble swallowing and voice change.    Eyes: Negative for visual disturbance.   Respiratory: Negative for apnea, cough, chest tightness and wheezing.    Cardiovascular: Negative for chest pain, palpitations and leg swelling.   Gastrointestinal: Negative for abdominal distention, abdominal pain, anal bleeding, blood in stool, constipation, diarrhea, nausea, rectal pain and vomiting.   Endocrine: Negative for cold intolerance and heat intolerance.   Genitourinary: Negative for difficulty urinating, dysuria, flank pain and hematuria.    Musculoskeletal: Negative for back pain, gait problem and joint swelling.   Skin: Negative for color change, rash and wound.   Neurological: Negative for dizziness, syncope, speech difficulty, weakness, numbness and headaches.   Hematological: Negative for adenopathy. Does not bruise/bleed easily.   Psychiatric/Behavioral: Negative for confusion. The patient is not nervous/anxious.        Allergies:  Allergies   Allergen Reactions   • Lisinopril Cough   • Losartan Other (See Comments)     HAS STOMACH ULCERS   • Citrus Cough     Citrus blossoms   • Latex Itching   • Pollen Extract Other (See Comments)     RED, ITCHY EYES       Medications:    Current Outpatient Medications:   •  albuterol sulfate  (90 Base) MCG/ACT inhaler, Inhale 2 puffs Every 4 (Four) Hours As Needed for Wheezing., Disp: , Rfl:   •  amLODIPine (NORVASC) 10 MG tablet, Take 1 tablet by mouth Daily., Disp: 30 tablet, Rfl: 0  •  chlorthalidone (HYGROTON) 25 MG tablet, Take 1 tablet by mouth Daily., Disp: 30 tablet, Rfl: 0  •  clopidogrel (Plavix) 75 MG tablet, Take 1 tablet by mouth Daily., Disp: 30 tablet, Rfl: 0  •  dexamethasone (DECADRON) 4 MG tablet, Take 2 tablets in the morning daily on days 2, 3 & 4.  Take with food., Disp: 6 tablet, Rfl: 5  •  diphenhydrAMINE (BENADRYL) 25 mg capsule, Take 1 capsule by mouth At Night As Needed for Sleep., Disp:  , Rfl:   •  FLUoxetine (PROzac) 20 MG capsule, Take 1 capsule by mouth Daily for 14 days., Disp: 14 capsule, Rfl: 0  •  fluticasone (FLONASE) 50 MCG/ACT nasal spray, 2 sprays into the nostril(s) as directed by provider Daily., Disp: , Rfl:   •  lidocaine-prilocaine (EMLA) 2.5-2.5 % cream, Apply  topically to the appropriate area as directed As Needed (45-60 minutes prior to port access.  Cover with saran/plastic wrap.)., Disp: 30 g, Rfl: 3  •  melatonin 5 MG tablet tablet, 2 tablets by Nasogastric route Every Night., Disp:  , Rfl:   •  methylnaltrexone (RELISTOR) 12 MG/0.6ML solution, Inject  0.6 mL under the skin into the appropriate area as directed Every Other Day As Needed for Constipation., Disp: 2.1 mL, Rfl:   •  metoclopramide (REGLAN) 10 MG tablet, Take 1 tablet by mouth Every 6 (Six) Hours As Needed (nausea, vomiting)., Disp: , Rfl:   •  metoprolol tartrate (LOPRESSOR) 25 MG tablet, Take 1 tablet by mouth Every 12 (Twelve) Hours for 30 days., Disp: 60 tablet, Rfl: 0  •  Multiple Vitamins-Minerals (ONE-A-DAY 50 PLUS PO), Take  by mouth., Disp: , Rfl:   •  Omega-3 Fatty Acids (fish oil) 1000 MG capsule capsule, Take  by mouth Daily With Breakfast., Disp: , Rfl:   •  ondansetron (ZOFRAN) 8 MG tablet, Take 1 tablet by mouth 3 (Three) Times a Day As Needed for Nausea or Vomiting., Disp: 30 tablet, Rfl: 5  •  ondansetron ODT (ZOFRAN-ODT) 4 MG disintegrating tablet, Place 1 tablet on the tongue Every 6 (Six) Hours As Needed for Nausea., Disp: 20 tablet, Rfl: 0  •  OXcarbazepine (TRILEPTAL) 150 MG tablet, Take 150 mg by mouth 2 (Two) Times a Day., Disp: , Rfl:   •  pantoprazole (PROTONIX) 40 MG EC tablet, Take 1 tablet by mouth Daily for 30 days., Disp: 30 tablet, Rfl: 0    History:  Past Medical History:   Diagnosis Date   • Arthritis    • Cancer (CMS/HCC)     breast cancer twice, basal cell carcinoma, ovarian   • CHF (congestive heart failure) (CMS/HCC)    • COPD (chronic obstructive pulmonary disease) (CMS/HCC)    • Coronary artery disease    • Disease of thyroid gland    • History of transfusion    • Hyperlipidemia    • Hypertension    • Injury of back    • Myocardial infarction (CMS/HCC)    • Seizures (CMS/HCC)    • Stroke (CMS/HCC) 2016       Past Surgical History:   Procedure Laterality Date   • BREAST SURGERY     • CARDIAC CATHETERIZATION  11/2019    one stent   • CARDIAC SURGERY      CABG two vessel 2008   • COLONOSCOPY     • COLONOSCOPY N/A 10/12/2020    Procedure: COLONOSCOPY;  Surgeon: Himanshu Shen MD;  Location: [facility] ENDOSCOPY;  Service: Gastroenterology;  Laterality: N/A;   •  "COLOSTOMY N/A 10/17/2020    Procedure: LAPAROTOMY EXPLORATORY, COLOSTOMY, LYSIS OF ADHESIONS;  Surgeon: Juana Winter MD;  Location: [facility] OR;  Service: Gynecology Oncology;  Laterality: N/A;   • DILATATION AND CURETTAGE     • HYSTERECTOMY     • MASTECTOMY Bilateral     ,    • SKIN BIOPSY     • VAGINAL BIOPSY N/A 10/12/2020    Procedure: VAGINAL BIOPSY;  Surgeon: Himanshu Shen MD;  Location: [facility] ENDOSCOPY;  Service: Gastroenterology;  Laterality: N/A;       Family History   Problem Relation Age of Onset   • Cancer Mother    • Hypertension Mother    • Hyperlipidemia Mother    • Stroke Father        Social History     Tobacco Use   • Smoking status: Former Smoker     Years: 10.00     Types: Cigarettes     Quit date: 1996     Years since quittin.2   • Smokeless tobacco: Never Used   Substance Use Topics   • Alcohol use: Never     Frequency: Never   • Drug use: Never        OBJECTIVE:    Vital Signs:   Vitals:    20 0931   BP: 142/88   Pulse: 114   SpO2: 98%   Weight: 61.2 kg (135 lb)   Height: 165.1 cm (65\")       Physical Exam:   General Appearance:    Alert, cooperative, in no acute distress   Head:    Normocephalic, without obvious abnormality, atraumatic   Eyes:            Lids and lashes normal, conjunctivae and sclerae normal, no   icterus, no pallor, corneas clear, PERRLA   Ears:    Ears appear intact with no abnormalities noted   Throat:   No oral lesions, no thrush, oral mucosa moist   Neck:   No adenopathy, supple, trachea midline, no thyromegaly, no   carotid bruit, no JVD   Lungs:     Clear to auscultation,respirations regular, even and                  unlabored    Heart:    Regular rhythm and normal rate, normal S1 and S2, no            murmur, no gallop, no rub, no click   Chest Wall:    No abnormalities observed   Abdomen:     Normal bowel sounds, no masses, no organomegaly, soft        non-tender, non-distended, no guarding, no rebound                tenderness, " well-healed midline incision and left lower quadrant ostomy   Extremities:   Moves all extremities well, no edema, no cyanosis, no             redness   Pulses:   Pulses palpable and equal bilaterally   Skin:   No bleeding, bruising or rash   Lymph nodes:   No palpable adenopathy   Neurologic:   Cranial nerves 2 - 12 grossly intact, sensation intact, DTR       present and equal bilaterally     Results Review:   I reviewed the patient's new clinical results.  I reviewed the patient's new imaging results and agree with the interpretation.  I reviewed the patient's other test results and agree with the interpretation    Review of Systems was reviewed and confirmed as accurate as documented by the MA.    ASSESSMENT/PLAN:    1. Right lower quadrant abdominal pain    2. Left lower quadrant abdominal pain    3. Liver mass    4. Malignant neoplasm of ovary, unspecified laterality (CMS/HCC)        In short, I did a detailed and extensive discussion with the patient in the office today and she needs to undergo right subclavian Port-A-Cath placement.  Risk and benefits of operative versus nonoperative intervention have been discussed with the patient, she understands and agrees.    I did refill her hydrocodone today in the office, other questions were answered.    I discussed the patients findings and my recommendations with patient        Electronically signed by Himanshu Shen MD  11/23/20

## 2020-11-25 NOTE — ANESTHESIA POSTPROCEDURE EVALUATION
Patient: Hailee Booker    Procedure Summary     Date: 11/25/20 Room / Location: [facility] OR  /  [facility]    Anesthesia Start: 0949 Anesthesia Stop:     Procedure: INSERTION OF PORTACATH RIGHT SUBCLAVIAN (Right ) Diagnosis:       Malignant neoplasm of ovary, unspecified laterality (CMS/HCC)      (Malignant neoplasm of ovary, unspecified laterality (CMS/HCC) [C56.9])    Surgeon: Himanshu Shen MD Provider: Sixto Louis CRNA    Anesthesia Type: MAC ASA Status: 3          Anesthesia Type: MAC    Vitals  HR 95  Sat 98  BP 95/67  Resp 16  Temp 97        Post Anesthesia Care and Evaluation    Patient location during evaluation: bedside  Patient participation: complete - patient participated  Level of consciousness: awake and alert and sleepy but conscious  Pain score: 0  Pain management: adequate  Airway patency: patent  Anesthetic complications: No anesthetic complications  PONV Status: none  Cardiovascular status: acceptable  Respiratory status: acceptable and nasal cannula  Hydration status: acceptable

## 2020-11-25 NOTE — ANESTHESIA PREPROCEDURE EVALUATION
Anesthesia Evaluation     Patient summary reviewed and Nursing notes reviewed   no history of anesthetic complications:  NPO Solid Status: > 8 hours  NPO Liquid Status: > 8 hours           Airway   Mallampati: II  TM distance: >3 FB  Neck ROM: full  No difficulty expected  Dental      Pulmonary    (+) a smoker Former, COPD, shortness of breath, sleep apnea, decreased breath sounds,   Cardiovascular     ECG reviewed    (+) hypertension, valvular problems/murmurs, past MI , CAD, CHF , HOOKS, DVT, hyperlipidemia,       Neuro/Psych  (+) seizures, CVA,     GI/Hepatic/Renal/Endo    (+)  GERD,  hepatitis, liver disease, renal disease, thyroid problem hypothyroidism    Musculoskeletal     (+) arthralgias, back pain, chronic pain, myalgias,   Abdominal   (+) obese,    Substance History      OB/GYN          Other   arthritis,    history of cancer    ROS/Med Hx Other: ekg sr pacs  cxr low lung vol atelectasis                    Anesthesia Plan    ASA 3     MAC   (Risks and benefits discussed including risk of aspiration, recall and dental damage. All patient questions answered.    Will continue with plan of care.)  intravenous induction     Anesthetic plan, all risks, benefits, and alternatives have been provided, discussed and informed consent has been obtained with: patient.

## 2020-12-01 NOTE — ED PROVIDER NOTES
Subjective   This patient comes in for evaluation of lower abdominal cramping and some blood per rectum.  She has a history of a colostomy placed in October 2020, history of ovarian cancer in the past that has recurred and now has metastasis elsewhere in the abdomen.  She was upstairs this morning seeing her oncologist, Dr. Anderson, she is slated to begin chemotherapy on 3 December however while in the office she had onset of lower abdominal pain and cramping and was sent to the ER for further evaluation.  She states she was here about a week ago and had labs and a CT which did not reveal any new findings but she states she stopped her Plavix since then and last night and today has noted some substance from her rectum that when she examines after wiping with toilet paper and switches between her fingers notices it has some red blood in it.  No rectal pain.  She states the output through her colostomy is normal          Review of Systems   Constitutional: Negative.    HENT: Negative.    Eyes: Negative.    Respiratory: Negative.    Cardiovascular: Negative.    Gastrointestinal: Positive for abdominal pain.        Blood per rectum   Genitourinary: Negative.    Musculoskeletal: Negative.    Skin: Negative.    Neurological: Negative.    Psychiatric/Behavioral: Negative.        Past Medical History:   Diagnosis Date   • Arthritis    • Body piercing     ears   • Cancer (CMS/HCC)     breast cancer twice, basal cell carcinoma, ovarian   • CHF (congestive heart failure) (CMS/[facility]) 2003   • Colostomy in place (CMS/[facility]) 10/17/2020   • Constipation    • COPD (chronic obstructive pulmonary disease) (CMS/[facility])    • Coronary artery disease    • Disease of thyroid gland    • DVT (deep venous thrombosis) (CMS/[facility])     Patient reported after CABG in 2008 and that she had DVT x2   • Elevated cholesterol    • GERD (gastroesophageal reflux disease)    • Hepatitis C     Patient reported she has had treatment   • History of bronchitis    •  "History of transfusion     Patient reported no prior reaction   • Hx of CABG 2008    Patient reported 2 vessel - reported MI prior to this procedure   • Hyperlipidemia    • Hypertension    • Injury of back    • Latex allergy    • Myocardial infarction (CMS/[facility]) 11/2019    Patient reported \"very light\" and that she had medical attention [facility] in [facility] and had placement of 1 stent   • Seizures (CMS/[facility]) 11/24/2020    Patient reported since 1997 - epileptic.  Patient reported last seizure activity while in nursing home apx 20 days ago   • Sleep apnea     Patient reported prior use of CPAP and none since 2017 after weight loss   • Stroke (CMS/[facility])     Patient reported TIA x3 (reported last TIA 2003, 2010, 2015) - reported no residual issues from TIA's   • Tattoo     x1   • Wears glasses     OTC glasses for reading       Allergies   Allergen Reactions   • Lisinopril Cough   • Losartan Other (See Comments)     HAS STOMACH ULCERS   • Citrus Cough     Citrus blossoms   • Latex Itching   • Pollen Extract Other (See Comments)     RED, ITCHY EYES       Past Surgical History:   Procedure Laterality Date   • BREAST SURGERY     • CARDIAC CATHETERIZATION  11/2019    one stent   • CARDIAC SURGERY      CABG two vessel 2008   • COLONOSCOPY     • COLONOSCOPY N/A 10/12/2020    Procedure: COLONOSCOPY;  Surgeon: Himanshu Shen MD;  Location: [facility] ENDOSCOPY;  Service: Gastroenterology;  Laterality: N/A;   • COLOSTOMY N/A 10/17/2020    Procedure: LAPAROTOMY EXPLORATORY, COLOSTOMY, LYSIS OF ADHESIONS;  Surgeon: Juana Winter MD;  Location: [facility] OR;  Service: Gynecology Oncology;  Laterality: N/A;   • DILATATION AND CURETTAGE     • HYSTERECTOMY     • MASTECTOMY Bilateral     1979, 1989   • OTHER SURGICAL HISTORY      Patient reported \"torin in the right leg\"   • PORTACATH PLACEMENT Right 11/25/2020    Procedure: INSERTION OF PORTACATH RIGHT SUBCLAVIAN;  Surgeon: Himanshu Shen MD;  Location: [facility] OR;  Service: " General;  Laterality: Right;   • SKIN BIOPSY      reported twice ( left shoulder,  face)   • VAGINAL BIOPSY N/A 10/12/2020    Procedure: VAGINAL BIOPSY;  Surgeon: Himanshu Shen MD;  Location: [facility] ENDOSCOPY;  Service: Gastroenterology;  Laterality: N/A;   • WISDOM TOOTH EXTRACTION         Family History   Problem Relation Age of Onset   • Cancer Mother    • Hypertension Mother    • Hyperlipidemia Mother    • Stroke Father        Social History     Socioeconomic History   • Marital status: Legally      Spouse name: Not on file   • Number of children: Not on file   • Years of education: Not on file   • Highest education level: Not on file   Tobacco Use   • Smoking status: Former Smoker     Years: 10.00     Types: Cigarettes     Quit date: 1996     Years since quittin.2   • Smokeless tobacco: Never Used   Substance and Sexual Activity   • Alcohol use: Never     Frequency: Never   • Drug use: Never   • Sexual activity: Defer           Objective   Physical Exam  Constitutional:       General: She is not in acute distress.     Appearance: Normal appearance. She is normal weight. She is not ill-appearing, toxic-appearing or diaphoretic.   HENT:      Head: Normocephalic and atraumatic.      Mouth/Throat:      Mouth: Mucous membranes are moist.   Eyes:      Extraocular Movements: Extraocular movements intact.   Neck:      Musculoskeletal: Normal range of motion.   Cardiovascular:      Rate and Rhythm: Normal rate.   Pulmonary:      Effort: Pulmonary effort is normal.   Abdominal:      General: Abdomen is flat. Bowel sounds are normal.      Palpations: Abdomen is soft.      Tenderness: There is generalized abdominal tenderness.      Comments: Colostomy bag in place stoma appears healthy   Musculoskeletal: Normal range of motion.   Skin:     General: Skin is warm and dry.   Neurological:      General: No focal deficit present.      Mental Status: She is alert.   Psychiatric:         Mood and  Affect: Mood normal.         Behavior: Behavior normal.         Procedures           ED Course  ED Course as of Dec 01 1333   Tue Dec 01, 2020   1315 WBC, UA(!): 6-12 [TM]   1315 Bacteria, UA(!): Trace [TM]      ED Course User Index  [TM] Rajeev Amaya PA-C                                           [facility]    Final diagnoses:   Rectal discharge            Rajeev Amaya PA-C  12/01/20 1333       Rajeev Amaya PA-C  12/01/20 1333

## 2020-12-01 NOTE — PROGRESS NOTES
"CHEMOTHERAPY PREPARATION    Hailee Booker  [id number]  1955    Chief Complaint: Metastatic ovarian cancer    History of present illness:  Hailee Booker is a 65 y.o. year old female who is here today for chemotherapy preparation and needs assessment. The patient has been diagnosed with metastatic ovarian cancer and is scheduled to begin treatment with carboplatin/Taxol/bevacizumab . She arrived to the clinic with 7/10 pain and rectal bleeding. She was taken to the ED and evaluated. She was diagnosed with UTI.  She requested to complete chemotherapy education due to transportation issues.     Oncology History:    Oncology/Hematology History   Recurrent malignant neoplasm of ovary (CMS/HCC)   10/16/2020 Initial Diagnosis    Recurrent malignant neoplasm of ovary (CMS/HCC)     12/3/2020 -  Chemotherapy    OP OVARIAN PACLitaxel / CARBOplatin AUC=5 / Bevacizumab-awwb 7.5 mg/kg         Past Medical History:   Diagnosis Date   • Arthritis    • Body piercing     ears   • Cancer (CMS/HCC)     breast cancer twice, basal cell carcinoma, ovarian   • CHF (congestive heart failure) (CMS/HCC) 2003   • Colostomy in place (CMS/[facility]) 10/17/2020   • Constipation    • COPD (chronic obstructive pulmonary disease) (CMS/HCC)    • Coronary artery disease    • Disease of thyroid gland    • DVT (deep venous thrombosis) (CMS/[facility])     Patient reported after CABG in 2008 and that she had DVT x2   • Elevated cholesterol    • GERD (gastroesophageal reflux disease)    • Hepatitis C     Patient reported she has had treatment   • History of bronchitis    • History of transfusion     Patient reported no prior reaction   • Hx of CABG 2008    Patient reported 2 vessel - reported MI prior to this procedure   • Hyperlipidemia    • Hypertension    • Injury of back    • Latex allergy    • Myocardial infarction (CMS/HCC) 11/2019    Patient reported \"very light\" and that she had medical attention [facility] and had placement of 1 " "stent   • Seizures (CMS/[facility]) 11/24/2020    Patient reported since 1997 - epileptic.  Patient reported last seizure activity while in nursing home apx 20 days ago   • Sleep apnea     Patient reported prior use of CPAP and none since 2017 after weight loss   • Stroke (CMS/[facility])     Patient reported TIA x3 (reported last TIA 2003, 2010, 2015) - reported no residual issues from TIA's   • Tattoo     x1   • Wears glasses     OTC glasses for reading       Past Surgical History:   Procedure Laterality Date   • BREAST SURGERY     • CARDIAC CATHETERIZATION  11/2019    one stent   • CARDIAC SURGERY      CABG two vessel 2008   • COLONOSCOPY     • COLONOSCOPY N/A 10/12/2020    Procedure: COLONOSCOPY;  Surgeon: Himanshu Shen MD;  Location: [facility] ENDOSCOPY;  Service: Gastroenterology;  Laterality: N/A;   • COLOSTOMY N/A 10/17/2020    Procedure: LAPAROTOMY EXPLORATORY, COLOSTOMY, LYSIS OF ADHESIONS;  Surgeon: Juana Winter MD;  Location: [facility] OR;  Service: Gynecology Oncology;  Laterality: N/A;   • DILATATION AND CURETTAGE     • HYSTERECTOMY     • MASTECTOMY Bilateral     1979, 1989   • OTHER SURGICAL HISTORY      Patient reported \"torin in the right leg\"   • PORTACATH PLACEMENT Right 11/25/2020    Procedure: INSERTION OF PORTACATH RIGHT SUBCLAVIAN;  Surgeon: Himanshu Shen MD;  Location: [facility] OR;  Service: General;  Laterality: Right;   • SKIN BIOPSY      reported twice (2009 left shoulder, 2020 face)   • VAGINAL BIOPSY N/A 10/12/2020    Procedure: VAGINAL BIOPSY;  Surgeon: Himanshu Shen MD;  Location: [facility] ENDOSCOPY;  Service: Gastroenterology;  Laterality: N/A;   • WISDOM TOOTH EXTRACTION         MEDICATIONS: The current medication list was reviewed and reconciled.     Allergies:  is allergic to lisinopril; losartan; citrus; latex; and pollen extract.    Family History   Problem Relation Age of Onset   • Cancer Mother    • Hypertension Mother    • Hyperlipidemia Mother    • Stroke Father          Review " "of Systems   Constitutional: Positive for activity change and fatigue. Negative for appetite change, chills and fever.   HENT: Negative for congestion, dental problem, facial swelling, hearing loss, mouth sores, rhinorrhea, sinus pain, sore throat, tinnitus and voice change.    Eyes: Negative for photophobia, discharge, redness and itching.   Respiratory: Negative for apnea, choking, chest tightness, shortness of breath and stridor.    Cardiovascular: Negative for chest pain.   Gastrointestinal: Positive for abdominal distention, abdominal pain and nausea. Negative for constipation, diarrhea and vomiting.   Endocrine: Negative for cold intolerance and heat intolerance.   Genitourinary: Positive for dysuria. Negative for decreased urine volume, difficulty urinating, dyspareunia, flank pain, hematuria, urgency and vaginal pain.   Musculoskeletal: Negative for arthralgias, back pain, joint swelling, myalgias and neck stiffness.   Skin: Negative for color change, pallor and wound.   Allergic/Immunologic: Negative for environmental allergies and food allergies.   Neurological: Negative for dizziness, syncope, facial asymmetry, weakness, light-headedness and numbness.   Hematological: Negative for adenopathy. Does not bruise/bleed easily.   Psychiatric/Behavioral: Negative for agitation, behavioral problems, confusion and hallucinations. The patient is not nervous/anxious and is not hyperactive.        Physical Exam  Vital Signs: /87   Pulse 108   Temp 98 °F (36.7 °C) (Temporal)   Resp 16   Ht 165.1 cm (65\")   Wt 61.2 kg (135 lb)   LMP  (LMP Unknown)   SpO2 98%   BMI 22.47 kg/m²    General Appearance:  alert, cooperative, no apparent distress and appears stated age   Neurologic/Psychiatric: A&O x 3, gait steady, appropriate affect   HEENT:  Normocephalic, without obvious abnormality, mucous membranes moist   Lungs:   Clear to auscultation bilaterally; respirations regular, even, and unlabored bilaterally "   Heart:  Regular rate and rhythm, no murmurs appreciated   Extremities: Normal, atraumatic; no clubbing, cyanosis, or edema    Skin: No rashes, lesions, or abnormal coloration noted   Abdomen: Soft: NT/ND, no HSM, ostomy in place  ECOG Performance Status: (2) Ambulatory & Capable of Self Care, Unable to Carry Out Work Activity, Up & About Greater Than 50% of Waking Hours          NEEDS ASSESSMENTS    Genetics  The patient's new diagnosis and family history have been reviewed for genetic counseling needs. A genetic referral is not recommended.     Psychosocial  The patient has completed a PHQ-9 Depression Screening and the Distress Thermometer (DT) today.   PHQ-9 results show 0 (No Depression). The patient scored their distress today as 1 on a scale of 0-10 with 0 being no distress and 10 being extreme distress.   Problems marked by the patient as being an issue for them within the last week include emotional problems and physical problems.   Results were reviewed along with psychosocial resources offered by our cancer center. Our oncology social worker will be flagged for a DT score of 4 or above, and a same day call will be made for a score of 9 or 10. A mental health referral is not recommended at this time. The patient is not accepting of a referral to [facility].   Copies of patient's questionnaires will be scanned into EMR for details and further reference.    Barriers to care  A barriers form was also completed by the patient today. We discussed services offered by our facility to help her have adequate access to care. The patient was given the name and card for our  . Based upon barriers assessment today, the patient will require a follow-up call from the  to further discuss needs.   A copy of the barriers form will also be scanned into EMR for details and further reference.     VAD Assessment  The patient and I discussed planned intervenous chemotherapy as well as other IV  "treatments that are often needed throughout the course of treatment. These may include, but are not limited to blood transfusions, antibiotics, and IV hydration. The vasculature does not appear to be adequate for multiple peripheral IVs throughout their treatment course. Discussed risks and benefits of VADs. The patient would like to pursue Port-A-Cath insertion prior to initiation of treatment.     Advanced Care Planning  The patient and I discussed advanced care planning, \"Conversations that Matter\".   This service was offered, free of charge, for development of advance directives with a certified ACP facilitator.  The patient does not have an up-to-date advanced directive. This document is not on file with our office. The patient is not interested in an appointment with one of our facilitators to create or update their advanced directives.      Palliative Care  The patient and I discussed palliative care services. Palliative care is not the same as Hospice care. This is specialized medical care for people living with serious illness with the goal of improving quality of life for the patient and their family. [facility] has partnered with [facility] to offer our patients outpatient palliative care early along with their treatment to assist in coordination of care, symptom management, pain management, and medical decision making.  Oncology criteria for palliative care referral is met at this time. The patient is not interested in a palliative care consultation.     Additional Referral needs  Nutrition      CHEMOTHERAPY EDUCATION    Booklets Given: Chemotherapy and You [x]  Eating Hints [x]    Sexuality/Fertility Books []      Chemotherapy/Biotherapy Education Sheets: (list all that apply)  nausea management, acid reflux management, diarrhea management, Cancer resourse contacts information, skin and mouth care and vaccination information                                                                     "                                                                                             Chemotherapy Regimen:   Treatment Plans     Name Type Hold Status Plan Dates Plan Provider       Active    OP OVARIAN PACLitaxel / CARBOplatin AUC=5 / Bevacizumab-awwb 7.5 mg/kg ONCOLOGY TREATMENT On Automatic Hold  12/2/2020 - Present Shelton Anderson MD                   TOPICS EDUCATION PROVIDED COMMENTS   ANEMIA:  role of RBC, cause, s/s, ways to manage, role of transfusion [x]    THROMBOCYTOPENIA:  role of platelet, cause, s/s, ways to prevent bleeding, things to avoid, when to seek help [x]    NEUTROPENIA:  role of WBC, cause, infection precautions, s/s of infection, when to call MD [x]    NUTRITION & APPETITE CHANGES:  importance of maintaining healthy diet & weight, ways to manage to improve intake, dietary consult, exercise regimen [x]    DIARRHEA:  causes, s/s of dehydration, ways to manage, dietary changes, when to call MD [x]    CONSTIPATION:  causes, ways to manage, dietary changes, when to call MD [x]    NAUSEA & VOMITING:  cause, use of antiemetics, dietary changes, when to call MD [x]    MOUTH SORES:  causes, oral care, ways to manage [x]    ALOPECIA:  cause, ways to manage, resources [x]    INFERTILITY & SEXUALITY:  causes, fertility preservation options, sexuality changes, ways to manage, importance of birth control [x]    NERVOUS SYSTEM CHANGES:  causes, s/s, neuropathies, cognitive changes, ways to manage [x]    PAIN:  causes, ways to manage [x] ????   SKIN & NAIL CHANGES:  cause, s/s, ways to manage [x]    ORGAN TOXICITIES:  cause, s/s, need for diagnostic tests, labs, when to notify MD [x]    SURVIVORSHIP:  distress, distress assessment, secondary malignancies, early/late effects, follow-up, social issues, social support [x]    HOME CARE:  use of spill kits, how to manage bodily fluids [x]    MISCELLANEOUS:  drug interactions, administration, vesicant, et [x]        Assessment and Plan:    Diagnoses and  all orders for this visit:    1. Liver metastases (CMS/HCC) (Primary)    2. Malignant neoplasm of both ovaries (CMS/HCC)    3. Recurrent malignant neoplasm of ovary (CMS/HCC)  -     dexamethasone (DECADRON) 4 MG tablet; Take 2 tablets in the morning daily on days 2, 3 & 4.  Take with food.  Dispense: 6 tablet; Refill: 5        This was a 60 minute face-to-face visit with 55 minutes spent in  counseling and coordination of care as documented above.   The patient and I have reviewed their new cancer diagnosis and scheduled treatment plan. Needs assessment was completed including genetics, psychosocial needs, barriers to care, VAD evaluation, advanced care planning, and palliative care services. Referrals have been ordered as appropriate based upon our evaluation and patient desires.     Chemotherapy teaching was also completed today as documented above. Adequate time was given to answer all questions to her satisfaction. Patient and family are aware of their care team members and contact information if they have questions or problems throughout the treatment course. Needs assessments and education has been completed. The patient is adequately prepared to begin treatment as scheduled.     Port placed on 11/30/2020. We will continue with port care. EMLA cream prescription is at pharmacy. Discussed with patient how to use. ED kaushal labs today they are stable overall. Patient reports that she was taking decadron daily. We had a long discussion on how to take decadron. This is not a daily medication. We discussed that she will take this with each cycle on Days 2,3 and 4 of each cycle. A calendar was provided to the patient. Prescription was refilled.     Nutrition will see her this week to discuss chemotherapy. Social work saw her today for transportation issues. They will arrange for transportation home and with each treatment. She declined advanced directives.       ERIN Quach

## 2020-12-01 NOTE — TELEPHONE ENCOUNTER
"Patient came for her scheduled chemo teach with ERIN Castellano.  Patient could not sit up and was slumped over in pain.  Patient states when she sits up she bleeds from her \"bottom\".  I called and spoke with FEDERICA Weldon who discussed with Dr. Anderson.  Per Dr. Anderson, patient was taken to ED.  I spoke with patients daughter Emmy to let family know patients status.  Emmy stated understanding.  "

## 2020-12-02 NOTE — TELEPHONE ENCOUNTER
Called patient to advise this will be discussed in more detail at office visit tomorrow. Verbalized understanding.

## 2020-12-02 NOTE — TELEPHONE ENCOUNTER
Hailee's wanting to know if she should stop taking her Asprin 81MG. She says the paperwork that she received yesterday said not to take her chemo with Asprin     Phone# [phone] or pt's daughter Shay  [phone]

## 2020-12-03 NOTE — PROGRESS NOTES
Follow Up Office Visit      Date: 2020     Patient Name: Hailee Booker  MRN: [id number]  : 1955  Referring Physician: Juana Winter     Chief Complaint: Metastatic ovarian cancer     History of Present Illness: Hailee Booker is a pleasant 65 y.o. female past medical history of hypertension, anxiety, CAD, GERD, bilateral breast cancer, ovarian cancer status post treatment at [facility] in 2017 who presents today for evaluation of recurrent/metastatic ovarian cancer.  The patient had been in her usual state of health when she started noticing worsening abdominal pain as well as constipation.  She underwent a colonoscopy which was noted for a rectal mass.  Around same time she was seen by Dr. Demarco in [place] for a gynecologic exam which was also noted for vaginal mass.  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  CT scan of her abdomen/pelvis was concerning for obstructive disease as well as a liver lesion.  She underwent a diversion with Dr. Winter with ostomy placement.  Her hospital course was complicated by hypotension acute blood loss.  She since recovered and is currently residing at a rehab facility which she is expected to be discharged next week.  Today she is still using a wheelchair for ambulation but states she is getting stronger with rehab and ambulating more easily on her own.  She denies any other bleeding or bruising episodes.  She is tolerating her ostomy well and denies any worsening abdominal pain.     In regards to her cancer history, she states she was initially diagnosed in 2017 and was treated spars a clinical trial at [facility].  States she received surgery as well as chemotherapy.  Records are unavailable at this time    Interval History:  Presents clinic for follow-up.  Has been having increased rectal bleeding when sitting for prolonged periods of time.  Bleeding stops when she is laying down.  She has an ostomy which shows no bleeding.  Has some  mild increased abdominal pain.  Otherwise denies any fevers, chills, worsening fatigue/weakness, nausea, vomiting, diarrhea    Oncology History:    Oncology/Hematology History   Malignant neoplasm of both ovaries (CMS/HCC)   10/16/2020 Initial Diagnosis    Malignant neoplasm of both ovaries (CMS/HCC)     12/3/2020 Cancer Staged    Staging form: Ovary, Fallopian Tube, And Primary Peritoneal Carcinoma, AJCC 8th Edition  - Clinical: FIGO Stage IVB - Signed by Shelton Anderson MD on 12/3/2020     Recurrent malignant neoplasm of ovary (CMS/HCC)   10/16/2020 Initial Diagnosis    Recurrent malignant neoplasm of ovary (CMS/HCC)     12/3/2020 -  Chemotherapy    OP OVARIAN PACLitaxel / CARBOplatin AUC=5 / Bevacizumab-awwb 7.5 mg/kg         Subjective      Review of Systems:   Constitutional: Negative for fevers, chills, or weight loss  Eyes: Negative for blurred vision or discharge         Ear/Nose/Throat: Negative for difficulty swallowing, sore throat, LAD                                                       Respiratory: Negative for cough, SOA, wheezing                                                                                        Cardiovascular: Negative for chest pain or palpitations                                                                  Gastrointestinal: Negative for nausea, vomiting or diarrhea                                                                     Genitourinary: Negative for dysuria or hematuria                                                                                           Musculoskeletal: Negative for any joint pains or muscle aches                                                                        Neurologic: Negative for any weakness, headaches, dizziness                                                                         Hematologic: Negative for any easy bleeding or bruising                                                                                    Psychiatric: Negative for anxiety or depression                          Past Medical History/Past Surgical History/ Family History/ Social History: Reviewed by me and unchanged from my previous documentation done on November 2020.     Medications:     Current Outpatient Medications:   •  albuterol sulfate  (90 Base) MCG/ACT inhaler, Inhale 2 puffs Every 4 (Four) Hours As Needed for Wheezing., Disp: , Rfl:   •  amLODIPine (NORVASC) 10 MG tablet, Take 1 tablet by mouth Daily., Disp: 30 tablet, Rfl: 0  •  aspirin 81 MG EC tablet, Take 81 mg by mouth Daily., Disp: , Rfl:   •  cephalexin (KEFLEX) 500 MG capsule, Take 1 capsule by mouth 2 (Two) Times a Day for 5 days., Disp: 10 capsule, Rfl: 0  •  chlorthalidone (HYGROTON) 25 MG tablet, Take 1 tablet by mouth Daily., Disp: 30 tablet, Rfl: 0  •  cloBAZam (ONFI) 10 MG tablet, Take 10 mg by mouth 2 (two) times a day., Disp: , Rfl:   •  dexamethasone (DECADRON) 4 MG tablet, Take 2 tablets in the morning daily on days 2, 3 & 4.  Take with food., Disp: 6 tablet, Rfl: 5  •  fluticasone (FLONASE) 50 MCG/ACT nasal spray, 2 sprays into the nostril(s) as directed by provider Daily., Disp: , Rfl:   •  isosorbide mononitrate (IMDUR) 60 MG 24 hr tablet, Take 60 mg by mouth Daily., Disp: , Rfl:   •  levOCARNitine (L-Carnitine) 500 MG tablet, Take 1 tablet by mouth 2 (two) times a day., Disp: , Rfl:   •  lidocaine-prilocaine (EMLA) 2.5-2.5 % cream, Apply  topically to the appropriate area as directed As Needed (45-60 minutes prior to port access.  Cover with saran/plastic wrap.). (Patient not taking: Reported on 11/24/2020), Disp: 30 g, Rfl: 3  •  metoclopramide (REGLAN) 10 MG tablet, Take 1 tablet by mouth Every 6 (Six) Hours As Needed (nausea, vomiting)., Disp: , Rfl:   •  Multiple Vitamins-Minerals (ONE-A-DAY 50 PLUS PO), Take 1 tablet by mouth Daily., Disp: , Rfl:   •  Omega-3 Fatty Acids (fish oil) 1000 MG capsule capsule, Take 1,000 mg by mouth Daily With Breakfast.,  "Disp: , Rfl:   •  oxyCODONE (Roxicodone) 5 MG immediate release tablet, Take 2 tablets by mouth Every 4 (Four) Hours As Needed for Moderate Pain ., Disp: 84 tablet, Rfl: 0  •  potassium chloride (K-DUR,KLOR-CON) 20 MEQ CR tablet, Take 1 tablet by mouth Daily for 7 days., Disp: 7 tablet, Rfl: 0    Allergies:   Allergies   Allergen Reactions   • Lisinopril Cough   • Losartan Unknown - High Severity     HAS STOMACH ULCERS   • Citrus Cough     Citrus blossoms   • Latex Itching   • Pollen Extract Unknown - Low Severity     RED, ITCHY EYES       Objective     Physical Exam:  Vital Signs:   Vitals:    12/03/20 0806   BP: 133/82   Pulse: (!) 122   Resp: 12   Temp: 98.1 °F (36.7 °C)   TempSrc: Temporal   SpO2: 98%   Weight: 60.3 kg (133 lb)   Height: 165.1 cm (65\")   PainSc:   4     Pain Score    12/03/20 0806   PainSc:   4     ECOG Performance Status: 1 - Symptomatic but completely ambulatory    Constitutional: NAD, ECOG 1  Eyes: PERRLA, scleral anicteric  ENT: No LAD, no thyromegaly  Respiratory: CTAB, no wheezing, rales, rhonchi  Cardiovascular: RRR, no murmurs, pulses 2+ bilaterally  Abdomen: soft, NT/ND, no HSM  Musculoskeletal: strength 5/5 bilaterally, no c/c/e  Neurologic: A&O x 3, CN II-XII intact grossly    Results Review:   Admission on 12/01/2020, Discharged on 12/01/2020   Component Date Value Ref Range Status   • Glucose 12/01/2020 100* 65 - 99 mg/dL Final   • BUN 12/01/2020 23  8 - 23 mg/dL Final   • Creatinine 12/01/2020 0.85  0.57 - 1.00 mg/dL Final   • Sodium 12/01/2020 139  136 - 145 mmol/L Final   • Potassium 12/01/2020 2.8* 3.5 - 5.2 mmol/L Final   • Chloride 12/01/2020 98  98 - 107 mmol/L Final   • CO2 12/01/2020 28.8  22.0 - 29.0 mmol/L Final   • Calcium 12/01/2020 9.7  8.6 - 10.5 mg/dL Final   • Total Protein 12/01/2020 7.7  6.0 - 8.5 g/dL Final   • Albumin 12/01/2020 3.90  3.50 - 5.20 g/dL Final   • ALT (SGPT) 12/01/2020 7  1 - 33 U/L Final   • AST (SGOT) 12/01/2020 20  1 - 32 U/L Final   • Alkaline " Phosphatase 12/01/2020 69  39 - 117 U/L Final   • Total Bilirubin 12/01/2020 0.2  0.0 - 1.2 mg/dL Final   • eGFR Non African Amer 12/01/2020 67  >60 mL/min/1.73 Final   • Globulin 12/01/2020 3.8  gm/dL Final   • A/G Ratio 12/01/2020 1.0  g/dL Final   • BUN/Creatinine Ratio 12/01/2020 27.1* 7.0 - 25.0 Final   • Anion Gap 12/01/2020 12.2  5.0 - 15.0 mmol/L Final   • WBC 12/01/2020 11.37* 3.40 - 10.80 10*3/mm3 Final   • RBC 12/01/2020 3.63* 3.77 - 5.28 10*6/mm3 Final   • Hemoglobin 12/01/2020 9.7* 12.0 - 15.9 g/dL Final   • Hematocrit 12/01/2020 30.7* 34.0 - 46.6 % Final   • MCV 12/01/2020 84.6  79.0 - 97.0 fL Final   • MCH 12/01/2020 26.7  26.6 - 33.0 pg Final   • MCHC 12/01/2020 31.6  31.5 - 35.7 g/dL Final   • RDW 12/01/2020 13.3  12.3 - 15.4 % Final   • RDW-SD 12/01/2020 41.8  37.0 - 54.0 fl Final   • MPV 12/01/2020 10.5  6.0 - 12.0 fL Final   • Platelets 12/01/2020 500* 140 - 450 10*3/mm3 Final   • Neutrophil % 12/01/2020 72.2  42.7 - 76.0 % Final   • Lymphocyte % 12/01/2020 19.3* 19.6 - 45.3 % Final   • Monocyte % 12/01/2020 5.9  5.0 - 12.0 % Final   • Eosinophil % 12/01/2020 2.0  0.3 - 6.2 % Final   • Basophil % 12/01/2020 0.4  0.0 - 1.5 % Final   • Immature Grans % 12/01/2020 0.2  0.0 - 0.5 % Final   • Neutrophils, Absolute 12/01/2020 8.22* 1.70 - 7.00 10*3/mm3 Final   • Lymphocytes, Absolute 12/01/2020 2.19  0.70 - 3.10 10*3/mm3 Final   • Monocytes, Absolute 12/01/2020 0.67  0.10 - 0.90 10*3/mm3 Final   • Eosinophils, Absolute 12/01/2020 0.23  0.00 - 0.40 10*3/mm3 Final   • Basophils, Absolute 12/01/2020 0.04  0.00 - 0.20 10*3/mm3 Final   • Immature Grans, Absolute 12/01/2020 0.02  0.00 - 0.05 10*3/mm3 Final   • nRBC 12/01/2020 0.0  0.0 - 0.2 /100 WBC Final   • Color, UA 12/01/2020 Yellow  Yellow, Straw Final   • Appearance, UA 12/01/2020 Cloudy* Clear Final   • pH, UA 12/01/2020 5.5  5.0 - 8.0 Final   • Specific Gravity, UA 12/01/2020 1.016  1.005 - 1.030 Final   • Glucose, UA 12/01/2020 Negative  Negative  Final   • Ketones, UA 12/01/2020 Negative  Negative Final   • Bilirubin, UA 12/01/2020 Negative  Negative Final   • Blood, UA 12/01/2020 Moderate (2+)* Negative Final   • Protein, UA 12/01/2020 30 mg/dL (1+)* Negative Final   • Leuk Esterase, UA 12/01/2020 Moderate (2+)* Negative Final   • Nitrite, UA 12/01/2020 Negative  Negative Final   • Urobilinogen, UA 12/01/2020 0.2 E.U./dL  0.2 - 1.0 E.U./dL Final   • RBC, UA 12/01/2020 3-5* None Seen /HPF Final   • WBC, UA 12/01/2020 6-12* None Seen /HPF Final   • Bacteria, UA 12/01/2020 Trace* None Seen /HPF Final   • Squamous Epithelial Cells, UA 12/01/2020 3-6* None Seen, 0-2 /HPF Final   • Hyaline Casts, UA 12/01/2020 None Seen  None Seen /LPF Final   • Methodology 12/01/2020 Manual Light Microscopy   Final   Lab on 11/23/2020   Component Date Value Ref Range Status   • SARS-CoV-2 LUISITO 11/23/2020 Not Detected  Not Detected Final   Admission on 11/21/2020, Discharged on 11/21/2020   Component Date Value Ref Range Status   • Extra Tube 11/21/2020 hold for add-on   Final    Auto resulted   • Extra Tube 11/21/2020 Hold for add-ons.   Final    Auto resulted.   • Extra Tube 11/21/2020 hold for add-on   Final    Auto resulted   • Extra Tube 11/21/2020 Hold for add-ons.   Final    Auto resulted.   • Glucose 11/21/2020 117* 65 - 99 mg/dL Final   • BUN 11/21/2020 20  8 - 23 mg/dL Final   • Creatinine 11/21/2020 0.78  0.57 - 1.00 mg/dL Final   • Sodium 11/21/2020 139  136 - 145 mmol/L Final   • Potassium 11/21/2020 3.1* 3.5 - 5.2 mmol/L Final    Slight hemolysis detected by analyzer. Results may be affected.   • Chloride 11/21/2020 99  98 - 107 mmol/L Final   • CO2 11/21/2020 29.0  22.0 - 29.0 mmol/L Final   • Calcium 11/21/2020 9.9  8.6 - 10.5 mg/dL Final   • Total Protein 11/21/2020 7.9  6.0 - 8.5 g/dL Final   • Albumin 11/21/2020 3.90  3.50 - 5.20 g/dL Final   • ALT (SGPT) 11/21/2020 14  1 - 33 U/L Final   • AST (SGOT) 11/21/2020 30  1 - 32 U/L Final    Slight hemolysis detected  by analyzer. Results may be affected.   • Alkaline Phosphatase 11/21/2020 78  39 - 117 U/L Final   • Total Bilirubin 11/21/2020 0.3  0.0 - 1.2 mg/dL Final   • eGFR Non African Amer 11/21/2020 74  >60 mL/min/1.73 Final   • Globulin 11/21/2020 4.0  gm/dL Final   • A/G Ratio 11/21/2020 1.0  g/dL Final   • BUN/Creatinine Ratio 11/21/2020 25.6* 7.0 - 25.0 Final   • Anion Gap 11/21/2020 11.0  5.0 - 15.0 mmol/L Final   • Protime 11/21/2020 13.8  12.0 - 15.1 Seconds Final   • INR 11/21/2020 1.02  0.90 - 1.10 Final   • WBC 11/21/2020 10.58  3.40 - 10.80 10*3/mm3 Final   • RBC 11/21/2020 3.74* 3.77 - 5.28 10*6/mm3 Final   • Hemoglobin 11/21/2020 10.2* 12.0 - 15.9 g/dL Final   • Hematocrit 11/21/2020 32.2* 34.0 - 46.6 % Final   • MCV 11/21/2020 86.1  79.0 - 97.0 fL Final   • MCH 11/21/2020 27.3  26.6 - 33.0 pg Final   • MCHC 11/21/2020 31.7  31.5 - 35.7 g/dL Final   • RDW 11/21/2020 13.2  12.3 - 15.4 % Final   • RDW-SD 11/21/2020 41.4  37.0 - 54.0 fl Final   • MPV 11/21/2020 11.1  6.0 - 12.0 fL Final   • Platelets 11/21/2020 356  140 - 450 10*3/mm3 Final   • Neutrophil % 11/21/2020 72.8  42.7 - 76.0 % Final   • Lymphocyte % 11/21/2020 18.7* 19.6 - 45.3 % Final   • Monocyte % 11/21/2020 6.2  5.0 - 12.0 % Final   • Eosinophil % 11/21/2020 1.4  0.3 - 6.2 % Final   • Basophil % 11/21/2020 0.5  0.0 - 1.5 % Final   • Immature Grans % 11/21/2020 0.4  0.0 - 0.5 % Final   • Neutrophils, Absolute 11/21/2020 7.70* 1.70 - 7.00 10*3/mm3 Final   • Lymphocytes, Absolute 11/21/2020 1.98  0.70 - 3.10 10*3/mm3 Final   • Monocytes, Absolute 11/21/2020 0.66  0.10 - 0.90 10*3/mm3 Final   • Eosinophils, Absolute 11/21/2020 0.15  0.00 - 0.40 10*3/mm3 Final   • Basophils, Absolute 11/21/2020 0.05  0.00 - 0.20 10*3/mm3 Final   • Immature Grans, Absolute 11/21/2020 0.04  0.00 - 0.05 10*3/mm3 Final   • nRBC 11/21/2020 0.0  0.0 - 0.2 /100 WBC Final       Ct Abdomen Pelvis Without Contrast    Result Date: 11/21/2020  Narrative: PROCEDURE: CT ABDOMEN  PELVIS WO CONTRAST-  HISTORY: rectal bleeding status post colostomy   COMPARISON: 10/07/2020  TECHNIQUE: Axial CT images of the abdomen and pelvis were obtained without contrast. Coronal reformatted images were also obtained.This study was performed with techniques to keep radiation doses as low as reasonably achievable, (ALARA). Individualized dose reduction techniques using automated exposure control or adjustment of mA and/or kV according to the patient size were employed.   FINDINGS:  ABDOMEN: There are several less than 5 mm nodules in the lung bases that were not definitely seen on the prior exam  .  There is a persistent mass at the posterior liver dome that measures 52 mm transverse and was previously 39 mm. This is consistent with a hepatic metastasis. There is a second smaller mass in the inferior right hepatic lobe that measures 9 mm and was not seen on the prior.  There is moderate bilateral hydronephrosis. There is a less than 1 cm left renal mass likely representing a small angiomyolipoma. An inferior vena cava filter is present.  PELVIS:  Images of the pelvis reveal no evidence of ureteral stone. Multiple phleboliths are present. There is moderate bilateral hydroureter. Again noted is a lobular mass in the lower pelvis that measures approximately 93 x 81 mm and is consistent with neoplasm. It visually has not significantly changed in size. Lobular soft tissue indents the left bladder base and bladder invasion cannot be excluded. There is a new small amount of ascites. There is a new fluid collection in the right upper pelvis that measures 11.5 cm transverse but is of uncertain etiology. This could represent loculated ascites or other fluid collection. There has been interval placement of a left pelvis colostomy.      Impression:  No significant change in the pelvic mass consistent with the history of rectal neoplasm.   Worsening hepatic metastatic disease.  New fluid in the pelvis some of which likely  represents free fluid and an area in the upper pelvis could represent loculated ascites or other fluid collection.   Small pulmonary nodules worrisome for small pulmonary metastases. These could be further evaluated with follow-up chest CT.   This report was finalized on 11/21/2020 10:11 AM by Jem Worley MD.    Ct Abdomen Pelvis With Contrast    Result Date: 12/1/2020  Narrative: PROCEDURE: CT ABDOMEN PELVIS W CONTRAST-  HISTORY:  lower abdominal pain, rectal bleeding  COMPARISON: 11/21/2020.  TECHNIQUE: Multiple axial CT images were obtained from the lung bases through the pubic symphysis following the administration of Isovue 300 contrast.  FINDINGS:  ABDOMEN: The lung bases are clear. The heart is normal in size. There is a 6.3 x 4.3 cm mass at the dome of the liver which is unchanged compared to the prior exam. The spleen is unremarkable. No adrenal mass is present.  The pancreas is normal. There is moderate to severe bilateral hydronephrosis which is unchanged. The aorta is normal in caliber. An IVC filter is in place. There are enlarged retroperitoneal lymph nodes consistent with metastatic disease. There are postsurgical changes with a left upper quadrant ostomy site in place. There is no evidence of a bowel obstruction.  PELVIS: The appendix is not identified.  There is a large heterogeneous pelvic mass measuring approximately 9.6 x 8.2 cm which is unchanged. The loculated fluid collections in the right hemipelvis and posterior pelvis are unchanged. There are enlarged bilateral external iliac chain and inguinal lymph nodes.      Impression: 1. Approximately 6.3 cm heterogeneous mass within the pelvis which is unchanged. 2. Postsurgical change from left upper quadrant ostomy formation with two loculated fluid collections in the pelvis which are essentially unchanged. 3. Moderate to severe bilateral hydronephrosis secondary to the large pelvic mass. 4. Metastatic lesions within the liver and metastatic  adenopathy which is unchanged.  417.38 mGy.cm   This study was performed with techniques to keep radiation doses as low as reasonably achievable (ALARA). Individualized dose reduction techniques using automated exposure control or adjustment of mA and/or kV according to the patient size were employed.  This report was finalized on 12/1/2020 12:29 PM by Dewey Sánchez M.D..    Xr Chest 1 View    Result Date: 11/25/2020  Narrative: PROCEDURE: XR CHEST 1 VW-  HISTORY: port a cath; C56.9-Malignant neoplasm of unspecified ovary  COMPARISON: 09/04/2020.  FINDINGS: A right subclavian Port-A-Cath is in place with the tip in the SVC. The patient is status post median sternotomy and CABG procedure. The heart is normal in size. The mediastinum is unremarkable. The lungs are clear. There is no pneumothorax.  There are no acute osseous abnormalities.      Impression: Placement of a right subclavian Port-A-Cath with no evidence of pneumothorax.  Continued followup is recommended.  This report was finalized on 11/25/2020 11:06 AM by Dewey Sánchez M.D..    Fl C Arm During Surgery    Result Date: 11/25/2020  Narrative: This procedure was auto-finalized with no dictation required.      Assessment / Plan      Assessment/Plan:   1. Recurrent malignant neoplasm of ovary (CMS/HCC) (Primary)  -Biopsy-proven for colonoscopy and vaginal biopsies concerning for serous adenocarcinoma consistent with her original ovarian primary  -Initial ovarian cancer treated at [facility] in 2017 with surgery and chemotherapy with part B maintenance per GOG 3005  -CT chest for complete staging to be done today  -Baseline CA-125 1200  -We will plan to start patient on carboplatin/Taxol/bevacizumab for metastatic disease  -We will get [employer] sequencing today     2. Liver metastases (CMS/HCC)  -Likely secondary to metastatic ovarian cancer  -We will continue to monitor on future scans    3.  Abdominal pain  -Secondary to her malignancy  -Increase oxycodone  10 mg every 4 hours    Follow Up:   Follow-up in 4 weeks with her next cycle     Shelton Anderson MD  Hematology and Oncology     Please note that portions of this note may have been completed with a voice recognition program. Efforts were made to edit the dictations, but occasionally words are mistranscribed.

## 2020-12-03 NOTE — PROGRESS NOTES
Provided cab voucher for patient to transport home. Rviewed process for medicaid transport through [employer]. Staff aware.

## 2020-12-08 PROBLEM — N93.9 VAGINAL BLEEDING: Status: ACTIVE | Noted: 2020-01-01

## 2020-12-08 NOTE — PLAN OF CARE
Problem: Adult Inpatient Plan of Care  Goal: Plan of Care Review  Outcome: Ongoing, Progressing  Flowsheets (Taken 12/8/2020 1703)  Progress: no change  Plan of Care Reviewed With: patient  Outcome Summary: Dr Longoria seeing patient, CHG wipes done for PORT     Problem: Adult Inpatient Plan of Care  Goal: Patient-Specific Goal (Individualized)  Outcome: Ongoing, Progressing  Flowsheets (Taken 12/8/2020 1703)  Patient-Specific Goals (Include Timeframe): monitor pain q2h and prn     Problem: Adult Inpatient Plan of Care  Goal: Absence of Hospital-Acquired Illness or Injury  Outcome: Ongoing, Progressing     Problem: Adult Inpatient Plan of Care  Goal: Optimal Comfort and Wellbeing  Outcome: Ongoing, Progressing     Problem: Adult Inpatient Plan of Care  Goal: Readiness for Transition of Care  Outcome: Ongoing, Progressing     Problem: Fall Injury Risk  Goal: Absence of Fall and Fall-Related Injury  Outcome: Ongoing, Progressing     Problem: Pain Acute  Goal: Optimal Pain Control  Outcome: Ongoing, Progressing   Goal Outcome Evaluation:  Plan of Care Reviewed With: patient  Progress: no change  Outcome Summary: Dr Longoria seeing patient, CHG wipes done for PORT

## 2020-12-08 NOTE — CONSULTS
Hailee Booker  [id number]  1955      Reason for consultation: Vaginal hemorrhage    Consultation:  Patient is being seen at the request of Dr. Johnson, hospitalist service    History of present illness:  The patient is a 65 y.o. year old female who presents today for treatment and evaluation of the above issues.    Patient presented to the office with a large bowel obstruction and was admitted.  She underwent surgical management with exploratory laparotomy, colostomy, lysis of adhesions, mobilization of colon performed on 10/17/2020.  At that time, recurrence of her ovarian cancer was confirmed.  Hospital course was complicated.  She was admitted to the ICU postoperatively.  She received a blood transfusion for symptomatic anemia.  She was started on TPN due to limited p.o. intake.  Her diet was advanced and she was weaned from TPN on postop day 10.  On postop day 12 she was discharged to [facility] and was discharged from there approximately 11/18/2020.   Patient underwent initiation of chemotherapy with carboplatin, paclitaxel, and bevacizumab on 12/3/2020 with Dr. Anderson.  Chemotherapy is being administered in [place].  She was in her normal state of health until yesterday when she developed some vaginal spotting.  This morning at 4 AM, the vaginal bleeding became quite heavy and by the time she presented to the hospital at 930 this morning she had changed 5 pads.  Her hemoglobin dropped from 9.7 12/1/2020 to 8.8 today.  Her ostomy is functioning well.  She has no complaints regarding bladder function.  She has using her walker at home and states that she has been up out of bed.  She lives with her son and daughter-in-law who take care of her.    Oncologic History:  Oncology/Hematology History   Malignant neoplasm of both ovaries (CMS/HCC)   10/16/2020 Initial Diagnosis    Malignant neoplasm of both ovaries (CMS/HCC)     12/3/2020 Cancer Staged    Staging form: Ovary, Fallopian Tube, And Primary  "Peritoneal Carcinoma, AJCC 8th Edition  - Clinical: FIGO Stage IVB - Signed by Shelton Anderson MD on 12/3/2020     Recurrent malignant neoplasm of ovary (CMS/[facility])   10/16/2020 Initial Diagnosis    Recurrent malignant neoplasm of ovary (CMS/[facility])     12/3/2020 -  Chemotherapy    OP OVARIAN PACLitaxel / CARBOplatin AUC=5 / Bevacizumab-awwb 7.5 mg/kg     Malignant neoplasm of ovary (CMS/HCC)   11/18/2020 Initial Diagnosis    Malignant neoplasm of ovary (CMS/[facility])     12/3/2020 -  Chemotherapy    OP CENTRAL VENOUS ACCESS DEVICE ACCESS, CARE, AND MAINTENANCE (CVAD)           Past Medical History:   Diagnosis Date   • Arthritis    • Body piercing     ears   • Cancer (CMS/[facility])     breast cancer twice, basal cell carcinoma, ovarian   • CHF (congestive heart failure) (CMS/HCC) 2003   • Colostomy in place (CMS/HCC) 10/17/2020   • Constipation    • COPD (chronic obstructive pulmonary disease) (CMS/HCC)    • Coronary artery disease    • Disease of thyroid gland    • DVT (deep venous thrombosis) (CMS/HCC)     Patient reported after CABG in 2008 and that she had DVT x2   • Elevated cholesterol    • GERD (gastroesophageal reflux disease)    • Hepatitis C     Patient reported she has had treatment   • History of bronchitis    • History of transfusion     Patient reported no prior reaction   • Hx of CABG 2008    Patient reported 2 vessel - reported MI prior to this procedure   • Hyperlipidemia    • Hypertension    • Injury of back    • Latex allergy    • Myocardial infarction (CMS/HCC) 11/2019    Patient reported \"very light\" and that she had medical attention [facility] in [facility] and had placement of 1 stent   • Seizures (CMS/HCC) 11/24/2020    Patient reported since 1997 - epileptic.  Patient reported last seizure activity while in nursing home apx 20 days ago   • Sleep apnea     Patient reported prior use of CPAP and none since 2017 after weight loss   • Stroke (CMS/HCC)     Patient reported TIA x3 (reported last TIA 2003, " ", ) - reported no residual issues from TIA's   • Tattoo     x1   • Wears glasses     OTC glasses for reading       Past Surgical History:   Procedure Laterality Date   • BREAST SURGERY     • CARDIAC CATHETERIZATION  2019    one stent   • CARDIAC SURGERY      CABG two vessel    • COLONOSCOPY     • COLONOSCOPY N/A 10/12/2020    Procedure: COLONOSCOPY;  Surgeon: Himanshu Shen MD;  Location: [facility] ENDOSCOPY;  Service: Gastroenterology;  Laterality: N/A;   • COLOSTOMY N/A 10/17/2020    Procedure: LAPAROTOMY EXPLORATORY, COLOSTOMY, LYSIS OF ADHESIONS;  Surgeon: Juana Winter MD;  Location: [facility] OR;  Service: Gynecology Oncology;  Laterality: N/A;   • DILATATION AND CURETTAGE     • HYSTERECTOMY     • MASTECTOMY Bilateral     ,    • OTHER SURGICAL HISTORY      Patient reported \"torin in the right leg\"   • PORTACATH PLACEMENT Right 2020    Procedure: INSERTION OF PORTACATH RIGHT SUBCLAVIAN;  Surgeon: Himanshu Shen MD;  Location: [facility] OR;  Service: General;  Laterality: Right;   • SKIN BIOPSY      reported twice ( left shoulder,  face)   • VAGINAL BIOPSY N/A 10/12/2020    Procedure: VAGINAL BIOPSY;  Surgeon: Himanshu Shen MD;  Location: [facility] ENDOSCOPY;  Service: Gastroenterology;  Laterality: N/A;   • WISDOM TOOTH EXTRACTION         MEDICATIONS: The current medication list was reviewed in the EMR.    Allergies:  is allergic to lisinopril; losartan; citrus; latex; and pollen extract.    Social History:   Social History     Socioeconomic History   • Marital status: Legally      Spouse name: Not on file   • Number of children: Not on file   • Years of education: Not on file   • Highest education level: Not on file   Tobacco Use   • Smoking status: Former Smoker     Years: 10.00     Types: Cigarettes     Quit date: 1995     Years since quittin.2   • Smokeless tobacco: Never Used   Substance and Sexual Activity   • Alcohol use: Never     Frequency: " Never   • Drug use: Never   • Sexual activity: Defer       Family History:    Family History   Problem Relation Age of Onset   • Cancer Mother    • Hypertension Mother    • Hyperlipidemia Mother    • Stroke Father        Health Maintenance:    Health Maintenance   Topic Date Due   • TDAP/TD VACCINES (1 - Tdap) 07/14/1972   • ZOSTER VACCINE (1 of 2) 07/14/2003   • Pneumococcal Vaccine 65+ (1 of 1 - PPSV23) 07/14/2018   • HEPATITIS C SCREENING  09/08/2020   • ANNUAL WELLNESS VISIT  09/08/2020   • LIPID PANEL  10/22/2021   • COLONOSCOPY  10/12/2030   • INFLUENZA VACCINE  Completed     Review of Systems   Constitutional: Negative for appetite change, chills, fatigue, fever and unexpected weight change.   HENT: Negative for ear discharge, ear pain, hearing loss, mouth sores, nosebleeds, postnasal drip, sinus pressure, sinus pain, sore throat, tinnitus and trouble swallowing.    Eyes: Negative for pain, itching and visual disturbance.   Respiratory: Negative for cough, shortness of breath and wheezing.    Cardiovascular: Negative for chest pain and palpitations.   Gastrointestinal: Positive for abdominal pain. Negative for abdominal distention, blood in stool, constipation, diarrhea, nausea and vomiting.   Endocrine: Negative for heat intolerance.   Genitourinary: Positive for vaginal bleeding. Negative for difficulty urinating, flank pain, frequency, hematuria, urgency and vaginal discharge.   Musculoskeletal: Positive for gait problem (difficulty walking and balance). Negative for arthralgias and myalgias.   Skin: Negative for color change, rash and wound.   Allergic/Immunologic: Negative for immunocompromised state.   Neurological: Negative for dizziness, seizures, syncope, weakness and headaches.        Balance problems   Hematological: Negative for adenopathy. Does not bruise/bleed easily.   Psychiatric/Behavioral: Negative for agitation, confusion, dysphoric mood and sleep disturbance. The patient is not  nervous/anxious.        Physical Exam    Vitals:    12/08/20 1100 12/08/20 1107 12/08/20 1630 12/08/20 1704   BP:   129/70 132/88   BP Location:    Left arm   Patient Position:    Lying   Pulse: 105 104 97 100   Resp:    18   Temp:    97.7 °F (36.5 °C)   TempSrc:    Oral   SpO2: 97% 95% 95% 97%   Weight:       Height:           Body mass index is 21.8 kg/m².    Wt Readings from Last 3 Encounters:   12/08/20 59.4 kg (131 lb)   12/03/20 60.3 kg (133 lb)   12/01/20 61.2 kg (135 lb)     GENERAL: Alert, cachectic-appearing female appearing her stated age who is in no apparent distress.   HEENT: Sclera anicteric. Head normocephalic, atraumatic. Mucus membranes moist.   NECK: Trachea midline, supple, without masses.  No thyromegaly.   BREASTS: Deferred  CARDIOVASCULAR: Normal rate, regular rhythm, no murmurs, rubs, or gallops.  No peripheral edema.  RESPIRATORY: Clear to auscultation bilaterally, normal respiratory effort  BACK:  No CVA tenderness, no vertebral tenderness on palpation  GASTROINTESTINAL:  Abdomen is soft, non-tender, non-distended, no rebound or guarding,  or hernias. No HSM.  Incisions well-healed, ostomy appliance intact.  Palpable tumor which essentially fills the lower abdomen.  SKIN:  Warm, dry, well-perfused.  All visible areas intact.  No rashes, lesions, ulcers.  PSYCHIATRIC: AO x3, with appropriate affect, normal thought processes.  NEUROLOGIC: No focal deficits.  Patient examined while lying in bed.  MUSCULOSKELETAL: Patient examined while lying in bed.  EXTREMITIES:   No cyanosis, clubbing, symmetric.  LYMPHATICS:  No cervical or inguinal adenopathy noted.     PELVIC exam:    External genitalia are free from lesion.  On bimanual examination, mass-effect is noted most remarkably at the anterior pelvis.  Bladder is compressed.  Patient complained of tenderness during examination.    ECOG PS 2    PROCEDURES: None    Diagnostic Data:    I personally reviewed imaging from CT scan of abdomen and pelvis  from today.  There is hydronephrosis right greater than left.  There is a liver lesion consistent with metastatic disease.  There is ascites.  There is a large pelvic tumor which seems to be directly compressing the bladder.  There are no findings consistent with obstruction.  Ostomy is present on the left.  IVC filter.    Ct Abdomen Pelvis With Contrast    Result Date: 12/8/2020  EXAMINATION: CT ABDOMEN PELVIS W CONTRAST-  INDICATION: vaginal bleeding. pelvic mass.  TECHNIQUE: 5 mm post IV contrast portal venous phase and delayed venous phase images through the abdomen and pelvis.  The radiation dose reduction device was turned on for each scan per the ALARA (As Low as Reasonably Achievable) protocol.  COMPARISON: 10/21/2020 abdomen and pelvis CT scan  FINDINGS: Previous 10/21/2020 exam report indicates bilateral obstructive uropathy record left, heterogeneous hypoenhancing five similar right lobe liver mass. Diffuse bowel dilatation. History today indicates abnormal uterine bleeding, history of ovarian cancer and hysterectomy.  There is trace right effusion and minimal right basilar atelectasis. Right lobe liver lesion appears increased from approximately 5 cm to proximal 7 cm, and now has an extrahepatic/capsular component, suggesting it could actually be an implanted metastasis, as well as hematogenous metastasis with new erosion into the extra hepatic soft tissues. There appears to be a new small hypoenhancing lesion of the right liver lobe inferiorly, 14 mm in diameter, although there is some marginal peripheral enhancement and this could be an incidental hemangioma better seen today due to phase of contrast enhancement. No new liver lesions are seen elsewhere.  Spleen is not enlarged. No cystic abnormalities are seen of the pancreas or just glands. Gallbladder is normally distended and normal in appearance. There appears to be at least moderate fecal stasis. Bilateral hydronephrosis, right greater than left  appears to be gradually increasing, right renal pelvis increase in diameter from 38 mm 43 mm, left renal pelvis increasing from 22 mm to 28 mm. IVC filter is again noted. There is a large new pelvoabdominal cyst at least 12 x 14 centers in diameter, with no evidence of cyst wall enhancement, internal debris or septation, occupying much of the right pelvis. Milder cystic changes in the right lower pelvis appear well increased. Cystic change of the left adnexal region appear stable. Amorphous appearance of the pelvis may represent diffuse metastatic implants here given the patient's previous history of hysterectomy. Overall size of this area appears very similar to prior exam of to 10 cm in maximal diameter. No intra-abdominal free air or significant free ascites is seen. No bowel wall inflammatory changes are noted. There is normal contrast opacification of the mesenteric vasculature. Delayed venous phase images again show high-grade obstructive uropathy bilaterally, presumably as a result the patient's ill-defined mass/metastatic implants. Additional note is made of mild further enlargement of some shotty inguinal and pelvic sidewall lymph nodes.  .          1. Enlarging, now partially exophytic right lobe liver mass compared to 10/21/2020 exam. Questionable small new right lobe liver lesion. 2. Amorphous masslike appearance of pelvis presumably pelvic metastatic disease/metastatic implants, similar to prior study. 3. New approximately 12 x 14 cm pelvoabdominal cysts/cystic mass. 4. Worsening bilateral hydronephrosis, likely as a result of pelvic disease #5. Moderate to marked fecal stasis. #5. Gradually enlarging pelvic sidewall and inguinal lymph nodes.          Lab Results   Component Value Date    WBC 6.57 12/08/2020    HGB 8.8 (L) 12/08/2020    HCT 28.5 (L) 12/08/2020    MCV 84.6 12/08/2020     12/08/2020    NEUTROABS 5.06 12/08/2020    GLUCOSE 88 12/08/2020    BUN 20 12/08/2020    CREATININE 0.64  12/08/2020    EGFRIFNONA 93 12/08/2020    EGFRIFAFRI 100 09/04/2020     (L) 12/08/2020    K 3.0 (L) 12/08/2020    CL 94 (L) 12/08/2020    CO2 31.0 (H) 12/08/2020    MG 1.4 (L) 12/08/2020    PHOS 3.5 10/29/2020    CALCIUM 9.0 12/08/2020    ALBUMIN 3.50 12/08/2020    AST 28 12/08/2020    ALT 16 12/08/2020    BILITOT 0.3 12/08/2020     Lab Results   Component Value Date     1,250.0 (H) 11/13/2020           Assessment/Plan   This is a 65 y.o. woman with a history of large bowel obstruction due to recurrent ovarian cancer status post diverting colostomy as detailed above.  Status post cycle #1 of combined carboplatin/paclitaxel/bevacizumab on 12/3/2020 now with vaginal hemorrhage.  Essentially, the patient has lost 1 unit of blood from vaginal hemorrhage today.  She is not actively bleeding.  I discussed the case with Dr. Brian Ryan and Dr. Grace Johnson.  Consult was put in for emergency radiation for vaginal bleeding.  I discussed this with patient and her daughter-in-law, Emmy, who are agreeable to palliative radiation for control of bleeding.  Patient and I had previously discussed palliative versus treatment options and she had strongly desired chemotherapy.  She is undergoing this with Dr. Anderson in [place] and he is going to see her tomorrow.  I will continue to follow for now.    Electronically Signed by: Juana Winter MD  Date: 12/8/2020
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
## 2020-12-08 NOTE — ED PROVIDER NOTES
Subjective   Patient presents to the emergency department for abdominal pain and vaginal bleeding x1 day.  She reports she has had rectal bleeding in the past but the vaginal bleeding is new.  She does have an abdominal mass and she gets chemotherapy every 3 weeks.  With most recently earlier this month.  She tells me her Onc GYN is Dr. Lombardi.  She reports a total hysterectomy several years ago but the cancer returned.  She denies fever chest pain shortness of breath or cough.  She denies any Covid risk factors.      Abdominal Pain  Pain location:  Generalized  Pain quality: cramping    Pain severity:  Moderate  Onset quality:  Gradual  Duration:  1 day  Timing:  Constant  Progression:  Worsening  Chronicity:  New  Relieved by:  Nothing  Worsened by:  Movement  Associated symptoms: vaginal bleeding    Associated symptoms: no chest pain, no chills, no constipation, no cough, no diarrhea, no dysuria, no fever, no hematuria, no nausea, no shortness of breath, no sore throat and no vomiting    Risk factors: multiple surgeries        Review of Systems   Constitutional: Negative for chills, diaphoresis and fever.   HENT: Negative for congestion and sore throat.    Respiratory: Negative for cough, choking, chest tightness, shortness of breath and wheezing.    Cardiovascular: Negative for chest pain and leg swelling.   Gastrointestinal: Positive for abdominal pain. Negative for abdominal distention, anal bleeding, blood in stool, constipation, diarrhea, nausea and vomiting.   Genitourinary: Positive for vaginal bleeding. Negative for difficulty urinating, dysuria, flank pain, frequency, hematuria and urgency.   All other systems reviewed and are negative.      Past Medical History:   Diagnosis Date   • Arthritis    • Body piercing     ears   • Cancer (CMS/[facility])     breast cancer twice, basal cell carcinoma, ovarian   • CHF (congestive heart failure) (CMS/[facility]) 2003   • Colostomy in place (CMS/[facility]) 10/17/2020   •  "Constipation    • COPD (chronic obstructive pulmonary disease) (CMS/[facility])    • Coronary artery disease    • Disease of thyroid gland    • DVT (deep venous thrombosis) (CMS/[facility])     Patient reported after CABG in 2008 and that she had DVT x2   • Elevated cholesterol    • GERD (gastroesophageal reflux disease)    • Hepatitis C     Patient reported she has had treatment   • History of bronchitis    • History of transfusion     Patient reported no prior reaction   • Hx of CABG 2008    Patient reported 2 vessel - reported MI prior to this procedure   • Hyperlipidemia    • Hypertension    • Injury of back    • Latex allergy    • Myocardial infarction (CMS/[facility]) 11/2019    Patient reported \"very light\" and that she had medical attention [facility] in [facility] and had placement of 1 stent   • Seizures (CMS/[facility]) 11/24/2020    Patient reported since 1997 - epileptic.  Patient reported last seizure activity while in nursing home apx 20 days ago   • Sleep apnea     Patient reported prior use of CPAP and none since 2017 after weight loss   • Stroke (CMS/[facility])     Patient reported TIA x3 (reported last TIA 2003, 2010, 2015) - reported no residual issues from TIA's   • Tattoo     x1   • Wears glasses     OTC glasses for reading       Allergies   Allergen Reactions   • Lisinopril Cough   • Losartan Unknown - High Severity     HAS STOMACH ULCERS   • Citrus Cough     Citrus blossoms   • Latex Itching   • Pollen Extract Unknown - Low Severity     RED, ITCHY EYES       Past Surgical History:   Procedure Laterality Date   • BREAST SURGERY     • CARDIAC CATHETERIZATION  11/2019    one stent   • CARDIAC SURGERY      CABG two vessel 2008   • COLONOSCOPY     • COLONOSCOPY N/A 10/12/2020    Procedure: COLONOSCOPY;  Surgeon: Himanshu Shen MD;  Location: [facility] ENDOSCOPY;  Service: Gastroenterology;  Laterality: N/A;   • COLOSTOMY N/A 10/17/2020    Procedure: LAPAROTOMY EXPLORATORY, COLOSTOMY, LYSIS OF ADHESIONS;  Surgeon: Juana Winter " "MD LEONARD;  Location: [facility] OR;  Service: Gynecology Oncology;  Laterality: N/A;   • DILATATION AND CURETTAGE     • HYSTERECTOMY     • MASTECTOMY Bilateral     ,    • OTHER SURGICAL HISTORY      Patient reported \"torin in the right leg\"   • PORTACATH PLACEMENT Right 2020    Procedure: INSERTION OF PORTACATH RIGHT SUBCLAVIAN;  Surgeon: Himanshu Shen MD;  Location: [facility] OR;  Service: General;  Laterality: Right;   • SKIN BIOPSY      reported twice ( left shoulder,  face)   • VAGINAL BIOPSY N/A 10/12/2020    Procedure: VAGINAL BIOPSY;  Surgeon: Himanshu Shen MD;  Location: [facility] ENDOSCOPY;  Service: Gastroenterology;  Laterality: N/A;   • WISDOM TOOTH EXTRACTION         Family History   Problem Relation Age of Onset   • Cancer Mother    • Hypertension Mother    • Hyperlipidemia Mother    • Stroke Father        Social History     Socioeconomic History   • Marital status: Legally      Spouse name: Not on file   • Number of children: Not on file   • Years of education: Not on file   • Highest education level: Not on file   Tobacco Use   • Smoking status: Former Smoker     Years: 10.00     Types: Cigarettes     Quit date: 1996     Years since quittin.2   • Smokeless tobacco: Never Used   Substance and Sexual Activity   • Alcohol use: Never     Frequency: Never   • Drug use: Never   • Sexual activity: Defer           Objective   Physical Exam  Constitutional:       Appearance: She is well-developed. She is ill-appearing.   HENT:      Head: Normocephalic and atraumatic.      Right Ear: External ear normal.      Left Ear: External ear normal.      Nose: Nose normal.   Eyes:      Conjunctiva/sclera: Conjunctivae normal.      Pupils: Pupils are equal, round, and reactive to light.   Neck:      Musculoskeletal: Normal range of motion and neck supple.   Cardiovascular:      Rate and Rhythm: Normal rate and regular rhythm.      Heart sounds: Normal heart sounds.   Pulmonary:      " Effort: Pulmonary effort is normal.      Breath sounds: Normal breath sounds.   Abdominal:      Palpations: Abdomen is soft.      Tenderness: There is abdominal tenderness.   Musculoskeletal: Normal range of motion.   Skin:     General: Skin is warm and dry.   Neurological:      Mental Status: She is alert and oriented to person, place, and time.   Psychiatric:         Behavior: Behavior normal.         Thought Content: Thought content normal.         Judgment: Judgment normal.         Procedures           ED Course  ED Course as of Dec 08 1509   Tue Dec 08, 2020   1344 Awaiting CT results    [JM]   1502 I spoke with Dr. Anderson her oncologist and made him aware of the findings as well as the plan on admission to the hospitalist.  Spoke with Dr. Johnson in person who will admit.    [JM]      ED Course User Index  [JOSHUA] Federico Roman APRN           CT Abdomen Pelvis With Contrast   Preliminary Result   1. Enlarging, now partially exophytic right lobe liver mass compared to   10/21/2020 exam. Questionable small new right lobe liver lesion.   2. Amorphous masslike appearance of pelvis presumably pelvic metastatic   disease/metastatic implants, similar to prior study.   3. New approximately 12 x 14 cm pelvoabdominal cysts/cystic mass.   4. Worsening bilateral hydronephrosis, likely as a result of pelvic   disease #5. Moderate to marked fecal stasis.   #5. Gradually enlarging pelvic sidewall and inguinal lymph nodes.                                            MDM    Final diagnoses:   Pelvic mass   Generalized abdominal mass   Liver mass   Bilateral hydronephrosis   Anemia, unspecified type   Vagina bleeding   Acute abdominal pain            Federico Roman APRN  12/08/20 1505

## 2020-12-09 NOTE — PROGRESS NOTES
[facility] Medicine Services  PROGRESS NOTE    Patient Name: Hailee Booker  : 1955  MRN: [id number]    Date of Admission: 2020  Primary Care Physician: Sheila Bran APRN    Subjective   Subjective     CC:  F/U vaginal hemorrhage     HPI:  Pt seen and examined, RN notes reviewed, no acute events overnight. Pt doing well. States she just had a pain pill. Getting ready to go to radiation. Vaginal bleeding much improved. Pt did spike a temp of 101.3 this AM.    ROS:  Gen- +fevers, No chills  CV- No chest pain, palpitations  Resp- No cough, dyspnea  GI- No N/V/D, abd pain    Objective   Objective     Vital Signs:   Temp:  [97.7 °F (36.5 °C)-101.3 °F (38.5 °C)] 101.3 °F (38.5 °C)  Heart Rate:  [] 103  Resp:  [18] 18  BP: (114-132)/(65-88) 129/77        Physical Exam:  Constitutional: No acute distress, awake, alert  HENT: NCAT, mucous membranes moist  Respiratory: Clear to auscultation bilaterally, respiratory effort normal on RA  Cardiovascular: RRR, no murmurs, rubs, or gallops  Gastrointestinal: Positive bowel sounds, soft, nontender, non-distended, +ostomy  Musculoskeletal: No bilateral ankle edema  Psychiatric: Appropriate affect, cooperative  Neurologic: Oriented x 3, speech clear, no focal deficits   Skin: No rashes    Results Reviewed:  Results from last 7 days   Lab Units 20  0737 20  0204 20  1048 20  1032   WBC 10*3/mm3 5.70  --   --  6.57   HEMOGLOBIN g/dL 8.1* 8.3*  --  8.8*   HEMATOCRIT % 25.9* 27.1*  --  28.5*   PLATELETS 10*3/mm3 319  --   --  383   INR   --   --   --  1.12   PROCALCITONIN ng/mL  --   --  0.12  --      Results from last 7 days   Lab Units 20  0737 20  1048   SODIUM mmol/L 131* 134*   POTASSIUM mmol/L 3.6 3.0*   CHLORIDE mmol/L 94* 94*   CO2 mmol/L 29.0 31.0*   BUN mg/dL 15 20   CREATININE mg/dL 0.56* 0.64   GLUCOSE mg/dL 105* 88   CALCIUM mg/dL 8.4* 9.0   ALT (SGPT) U/L  --  16   AST (SGOT) U/L   --  28   TROPONIN T ng/mL  --  <0.010   PROBNP pg/mL  --  123.4     Estimated Creatinine Clearance: 64 mL/min (A) (by C-G formula based on SCr of 0.56 mg/dL (L)).    Microbiology Results Abnormal     Procedure Component Value - Date/Time    Urine Culture - Urine, Urine, Clean Catch [[id number]]  (Normal) Collected: 12/08/20 1024    Lab Status: Preliminary result Specimen: Urine, Clean Catch Updated: 12/09/20 0906     Urine Culture Culture in progress    COVID-19,CEPHEID,DANUTA IN-HOUSE(OR EMERGENT/ADD-ON),NP SWAB IN TRANSPORT MEDIA 3-4 HR TAT - Swab, Nasopharynx [[id number]]  (Normal) Collected: 12/08/20 1107    Lab Status: Final result Specimen: Swab from Nasopharynx Updated: 12/08/20 1206     COVID19 Not Detected    Narrative:      Fact sheet for providers: https://www.fda.gov/media/931040/download     Fact sheet for patients: https://www.fda.gov/media/567890/download          Imaging Results (Last 24 Hours)     Procedure Component Value Units Date/Time    CT Abdomen Pelvis With Contrast [[id number]] Collected: 12/08/20 1411     Updated: 12/09/20 0949    Narrative:      EXAMINATION: CT ABDOMEN PELVIS W CONTRAST- 12/08/2020      INDICATION: Vaginal bleeding, pelvic mass.     TECHNIQUE: 5 mm post IV contrast portal venous phase and delayed venous  phase images through the abdomen and pelvis.     The radiation dose reduction device was turned on for each scan per the  ALARA (As Low as Reasonably Achievable) protocol.     COMPARISON: 10/21/2020 abdomen and pelvis CT scan.     FINDINGS: Previous 10/21/2020 exam report indicates bilateral  obstructive uropathy right greater than left, heterogeneous  hypoenhancing 5 cm right lobe liver mass. Diffuse bowel dilatation.  History today indicates abnormal uterine bleeding, history of ovarian  cancer and hysterectomy.     There is trace right effusion and minimal right basilar atelectasis.  Right lobe liver lesion appears increased from approximately 5 cm to  approximately 7 cm, and  now has an extrahepatic/capsular component,  suggesting it could actually be an implanted metastasis, as well as  hematogenous metastasis with new erosion into the extra hepatic soft  tissues. There appears to be a new small hypoenhancing lesion of the  right liver lobe inferiorly, 14 mm in diameter, although there is some  marginal peripheral enhancement and this could be an incidental  hemangioma better seen today due to phase of contrast enhancement. No  new liver lesions are seen elsewhere.     Spleen is not enlarged. No significant abnormalities are seen of the  pancreas or adrenal glands. Gallbladder is normally distended and normal  in appearance. There appears to be at least moderate fecal stasis.  Bilateral hydronephrosis, right greater than left appears to be  gradually increasing, right renal pelvis increasing in diameter from 38  mm to 43 mm, left renal pelvis increasing from 22 mm to 28 mm. IVC  filter is again noted. There is a large new pelvoabdominal cyst at least  12 x 14 cm in diameter, with no evidence of cyst wall enhancement,  internal debris or septation, occupying much of the right pelvis. Milder  cystic changes in the right lower pelvis appear a little increased.  Cystic change of the left adnexal region appears stable. Amorphous  appearance of the pelvis may represent diffuse metastatic implants here  given the patient's previous history of hysterectomy. Overall size of  this area appears very similar to prior exam up to 10 cm in maximal  diameter. No intra-abdominal free air or significant free ascites is  seen. No bowel wall inflammatory changes are noted. There is normal  contrast opacification of the mesenteric vasculature. Delayed venous  phase images again show high-grade obstructive uropathy bilaterally,  presumably as a result of the patient's ill-defined mass/metastatic  implants. Additional note is made of mild further enlargement of some  shotty inguinal and pelvic sidewall lymph  nodes.       Impression:      1. Enlarging, now partially exophytic right lobe liver mass compared to  10/21/2020 exam. Questionable small new right lobe liver lesion.  2. Amorphous masslike appearance of pelvis presumably pelvic metastatic  disease/metastatic implants, similar to prior study.  3. New approximately 12 x 14 cm pelvoabdominal cysts/cystic mass.  4. Worsening bilateral hydronephrosis, likely as a result of pelvic  disease.  5. Moderate to marked fecal stasis.  6. Gradually enlarging pelvic sidewall and inguinal lymph nodes.     D:  12/08/2020  E:  12/09/2020             Results for orders placed during the hospital encounter of 10/16/20   Adult Transthoracic Echo Complete W/ Cont if Necessary Per Protocol    Narrative · Estimated left ventricular EF = 70%  · Mild tricuspid valve regurgitation is present.  · Estimated right ventricular systolic pressure from tricuspid   regurgitation is 37 mmHg.          I have reviewed the medications:  Scheduled Meds:cefuroxime, 250 mg, Oral, Q12H  clonazePAM, 0.5 mg, Oral, Q12H  isosorbide mononitrate, 60 mg, Oral, Daily  sodium chloride, 10 mL, Intravenous, Q12H      Continuous Infusions:   PRN Meds:.•  acetaminophen  •  albuterol  •  HYDROmorphone **AND** naloxone  •  magnesium sulfate **OR** magnesium sulfate **OR** magnesium sulfate  •  oxyCODONE  •  potassium chloride  •  potassium chloride  •  potassium chloride  •  sodium chloride  •  sodium chloride    Assessment/Plan   Assessment & Plan     Active Hospital Problems    Diagnosis  POA   • Vaginal bleeding [N93.9]  Yes      Resolved Hospital Problems   No resolved problems to display.        Brief Hospital Course to date:  Hailee Booker is a 65 y.o. 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  Presents with vaginal bleeding and uncontrolled pain.    This patient's problems and plans were partially entered by my partner and updated as appropriate  by me 12/09/20.  All problems are new to me today    Vaginal bleeding, anemia  In setting of widespread ovarian cancer   - chemo started 12/3:  carboplatin/Taxol/bevacizumab   - Vaginal bleeding improved. H&H stable.  - Plan for XRT today with Dr. Ryan  - Gyn-Onc, Dr. Winter, has signed off   - Dr. Anderson to see (heme/onc)    Fever, likely secondary to UTI  -Pt with fever of 101.3 this AM  -Blood Cx are pending  -Urine Cx pending  -Will obtain CXR  -DC Ceftin, change to Rocephin 2g IV daily until Cx results      Progressive bilateral hydronephrosis, presumably from compression by mass  - no CVA pain or tenderness  - creatinine nl; patient on chemo  - if creat rises or pain develops at CVAs, consider stenting.     Progressive pain R lower abd.   - Continue Oxycodone 10mg q4h prn      Hypokalemia:   -Replaced and resolved      Abnl UA  - Cx pending  - Rocephin      Social issues  -SW to see       DVT prophylaxis:   [facility]    Disposition: I expect the patient to be discharged TBD.    CODE STATUS:   Code Status and Medical Interventions:   Ordered at: 12/08/20 1525     Level Of Support Discussed With:    Patient     Code Status:    CPR     Medical Interventions (Level of Support Prior to Arrest):    Full       Sherine Keen, DO  12/09/20

## 2020-12-09 NOTE — PROGRESS NOTES
"Pharmacy Consult-Vancomycin Dosing  Hailee Booker is a  65 y.o. female receiving vancomycin therapy.     Indication: Sepsis  Consulting Provider: Hospitalist  ID Consult: No    Goal AUC: 400 - 600 mg/L*hr    Current Antimicrobial Therapy  Anti-Infectives (From admission, onward)      Ordered     Dose/Rate Route Frequency Start Stop    12/09/20 1657  piperacillin-tazobactam (ZOSYN) 3.375 g in iso-osmotic dextrose 50 ml (premix)     Ordering Provider: Sherine Keen DO    3.375 g  over 4 Hours Intravenous Every 8 Hours 12/10/20 0000 12/14/20 2359    12/09/20 1657  piperacillin-tazobactam (ZOSYN) 3.375 g in iso-osmotic dextrose 50 ml (premix)     Ordering Provider: Sherine Keen DO    3.375 g  over 30 Minutes Intravenous Once 12/09/20 1745      12/09/20 1657  Pharmacy to dose vancomycin     Ordering Provider: Sherine Keen DO     Does not apply Continuous PRN 12/09/20 1656 12/12/20 1655    12/08/20 1443  cefTRIAXone (ROCEPHIN) 2 g/100 mL 0.9% NS IVPB (MBP)     Ordering Provider: Federico Roman APRN    2 g  over 30 Minutes Intravenous Once 12/08/20 1445 12/08/20 1656            Allergies  Allergies as of 12/08/2020 - Reviewed 12/08/2020   Allergen Reaction Noted    Lisinopril Cough 09/04/2020    Losartan Unknown - High Severity 09/04/2020    Citrus Cough 09/04/2020    Latex Itching 09/04/2020    Pollen extract Unknown - Low Severity 09/04/2020       Labs    Results from last 7 days   Lab Units 12/09/20  0737 12/08/20  1048   BUN mg/dL 15 20   CREATININE mg/dL 0.56* 0.64       Results from last 7 days   Lab Units 12/09/20  0737 12/08/20  1032   WBC 10*3/mm3 5.70 6.57       Evaluation of Dosing     Last Dose Received in the ED/Outside Facility:   Is Patient on Dialysis or Renal Replacement:     Ht - 165.1 cm (65\")  Wt - 59.4 kg (131 lb)    Estimated Creatinine Clearance: 64 mL/min (A) (by C-G formula based on SCr of 0.56 mg/dL (L)).    Intake & Output (last 3 days)         12/06 0701 - 12/07 0700 12/07 " 0701 - 12/08 0700 12/08 0701 - 12/09 0700 12/09 0701 - 12/10 0700    P.O.    720    IV Piggyback   1000     Total Intake(mL/kg)   1000 (16.8) 720 (12.1)    Urine (mL/kg/hr)   600 200 (0.3)    Total Output   600 200    Net   +400 +520                    Microbiology and Radiology  Microbiology Results (last 10 days)       Procedure Component Value - Date/Time    Blood Culture - Blood, Arm, Left [[id number]] Collected: 12/08/20 1545    Lab Status: Preliminary result Specimen: Blood from Arm, Left Updated: 12/09/20 1615     Blood Culture No growth at 24 hours    Blood Culture - Blood, Arm, Right [[id number]] Collected: 12/08/20 1540    Lab Status: Preliminary result Specimen: Blood from Arm, Right Updated: 12/09/20 1615     Blood Culture No growth at 24 hours    COVID-19,CEPHEID,DANUTA IN-HOUSE(OR EMERGENT/ADD-ON),NP SWAB IN TRANSPORT MEDIA 3-4 HR TAT - Swab, Nasopharynx [[id number]]  (Normal) Collected: 12/08/20 1107    Lab Status: Final result Specimen: Swab from Nasopharynx Updated: 12/08/20 1206     COVID19 Not Detected    Narrative:      Fact sheet for providers: https://www.fda.gov/media/132339/download     Fact sheet for patients: https://www.fda.gov/media/484408/download    Urine Culture - Urine, Urine, Clean Catch [[id number]]  (Normal) Collected: 12/08/20 1024    Lab Status: Preliminary result Specimen: Urine, Clean Catch Updated: 12/09/20 0906     Urine Culture Culture in progress            Reported Vancomycin Levels              InsightRX AUC Calculation:    Current AUC:    mg/L*hr    Predicted Steady State AUC on Current Dose:  mg/L*hr  _________________________________    Predicted Steady State AUC on New Dose:   405 mg/L*hr    Assessment/Plan:    Pharmacy dosing Vancomycin for Sepsis. Goal -600 mg/L*hr  Vancomycin initiated with 1500mg (~20mg/kg) followed by 750mg q12hr.  Obtain trough prior to 4th total dose on 12/11.  Monitor renal function, culture results, clinical status and adjust as  necessary.  Pharmacy will continue to follow.     Sixto Mcdonnell, PharmD  12/9/2020  17:12 EST

## 2020-12-09 NOTE — PROGRESS NOTES
Gynecologic Oncology   Daily Progress Note    Chief Complaint: Vaginal bleeding    Subjective   Patient did well overnight.  Endorses fatigue.  Her pain is controlled she says as long as she remains in bed.  She requested the bedpan for voiding so she would not have to ambulate.  Bleeding has decreased to only light spotting on chucks pad overnight.  She is tolerating regular diet and requesting breakfast.  She believes the plan is for radiation this morning for her bleeding.      Updated ROS is remarkable for no headache, chest pain, nausea, vomiting     Objective   Temp:  [97.7 °F (36.5 °C)-98.8 °F (37.1 °C)] 98.5 °F (36.9 °C)  Heart Rate:  [] 110  Resp:  [16-18] 18  BP: (114-133)/(65-89) 121/75  Vitals:    12/09/20 0352   BP: 121/75   Pulse: 110   Resp: 18   Temp: 98.5 °F (36.9 °C)   SpO2: 96%     I/O last 3 completed shifts:  In: 1000 [IV Piggyback:1000]  Out: 600 [Urine:600]     GENERAL: Alert, well-appearing female in no apparent distress.    CARDIOVASCULAR: Normal rate, regular rhythm, no murmurs, rubs, or gallops.    RESPIRATORY: Clear to auscultation bilaterally, normal respiratory effort  GASTROINTESTINAL:  Soft, appropriately tender, non-distended, no rebound or guarding.  Positive bowel sounds.  Incision well healed.  Colostomy in place at RLQ with solid stool in bag.   GENITOURINARY: deferred   SKIN:  Warm, dry, well-perfused.    PSYCHIATRIC: AO x3, with appropriate affect, normal thought processes  EXREMITIES: Symmetric. No peripheral edema.    Lab Results   Component Value Date    WBC 5.70 12/09/2020    HGB 8.1 (L) 12/09/2020    HCT 25.9 (L) 12/09/2020    MCV 86.0 12/09/2020     12/09/2020    NEUTROABS 5.06 12/08/2020    GLUCOSE 88 12/08/2020    BUN 20 12/08/2020    CREATININE 0.64 12/08/2020    EGFRIFNONA 93 12/08/2020    EGFRIFAFRI 100 09/04/2020     (L) 12/08/2020    K 3.0 (L) 12/08/2020    CL 94 (L) 12/08/2020    CO2 31.0 (H) 12/08/2020    MG 1.4 (L) 12/08/2020    CALCIUM 9.0  12/08/2020         Assessment/Plan   Hailee Booker is a 65 y.o. female with a history of large bowel obstruction due to recurrent ovarian cancer status post diverting colostomy as detailed above.  Status post cycle #1 of combined carboplatin/paclitaxel/bevacizumab on 12/3/2020 now with vaginal hemorrhage. Bleeding improved overnight with only spotting. Pain well controlled. Hgb 8.3 yesterday decreased to 8.1 this morning. Patient to be seen by Palliative Radiation today. Gyn-Onc will be signing off at this time.            Xiao Jimenez MD  12/09/20  08:10 EST     I saw and evaluated the patient. I agree with the findings and the plan of care as documented in the note.  Will sign off since vaginal hemorrhage controlled for now and pt starting emergency RT today.    Juana Winter MD  12/09/20  08:47 EST

## 2020-12-09 NOTE — PLAN OF CARE
Problem: Adult Inpatient Plan of Care  Goal: Patient-Specific Goal (Individualized)  Outcome: Ongoing, Progressing     Problem: Adult Inpatient Plan of Care  Goal: Absence of Hospital-Acquired Illness or Injury  Intervention: Prevent Skin Injury  Description: Assess skin risk on admission and at regular intervals throughout hospital stay.  Keep all areas of skin (especially folds) clean and dry.  Maintain adequate skin hydration.  Relieve and redistribute pressure and protect bony prominences; implement measures based on patient-specific risk factors.  Match turning and repositioning schedule to clinical condition.  Encourage weight shift frequently; assist with reposition if unable to complete independently.  Float heels off bed. Avoid pressure on the Achilles tendon.  Keep skin free from extended contact with medical devices.  Use aids (e.g., slide boards, mechanical lift) during transfer.  Recent Flowsheet Documentation  Taken 12/9/2020 0906 by Dee Dee Fournier RN  Body Position:   weight shift assistance provided   side-lying, right  Taken 12/9/2020 0800 by Dee Dee Fournier RN  Body Position: position changed independently   Goal Outcome Evaluation:  Plan of Care Reviewed With: patient  Progress: improving  Outcome Summary: patient has c/o abd pain this shift, PRN percocet given. Has used bedpan this shift due to fear of increasing vaginal bleeding and weakness. Bowel regimen started this shift, colostomy output noted to be very thick. Temp of 101.3 this AM relieved by PRN tylenol. Pt had first radiation treatment. VSS.

## 2020-12-09 NOTE — PROGRESS NOTES
Discharge Planning Assessment  [facility]     Patient Name: Hailee Booker  MRN: [id number]  Today's Date: 12/9/2020    Admit Date: 12/8/2020    Discharge Needs Assessment     Row Name 12/09/20 1301       Living Environment    Lives With  child(michele), adult    Name(s) of Who Lives With Patient  Son and DIL, Fran and Shay Guo    Current Living Arrangements  home/apartment/condo    Primary Care Provided by  self    Provides Primary Care For  no one, unable/limited ability to care for self    Family Caregiver if Needed  child(michele), adult    Quality of Family Relationships  helpful;involved    Able to Return to Prior Arrangements  yes       Resource/Environmental Concerns    Resource/Environmental Concerns  none       Transition Planning    Patient/Family Anticipates Transition to  home with family;home with help/services    Patient/Family Anticipated Services at Transition  home health care    Transportation Anticipated  family or friend will provide       Discharge Needs Assessment    Readmission Within the Last 30 Days  no previous admission in last 30 days    Equipment Currently Used at Home  cane, straight;walker, rolling;colostomy/ostomy supplies    Concerns to be Addressed  discharge planning    Equipment Needed After Discharge  none    Outpatient/Agency/Support Group Needs  homecare agency    Patient's Choice of Community Agency(s)  [facility]    Discharge Coordination/Progress  Pt lives in [place] with her son and JESSICA.  Pt is currenty with [facility] for SN, PT, and OT services following surgery.  They are aware of her admission and will resume care at discharge.  Pt plans to return home with her family at discharge.  Her son can provide transportation after he gets off work (approx 1630).        Discharge Plan     Row Name 12/09/20 1312       Plan    Plan  home with resumed home health    Patient/Family in Agreement with Plan  yes    Plan Comments  TED met with pt at bedside.  She would  like home health services resumed at discharge.  Pt stated she feels safe in her home and feels well cared for by her family.  She denies that they are misusing her finances and she denies any concerns related to exploitation.  Case management will continue to follow for discharge planning needs.    Final Discharge Disposition Code  06 - home with home health care    Row Name 12/09/20 1142       Plan    Plan Comments  Pt is currently enrolled with [facility] out of [place], [phone], she is being followed for PT/OT/Skilled with a dx of after care following surgery    Final Discharge Disposition Code  06 - home with home health care        Continued Care and Services - Admitted Since 12/8/2020     Home Medical Care     Service Provider Request Status Selected Services Address Phone Fax Patient Preferred    [facility]  Pending - No Request Sent N/A [address] [place] [address] [phone] --            Selected Continued Care - Prior Encounters Includes selections from prior encounters from 9/9/2020 to 12/9/2020    Discharged on 10/29/2020 Admission date: 10/16/2020 - Discharge disposition: Skilled Nursing Facility (DC - External)    Destination     Service Provider Selected Services Address Phone Fax Patient Preferred    [facility]  Skilled Nursing [address] , [facility] [address] [phone] --                      Demographic Summary     Row Name 12/09/20 1257       General Information    Arrived From  home    Reason for Consult  psychosocial concerns    Preferred Language  English    General Information Comments  PCP is Sheila Bran        Functional Status     Row Name 12/09/20 1300       Employment/    Employment/ Comments  Pt has insurance and prescription coverage through [employer] and [place] Medicaid        Psychosocial    No documentation.       Abuse/Neglect    No documentation.       Legal    No  documentation.       Substance Abuse    No documentation.       Patient Forms    No documentation.           ALFONSO Coronel

## 2020-12-09 NOTE — PROGRESS NOTES
Continued Stay Note  [facility]     Patient Name: Hailee Booker  MRN: [id number]  Today's Date: 12/9/2020    Admit Date: 12/8/2020    Discharge Plan     Row Name 12/09/20 1142       Plan    Plan Comments  Pt is currently enrolled with [facility], [phone], she is being followed for PT/OT/Skilled with a dx of after care following surgery    Final Discharge Disposition Code  06 - home with home health care        Discharge Codes    No documentation.             Evi Darby RN

## 2020-12-09 NOTE — CONSULTS
CONSULTATION NOTE    NAME:      Hailee Booker  :                                                          1955  DATE OF CONSULTATION:                       2020   REQUESTING PHYSICIAN:                   Juana Winter MD  REASON FOR CONSULTATION:           Recurrent/metastatic ovarian cancer  1. Pelvic mass    2. Generalized abdominal mass    3. Liver mass    4. Bilateral hydronephrosis    5. Anemia, unspecified type    6. Vagina bleeding    7. Acute abdominal pain         Thank you for requesting the services of radiation oncology.  We have been asked to see Ms. Booker as an inpatient regarding palliative radiation to a symptomatic pelvic mass consistent with her original ovarian primary carcinoma.       BRIEF HISTORY:  Hailee Booker is a 65 y.o. female with a recurrent and now metastatic ovarian cancer, previously treated at [facility] in 2017.    She had been in her usual state of health until 2020, when she presented with complaints of progressive constipation, abdominal pain, and rectal bleeding.  She underwent a colonoscopy as well as gynecologic exam at that time which revealed rectal and vaginal masses, with biopsies of both lesions revealing serous adenocarcinoma consistent with her original ovarian primary.  CT abdomen/pelvis 10/7/2020 revealed a large pelvic mass causing bowel obstruction and mass effect on the bladder.  Imaging also showed pelvic and abdominal lymphadenopathy as well as liver metastases.  She was taken for diversion with Dr. Winter with colostomy placement on 10/17/2020.  She began chemotherapy with carbo/Taxol/bevacizumab on 12/3/2020 with Dr. Anderson.  More recently, she presented to [facility] ED with complaints of progressive vaginal bleeding, lower abdominal/pelvic pain, and generalized weakness.    Repeat CT abdomen/pelvis demonstrates the 10 cm pelvic mass is relatively unchanged from prior, but now shows enlarging and new liver lesions, worsening bilateral  "hydronephrosis, and enlarging pelvic sidewall and inguinal lymph nodes.  Hemoglobin decreased from 9.7 2020 to 8.8 2020.  She has not required transfusion.  She reports vaginal bleeding is fairly positional, worse when upright or ambulating.  She notes some light spotting overnight.  She refused vaginal packing as an intervention.  She reports pain in her abdomen, pelvis, and vagina.  Currently \"8/10\".  Improved with narcotics and lying still.  She otherwise denies dizziness, light-headedness, hematuria, dysuria, hematochezia, nausea, vomiting, or chest pain.  Her ostomy is functioning.  She is urinating but reports sensation of incomplete emptying.   She requires assistance with ambulation.  She lives with her son and daughter-in-law          Allergies   Allergen Reactions   • Lisinopril Cough   • Losartan Unknown - High Severity     HAS STOMACH ULCERS   • Citrus Cough     Citrus blossoms   • Latex Itching   • Pollen Extract Unknown - Low Severity     RED, ITCHY EYES       Social History     Tobacco Use   • Smoking status: Former Smoker     Years: 10.00     Types: Cigarettes     Quit date: 1995     Years since quittin.2   • Smokeless tobacco: Never Used   Substance Use Topics   • Alcohol use: Never     Frequency: Never   • Drug use: Never       Past Medical History:   Diagnosis Date   • Arthritis    • Body piercing     ears   • Cancer (CMS/[facility])     breast cancer twice, basal cell carcinoma, ovarian   • CHF (congestive heart failure) (CMS/[facility])    • Colostomy in place (CMS/[facility]) 10/17/2020   • Constipation    • COPD (chronic obstructive pulmonary disease) (CMS/[facility])    • Coronary artery disease    • Disease of thyroid gland    • DVT (deep venous thrombosis) (CMS/[facility])     Patient reported after CABG in  and that she had DVT x2   • Elevated cholesterol    • GERD (gastroesophageal reflux disease)    • Hepatitis C     Patient reported she has had treatment   • History of bronchitis    • History " "of transfusion     Patient reported no prior reaction   • Hx of CABG 2008    Patient reported 2 vessel - reported MI prior to this procedure   • Hyperlipidemia    • Hypertension    • Injury of back    • Latex allergy    • Myocardial infarction (CMS/[facility]) 11/2019    Patient reported \"very light\" and that she had medical attention [facility] in [facility] and had placement of 1 stent   • Seizures (CMS/[facility]) 11/24/2020    Patient reported since 1997 - epileptic.  Patient reported last seizure activity while in nursing home apx 20 days ago   • Sleep apnea     Patient reported prior use of CPAP and none since 2017 after weight loss   • Stroke (CMS/[facility])     Patient reported TIA x3 (reported last TIA 2003, 2010, 2015) - reported no residual issues from TIA's   • Tattoo     x1   • Wears glasses     OTC glasses for reading       family history includes Cancer in her mother; Hyperlipidemia in her mother; Hypertension in her mother; Stroke in her father.     Past Surgical History:   Procedure Laterality Date   • BREAST SURGERY     • CARDIAC CATHETERIZATION  11/2019    one stent   • CARDIAC SURGERY      CABG two vessel 2008   • COLONOSCOPY     • COLONOSCOPY N/A 10/12/2020    Procedure: COLONOSCOPY;  Surgeon: Himanshu Shen MD;  Location: [facility] ENDOSCOPY;  Service: Gastroenterology;  Laterality: N/A;   • COLOSTOMY N/A 10/17/2020    Procedure: LAPAROTOMY EXPLORATORY, COLOSTOMY, LYSIS OF ADHESIONS;  Surgeon: Juana Winter MD;  Location: [facility] OR;  Service: Gynecology Oncology;  Laterality: N/A;   • DILATATION AND CURETTAGE     • HYSTERECTOMY     • MASTECTOMY Bilateral     1979, 1989   • OTHER SURGICAL HISTORY      Patient reported \"torin in the right leg\"   • PORTACATH PLACEMENT Right 11/25/2020    Procedure: INSERTION OF PORTACATH RIGHT SUBCLAVIAN;  Surgeon: Himanshu Shen MD;  Location: [facility] OR;  Service: General;  Laterality: Right;   • SKIN BIOPSY      reported twice (2009 left shoulder, 2020 face)   • " VAGINAL BIOPSY N/A 10/12/2020    Procedure: VAGINAL BIOPSY;  Surgeon: Himanshu Shen MD;  Location: [facility] ENDOSCOPY;  Service: Gastroenterology;  Laterality: N/A;   • WISDOM TOOTH EXTRACTION          Review of Systems   Constitutional: Positive for fatigue.   Gastrointestinal: Positive for abdominal pain.   Genitourinary: Positive for vaginal bleeding.         Retention   Musculoskeletal: Positive for gait problem (uses walker).   Neurological: Positive for gait problem (uses walker).        Generalized weakness   All other systems reviewed and are negative.          Objective   VITAL SIGNS:   Vitals:    12/08/20 1910 12/08/20 2316 12/09/20 0352 12/09/20 0726   BP: 119/72 114/65 121/75 129/77   BP Location: Left arm Left arm Left arm Right arm   Patient Position: Lying Lying Lying    Pulse: 93 93 110 103   Resp: 18 18 18 18   Temp: 98.7 °F (37.1 °C) 97.8 °F (36.6 °C) 98.5 °F (36.9 °C) (!) 101.3 °F (38.5 °C)  Comment: yeyo notified   TempSrc: Oral Oral Oral Oral   SpO2: 96% 96% 96%    Weight:       Height:            KPS        80%    Physical Exam  Vitals signs and nursing note reviewed.   Constitutional:       Appearance: Normal appearance. She is well-developed.      Comments: Thin female in no acute distress   HENT:      Head: Normocephalic and atraumatic.   Neck:      Musculoskeletal: Normal range of motion and neck supple.   Cardiovascular:      Rate and Rhythm: Normal rate and regular rhythm.      Heart sounds: No murmur. No friction rub.   Pulmonary:      Effort: Pulmonary effort is normal. No respiratory distress.      Breath sounds: Normal breath sounds. No wheezing.   Abdominal:      General: Bowel sounds are normal. There is no distension.      Palpations: Abdomen is soft. There is mass (palpable mass bilateral lower abdomen).      Tenderness: There is abdominal tenderness (RLQ, LLQ).      Comments: RLQ colostomy +stool   Genitourinary:     Comments: Deferred  Musculoskeletal: Normal range of motion.    Lymphadenopathy:      Cervical: No cervical adenopathy.      Upper Body:      Right upper body: No supraclavicular adenopathy.      Left upper body: No supraclavicular adenopathy.   Skin:     General: Skin is warm and dry.   Neurological:      General: No focal deficit present.      Mental Status: She is alert and oriented to person, place, and time.      Cranial Nerves: No cranial nerve deficit.   Psychiatric:         Behavior: Behavior normal.         Thought Content: Thought content normal.         Judgment: Judgment normal.       IMAGING:  I have personally reviewed the most recent imaging study:    CT abdomen/pelvis 12/8/2020:  FINDINGS: Previous 10/21/2020 exam report indicates bilateral  obstructive uropathy right greater than left, heterogeneous  hypoenhancing 5 cm right lobe liver mass. Diffuse bowel dilatation.  History today indicates abnormal uterine bleeding, history of ovarian  cancer and hysterectomy.     There is trace right effusion and minimal right basilar atelectasis.  Right lobe liver lesion appears increased from approximately 5 cm to  approximately 7 cm, and now has an extrahepatic/capsular component,  suggesting it could actually be an implanted metastasis, as well as  hematogenous metastasis with new erosion into the extra hepatic soft  tissues. There appears to be a new small hypoenhancing lesion of the  right liver lobe inferiorly, 14 mm in diameter, although there is some  marginal peripheral enhancement and this could be an incidental  hemangioma better seen today due to phase of contrast enhancement. No  new liver lesions are seen elsewhere.     Spleen is not enlarged. No significant abnormalities are seen of the  pancreas or adrenal glands. Gallbladder is normally distended and normal  in appearance. There appears to be at least moderate fecal stasis.  Bilateral hydronephrosis, right greater than left appears to be  gradually increasing, right renal pelvis increasing in diameter from  38  mm to 43 mm, left renal pelvis increasing from 22 mm to 28 mm. IVC  filter is again noted. There is a large new pelvoabdominal cyst at least  12 x 14 cm in diameter, with no evidence of cyst wall enhancement,  internal debris or septation, occupying much of the right pelvis. Milder  cystic changes in the right lower pelvis appear a little increased.  Cystic change of the left adnexal region appears stable. Amorphous  appearance of the pelvis may represent diffuse metastatic implants here  given the patient's previous history of hysterectomy. Overall size of  this area appears very similar to prior exam up to 10 cm in maximal  diameter. No intra-abdominal free air or significant free ascites is  seen. No bowel wall inflammatory changes are noted. There is normal  contrast opacification of the mesenteric vasculature. Delayed venous  phase images again show high-grade obstructive uropathy bilaterally,  presumably as a result of the patient's ill-defined mass/metastatic  implants. Additional note is made of mild further enlargement of some  shotty inguinal and pelvic sidewall lymph nodes.     IMPRESSION:  1. Enlarging, now partially exophytic right lobe liver mass compared to  10/21/2020 exam. Questionable small new right lobe liver lesion.  2. Amorphous masslike appearance of pelvis presumably pelvic metastatic  disease/metastatic implants, similar to prior study.  3. New approximately 12 x 14 cm pelvoabdominal cysts/cystic mass.  4. Worsening bilateral hydronephrosis, likely as a result of pelvic  disease.  5. Moderate to marked fecal stasis.  6. Gradually enlarging pelvic sidewall and inguinal lymph nodes.           The following portions of the patient's history were reviewed and updated as appropriate: allergies, current medications, past family history, past medical history, past social history, past surgical history and problem list.    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  She has a large heterogeneous pelvic mass, relatively unchanged in size over the past several months, causing vaginal hemorrhage, mass effect on the urinary bladder and subsequent bilateral hydronephrosis, and progressive pain.  She also has metastases to the rectum and liver and has begun systemic chemotherapy with Dr. Anderson.  I spent approximately 30 minutes with Ms. Booker explaining the role of palliative radiotherapy to the pelvic mass for local tumor control and to palliate pain, halt vaginal bleeding/risk for hemorrhage, and preserve bladder function.  For these reasons, emergency radiation as an inpatient is warranted.  We reviewed pros/cons, risks/benefits.  Informed consent was obtained.  She states that between her son and daughter-in-law, they have 1 car which the son takes to work daily.  She reiterated that she has no one to drive her from [place] to [place] for any subsequent outpatient radiation appointments.  Therefore, we will anticipate conventionally fractionated radiotherapy delivered to the entire pelvis to a dose of 20 Gy in 5 daily fractions while she remains admitted.  GYN Oncology has signed off, and she continues oncologic care under the direction of Dr. Anderson.  The patient wishes to continue treatment regimen and is not ready to discuss goals of care including palliative care.        RECOMMENDATIONS:  Palliative pelvic radiation, 20 Gy in 5 daily fractions treated on the [place].  We will bring her down for CT simulation and begin treatment today through Friday, resuming her final 2 treatments next Monday and Tuesday while she remains admitted.  She should complete radiation 12/15.    Thank you for allowing us to participate in the care of this pleasant yet unfortunate patient.       ERIN Delvalle

## 2020-12-09 NOTE — PROGRESS NOTES
Attempted to see patient multiple times this morning however she has been in Rad Onc.  Agree with palliative radiation for her vaginal bleeding she would be a poor candidate for any surgical intervention.  Reviewed CT scans and patient with significant worsening of her metastatic disease.  As she is continued to have a declining performance status, she would likely be a poor candidate for future systemic chemotherapy and would benefit from hospice. I will discuss this further with the patient tomorrow and place a palliative consult today.  Will dictate full consult note tomorrow when I am able to see and discuss patient's prognosis further with her.  Please do not hesitate to contact with any questions or concerns

## 2020-12-09 NOTE — PLAN OF CARE
Goal Outcome Evaluation:  Plan of Care Reviewed With: patient  Progress: improving  Outcome Summary: PT RESTING IN BED. NO DISTRESS NOTED. ALERT AND ORIENTED. SKIN WARM AND DRY. NO C/O AT THIS TIME

## 2020-12-09 NOTE — CONSULTS
"Sheila Bran APRN  Consulting physician: Shelton Anderson  Reason for referral: hospice discussion, goc discussion/ACP  Chief Complaint   Patient presents with   • Vaginal Bleeding     HPI:   65 y.o. female with history of bilateral breast cancer, seizure disorder, congestive heart failure, CAD, hypertension, hyperlipidemia and  metastatic ovarian cancer (treated at [facility] in 2017) presented to ED on 12/8 am with vaginal bleeding.   She was admitted 10/16-29/2020 for large bowel obstruction and underwent surgical management with exploratory laparotomy, colostomy, lysis of adhesions, mobilization of colon performed on 10/17/2020 per Dr Lombardi. Recurrence of her ovarian cancer was confirmed.  Post op course complicated with symptomatic anemia, diminished po intake requiring TPN.  Discharged SNF, [facility], with return home around 11/18/2020. She started chemotherapy, carboplatin, paclitaxel, and bevacizumab, on 12/3 with Dr Anderson.  She has been eating, walks with a walker, but feeling weaker and having worsening pain in lower R abdomen.  On 12/7/2020 she developed some vaginal spotting but increased to heavy flow.   Repeat CT abdomen/pelvis demonstrates the 10 cm pelvic mass is relatively unchanged from prior, but now shows enlarging and new liver lesions, worsening bilateral hydronephrosis, and enlarging pelvic sidewall and inguinal lymph nodes.   Symptoms:   Patient reports primary pain is transverse suprapubic, lower abdominal pain   - \"crampy\" pain. Reports pain came on after eating some grapes.   5/10 currently.  Reports increase pain with walking.   Oxy IR 10 mg makes the patient drowsy, tired - continues to yawn  Patient reports only takes 1/2 tab at home.  Patient reports that she has had cut the tabs at home in half.   No nausea, dyspnea or anxiety.  Advance care planning discussed:   Code Status:   Current Code Status     Date Active Code Status Order ID Comments User Context       12/8/2020 1525 " "CPR [id number]  Grace Johnson MD ED     Advance Care Planning Activity      Questions for Current Code Status     Question Answer Comment    Code Status CPR     Medical Interventions (Level of Support Prior to Arrest) Full     Level Of Support Discussed With Patient         Advance Directive: no written document  Surrogate decision maker: Fran cason  Past Medical History:   Diagnosis Date   • Arthritis    • Body piercing     ears   • Cancer (CMS/[facility])     breast cancer twice, basal cell carcinoma, ovarian   • CHF (congestive heart failure) (CMS/[facility]) 2003   • Colostomy in place (CMS/HCC) 10/17/2020   • Constipation    • COPD (chronic obstructive pulmonary disease) (CMS/[facility])    • Coronary artery disease    • Disease of thyroid gland    • DVT (deep venous thrombosis) (CMS/HCC)     Patient reported after CABG in 2008 and that she had DVT x2   • Elevated cholesterol    • GERD (gastroesophageal reflux disease)    • Hepatitis C     Patient reported she has had treatment   • History of bronchitis    • History of transfusion     Patient reported no prior reaction   • Hx of CABG 2008    Patient reported 2 vessel - reported MI prior to this procedure   • Hyperlipidemia    • Hypertension    • Injury of back    • Latex allergy    • Myocardial infarction (CMS/HCC) 11/2019    Patient reported \"very light\" and that she had medical attention [facility] in [facility] and had placement of 1 stent   • Seizures (CMS/HCC) 11/24/2020    Patient reported since 1997 - epileptic.  Patient reported last seizure activity while in nursing home apx 20 days ago   • Sleep apnea     Patient reported prior use of CPAP and none since 2017 after weight loss   • Stroke (CMS/[facility])     Patient reported TIA x3 (reported last TIA 2003, 2010, 2015) - reported no residual issues from TIA's   • Tattoo     x1   • Wears glasses     OTC glasses for reading     Past Surgical History:   Procedure Laterality Date   • BREAST SURGERY     • CARDIAC CATHETERIZATION  " "11/2019    one stent   • CARDIAC SURGERY      CABG two vessel 2008   • COLONOSCOPY     • COLONOSCOPY N/A 10/12/2020    Procedure: COLONOSCOPY;  Surgeon: Himanshu Shen MD;  Location: [facility] ENDOSCOPY;  Service: Gastroenterology;  Laterality: N/A;   • COLOSTOMY N/A 10/17/2020    Procedure: LAPAROTOMY EXPLORATORY, COLOSTOMY, LYSIS OF ADHESIONS;  Surgeon: Juana Winter MD;  Location: [facility] OR;  Service: Gynecology Oncology;  Laterality: N/A;   • DILATATION AND CURETTAGE     • HYSTERECTOMY     • MASTECTOMY Bilateral     1979, 1989   • OTHER SURGICAL HISTORY      Patient reported \"torin in the right leg\"   • PORTACATH PLACEMENT Right 11/25/2020    Procedure: INSERTION OF PORTACATH RIGHT SUBCLAVIAN;  Surgeon: Himanshu Shen MD;  Location: [facility] OR;  Service: General;  Laterality: Right;   • SKIN BIOPSY      reported twice (2009 left shoulder, 2020 face)   • VAGINAL BIOPSY N/A 10/12/2020    Procedure: VAGINAL BIOPSY;  Surgeon: Himanshu Shen MD;  Location: [facility] ENDOSCOPY;  Service: Gastroenterology;  Laterality: N/A;   • WISDOM TOOTH EXTRACTION         Reviewed current scheduled and prn medications for route, type, dose and frequency.    Current Facility-Administered Medications   Medication Dose Route Frequency Provider Last Rate Last Admin   • acetaminophen (TYLENOL) tablet 650 mg  650 mg Oral Q6H PRN Sherine Keen DO   650 mg at 12/09/20 0911   • albuterol (PROVENTIL) nebulizer solution 0.083% 2.5 mg/3mL  2.5 mg Nebulization Q4H PRN Grace Johnson MD       • cefTRIAXone (ROCEPHIN) 2 g/100 mL 0.9% NS IVPB (MBP)  2 g Intravenous Q24H Sherine Keen DO       • clonazePAM (KlonoPIN) tablet 0.5 mg  0.5 mg Oral Q12H Grace Johnson MD   0.5 mg at 12/09/20 0910   • HYDROmorphone (DILAUDID) injection 1 mg  1 mg Intravenous Q2H PRN Grace Johnson MD        And   • naloxone (NARCAN) injection 0.4 mg  0.4 mg Intravenous Q5 Min PRN Grace Johnson MD       • isosorbide mononitrate (IMDUR) 24 hr tablet " 60 mg  60 mg Oral Daily Grace Johnson MD   60 mg at 12/09/20 0910   • Magnesium Sulfate 2 gram Bolus, followed by 8 gram infusion (total Mg dose 10 grams)- Mg less than or equal to 1mg/dL  2 g Intravenous PRN Grace Jonhson MD        Or   • Magnesium Sulfate 2 gram / 50mL Infusion (GIVE X 3 BAGS TO EQUAL 6GM TOTAL DOSE) - Mg 1.1 - 1.5 mg/dl  2 g Intravenous PRN Grace Johnson MD 25 mL/hr at 12/08/20 2143 2 g at 12/08/20 2143    Or   • Magnesium Sulfate 4 gram infusion- Mg 1.6-1.9 mg/dL  4 g Intravenous PRN Grace Johnson MD       • oxyCODONE (ROXICODONE) immediate release tablet 10 mg  10 mg Oral Q4H PRN Grace Johnson MD   10 mg at 12/09/20 0906   • potassium chloride (KLOR-CON) packet 40 mEq  40 mEq Oral PRN Grace Johnson MD       • potassium chloride (MICRO-K) CR capsule 40 mEq  40 mEq Oral PRN Grace Johnson MD   40 mEq at 12/08/20 1828   • potassium chloride 10 mEq in 100 mL IVPB  10 mEq Intravenous Q1H PRN Grace Johnson MD       • sodium chloride 0.9 % flush 10 mL  10 mL Intravenous PRN Kirk Carter MD       • sodium chloride 0.9 % flush 10 mL  10 mL Intravenous Q12H Grace Johnson MD       • sodium chloride 0.9 % flush 10 mL  10 mL Intravenous PRN Grace Johnson MD            •  acetaminophen  •  albuterol  •  HYDROmorphone **AND** naloxone  •  magnesium sulfate **OR** magnesium sulfate **OR** magnesium sulfate  •  oxyCODONE  •  potassium chloride  •  potassium chloride  •  potassium chloride  •  sodium chloride  •  sodium chloride  Allergies   Allergen Reactions   • Lisinopril Cough   • Losartan Unknown - High Severity     HAS STOMACH ULCERS   • Citrus Cough     Citrus blossoms   • Latex Itching   • Pollen Extract Unknown - Low Severity     RED, ITCHY EYES     Family History   Problem Relation Age of Onset   • Cancer Mother    • Hypertension Mother    • Hyperlipidemia Mother    • Stroke Father      Social History     Socioeconomic History   • Marital status: Legally      Spouse name: Not on  "file   • Number of children: Not on file   • Years of education: Not on file   • Highest education level: Not on file   Tobacco Use   • Smoking status: Former Smoker     Years: 10.00     Types: Cigarettes     Quit date: 1995     Years since quittin.2   • Smokeless tobacco: Never Used   Substance and Sexual Activity   • Alcohol use: Never     Frequency: Never   • Drug use: Never   • Sexual activity: Defer       Review of Systems - +/- per HPI and symptom review.      PPS: 50%  /79 (BP Location: Right arm, Patient Position: Lying)   Pulse 105   Temp 98.5 °F (36.9 °C) (Oral)   Resp 20   Ht 165.1 cm (65\")   Wt 59.4 kg (131 lb)   LMP  (LMP Unknown)   SpO2 96%   BMI 21.80 kg/m²   59.4 kg (131 lb) Body mass index is 21.8 kg/m².  Intake & Output (last day)       701 -  07 - 12/10 0700    P.O.  720    IV Piggyback 1000     Total Intake(mL/kg) 1000 (16.8) 720 (12.1)    Urine (mL/kg/hr) 600 200 (0.6)    Total Output 600 200    Net +400 +520                Physical Exam:  General Appearance: No acute distress, resting in bed with repeated yawning and drowsy  Head: Normocephalic without obvious abnormality, atraumatic  Eyes: Conjunctivae and sclerae normal, no icterus, LB  Throat: No oral lesions, no thrush, oral mucosa moist  Lungs: Clear to auscultation, respirations regular, even and non-labored  Heart: Regular rhythm and normal rate, normal S1  Abdomen: Normal bowel sounds, soft, non-distended, non-tender  Extremities: Moves all extremities, no redness, no cyanosis, no edema  Pulses: Distal pulses palpable and equal bilaterally  Skin: Warm and dry  Neurological: Awakens to name, tries to stay awake, no myoclonus  Reviewed labs and diagnostic results.  Lab Results   Component Value Date    HGBA1C 5.30 10/16/2020     Results from last 7 days   Lab Units 20  0737   WBC 10*3/mm3 5.70   HEMOGLOBIN g/dL 8.1*   HEMATOCRIT % 25.9*   PLATELETS 10*3/mm3 319     Results from last " 7 days   Lab Units 12/09/20  0737 12/08/20  1048   SODIUM mmol/L 131* 134*   POTASSIUM mmol/L 3.6 3.0*   CHLORIDE mmol/L 94* 94*   CO2 mmol/L 29.0 31.0*   BUN mg/dL 15 20   CREATININE mg/dL 0.56* 0.64   CALCIUM mg/dL 8.4* 9.0   BILIRUBIN mg/dL  --  0.3   ALK PHOS U/L  --  54   ALT (SGPT) U/L  --  16   AST (SGOT) U/L  --  28   GLUCOSE mg/dL 105* 88     Results from last 7 days   Lab Units 12/09/20  0737   SODIUM mmol/L 131*   POTASSIUM mmol/L 3.6   CHLORIDE mmol/L 94*   CO2 mmol/L 29.0   BUN mg/dL 15   CREATININE mg/dL 0.56*   GLUCOSE mg/dL 105*   CALCIUM mg/dL 8.4*     Imaging Results (Last 72 Hours)     Procedure Component Value Units Date/Time    XR Chest 1 View [[id number]] Collected: 12/09/20 1119     Updated: 12/09/20 1148    Narrative:         EXAMINATION: XR CHEST 1 VW-      INDICATION: fever; R19.00-Intra-abdominal and pelvic swelling, mass and  lump, unspecified site; R19.07-Generalized intra-abdominal and pelvic  swelling, mass and lump; R16.0-Hepatomegaly, not elsewhere classified;  N13.30-Unspecified hydronephrosis; D64.9-Anemia, unspecified;  N93.9-Abnormal uterine and vaginal bleeding, unspecified;  R10.9-Unspecified abdominal pain      COMPARISON: 10/17/2020     FINDINGS: Portable chest reveals mild elevation seen the right  hemidiaphragm. Mild increased markings identified the right lung base.  Portacatheter on the right tip in the SVC with no pneumothorax.  Degenerative changes seen within the spine. Nasogastric tube and deep  line catheter on the right previously visualized have been removed.           Impression:      Placement of a mj catheter on the right tip in the SVC.  No pneumothorax with minimal increased markings again seen at the right  lung base and elevation the right hemidiaphragm.          CT Abdomen Pelvis With Contrast [[id number]] Collected: 12/08/20 1411     Updated: 12/09/20 0949    Narrative:      EXAMINATION: CT ABDOMEN PELVIS W CONTRAST- 12/08/2020      INDICATION: Vaginal  bleeding, pelvic mass.     TECHNIQUE: 5 mm post IV contrast portal venous phase and delayed venous  phase images through the abdomen and pelvis.     The radiation dose reduction device was turned on for each scan per the  ALARA (As Low as Reasonably Achievable) protocol.     COMPARISON: 10/21/2020 abdomen and pelvis CT scan.     FINDINGS: Previous 10/21/2020 exam report indicates bilateral  obstructive uropathy right greater than left, heterogeneous  hypoenhancing 5 cm right lobe liver mass. Diffuse bowel dilatation.  History today indicates abnormal uterine bleeding, history of ovarian  cancer and hysterectomy.     There is trace right effusion and minimal right basilar atelectasis.  Right lobe liver lesion appears increased from approximately 5 cm to  approximately 7 cm, and now has an extrahepatic/capsular component,  suggesting it could actually be an implanted metastasis, as well as  hematogenous metastasis with new erosion into the extra hepatic soft  tissues. There appears to be a new small hypoenhancing lesion of the  right liver lobe inferiorly, 14 mm in diameter, although there is some  marginal peripheral enhancement and this could be an incidental  hemangioma better seen today due to phase of contrast enhancement. No  new liver lesions are seen elsewhere.     Spleen is not enlarged. No significant abnormalities are seen of the  pancreas or adrenal glands. Gallbladder is normally distended and normal  in appearance. There appears to be at least moderate fecal stasis.  Bilateral hydronephrosis, right greater than left appears to be  gradually increasing, right renal pelvis increasing in diameter from 38  mm to 43 mm, left renal pelvis increasing from 22 mm to 28 mm. IVC  filter is again noted. There is a large new pelvoabdominal cyst at least  12 x 14 cm in diameter, with no evidence of cyst wall enhancement,  internal debris or septation, occupying much of the right pelvis. Milder  cystic changes in the  right lower pelvis appear a little increased.  Cystic change of the left adnexal region appears stable. Amorphous  appearance of the pelvis may represent diffuse metastatic implants here  given the patient's previous history of hysterectomy. Overall size of  this area appears very similar to prior exam up to 10 cm in maximal  diameter. No intra-abdominal free air or significant free ascites is  seen. No bowel wall inflammatory changes are noted. There is normal  contrast opacification of the mesenteric vasculature. Delayed venous  phase images again show high-grade obstructive uropathy bilaterally,  presumably as a result of the patient's ill-defined mass/metastatic  implants. Additional note is made of mild further enlargement of some  shotty inguinal and pelvic sidewall lymph nodes.       Impression:      1. Enlarging, now partially exophytic right lobe liver mass compared to  10/21/2020 exam. Questionable small new right lobe liver lesion.  2. Amorphous masslike appearance of pelvis presumably pelvic metastatic  disease/metastatic implants, similar to prior study.  3. New approximately 12 x 14 cm pelvoabdominal cysts/cystic mass.  4. Worsening bilateral hydronephrosis, likely as a result of pelvic  disease.  5. Moderate to marked fecal stasis.  6. Gradually enlarging pelvic sidewall and inguinal lymph nodes.     D:  12/08/2020  E:  12/09/2020           Impression: 65 y.o. female with metastatic ovarian cancer stage IVb with progressive hydronephrosis, starting radiation Rx x5 fractions  Plan:   Lower abd pain - adjust oxy IR to 5mg prn, patient reports taking less amount at home and seems to have increase drowsiness    Constipation - continue with scheduled bowel med regimen with daily opioid.    Goals of care discussion - patient has not spoken with oncology, Dr Anderson. She lives in [place] with her son and his family.      Palliative care team provide support to pt/family.    Joanna Agrawal,  ERIN  [phone]  12/09/20  12:51 EST      Time: minutes spent reviewing medical and medication records, assessing and examining patient, discussing with patient, answering questions, formulating a plan and documentation of care. > 50% time spent face to face

## 2020-12-10 NOTE — PROGRESS NOTES
Continued Stay Note  [facility]     Patient Name: Hailee Booker  MRN: [id number]  Today's Date: 12/10/2020    Admit Date: 12/8/2020    Discharge Plan     Row Name 12/10/20 1200       Plan    Plan  The patient is eligible for Medicaid transportation, [phone] as long is there is no car registered to the home address. Social work is going to forward this patients to the oncology social worker for transportation  resources.        Discharge Codes    No documentation.             ALFONSO Gentile

## 2020-12-10 NOTE — THERAPY EVALUATION
Patient Name: Hailee Booker  : 1955    MRN: [id number]                              Today's Date: 12/10/2020       Admit Date: 2020    Visit Dx:     ICD-10-CM ICD-9-CM   1. Pelvic mass  R19.00 789.30   2. Generalized abdominal mass  R19.07 789.37   3. Liver mass  R16.0 573.8   4. Bilateral hydronephrosis  N13.30 591   5. Anemia, unspecified type  D64.9 285.9   6. Vagina bleeding  N93.9 623.8   7. Acute abdominal pain  R10.9 789.00     338.19   8. Impaired mobility and activities of daily living  Z74.09 V49.89    Z78.9    9. Vaginal bleeding  N93.9 623.8   10. Malignant neoplasm of both ovaries (CMS/HCC)  C56.1 183.0    C56.2      Patient Active Problem List   Diagnosis   • Seizures (CMS/HCC)   • Pelvic mass   • Rt Hydronephrosis 2* Pelvic Mass   • Malignant neoplasm of both ovaries (CMS/HCC)   • Recurrent malignant neoplasm of ovary (CMS/HCC)   • History of breast cancer   • History of pulmonary embolus (PE)   • History of DVT (deep vein thrombosis)   • Liver metastases (CMS/HCC)   • Large bowel obstruction (CMS/HCC)   • CHF (congestive heart failure) (CMS/HCC)   • Coronary artery disease   • Disease of thyroid gland   • Hypertension   • Hyperlipidemia   • Hyponatremia   • Malignant neoplasm of ovary (CMS/HCC)   • Vaginal bleeding     Past Medical History:   Diagnosis Date   • Arthritis    • Body piercing     ears   • Cancer (CMS/HCC)     breast cancer twice, basal cell carcinoma, ovarian   • CHF (congestive heart failure) (CMS/HCC)    • Colostomy in place (CMS/HCC) 10/17/2020   • Constipation    • COPD (chronic obstructive pulmonary disease) (CMS/HCC)    • Coronary artery disease    • Disease of thyroid gland    • DVT (deep venous thrombosis) (CMS/HCC)     Patient reported after CABG in  and that she had DVT x2   • Elevated cholesterol    • GERD (gastroesophageal reflux disease)    • Hepatitis C     Patient reported she has had treatment   • History of bronchitis    • History of transfusion  "    Patient reported no prior reaction   • Hx of CABG 2008    Patient reported 2 vessel - reported MI prior to this procedure   • Hyperlipidemia    • Hypertension    • Injury of back    • Latex allergy    • Myocardial infarction (CMS/[facility]) 11/2019    Patient reported \"very light\" and that she had medical attention [facility] in [facility] and had placement of 1 stent   • Seizures (CMS/[facility]) 11/24/2020    Patient reported since 1997 - epileptic.  Patient reported last seizure activity while in nursing home apx 20 days ago   • Sleep apnea     Patient reported prior use of CPAP and none since 2017 after weight loss   • Stroke (CMS/[facility])     Patient reported TIA x3 (reported last TIA 2003, 2010, 2015) - reported no residual issues from TIA's   • Tattoo     x1   • Wears glasses     OTC glasses for reading     Past Surgical History:   Procedure Laterality Date   • BREAST SURGERY     • CARDIAC CATHETERIZATION  11/2019    one stent   • CARDIAC SURGERY      CABG two vessel 2008   • COLONOSCOPY     • COLONOSCOPY N/A 10/12/2020    Procedure: COLONOSCOPY;  Surgeon: Himanshu Shen MD;  Location: [facility];  Service: Gastroenterology;  Laterality: N/A;   • COLOSTOMY N/A 10/17/2020    Procedure: LAPAROTOMY EXPLORATORY, COLOSTOMY, LYSIS OF ADHESIONS;  Surgeon: Juana Winter MD;  Location: [facility] OR;  Service: Gynecology Oncology;  Laterality: N/A;   • DILATATION AND CURETTAGE     • HYSTERECTOMY     • MASTECTOMY Bilateral     1979, 1989   • OTHER SURGICAL HISTORY      Patient reported \"torin in the right leg\"   • PORTACATH PLACEMENT Right 11/25/2020    Procedure: INSERTION OF PORTACATH RIGHT SUBCLAVIAN;  Surgeon: Himanshu Shen MD;  Location: [facility] OR;  Service: General;  Laterality: Right;   • SKIN BIOPSY      reported twice (2009 left shoulder, 2020 face)   • VAGINAL BIOPSY N/A 10/12/2020    Procedure: VAGINAL BIOPSY;  Surgeon: Himanshu Shen MD;  Location: [facility];  Service: Gastroenterology;  " Laterality: N/A;   • WISDOM TOOTH EXTRACTION       General Information     Row Name 12/10/20 8813          Physical Therapy Time and Intention    Document Type  evaluation  -NS     Mode of Treatment  physical therapy  -NS     Row Name 12/10/20 1333          General Information    Patient Profile Reviewed  yes  -NS     Prior Level of Function  independent:;all household mobility;gait;transfer;bed mobility;ADL's Pt uses RW at baseline  -NS     Existing Precautions/Restrictions  hip;seizures;other (see comments) brief with OOB activity  -NS     Barriers to Rehab  medically complex  -NS     Row Name 12/10/20 1333          Living Environment    Lives With  child(michele), adult  -NS     Row Name 12/10/20 1333          Home Main Entrance    Number of Stairs, Main Entrance  none  -NS     Row Name 12/10/20 1333          Stairs Within Home, Primary    Number of Stairs, Within Home, Primary  other (see comments) 12  -NS     Stairs Comment, Within Home, Primary  Pt's bed and bath are on the second floor.  -NS     Row Name 12/10/20 1333          Cognition    Orientation Status (Cognition)  oriented x 4  -NS     Row Name 12/10/20 1333          Safety Issues, Functional Mobility    Safety Issues Affecting Function (Mobility)  safety precaution awareness;safety precautions follow-through/compliance;sequencing abilities;insight into deficits/self-awareness  -NS     Impairments Affecting Function (Mobility)  balance;endurance/activity tolerance;strength;range of motion (ROM);pain  -NS       User Key  (r) = Recorded By, (t) = Taken By, (c) = Cosigned By    Initials Name Provider Type    Aby Marcus PT Physical Therapist        Mobility     Row Name 12/10/20 1136          Bed Mobility    Bed Mobility  rolling left;rolling right;supine-sit  -NS     Rolling Left [place] (Bed Mobility)  minimum assist (75% patient effort);verbal cues  -NS     Rolling Right [place] (Bed Mobility)  minimum assist (75% patient effort);verbal  cues  -NS     Supine-Sit [place] (Bed Mobility)  minimum assist (75% patient effort)  -NS     Assistive Device (Bed Mobility)  bed rails;head of bed elevated  -NS     Comment (Bed Mobility)  VCs for sequencing. B rolling for donning of brief.  -NS     Row Name 12/10/20 9263          Transfers    Comment (Transfers)  VCs for sequencing. Pt transferred to the chair with BUE support, demonstrating decreased weight shifting ability and weakness. Increased fatigue following. Pt declined further activity.  -NS     Row Name 12/10/20 1333          Bed-Chair Transfer    Bed-Chair [place] (Transfers)  minimum assist (75% patient effort);2 person assist;verbal cues  -NS     Assistive Device (Bed-Chair Transfers)  other (see comments) BUE support  -NS     Row Name 12/10/20 1333          Sit-Stand Transfer    Sit-Stand [place] (Transfers)  minimum assist (75% patient effort);2 person assist  -NS     Assistive Device (Sit-Stand Transfers)  other (see comments) BUE support  -NS     Row Name 12/10/20 1333          Gait/Stairs (Locomotion)    [place] Level (Gait)  not tested  -NS     Comment (Gait/Stairs)  Pt declined ambulation following transfer.  -NS       User Key  (r) = Recorded By, (t) = Taken By, (c) = Cosigned By    Initials Name Provider Type    Aby Marcus PT Physical Therapist        Obj/Interventions     Row Name 12/10/20 1333          Range of Motion Comprehensive    General Range of Motion  bilateral lower extremity ROM WFL  -NS     Comment, General Range of Motion  Actively WFL  -NS     Row Name 12/10/20 1333          Strength Comprehensive (MMT)    General Manual Muscle Testing (MMT) Assessment  lower extremity strength deficits identified  -NS     Comment, General Manual Muscle Testing (MMT) Assessment  BLEs: grossly 4/5  -NS     Row Name 12/10/20 1333          Balance    Balance Assessment  sitting static balance;sitting dynamic balance;standing static balance;standing dynamic balance  -NS      Static Sitting Balance  mild impairment;unsupported;sitting, edge of bed  -NS     Dynamic Sitting Balance  moderate impairment;unsupported;sitting, edge of bed  -NS     Static Standing Balance  mild impairment;supported;standing  -NS     Dynamic Standing Balance  moderate impairment;supported;standing  -NS     Row Name 12/10/20 [facility]          Sensory Assessment (Somatosensory)    Sensory Assessment (Somatosensory)  LE sensation intact  -NS       User Key  (r) = Recorded By, (t) = Taken By, (c) = Cosigned By    Initials Name Provider Type    Aby Marcus PT Physical Therapist        Goals/Plan     Row Name 12/10/20 [facility]          Bed Mobility Goal 1 (PT)    Activity/Assistive Device (Bed Mobility Goal 1, PT)  sit to supine/supine to sit  -NS     [place] Level/Cues Needed (Bed Mobility Goal 1, PT)  contact guard assist  -NS     Time Frame (Bed Mobility Goal 1, PT)  long term goal (LTG);2 weeks  -NS     Row Name 12/10/20 1333          Transfer Goal 1 (PT)    Activity/Assistive Device (Transfer Goal 1, PT)  sit-to-stand/stand-to-sit;bed-to-chair/chair-to-bed;walker, rolling  -NS     [place] Level/Cues Needed (Transfer Goal 1, PT)  contact guard assist  -NS     Time Frame (Transfer Goal 1, PT)  long term goal (LTG);2 weeks  -NS     Row Name 12/10/20 [facility]          Gait Training Goal 1 (PT)    Activity/Assistive Device (Gait Training Goal 1, PT)  gait (walking locomotion);assistive device use;walker, rolling  -NS     [place] Level (Gait Training Goal 1, PT)  contact guard assist  -NS     Distance (Gait Training Goal 1, PT)  100  -NS     Time Frame (Gait Training Goal 1, PT)  long term goal (LTG);2 weeks  -NS       User Key  (r) = Recorded By, (t) = Taken By, (c) = Cosigned By    Initials Name Provider Type    Aby Marcus PT Physical Therapist        Clinical Impression     Row Name 12/10/20 4503          Pain    Additional Documentation  Pain Scale: Numbers Pre/Post-Treatment (Group)  -NS      Row Name 12/10/20 1333          Pain Scale: Numbers Pre/Post-Treatment    Pretreatment Pain Rating  0/10 - no pain  -NS     Posttreatment Pain Rating  0/10 - no pain  -NS     Row Name 12/10/20 [facility]          Plan of Care Review    Plan of Care Reviewed With  patient  -NS     Progress  no change PT eval  -NS     Outcome Summary  Patient presents with generalized weakness, decreased balance, and poor activity tolerance affecting independence with mobility. She transferred B rolling and supine>sit with Min A, STS and transfer with Min A x2 and BUE support, demonstrating R lateral lean and decreased sequencing to the chair. She declined further activity. Skilled IP PT warranted. Recommend home with 24/7 assist and HHPT at discharge.  -NS     Row Name 12/10/20 0822          Therapy Assessment/Plan (PT)    Patient/Family Therapy Goals Statement (PT)  To go home and walk  -NS     Rehab Potential (PT)  good, to achieve stated therapy goals  -NS     Criteria for Skilled Interventions Met (PT)  yes;meets criteria;skilled treatment is necessary  -NS     Predicted Duration of Therapy Intervention (PT)  2 weeks  -NS     Row Name 12/10/20 1333          Vital Signs    Pre Systolic BP Rehab  107  -NS     Pre Treatment Diastolic BP  63  -NS     Intra Systolic BP Rehab  121  -NS     Intra Treatment Diastolic BP  79  -NS     Pretreatment Heart Rate (beats/min)  97  -NS     Posttreatment Heart Rate (beats/min)  98  -NS     Pre Patient Position  Supine  -NS     Intra Patient Position  Standing  -NS     Post Patient Position  Sitting  -NS     Row Name 12/10/20 [facility]          Positioning and Restraints    Pre-Treatment Position  in bed  -NS     Post Treatment Position  chair  -NS     In Chair  notified nsg;call light within reach;reclined;encouraged to call for assist;exit alarm on;legs elevated;waffle cushion  -NS       User Key  (r) = Recorded By, (t) = Taken By, (c) = Cosigned By    Initials Name Provider Type    Aby Marcus, PT  Physical Therapist        Outcome Measures     Row Name 12/10/20 1333          How much help from another person do you currently need...    Turning from your back to your side while in flat bed without using bedrails?  3  -NS     Moving from lying on back to sitting on the side of a flat bed without bedrails?  3  -NS     Moving to and from a bed to a chair (including a wheelchair)?  3  -NS     Standing up from a chair using your arms (e.g., wheelchair, bedside chair)?  3  -NS     Climbing 3-5 steps with a railing?  2  -NS     To walk in hospital room?  2  -NS     AM-PAC 6 Clicks Score (PT)  16  -NS     Row Name 12/10/20 7693          Functional Assessment    Outcome Measure Options  AM-PAC 6 Clicks Basic Mobility (PT)  -NS       User Key  (r) = Recorded By, (t) = Taken By, (c) = Cosigned By    Initials Name Provider Type    Aby Marcus PT Physical Therapist        Physical Therapy Education                 Title: PT OT SLP Therapies (In Progress)     Topic: Physical Therapy (Done)     Point: Mobility training (Done)     Learning Progress Summary           Patient Acceptance, E, VU,NR by NS at 12/10/2020 1534    Comment: Patient was educated about PT POC, sequencing mobility, and benefits of OOB activity.    Acceptance, E,TB, VU by HR at 12/9/2020 0046                   Point: Home exercise program (Done)     Learning Progress Summary           Patient Acceptance, E,TB, VU by HR at 12/9/2020 0046                   Point: Body mechanics (Done)     Learning Progress Summary           Patient Acceptance, E, VU,NR by NS at 12/10/2020 1534    Comment: Patient was educated about PT POC, sequencing mobility, and benefits of OOB activity.    Acceptance, E,TB, VU by HR at 12/9/2020 0046                   Point: Precautions (Done)     Learning Progress Summary           Patient Acceptance, E, VU,NR by NS at 12/10/2020 1534    Comment: Patient was educated about PT POC, sequencing mobility, and benefits of OOB activity.     Acceptance, E,TB, VU by HR at 12/9/2020 0046                               User Key     Initials Effective Dates Name Provider Type Discipline    HR 01/16/19 -  Brandi Rawls, RN Registered Nurse Nurse    NS 09/10/19 -  Aby Palacios PT Physical Therapist PT              PT Recommendation and Plan  Planned Therapy Interventions (PT): balance training, bed mobility training, gait training, home exercise program, neuromuscular re-education, patient/family education, transfer training, strengthening  Plan of Care Reviewed With: patient  Progress: no change(PT eval)  Outcome Summary: Patient presents with generalized weakness, decreased balance, and poor activity tolerance affecting independence with mobility. She transferred B rolling and supine>sit with Min A, STS and transfer with Min A x2 and BUE support, demonstrating R lateral lean and decreased sequencing to the chair. She declined further activity. Skilled IP PT warranted. Recommend home with 24/7 assist and HHPT at discharge.     Time Calculation:   PT Charges     Row Name 12/10/20 1333             Time Calculation    Start Time  1333  -NS      PT Received On  12/10/20  -NS      PT Goal Re-Cert Due Date  12/20/20  -NS        User Key  (r) = Recorded By, (t) = Taken By, (c) = Cosigned By    Initials Name Provider Type    NS Aby Palacios, ARMANI Physical Therapist        Therapy Charges for Today     Code Description Service Date Service Provider Modifiers Qty    [id number] HC PT EVAL MOD COMPLEXITY 4 12/10/2020 Aby Palacios, ARMANI GP 1          PT G-Codes  Outcome Measure Options: AM-PAC 6 Clicks Daily Activity (OT)  AM-PAC 6 Clicks Score (PT): 16  AM-PAC 6 Clicks Score (OT): 15    Aby Palacios PT  12/10/2020

## 2020-12-10 NOTE — PLAN OF CARE
Goal Outcome Evaluation:  Plan of Care Reviewed With: patient  Progress: no change  Outcome Summary: Palliative consult for Hospice referral and discussion. Pt c/o abdominal pain which she feels is worsened by current constipation with very hard stool observed in ostomy bag and stoma. Suppository this morning with only partial relief, still has uncomfortable pressure in and around stoma in addiation to prior abdominal pain. Noted Lactulose recently ordered, not yet administered at time of encounter; add small amount of mineral oil enema instilled into stoma for softening and lubrication to ease evacuation. Focused today on improved management of pain and constipation, will follow up with discussion re GOC and possible hospice after Hem/Onc able to see pt and discuss cancer progression.    1300 Palliative Team Conference: GRANT Tellez RN, PN; ERIN Ness; YAKELIN May RN, [place]; LEONARD Summers, [facility], [facility]; CRISTINA Rosales MD; MAYTE Kwan RN, PN    Problem: Palliative Care  Goal: Enhanced Quality of Life  Outcome: Ongoing, Progressing  Intervention: Maximize Comfort  Flowsheets (Taken 12/10/2020 1453)  Pain Management Interventions:   around-the-clock dosing utilized   pain management plan reviewed with patient/caregiver  Intervention: Promote Advance Care Planning  Flowsheets (Taken 12/10/2020 1456)  Life Transition/Adjustment:   palliative care discussed   palliative care initiated  Intervention: Optimize Psychosocial Wellbeing  Flowsheets (Taken 12/10/2020 1456)  Supportive Measures:   active listening utilized   self-responsibility promoted   verbalization of feelings encouraged   problem-solving facilitated  Spiritual Activities Assistance: (Spiritual Care consult) other (see comments)  Family/Support System Care: (Meal discount card provided for family) support provided

## 2020-12-10 NOTE — CONSULTS
HEMATOLOGY/ONCOLOGY INPATIENT CONSULT    REASON FOR CONSULT: Metastatic ovarian cancer    Subjective   HISTORY OF PRESENT ILLNESS;  Ms. Booker is a pleasant 65 y.o. female past medical history of hypertension, anxiety, CAD, GERD, bilateral breast cancer, ovarian cancer status post treatment at [facility] in 2017 who presented to [facility] with worsening vaginal bleeding.  Patient recently started on carbo/Taxol/Avastin for her metastatic ovarian cancer.  States that on Sunday she started having vaginal spotting which increased to hakan blood on Monday and Tuesday.  The bleeding was enough to where her son convinced her to go to the ER.  While in the ER her hemoglobin is noted to be 8.3 which is down from her baseline of 10.2.  She was seen by GYN/ONC who did not feel she was a surgical candidate this time.  She was then referred to radiation oncology for palliative radiation which she started yesterday and received her second dose today.  She otherwise continues to have significant fatigue denies any hakan blood when laying down.  Tolerating diet without difficulty and denies any chills, headaches, chest pain, palpitations, shortness of breath, cough, nausea, vomiting, diarrhea, dysuria    Oncologic History:  The patient had been in her usual state of health when she started noticing worsening abdominal pain as well as constipation.  She underwent a colonoscopy which was noted for a rectal mass.  Around same time she was seen by Dr. Demarco in [place] for a gynecologic exam which was also noted for vaginal mass.  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  CT scan of her abdomen/pelvis was concerning for obstructive disease as well as a liver lesion.  She underwent a diversion with Dr. Winter with ostomy placement.  Her hospital course was complicated by hypotension acute blood loss.  She since recovered and is currently residing at a rehab facility which she is expected to be  "discharged next week.  Today she is still using a wheelchair for ambulation but states she is getting stronger with rehab and ambulating more easily on her own.  She denies any other bleeding or bruising episodes.  She is tolerating her ostomy well and denies any worsening abdominal pain.     In regards to her cancer history, she states she was initially diagnosed in 2017 and was treated spars a clinical trial at [facility].  States she received surgery as well as chemotherapy.  Records are unavailable at this time    Past Medical History:   Diagnosis Date   • Arthritis    • Body piercing     ears   • Cancer (CMS/[facility])     breast cancer twice, basal cell carcinoma, ovarian   • CHF (congestive heart failure) (CMS/[facility]) 2003   • Colostomy in place (CMS/HCC) 10/17/2020   • Constipation    • COPD (chronic obstructive pulmonary disease) (CMS/[facility])    • Coronary artery disease    • Disease of thyroid gland    • DVT (deep venous thrombosis) (CMS/[facility])     Patient reported after CABG in 2008 and that she had DVT x2   • Elevated cholesterol    • GERD (gastroesophageal reflux disease)    • Hepatitis C     Patient reported she has had treatment   • History of bronchitis    • History of transfusion     Patient reported no prior reaction   • Hx of CABG 2008    Patient reported 2 vessel - reported MI prior to this procedure   • Hyperlipidemia    • Hypertension    • Injury of back    • Latex allergy    • Myocardial infarction (CMS/[facility]) 11/2019    Patient reported \"very light\" and that she had medical attention [facility] in [facility] and had placement of 1 stent   • Seizures (CMS/[facility]) 11/24/2020    Patient reported since 1997 - epileptic.  Patient reported last seizure activity while in nursing home apx 20 days ago   • Sleep apnea     Patient reported prior use of CPAP and none since 2017 after weight loss   • Stroke (CMS/[facility])     Patient reported TIA x3 (reported last TIA 2003, 2010, 2015) - reported no residual issues from TIA's   • " "Tattoo     x1   • Wears glasses     OTC glasses for reading     Past Surgical History:   Procedure Laterality Date   • BREAST SURGERY     • CARDIAC CATHETERIZATION  11/2019    one stent   • CARDIAC SURGERY      CABG two vessel 2008   • COLONOSCOPY     • COLONOSCOPY N/A 10/12/2020    Procedure: COLONOSCOPY;  Surgeon: Himanshu Shen MD;  Location: [facility] ENDOSCOPY;  Service: Gastroenterology;  Laterality: N/A;   • COLOSTOMY N/A 10/17/2020    Procedure: LAPAROTOMY EXPLORATORY, COLOSTOMY, LYSIS OF ADHESIONS;  Surgeon: Juana Winter MD;  Location: [facility] OR;  Service: Gynecology Oncology;  Laterality: N/A;   • DILATATION AND CURETTAGE     • HYSTERECTOMY     • MASTECTOMY Bilateral     1979, 1989   • OTHER SURGICAL HISTORY      Patient reported \"torin in the right leg\"   • PORTACATH PLACEMENT Right 11/25/2020    Procedure: INSERTION OF PORTACATH RIGHT SUBCLAVIAN;  Surgeon: Himanshu Shen MD;  Location: [facility] OR;  Service: General;  Laterality: Right;   • SKIN BIOPSY      reported twice (2009 left shoulder, 2020 face)   • VAGINAL BIOPSY N/A 10/12/2020    Procedure: VAGINAL BIOPSY;  Surgeon: Himanshu Shen MD;  Location: [facility] ENDOSCOPY;  Service: Gastroenterology;  Laterality: N/A;   • WISDOM TOOTH EXTRACTION         No current facility-administered medications on file prior to encounter.      Current Outpatient Medications on File Prior to Encounter   Medication Sig Dispense Refill   • amLODIPine (NORVASC) 10 MG tablet Take 1 tablet by mouth Daily. 30 tablet 0   • aspirin 81 MG EC tablet Take 81 mg by mouth Daily.     • chlorthalidone (HYGROTON) 25 MG tablet Take 1 tablet by mouth Daily. 30 tablet 0   • cloBAZam (ONFI) 10 MG tablet Take 10 mg by mouth 2 (two) times a day.     • dexamethasone (DECADRON) 4 MG tablet Take 2 tablets in the morning daily on days 2, 3 & 4.  Take with food. 6 tablet 5   • fluticasone (FLONASE) 50 MCG/ACT nasal spray 2 sprays into the nostril(s) as directed by provider Daily.   "   • isosorbide mononitrate (IMDUR) 60 MG 24 hr tablet Take 60 mg by mouth Daily.     • levOCARNitine (L-Carnitine) 500 MG tablet Take 1 tablet by mouth 2 (two) times a day.     • lidocaine-prilocaine (EMLA) 2.5-2.5 % cream Apply  topically to the appropriate area as directed As Needed (45-60 minutes prior to port access.  Cover with saran/plastic wrap.). 30 g 3   • metoclopramide (REGLAN) 10 MG tablet Take 1 tablet by mouth Every 6 (Six) Hours As Needed (nausea, vomiting).     • Multiple Vitamins-Minerals (ONE-A-DAY 50 PLUS PO) Take 1 tablet by mouth Daily.     • Omega-3 Fatty Acids (fish oil) 1000 MG capsule capsule Take 1,000 mg by mouth Daily With Breakfast.     • oxyCODONE (ROXICODONE) 10 MG tablet Take 1 tablet by mouth Every 4 (Four) Hours As Needed for Moderate Pain . 180 tablet 0   • albuterol sulfate  (90 Base) MCG/ACT inhaler Inhale 2 puffs Every 4 (Four) Hours As Needed for Wheezing.         Allergies   Allergen Reactions   • Lisinopril Cough   • Losartan Unknown - High Severity     HAS STOMACH ULCERS   • Citrus Cough     Citrus blossoms   • Latex Itching   • Pollen Extract Unknown - Low Severity     RED, ITCHY EYES       Social History     Socioeconomic History   • Marital status: Legally      Spouse name: Not on file   • Number of children: Not on file   • Years of education: Not on file   • Highest education level: Not on file   Tobacco Use   • Smoking status: Former Smoker     Years: 10.00     Types: Cigarettes     Quit date: 1995     Years since quittin.2   • Smokeless tobacco: Never Used   Substance and Sexual Activity   • Alcohol use: Never     Frequency: Never   • Drug use: Never   • Sexual activity: Defer       Family History   Problem Relation Age of Onset   • Cancer Mother    • Hypertension Mother    • Hyperlipidemia Mother    • Stroke Father          REVIEW OF SYSTEMS:  A 12 point review of systems was performed and is negative except as noted above.    Objective  "  PHYSICAL EXAM:    /84 (BP Location: Left arm, Patient Position: Lying)   Pulse 104   Temp 99.1 °F (37.3 °C) (Oral)   Resp 18   Ht 165.1 cm (65\")   Wt 59.4 kg (131 lb)   LMP  (LMP Unknown)   SpO2 94%   BMI 21.80 kg/m²     ECO  General: Frail appearing female, laying in bed comfortably, in no acute distress  HEENT: sclerae anicteric, oropharynx clear  Lymphatics: no cervical, supraclavicular, inguinal, or axillary adenopathy  Neck: Supple. No thyromegaly.  Cardiovascular: regular rate and rhythm, no murmurs  Lungs: clear to auscultation bilaterally. No respiratory distress  Abdomen: soft, nontender, nondistended.  No palpable organomegaly  Extremities: no lower extremity edema, cyanosis, or clubbing  Skin: no rashes, lesions, bruising, or petechiae  Neuro: Alert and oriented x3. Moves all extremities.    Results:    Results from last 7 days   Lab Units 12/10/20  0916 12/10/20  04120  1946 20  0737  20  1032   WBC 10*3/mm3  --  3.92  --  5.70  --  6.57   HEMOGLOBIN g/dL 8.2* 7.2* 7.9* 8.1*   < > 8.8*   PLATELETS 10*3/mm3  --  231  --  319  --  383    < > = values in this interval not displayed.     Results from last 7 days   Lab Units 12/10/20  0412 20  1217 20  0737 20  1048   SODIUM mmol/L 134*  --  131* 134*   POTASSIUM mmol/L 3.9 3.3* 3.6 3.0*   CO2 mmol/L 31.0*  --  29.0 31.0*   BUN mg/dL 16  --  15 20   CREATININE mg/dL 0.60  --  0.56* 0.64   GLUCOSE mg/dL 99  --  105* 88     Results from last 7 days   Lab Units 20  1048   AST (SGOT) U/L 28   ALT (SGPT) U/L 16   BILIRUBIN mg/dL 0.3   ALK PHOS U/L 54         Ct Abdomen Pelvis With Contrast    Result Date: 2020  1. Enlarging, now partially exophytic right lobe liver mass compared to 10/21/2020 exam. Questionable small new right lobe liver lesion. 2. Amorphous masslike appearance of pelvis presumably pelvic metastatic disease/metastatic implants, similar to prior study. 3. New approximately 12 x 14 " cm pelvoabdominal cysts/cystic mass. 4. Worsening bilateral hydronephrosis, likely as a result of pelvic disease. 5. Moderate to marked fecal stasis. 6. Gradually enlarging pelvic sidewall and inguinal lymph nodes.  D:  12/08/2020 E:  12/09/2020      Xr Chest 1 View    Result Date: 12/9/2020  Placement of a portacatheter on the right tip in the SVC. No pneumothorax with minimal increased markings again seen at the right lung base and elevation of the right hemidiaphragm.  DICTATED:   12/09/2020 EDITED/ls :   12/09/2020  This report was finalized on 12/9/2020 4:59 PM by Dr. Ashley Waters MD.        Assessment    ASSESSMENT & PLAN:  Ms. Booker is a pleasant 65 y.o. female past medical history of hypertension, anxiety, CAD, GERD, bilateral breast cancer, ovarian cancer status post treatment at [facility] in 2017 who presented to [facility] with worsening vaginal bleeding.    Metastatic ovarian cancer  -Likely source of her vaginal bleeding  -Not a surgical candidate per GYN/ONC  -Agree with radiation consult for palliative radiation.  Status post 2/5 doses which she will complete inpatient  -CT scans on presentation with significant interval progression of disease from her last scan  -She is status post 1 cycle of carboplatin/Taxol/Avastin  -Given her poor performance status and rapidly progressive disease, I do not feel like she would be a candidate for further systemic chemotherapy.  -Next generation sequencing pending she may be a candidate for targeted treatment however this is very unlikely  -Discussed patient's poor prognosis and likely lack of benefit from further systemic treatment.  Discussed palliative care including hospice which she, her daughter-in-law, and son are now agreeable to.  Patient would like to have home hospice at her son's home which she is currently residing.  -Palliative consulted and following at this time  -Patient requesting CT scan of her chest to further evaluate extent  of disease I have ordered today    Thank you for the consult.  We will continue to follow while inpatient.  Please do not hesitate to contact with any questions or concerns    Shelton Anderson MD  Hematology and Oncology    12/10/2020  16:26 EST

## 2020-12-10 NOTE — PROGRESS NOTES
Continued Stay Note  [facility]     Patient Name: Hailee Booker  MRN: [id number]  Today's Date: 12/10/2020    Admit Date: 12/8/2020    Discharge Plan     Row Name 12/10/20 1400       Plan    Plan  home with home health    Plan Comments  CM received update from primary RN that pt had reported she has issues with transportation for appointments. CM discusseed with TED Zelaya and she will see if pt qualifies for any transportation services and provide pt with information. CM will continue to follow.    Final Discharge Disposition Code  06 - home with home health care    Row Name 12/10/20 1200       Plan    Plan  The patient is eligible for Medicaid transportation, [phone] as long is there is no car registered to the home address. Social work is going to forward this patients to the oncology social worker for transportation  resources.        Discharge Codes    No documentation.             Wendy Wilson RN

## 2020-12-10 NOTE — PLAN OF CARE
Goal Outcome Evaluation:  Plan of Care Reviewed With: patient  Progress: (OT eval)  Outcome Summary: OT eval completed. Pt. presents with general weakness, impaired activity tolerance, balance, and a decline in ADL performance. Pt. demonstrated bed mobility with Min A and T/Fs with Min A of 2. Pt. completed donning socks with CGA and washing face with SUA. Skilled OT services warranted to promote return to PLOF. Recommend home with 24/7 care and home health at discharge.

## 2020-12-10 NOTE — PLAN OF CARE
Goal Outcome Evaluation:  Plan of Care Reviewed With: patient  Progress: no change   A/Ox4, VSS, and currently on 2LNC. Activity encouraged as tolerated. Will continue to monitor.

## 2020-12-10 NOTE — PROGRESS NOTES
[facility] Medicine Services  PROGRESS NOTE    Patient Name: Hailee Booker  : 1955  MRN: [id number]    Date of Admission: 2020  Primary Care Physician: Sheila Bran APRN    Subjective   Subjective     CC:  F/u vaginal hemorrhage     HPI:  Patient is resting in bed in NAD eating lunch. She had radiation today and tolerated. Pt denies any soa or cp. Still having some abd pain. Pt slept ok. Tolerating diet.     ROS:  Gen- No fevers, chills  CV- No chest pain, palpitations  Resp- No cough, dyspnea  GI- No N/V/D, abd pain         Objective   Objective     Vital Signs:   Temp:  [98 °F (36.7 °C)-102.8 °F (39.3 °C)] 99.1 °F (37.3 °C)  Heart Rate:  [] 103  Resp:  [16-18] 18  BP: (105-130)/(53-86) 107/63        Physical Exam:  Constitutional: No acute distress, awake, alert  HENT: NCAT, mucous membranes moist  Respiratory: Clear to auscultation bilaterally, respiratory effort normal room air 95%  Cardiovascular: RRR, no murmurs, rubs, or gallops  Gastrointestinal: Positive bowel sounds, soft, nontender, nondistended, + ostomy with formed brown stool   Musculoskeletal: No bilateral ankle edema  Psychiatric: Appropriate affect, cooperative  Neurologic: Oriented x 3, strength symmetric in all extremities, Cranial Nerves grossly intact to confrontation, speech clear  Skin: No rashes      Results Reviewed:  Results from last 7 days   Lab Units 12/10/20  0916 12/10/20  0412 20  1946 20  0737  20  1048 20  1032   WBC 10*3/mm3  --  3.92  --  5.70  --   --  6.57   HEMOGLOBIN g/dL 8.2* 7.2* 7.9* 8.1*   < >  --  8.8*   HEMATOCRIT % 27.3* 23.6* 26.9* 25.9*   < >  --  28.5*   PLATELETS 10*3/mm3  --  231  --  319  --   --  383   INR   --   --   --   --   --   --  1.12   PROCALCITONIN ng/mL  --   --   --   --   --  0.12  --     < > = values in this interval not displayed.     Results from last 7 days   Lab Units 12/10/20  0412 20  1217 20  0737  12/08/20  1048   SODIUM mmol/L 134*  --  131* 134*   POTASSIUM mmol/L 3.9 3.3* 3.6 3.0*   CHLORIDE mmol/L 96*  --  94* 94*   CO2 mmol/L 31.0*  --  29.0 31.0*   BUN mg/dL 16  --  15 20   CREATININE mg/dL 0.60  --  0.56* 0.64   GLUCOSE mg/dL 99  --  105* 88   CALCIUM mg/dL 8.3*  --  8.4* 9.0   ALT (SGPT) U/L  --   --   --  16   AST (SGOT) U/L  --   --   --  28   TROPONIN T ng/mL  --   --   --  <0.010   PROBNP pg/mL  --   --   --  123.4     Estimated Creatinine Clearance: 64 mL/min (by C-G formula based on SCr of 0.6 mg/dL).    Microbiology Results Abnormal     Procedure Component Value - Date/Time    Urine Culture - Urine, Urine, Clean Catch [[id number]] Collected: 12/08/20 1024    Lab Status: Final result Specimen: Urine, Clean Catch Updated: 12/09/20 1733     Urine Culture 25,000 CFU/mL Normal Urogenital Mary    Blood Culture - Blood, Arm, Right [[id number]] Collected: 12/08/20 1540    Lab Status: Preliminary result Specimen: Blood from Arm, Right Updated: 12/09/20 1615     Blood Culture No growth at 24 hours    Blood Culture - Blood, Arm, Left [[id number]] Collected: 12/08/20 1545    Lab Status: Preliminary result Specimen: Blood from Arm, Left Updated: 12/09/20 1615     Blood Culture No growth at 24 hours    COVID-19,CEPHEID,DANUTA IN-HOUSE(OR EMERGENT/ADD-ON),NP SWAB IN TRANSPORT MEDIA 3-4 HR TAT - Swab, Nasopharynx [[id number]]  (Normal) Collected: 12/08/20 1107    Lab Status: Final result Specimen: Swab from Nasopharynx Updated: 12/08/20 1206     COVID19 Not Detected    Narrative:      Fact sheet for providers: https://www.fda.gov/media/908544/download     Fact sheet for patients: https://www.fda.gov/media/314204/download          Imaging Results (Last 24 Hours)     Procedure Component Value Units Date/Time    XR Chest 1 View [[id number]] Collected: 12/09/20 1119     Updated: 12/09/20 1702    Narrative:         EXAMINATION: XR CHEST 1 VW - 12/09/2020     INDICATION: R19.00-Intra-abdominal and pelvic swelling, mass and  lump,  unspecified site; R19.07-Generalized intra-abdominal and pelvic  swelling, mass and lump; R16.0-Hepatomegaly, not elsewhere classified;  N13.30-Unspecified hydronephrosis; D64.9-Anemia, unspecified;  N93.9-Abnormal uterine and vaginal bleeding, unspecified;  R10.9-Unspecified abdominal pain. Fever.     COMPARISON: 10/17/2020     FINDINGS: Portable chest reveals mild elevation seen the right  hemidiaphragm. Mild increased markings of the right lung base.  Portacatheter on the right tip in the SVC with no pneumothorax.  Degenerative changes seen within the spine. Nasogastric tube and deep  line catheter on the right previously visualized have been removed.         Impression:      Placement of a portacatheter on the right tip in the SVC. No  pneumothorax with minimal increased markings again seen at the right  lung base and elevation of the right hemidiaphragm.     DICTATED:   12/09/2020  EDITED/ls :   12/09/2020      This report was finalized on 12/9/2020 4:59 PM by Dr. Ashley Waters MD.             Results for orders placed during the hospital encounter of 10/16/20   Adult Transthoracic Echo Complete W/ Cont if Necessary Per Protocol    Narrative · Estimated left ventricular EF = 70%  · Mild tricuspid valve regurgitation is present.  · Estimated right ventricular systolic pressure from tricuspid   regurgitation is 37 mmHg.          I have reviewed the medications:  Scheduled Meds:[START ON 12/11/2020] Pharmacy Consult, , Does not apply, Once  clonazePAM, 0.5 mg, Oral, Q12H  docusate sodium, 100 mg, Oral, BID  isosorbide mononitrate, 60 mg, Oral, Daily  lactulose, 20 g, Oral, Daily  oxyCODONE, 7.5 mg, Oral, TID  piperacillin-tazobactam, 3.375 g, Intravenous, Q8H  polyethylene glycol, 17 g, Oral, Daily  sodium chloride, 10 mL, Intravenous, Q12H  vancomycin, 750 mg, Intravenous, Q12H      Continuous Infusions:Pharmacy to dose vancomycin,       PRN Meds:.acetaminophen  •  albuterol  •  bisacodyl  •   HYDROmorphone **AND** naloxone  •  magnesium sulfate **OR** magnesium sulfate **OR** magnesium sulfate  •  mineral oil  •  oxyCODONE  •  Pharmacy to dose vancomycin  •  potassium chloride  •  potassium chloride  •  potassium chloride  •  sodium chloride  •  sodium chloride    Assessment/Plan   Assessment & Plan     Active Hospital Problems    Diagnosis  POA   • Vaginal bleeding [N93.9]  Yes      Resolved Hospital Problems   No resolved problems to display.        Brief Hospital Course to date:  Hailee Booker is a 65 y.o. 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  Presents with vaginal bleeding and uncontrolled pain.     This patient's problems and plans were partially entered by my partner and updated as appropriate by me 12/010/20.  All problems are new to me today     Vaginal bleeding, anemia  In setting of widespread ovarian cancer   - chemo started 12/3:  carboplatin/Taxol/bevacizumab   - Vaginal bleeding improved  --. H&H down to 7.2 will give one unit of blood since patient still having some vaginal bleeding   -  XRT with Dr. Ryan 5 treatment in total started on 12/9  - Gyn-Onc, Dr. Winter, has signed off   - Dr. Anderson to see (heme/onc) today and discuss care   -- H/H q 12 hours      Recurrent Fever, sepsis unknown source   Immunosuppressed   -Pt with fever of 102.8 12/9  -Blood Cx are pending  -Urine Cx normal urogenital candelario   - CXR minimal markings in right lung base   -zosyn and vanc started,consult  ID     Progressive bilateral hydronephrosis, presumably from compression by mass  - no CVA pain or tenderness  - creatinine nl; patient on chemo  - if creat rises or pain develops at CVAs, consider stenting.     Progressive pain R lower abd.   constipation  - Continue Oxycodone 10mg q4h prn   -- palliative following   -- pt refusing miralax, will start daily lactulose      Hypokalemia:   -Replaced and resolved      Abnl UA  - Cx 25,000  normal urogenital candelario      Social issues  -SW to see         DVT Prophylaxis:  [facility]      Disposition: I expect the patient to be discharged TBD after GOC and seen by Dr. Anderson. If radiation continued will need transportation to treatment     CODE STATUS:   Code Status and Medical Interventions:   Ordered at: 12/08/20 1525     Level Of Support Discussed With:    Patient     Code Status:    CPR     Medical Interventions (Level of Support Prior to Arrest):    Full       Evi Leonard, APRN  12/10/20

## 2020-12-10 NOTE — PLAN OF CARE
Problem: Adult Inpatient Plan of Care  Goal: Plan of Care Review  Recent Flowsheet Documentation  Taken 12/10/2020 1333 by Aby Palacios, PT  Progress: (PT eval) no change  Plan of Care Reviewed With: patient  Outcome Summary: Patient presents with generalized weakness, decreased balance, and poor activity tolerance affecting independence with mobility. She transferred B rolling and supine>sit with Min A, STS and transfer with Min A x2 and BUE support, demonstrating R lateral lean and decreased sequencing ability. She declined further activity. Skilled IP PT warranted. Recommend home with 24/7 assist and HHPT at discharge.

## 2020-12-10 NOTE — NURSING NOTE
"Consulted for colostomy assessment:     Patient has a LLQ end colostomy per Dr. Winter on 10/17/2020     Patient is well versed on appliance changes and stomal care.     Patient had large formed stool that was pushing her appliance off.     Area cleaned and applied a Gypsy New image 2 3/4\" cut at 38mm.   I cut it larger to allow for the thick hard stool to pass and prevent undermining of appliance.     Floor staff to manage her colostomy at this time.   See orders for care needs.     Will follow PRN and provide assistance as needed.   Contact [facility] if needs arise.     Thanks       "

## 2020-12-10 NOTE — THERAPY EVALUATION
Patient Name: Hailee Booker  : 1955    MRN: [id number]                              Today's Date: 12/10/2020       Admit Date: 2020    Visit Dx:     ICD-10-CM ICD-9-CM   1. Pelvic mass  R19.00 789.30   2. Generalized abdominal mass  R19.07 789.37   3. Liver mass  R16.0 573.8   4. Bilateral hydronephrosis  N13.30 591   5. Anemia, unspecified type  D64.9 285.9   6. Vagina bleeding  N93.9 623.8   7. Acute abdominal pain  R10.9 789.00     338.19   8. Impaired mobility and activities of daily living  Z74.09 V49.89    Z78.9    9. Vaginal bleeding  N93.9 623.8   10. Malignant neoplasm of both ovaries (CMS/HCC)  C56.1 183.0    C56.2      Patient Active Problem List   Diagnosis   • Seizures (CMS/HCC)   • Pelvic mass   • Rt Hydronephrosis 2* Pelvic Mass   • Malignant neoplasm of both ovaries (CMS/HCC)   • Recurrent malignant neoplasm of ovary (CMS/HCC)   • History of breast cancer   • History of pulmonary embolus (PE)   • History of DVT (deep vein thrombosis)   • Liver metastases (CMS/HCC)   • Large bowel obstruction (CMS/HCC)   • CHF (congestive heart failure) (CMS/HCC)   • Coronary artery disease   • Disease of thyroid gland   • Hypertension   • Hyperlipidemia   • Hyponatremia   • Malignant neoplasm of ovary (CMS/HCC)   • Vaginal bleeding     Past Medical History:   Diagnosis Date   • Arthritis    • Body piercing     ears   • Cancer (CMS/HCC)     breast cancer twice, basal cell carcinoma, ovarian   • CHF (congestive heart failure) (CMS/HCC)    • Colostomy in place (CMS/HCC) 10/17/2020   • Constipation    • COPD (chronic obstructive pulmonary disease) (CMS/HCC)    • Coronary artery disease    • Disease of thyroid gland    • DVT (deep venous thrombosis) (CMS/HCC)     Patient reported after CABG in  and that she had DVT x2   • Elevated cholesterol    • GERD (gastroesophageal reflux disease)    • Hepatitis C     Patient reported she has had treatment   • History of bronchitis    • History of transfusion  "    Patient reported no prior reaction   • Hx of CABG 2008    Patient reported 2 vessel - reported MI prior to this procedure   • Hyperlipidemia    • Hypertension    • Injury of back    • Latex allergy    • Myocardial infarction (CMS/[facility]) 11/2019    Patient reported \"very light\" and that she had medical attention [facility] in [facility] and had placement of 1 stent   • Seizures (CMS/[facility]) 11/24/2020    Patient reported since 1997 - epileptic.  Patient reported last seizure activity while in nursing home apx 20 days ago   • Sleep apnea     Patient reported prior use of CPAP and none since 2017 after weight loss   • Stroke (CMS/[facility])     Patient reported TIA x3 (reported last TIA 2003, 2010, 2015) - reported no residual issues from TIA's   • Tattoo     x1   • Wears glasses     OTC glasses for reading     Past Surgical History:   Procedure Laterality Date   • BREAST SURGERY     • CARDIAC CATHETERIZATION  11/2019    one stent   • CARDIAC SURGERY      CABG two vessel 2008   • COLONOSCOPY     • COLONOSCOPY N/A 10/12/2020    Procedure: COLONOSCOPY;  Surgeon: Himanshu Shen MD;  Location: [facility] ENDOSCOPY;  Service: Gastroenterology;  Laterality: N/A;   • COLOSTOMY N/A 10/17/2020    Procedure: LAPAROTOMY EXPLORATORY, COLOSTOMY, LYSIS OF ADHESIONS;  Surgeon: Juana Winter MD;  Location: [facility] OR;  Service: Gynecology Oncology;  Laterality: N/A;   • DILATATION AND CURETTAGE     • HYSTERECTOMY     • MASTECTOMY Bilateral     1979, 1989   • OTHER SURGICAL HISTORY      Patient reported \"torin in the right leg\"   • PORTACATH PLACEMENT Right 11/25/2020    Procedure: INSERTION OF PORTACATH RIGHT SUBCLAVIAN;  Surgeon: Himanshu Shen MD;  Location: [facility] OR;  Service: General;  Laterality: Right;   • SKIN BIOPSY      reported twice (2009 left shoulder, 2020 face)   • VAGINAL BIOPSY N/A 10/12/2020    Procedure: VAGINAL BIOPSY;  Surgeon: Himanshu Shen MD;  Location: [facility] ENDOSCOPY;  Service: Gastroenterology;  " Laterality: N/A;   • WISDOM TOOTH EXTRACTION       General Information     Row Name 12/10/20 1414          OT Time and Intention    Document Type  evaluation  -     Mode of Treatment  occupational therapy  -     Row Name 12/10/20 1414          General Information    Patient Profile Reviewed  yes  -     Prior Level of Function  independent:;all household mobility;transfer;ADL's  -     Existing Precautions/Restrictions  (S) seizures;fall;other (see comments) Brief with OOB activity  -     Barriers to Rehab  medically complex  -     Row Name 12/10/20 1414          Living Environment    Lives With  child(michele), adult  -     Row Name 12/10/20 1414          Home Main Entrance    Number of Stairs, Main Entrance  none  -     Row Name 12/10/20 1414          Stairs Within Home, Primary    Number of Stairs, Within Home, Primary  other (see comments) 12  -     Stair Railings, Within Home, Primary  railings on both sides of stairs  -     Row Name 12/10/20 1414          Cognition    Orientation Status (Cognition)  oriented x 4  -     Row Name 12/10/20 1414          Safety Issues, Functional Mobility    Safety Issues Affecting Function (Mobility)  insight into deficits/self-awareness;safety precaution awareness;safety precautions follow-through/compliance  -     Impairments Affecting Function (Mobility)  balance;endurance/activity tolerance;strength;range of motion (ROM)  -       User Key  (r) = Recorded By, (t) = Taken By, (c) = Cosigned By    Initials Name Provider Type     Traci Velazco OT Occupational Therapist        Mobility/ADL's     Row Name 12/10/20 1418          Bed Mobility    Bed Mobility  rolling left;rolling right;scooting/bridging;supine-sit  -     Rolling Left [place] (Bed Mobility)  minimum assist (75% patient effort);verbal cues  -     Rolling Right [place] (Bed Mobility)  minimum assist (75% patient effort);verbal cues  -     Scooting/Bridging [place] (Bed  Mobility)  minimum assist (75% patient effort);verbal cues  -     Supine-Sit [place] (Bed Mobility)  minimum assist (75% patient effort);verbal cues  -     Assistive Device (Bed Mobility)  bed rails;head of bed elevated  -     Comment (Bed Mobility)  VC's to sequence transitioning from supine to sit EOB. Pt. completed rolling L and R to don brief prior to OOB activity  -     Row Name 12/10/20 1418          Transfers    Transfers  sit-stand transfer;bed-chair transfer  -     Comment (Transfers)  VC's for hand placement and sequencing.  -     Bed-Chair [place] (Transfers)  minimum assist (75% patient effort);2 person assist;verbal cues  -     Assistive Device (Bed-Chair Transfers)  other (see comments) BUE support, Gait Belt  -     Sit-Stand [place] (Transfers)  minimum assist (75% patient effort);verbal cues;2 person assist  -     Row Name 12/10/20 1418          Sit-Stand Transfer    Assistive Device (Sit-Stand Transfers)  other (see comments) BUE support, Gait Belt  -     Row Name 12/10/20 1418          Activities of Daily Living    BADL Assessment/Intervention  lower body dressing;grooming  -     Row Name 12/10/20 1418          Lower Body Dressing Assessment/Training    [place] Level (Lower Body Dressing)  don;socks;contact guard assist  -     Position (Lower Body Dressing)  supine  -     Row Name 12/10/20 1418          Grooming Assessment/Training    [place] Level (Grooming)  wash face, hands;set up  -     Position (Grooming)  supported sitting  -       User Key  (r) = Recorded By, (t) = Taken By, (c) = Cosigned By    Initials Name Provider Type     Traci Velazco OT Occupational Therapist        Obj/Interventions     Row Name 12/10/20 1426          Sensory Assessment (Somatosensory)    Sensory Assessment (Somatosensory)  UE sensation intact  -     Row Name 12/10/20 1426          Vision Assessment/Intervention    Visual Impairment/Limitations  corrective  lenses for reading  -     Row Name 12/10/20 1426          Range of Motion Comprehensive    General Range of Motion  bilateral upper extremity ROM WFL  -     Row Name 12/10/20 1426          Strength Comprehensive (MMT)    General Manual Muscle Testing (MMT) Assessment  upper extremity strength deficits identified  -     Comment, General Manual Muscle Testing (MMT) Assessment  BUE grossly 4/5  -     Row Name 12/10/20 1426          Balance    Balance Assessment  sitting static balance;sitting dynamic balance;standing static balance;standing dynamic balance  -     Static Sitting Balance  mild impairment;supported;sitting, edge of bed  -LC     Dynamic Sitting Balance  moderate impairment;supported;sitting, edge of bed  -LC     Static Standing Balance  mild impairment;supported;standing  -LC     Dynamic Standing Balance  moderate impairment;supported;standing  -LC       User Key  (r) = Recorded By, (t) = Taken By, (c) = Cosigned By    Initials Name Provider Type     Traci Velazco OT Occupational Therapist        Goals/Plan     Row Name 12/10/20 1432          Bed Mobility Goal 1 (OT)    Activity/Assistive Device (Bed Mobility Goal 1, OT)  bed mobility activities, all;bed rails  -     [place] Level/Cues Needed (Bed Mobility Goal 1, OT)  contact guard assist;verbal cues required  -     Time Frame (Bed Mobility Goal 1, OT)  10 days;long term goal (LTG)  -     Progress/Outcomes (Bed Mobility Goal 1, OT)  goal ongoing  -     Row Name 12/10/20 1432          Transfer Goal 1 (OT)    Activity/Assistive Device (Transfer Goal 1, OT)  sit-to-stand/stand-to-sit;bed-to-chair/chair-to-bed;walker, rolling  -     [place] Level/Cues Needed (Transfer Goal 1, OT)  contact guard assist  -     Time Frame (Transfer Goal 1, OT)  10 days;long term goal (LTG)  -     Progress/Outcome (Transfer Goal 1, OT)  goal ongoing  -     Row Name 12/10/20 1432          Dressing Goal 1 (OT)    Activity/Device (Dressing  Goal 1, OT)  lower body dressing;upper body dressing  -     [place]/Cues Needed (Dressing Goal 1, OT)  standby assist;verbal cues required  -LC     Time Frame (Dressing Goal 1, OT)  10 days;long term goal (LTG)  -     Progress/Outcome (Dressing Goal 1, OT)  goal ongoing  -LC       User Key  (r) = Recorded By, (t) = Taken By, (c) = Cosigned By    Initials Name Provider Type     Traci Velazco, OT Occupational Therapist        Clinical Impression     Row Name 12/10/20 1428          Pain Assessment    Additional Documentation  Pain Scale: Numbers Pre/Post-Treatment (Group)  -     Row Name 12/10/20 1422          Pain Scale: Numbers Pre/Post-Treatment    Pretreatment Pain Rating  0/10 - no pain  -LC     Posttreatment Pain Rating  0/10 - no pain  -     Row Name 12/10/20 1428          Plan of Care Review    Plan of Care Reviewed With  patient  -LC     Progress  -- OT eval  -LC     Outcome Summary  OT eval completed. Pt. presents with general weakness, impaired activity tolerance, balance, and a decline in ADL performance. Pt. demonstrated bed mobility with Min A and T/Fs with Min A of 2. Pt. completed donning socks with CGA and washing face with SUA. Skilled OT services warranted to promote return to PLOF. Recommend home with 24/7 care and home health at discharge.  -     Row Name 12/10/20 1428          Therapy Assessment/Plan (OT)    Therapy Frequency (OT)  daily  -     Row Name 12/10/20 1428          Therapy Plan Review/Discharge Plan (OT)    Anticipated Discharge Disposition (OT)  home with 24/7 care;home with home health  -     Row Name 12/10/20 1428          Vital Signs    Pre Systolic BP Rehab  -- VSS, Cleared with RN for Tx.  -LC     Pre Patient Position  Supine  -LC     Intra Patient Position  Standing  -LC     Post Patient Position  Sitting  -     Row Name 12/10/20 1428          Positioning and Restraints    Pre-Treatment Position  in bed  -LC     Post Treatment Position  chair  -LC     In  Chair  notified nsg;reclined;encouraged to call for assist;call light within reach;exit alarm on;waffle cushion;legs elevated  -       User Key  (r) = Recorded By, (t) = Taken By, (c) = Cosigned By    Initials Name Provider Type    Traci Manjarrez OT Occupational Therapist        Outcome Measures     Row Name 12/10/20 1434          How much help from another is currently needed...    Putting on and taking off regular lower body clothing?  2  -LC     Bathing (including washing, rinsing, and drying)  2  -LC     Toileting (which includes using toilet bed pan or urinal)  2  -LC     Putting on and taking off regular upper body clothing  3  -LC     Taking care of personal grooming (such as brushing teeth)  3  -LC     Eating meals  3  -LC     AM-PAC 6 Clicks Score (OT)  15  -     Row Name 12/10/20 1434          Functional Assessment    Outcome Measure Options  AM-PAC 6 Clicks Daily Activity (OT)  -       User Key  (r) = Recorded By, (t) = Taken By, (c) = Cosigned By    Initials Name Provider Type    Traci Manjarrez OT Occupational Therapist        Occupational Therapy Education                 Title: PT OT SLP Therapies (In Progress)     Topic: Occupational Therapy (In Progress)     Point: ADL training (In Progress)     Description:   Instruct learner(s) on proper safety adaptation and remediation techniques during self care or transfers.   Instruct in proper use of assistive devices.              Learning Progress Summary           Patient Acceptance, E, NR by  at 12/10/2020 1339    Acceptance, E,TB, VU by HR at 12/9/2020 0046                   Point: Home exercise program (Done)     Description:   Instruct learner(s) on appropriate technique for monitoring, assisting and/or progressing therapeutic exercises/activities.              Learning Progress Summary           Patient Acceptance, E,TB, VU by HR at 12/9/2020 0046                   Point: Precautions (In Progress)     Description:   Instruct learner(s)  on prescribed precautions during self-care and functional transfers.              Learning Progress Summary           Patient Acceptance, E, NR by  at 12/10/2020 1339    Acceptance, E,TB, VU by HR at 12/9/2020 0046                   Point: Body mechanics (In Progress)     Description:   Instruct learner(s) on proper positioning and spine alignment during self-care, functional mobility activities and/or exercises.              Learning Progress Summary           Patient Acceptance, E, NR by  at 12/10/2020 1339    Acceptance, E,TB, VU by HR at 12/9/2020 0046                               User Key     Initials Effective Dates Name Provider Type Discipline     01/16/19 -  Brandi Rawls, RN Registered Nurse Nurse     07/18/19 -  Traci Velazco OT Occupational Therapist OT              OT Recommendation and Plan  Therapy Frequency (OT): daily  Plan of Care Review  Plan of Care Reviewed With: patient  Progress: (OT eval)  Outcome Summary: OT eval completed. Pt. presents with general weakness, impaired activity tolerance, balance, and a decline in ADL performance. Pt. demonstrated bed mobility with Min A and T/Fs with Min A of 2. Pt. completed donning socks with CGA and washing face with SUA. Skilled OT services warranted to promote return to PLOF. Recommend home with 24/7 care and home health at discharge.     Time Calculation:   Time Calculation- OT     Row Name 12/10/20 1435             Time Calculation- OT    OT Start Time  1339  -      OT Received On  12/10/20  -      OT Goal Re-Cert Due Date  12/20/20  -        User Key  (r) = Recorded By, (t) = Taken By, (c) = Cosigned By    Initials Name Provider Type     Traci Velazco OT Occupational Therapist        Therapy Charges for Today     Code Description Service Date Service Provider Modifiers Qty    [id number]  OT EVAL LOW COMPLEXITY 4 12/10/2020 Traci Velazco OT GO 1               Traci Velazco OT  12/10/2020

## 2020-12-11 NOTE — PROGRESS NOTES
Pharmacy Consult-Vancomycin Dosing  Hailee Booker is a  65 y.o. female receiving vancomycin therapy.     Indication: Sepsis  Consulting Provider: Hospitalist  ID Consult: No    Goal AUC: 400 - 600 mg/L*hr    Current Antimicrobial Therapy  Anti-Infectives (From admission, onward)      Ordered     Dose/Rate Route Frequency Start Stop    12/11/20 0614  vancomycin (VANCOCIN) in iso-osmotic dextrose IVPB 1 g (premix) 200 mL     Ordering Provider: Malvin Rahman IV, PharmD    1,000 mg  over 60 Minutes Intravenous Every 12 Hours Scheduled 12/11/20 0730 12/15/20 1759    12/09/20 1657  piperacillin-tazobactam (ZOSYN) 3.375 g in iso-osmotic dextrose 50 ml (premix)     Xiao Sparks, PharmD reviewed the order on 12/10/20 1414.   Ordering Provider: Sherine Keen DO    3.375 g  over 4 Hours Intravenous Every 8 Hours 12/10/20 0000 12/14/20 2359    12/09/20 1711  vancomycin 1500 mg/500 mL 0.9% NS IVPB ([facility])     Ordering Provider: Sixto Mcdonnell, PharmD    1,500 mg  over 90 Minutes Intravenous Once 12/09/20 1800 12/09/20 2043    12/09/20 1657  piperacillin-tazobactam (ZOSYN) 3.375 g in iso-osmotic dextrose 50 ml (premix)     Ordering Provider: Sherine Keen DO    3.375 g  over 30 Minutes Intravenous Once 12/09/20 1745 12/09/20 1848    12/09/20 1657  Pharmacy to dose vancomycin     Xiao Sparks, Khushboo reviewed the order on 12/10/20 1414.   Ordering Provider: Sherine Keen DO     Does not apply Continuous PRN 12/09/20 1656 12/12/20 1655    12/08/20 1443  cefTRIAXone (ROCEPHIN) 2 g/100 mL 0.9% NS IVPB ([facility])     Ordering Provider: Federico Roman APRN    2 g  over 30 Minutes Intravenous Once 12/08/20 1445 12/08/20 1656            Allergies  Allergies as of 12/08/2020 - Reviewed 12/08/2020   Allergen Reaction Noted    Lisinopril Cough 09/04/2020    Losartan Unknown - High Severity 09/04/2020    Citrus Cough 09/04/2020    Latex Itching 09/04/2020    Pollen extract Unknown - Low Severity 09/04/2020  "      Labs    Results from last 7 days   Lab Units 12/10/20  0412 12/09/20  0737 12/08/20  1048   BUN mg/dL 16 15 20   CREATININE mg/dL 0.60 0.56* 0.64       Results from last 7 days   Lab Units 12/10/20  0412 12/09/20  0737 12/08/20  1032   WBC 10*3/mm3 3.92 5.70 6.57       Evaluation of Dosing     Last Dose Received in the ED/Outside Facility:   Is Patient on Dialysis or Renal Replacement:     Ht - 165.1 cm (65\")  Wt - 59.4 kg (131 lb)    Estimated Creatinine Clearance: 64 mL/min (by C-G formula based on SCr of 0.6 mg/dL).    Intake & Output (last 3 days)         12/08 0701 - 12/09 0700 12/09 0701 - 12/10 0700 12/10 0701 - 12/11 0700    P.O.  1440 1090    Blood   320    IV Piggyback 1000 450 50    Total Intake(mL/kg) 1000 (16.8) 1890 (31.8) 1460 (24.6)    Urine (mL/kg/hr) 600 1050 (0.7) 900 (0.6)    Stool   350    Total Output 600 1050 1250    Net +400 +840 +210           Urine Unmeasured Occurrence   2 x            Microbiology and Radiology  Microbiology Results (last 10 days)       Procedure Component Value - Date/Time    Blood Culture - Blood, Hand, Right [[id number]] Collected: 12/09/20 1945    Lab Status: Preliminary result Specimen: Blood from Hand, Right Updated: 12/10/20 2015     Blood Culture No growth at 24 hours    Narrative:      Aerobic bottle only      Blood Culture - Blood, Arm, Right [[id number]] Collected: 12/09/20 1941    Lab Status: Preliminary result Specimen: Blood from Arm, Right Updated: 12/10/20 2015     Blood Culture No growth at 24 hours    Blood Culture - Blood, Arm, Left [[id number]] Collected: 12/08/20 1545    Lab Status: Preliminary result Specimen: Blood from Arm, Left Updated: 12/10/20 1615     Blood Culture No growth at 2 days    Blood Culture - Blood, Arm, Right [[id number]] Collected: 12/08/20 1540    Lab Status: Preliminary result Specimen: Blood from Arm, Right Updated: 12/10/20 1615     Blood Culture No growth at 2 days    COVID-19,CEPHEID,DANUTA IN-HOUSE(OR EMERGENT/ADD-ON),NP SWAB " IN TRANSPORT MEDIA 3-4 HR TAT - Swab, Nasopharynx [[id number]]  (Normal) Collected: 12/08/20 1107    Lab Status: Final result Specimen: Swab from Nasopharynx Updated: 12/08/20 1206     COVID19 Not Detected    Narrative:      Fact sheet for providers: https://www.fda.gov/media/730109/download     Fact sheet for patients: https://www.fda.gov/media/087269/download    Urine Culture - Urine, Urine, Clean Catch [[id number]] Collected: 12/08/20 1024    Lab Status: Final result Specimen: Urine, Clean Catch Updated: 12/09/20 1733     Urine Culture 25,000 CFU/mL Normal Urogenital Mary            Reported Vancomycin Levels          Results from last 7 days   Lab Units 12/11/20  0515   VANCOMYCIN TR mcg/mL 9.90     [facility] AUC Calculation:    Current AUC:   334 mg/L*hr    Predicted Steady State AUC on Current Dose: 385 mg/L*hr  _________________________________    Predicted Steady State AUC on New Dose:   511 mg/L*hr    Assessment/Plan:  1. Pharmacy to dose vancomycin for sepsis. Goal -600 mg/L*hr.  2. Current maintenance dose of vancomycin 750mg (~12mg/kg) IV Q12hr. Scr and UOP have remained stable  3. Trough this morning prior to 4th dose, drawn correctly but previous dose administered late. True trough lower than measured.  4. Exposure correlates to a subtherapeutic AUC of ~334 mg/L*hr  5. Dw RN, increase to 1 gm IV q12h starting now, further monitoring per rounding pharmacist pending reevaluation.    Thank you for this consult.  Malvin Rahman IV, PharmD, BCPS  12/11/2020  06:18 EST

## 2020-12-11 NOTE — CONSULTS
INFECTIOUS DISEASE CONSULT/INITIAL HOSPITAL VISIT    Hailee Booker  1955  [id number]    Date of Consult: 12/11/2020    Admission Date: 12/8/2020      Requesting Provider: Sherine Keen DO  Evaluating Physician: Chase Prasad MD    Reason for Consultation: sepsis unknown source, recurrent fever, immunosuppressed    History of present illness:    Patient is a 65 y.o. female with h/o CHF, COPD, CAD/stent/CABG, DVT, hypothyroidism, Hep C/treated, HTN, seizure d/o, TIAs, breast cancer/bilateral mastectomy, and ovarian cancer/hysterectomy/treatment at [facility] 2017 who we were asked to see for sepsis with recurrent fever in immunosuppressed patient.  The patient was noted to have rectal and vaginal mass of recent examinations with lesions biopsy and found to be adenocarcinoma.  A CT scan of a/p was concerning for obstructive disease and a large liver lesion.  She underwent a diverting ostomy placement. Sh is residing at a rehab facility with discharge next week.  A port a cath was placed 11/25/20 with chemotherapy first started on 12/3 with carbo/Taxol/Avastin for metastatic ovarian cancer.  She presented to [facility] ED on 12/8/20 with vaginal spotting that progressed to hakan blood when standing or sitting.  She was felt not to be a surgical candidate as well as a poor candidate for systemic chemotherapy.  She was started on palliative radiation on 12/9 with daily radiation therapy.  Work up during her stay shows intermittent fevers with 102.8 on 12/9 and 100.2 on 12/10.  She has been afebrile today.  Blood cultures are pending.  A UA WBC on 12/8 was TNTC with culture showing normal candelario.  Her hgb today is 7.8.  A COVID-19 screen was negative. A CT scan of chest shows   RLL atelectasis with no evidence of metastatic disease.  A CT scan of a/p showed enlarging exophytic right lobe liver mass, amorphous masslike pelvoabdominal cystic mass, worsening bilateral hydronephrosis, marked fecal stasis, and gradually  "enlarging pelvic sidewall and inguinal lymph nodes.  She is currently on Vancomycin and Zosyn.  ID was asked to evaluate and manage her antibiotic therapy.     Past Medical History:   Diagnosis Date   • Arthritis    • Body piercing     ears   • Cancer (CMS/[facility])     breast cancer twice, basal cell carcinoma, ovarian   • CHF (congestive heart failure) (CMS/[facility]) 2003   • Colostomy in place (CMS/[facility]) 10/17/2020   • Constipation    • COPD (chronic obstructive pulmonary disease) (CMS/[facility])    • Coronary artery disease    • Disease of thyroid gland    • DVT (deep venous thrombosis) (CMS/[facility])     Patient reported after CABG in 2008 and that she had DVT x2   • Elevated cholesterol    • GERD (gastroesophageal reflux disease)    • Hepatitis C     Patient reported she has had treatment   • History of bronchitis    • History of transfusion     Patient reported no prior reaction   • Hx of CABG 2008    Patient reported 2 vessel - reported MI prior to this procedure   • Hyperlipidemia    • Hypertension    • Injury of back    • Latex allergy    • Myocardial infarction (CMS/[facility]) 11/2019    Patient reported \"very light\" and that she had medical attention [facility] in [facility] and had placement of 1 stent   • Seizures (CMS/[facility]) 11/24/2020    Patient reported since 1997 - epileptic.  Patient reported last seizure activity while in nursing home apx 20 days ago   • Sleep apnea     Patient reported prior use of CPAP and none since 2017 after weight loss   • Stroke (CMS/[facility])     Patient reported TIA x3 (reported last TIA 2003, 2010, 2015) - reported no residual issues from TIA's   • Tattoo     x1   • Wears glasses     OTC glasses for reading       Past Surgical History:   Procedure Laterality Date   • BREAST SURGERY     • CARDIAC CATHETERIZATION  11/2019    one stent   • CARDIAC SURGERY      CABG two vessel 2008   • COLONOSCOPY     • COLONOSCOPY N/A 10/12/2020    Procedure: COLONOSCOPY;  Surgeon: Himanshu Shen MD;  Location: [facility]" "ENDOSCOPY;  Service: Gastroenterology;  Laterality: N/A;   • COLOSTOMY N/A 10/17/2020    Procedure: LAPAROTOMY EXPLORATORY, COLOSTOMY, LYSIS OF ADHESIONS;  Surgeon: Juana Winter MD;  Location: [facility] OR;  Service: Gynecology Oncology;  Laterality: N/A;   • DILATATION AND CURETTAGE     • HYSTERECTOMY     • MASTECTOMY Bilateral     ,    • OTHER SURGICAL HISTORY      Patient reported \"torin in the right leg\"   • PORTACATH PLACEMENT Right 2020    Procedure: INSERTION OF PORTACATH RIGHT SUBCLAVIAN;  Surgeon: Himanshu Shen MD;  Location: [facility] OR;  Service: General;  Laterality: Right;   • SKIN BIOPSY      reported twice ( left shoulder,  face)   • VAGINAL BIOPSY N/A 10/12/2020    Procedure: VAGINAL BIOPSY;  Surgeon: Himanshu Shen MD;  Location: [facility] ENDOSCOPY;  Service: Gastroenterology;  Laterality: N/A;   • WISDOM TOOTH EXTRACTION         Family History   Problem Relation Age of Onset   • Cancer Mother    • Hypertension Mother    • Hyperlipidemia Mother    • Stroke Father        Social History     Socioeconomic History   • Marital status: Legally      Spouse name: Not on file   • Number of children: Not on file   • Years of education: Not on file   • Highest education level: Not on file   Tobacco Use   • Smoking status: Former Smoker     Years: 10.00     Types: Cigarettes     Quit date: 1995     Years since quittin.2   • Smokeless tobacco: Never Used   Substance and Sexual Activity   • Alcohol use: Never     Frequency: Never   • Drug use: Never   • Sexual activity: Defer       Allergies   Allergen Reactions   • Lisinopril Cough   • Losartan Unknown - High Severity     HAS STOMACH ULCERS   • Citrus Cough     Citrus blossoms   • Latex Itching   • Pollen Extract Unknown - Low Severity     RED, ITCHY EYES         Medication:    Current Facility-Administered Medications:   •  acetaminophen (TYLENOL) tablet 650 mg, 650 mg, Oral, Q6H PRN, Sherine Keen, DO, 650 " mg at 12/10/20 1200  •  albuterol (PROVENTIL) nebulizer solution 0.083% 2.5 mg/3mL, 2.5 mg, Nebulization, Q4H PRN, Grace Johnson MD  •  bisacodyl (DULCOLAX) EC tablet 10 mg, 10 mg, Oral, Daily PRN, Sherine Keen DO, 10 mg at 12/11/20 1040  •  clonazePAM (KlonoPIN) tablet 0.5 mg, 0.5 mg, Oral, Q12H, Grace Johnson MD, 0.5 mg at 12/11/20 2140  •  HYDROmorphone (DILAUDID) injection 1 mg, 1 mg, Intravenous, Q2H PRN **AND** naloxone (NARCAN) injection 0.4 mg, 0.4 mg, Intravenous, Q5 Min PRN, Grace Johnson MD  •  isosorbide mononitrate (IMDUR) 24 hr tablet 60 mg, 60 mg, Oral, Daily, Grace Johnson MD, 60 mg at 12/11/20 1040  •  lactulose (CHRONULAC) 10 GM/15ML solution 20 g, 20 g, Oral, BID PRN, Joanna Agrawal, APRN  •  Magnesium Sulfate 2 gram Bolus, followed by 8 gram infusion (total Mg dose 10 grams)- Mg less than or equal to 1mg/dL, 2 g, Intravenous, PRN **OR** Magnesium Sulfate 2 gram / 50mL Infusion (GIVE X 3 BAGS TO EQUAL 6GM TOTAL DOSE) - Mg 1.1 - 1.5 mg/dl, 2 g, Intravenous, PRN, Last Rate: 25 mL/hr at 12/08/20 2143, 2 g at 12/08/20 2143 **OR** Magnesium Sulfate 4 gram infusion- Mg 1.6-1.9 mg/dL, 4 g, Intravenous, PRN, Grace Johnson MD, Last Rate: 25 mL/hr at 12/09/20 1621, 4 g at 12/09/20 1621  •  mineral oil enema 1 enema, 1 enema, Rectal, Once PRN, Joanna Agrawal, APRN  •  ondansetron (ZOFRAN) injection 4 mg, 4 mg, Intravenous, Q6H PRN, Jessa Moreno, APRN, 4 mg at 12/11/20 1149  •  oxyCODONE (ROXICODONE) immediate release tablet 5 mg, 5 mg, Oral, Q4H While Awake, Joanna Agrawal, ERIN, 5 mg at 12/11/20 2140  •  oxyCODONE (ROXICODONE) immediate release tablet 7.5 mg, 7.5 mg, Oral, Q4H PRN, Joanna Agrawal APRN  •  Pharmacy to dose vancomycin, , Does not apply, Continuous PRN, Sherine Keen DO  •  piperacillin-tazobactam (ZOSYN) 3.375 g in iso-osmotic dextrose 50 ml (premix), 3.375 g, Intravenous, Q8H, Sherine Keen, , 3.375 g at 12/11/20 1627  •  potassium chloride (KLOR-CON)  packet 40 mEq, 40 mEq, Oral, PRN, Grace Johnson MD  •  potassium chloride (MICRO-K) CR capsule 40 mEq, 40 mEq, Oral, PRN, Grace Johnson MD, 40 mEq at 12/08/20 1828  •  potassium chloride 10 mEq in 100 mL IVPB, 10 mEq, Intravenous, Q1H PRN, Grace Johnson MD, Last Rate: 100 mL/hr at 12/09/20 2307, 10 mEq at 12/09/20 2307  •  sennosides-docusate (PERICOLACE) 8.6-50 MG per tablet 2 tablet, 2 tablet, Oral, BID, Joanna Agrawal, APRN, 2 tablet at 12/11/20 2140  •  sodium chloride 0.9 % flush 10 mL, 10 mL, Intravenous, PRN, Kirk Carter MD  •  sodium chloride 0.9 % flush 10 mL, 10 mL, Intravenous, Q12H, Grace Johnson MD  •  sodium chloride 0.9 % flush 10 mL, 10 mL, Intravenous, PRN, Grace Johnson MD  •  vancomycin (VANCOCIN) in iso-osmotic dextrose IVPB 1 g (premix) 200 mL, 1,000 mg, Intravenous, Q12H, Malvin Rahman IV, PharmD, 1,000 mg at 12/11/20 1924    Antibiotics:  Anti-Infectives (From admission, onward)    Ordered     Dose/Rate Route Frequency Start Stop    12/11/20 0614  vancomycin (VANCOCIN) in iso-osmotic dextrose IVPB 1 g (premix) 200 mL     Xiao Sparks, PharmD reviewed the order on 12/11/20 1517.   Ordering Provider: Malvin Rahman IV, PharmD    1,000 mg  over 60 Minutes Intravenous Every 12 Hours Scheduled 12/11/20 0730 12/15/20 1759    12/09/20 1657  piperacillin-tazobactam (ZOSYN) 3.375 g in iso-osmotic dextrose 50 ml (premix)     Xiao Sparks, PharmD reviewed the order on 12/10/20 1414.   Ordering Provider: Sherine Keen DO    3.375 g  over 4 Hours Intravenous Every 8 Hours 12/10/20 0000 12/14/20 1629    12/09/20 1711  vancomycin 1500 mg/500 mL 0.9% NS IVPB ([facility])     Ordering Provider: Sixto Mcdonnell, PharmD    1,500 mg  over 90 Minutes Intravenous Once 12/09/20 1800 12/09/20 2043 12/09/20 1657  piperacillin-tazobactam (ZOSYN) 3.375 g in iso-osmotic dextrose 50 ml (premix)     Ordering Provider: Sherine Keen DO    3.375 g  over 30 Minutes Intravenous Once  20 1745 20 1848    20 1657  Pharmacy to dose vancomycin     Xiao Sparks, PharmD let the order  on 12/10/20 1414.   Ordering Provider: Sherine Keen DO     Does not apply Continuous PRN 20 1656 20 1655    20 1443  cefTRIAXone (ROCEPHIN) 2 g/100 mL 0.9% NS IVPB ([facility])     Ordering Provider: Federico Roman APRN    2 g  over 30 Minutes Intravenous Once 20 1445 20 1656            Review of Systems:  Constitutional-- + Fever, chills no sweats.  Appetite poor, and + malaise. + fatigue.  HEENT-- No new vision, hearing or throat complaints.  No epistaxis or oral sores.  Denies odynophagia or dysphagia. No headache, photophobia or neck stiffness.  CV-- No chest pain, palpitation or syncope  Resp-- No SOB/cough/Hemoptysis  GI- No nausea, vomiting, or diarrhea.  No hematochezia, melena, or hematemesis. Denies jaundice or chronic liver disease.  -- No dysuria, hematuria, or flank pain.  Denies hesitancy, urgency or burning with urination.  Vaginal bleeding.  Lymph- no swollen lymph nodes in neck/axilla or groin.   Heme- No active bruising or bleeding; no Hx of DVT or PE.  MS-- no swelling or pain in the bones or joints of arms/legs.  No new back pain.  Neuro-- No acute focal weakness or numbness in the arms or legs.  No seizures.  Skin--No rashes or lesions      Physical Exam:   Vital Signs  Temp (24hrs), Av.9 °F (37.2 °C), Min:98.5 °F (36.9 °C), Max:99.7 °F (37.6 °C)    Temp  Min: 98.5 °F (36.9 °C)  Max: 99.7 °F (37.6 °C)  BP  Min: 110/72  Max: 133/73  Pulse  Min: 91  Max: 105  Resp  Min: 16  Max: 19  SpO2  Min: 90 %  Max: 96 %    GENERAL: Awake and alert, in no acute distress.   HEENT: Normocephalic, atraumatic.  PERRL. EOMI. No conjunctival injection. No icterus. Oropharynx clear without evidence of thrush or exudate.  NECK: Supple without nuchal rigidity. No mass.  LYMPH: No cervical, axillary or inguinal lymphadenopathy.  HEART: RRR; 2/6 systolic  murmur  LUNGS: Clear to auscultation bilaterally without wheezing, rales, rhonchi. Normal respiratory effort. Nonlabored.  ABDOMEN: Soft, nontender, nondistended. Positive bowel sounds. No rebound or guarding. NO mass or HSM. Ostomy site okay  EXT:  No cyanosis, clubbing or edema. No cord.  :  Without Ulloa catheter. Normal external genitalia  MSK:  FROM, no joint effusion  SKIN: Warm and dry without cutaneous eruptions on Inspection/palpation.    NEURO: Oriented to PPT. Speech and cognition normal  PSYCHIATRIC: Normal insight and judgment. Cooperative with PE    Laboratory Data    Results from last 7 days   Lab Units 12/11/20  1951 12/11/20  0735 12/10/20  2020  12/10/20  0412  12/09/20  0737  12/08/20  1032   WBC 10*3/mm3  --   --   --   --  3.92  --  5.70  --  6.57   HEMOGLOBIN g/dL 7.5* 7.8* 7.5*   < > 7.2*   < > 8.1*   < > 8.8*   HEMATOCRIT % 24.0* 25.4* 23.7*   < > 23.6*   < > 25.9*   < > 28.5*   PLATELETS 10*3/mm3  --   --   --   --  231  --  319  --  383    < > = values in this interval not displayed.     Results from last 7 days   Lab Units 12/10/20  0412   SODIUM mmol/L 134*   POTASSIUM mmol/L 3.9   CHLORIDE mmol/L 96*   CO2 mmol/L 31.0*   BUN mg/dL 16   CREATININE mg/dL 0.60   GLUCOSE mg/dL 99   CALCIUM mg/dL 8.3*     Results from last 7 days   Lab Units 12/08/20  1048   ALK PHOS U/L 54   BILIRUBIN mg/dL 0.3   ALT (SGPT) U/L 16   AST (SGOT) U/L 28             Results from last 7 days   Lab Units 12/08/20  1545   LACTATE mmol/L 0.7         Results from last 7 days   Lab Units 12/11/20  0515   VANCOMYCIN TR mcg/mL 9.90     Estimated Creatinine Clearance: 64 mL/min (by C-G formula based on SCr of 0.6 mg/dL).      Microbiology:  Microbiology Results (last 10 days)     Procedure Component Value - Date/Time    Blood Culture - Blood, Hand, Right [[id number]] Collected: 12/09/20 1945    Lab Status: Preliminary result Specimen: Blood from Hand, Right Updated: 12/11/20 2015     Blood Culture No growth at 2 days     Narrative:      Aerobic bottle only      Blood Culture - Blood, Arm, Right [[id number]] Collected: 12/09/20 1941    Lab Status: Preliminary result Specimen: Blood from Arm, Right Updated: 12/11/20 2015     Blood Culture No growth at 2 days    Blood Culture - Blood, Arm, Left [[id number]] Collected: 12/08/20 1545    Lab Status: Preliminary result Specimen: Blood from Arm, Left Updated: 12/11/20 1615     Blood Culture No growth at 3 days    Blood Culture - Blood, Arm, Right [[id number]] Collected: 12/08/20 1540    Lab Status: Preliminary result Specimen: Blood from Arm, Right Updated: 12/11/20 1615     Blood Culture No growth at 3 days    COVID-19,CEPHEID,DANUTA IN-HOUSE(OR EMERGENT/ADD-ON),NP SWAB IN TRANSPORT MEDIA 3-4 HR TAT - Swab, Nasopharynx [[id number]]  (Normal) Collected: 12/08/20 1107    Lab Status: Final result Specimen: Swab from Nasopharynx Updated: 12/08/20 1206     COVID19 Not Detected    Narrative:      Fact sheet for providers: https://www.fda.gov/media/826339/download     Fact sheet for patients: https://www.fda.gov/media/810854/download    Urine Culture - Urine, Urine, Clean Catch [[id number]] Collected: 12/08/20 1024    Lab Status: Final result Specimen: Urine, Clean Catch Updated: 12/09/20 1733     Urine Culture 25,000 CFU/mL Normal Urogenital Mary            Radiology:  Imaging Results (Last 72 Hours)     Procedure Component Value Units Date/Time    CT Chest With Contrast [[id number]] Collected: 12/11/20 0958     Updated: 12/11/20 1010    Narrative:      EXAMINATION: CT CHEST W CONTRAST- 12/10/2020     INDICATION: Ovarian cancer staging; R19.00-Intra-abdominal and pelvic  swelling, mass and lump, unspecified site; R19.07-Generalized  intra-abdominal and pelvic swelling, mass and lump; R16.0-Hepatomegaly,  not elsewhere classified; N13.30-Unspecified hydronephrosis;  D64.9-Anemia, unspecified; N93.9-Abnormal uterine and vaginal bleeding,  unspecified; R10.9-Unspecified abdominal pain; Z74.09-Other  reduced  mobility     TECHNIQUE: 5 mm post IV contrast images through the chest and upper  abdomen     The radiation dose reduction device was turned on for each scan per the  ALARA (As Low as Reasonably Achievable) protocol.     COMPARISON: Chest radiograph 12/09/2020     FINDINGS: History indicates ovarian cancer, staging.     There is diffuse thyroid goiter. No mediastinal hilar, or axillary  adenopathy is seen. No pulmonary parenchymal nodule is identified. There  is trace right basilar effusion and atelectasis, which may be associated  with the patient's exophytic mass in the dome of the right liver lobe.  There is a small hiatal hernia. Bony structures appear to be intact.  Note is again made of the patient's exophytic right lobe liver lesion,  small low-density lesion inferior right liver lobe. There appears to be  mild fecal stasis.       Impression:      1. Trace right basilar effusion and mild right lower lobe atelectasis,  which may be associated with the patient's exophytic right lobe liver  lesion, as from diaphragmatic irritation.     2. No evidence of transdiaphragmatic spread of disease is seen.      3. No evidence of metastatic disease or other acute disease elsewhere in  the chest.     4. Incidentally noted goiter.     D:  12/11/2020  E:  12/11/2020          CT Abdomen Pelvis With Contrast [[id number]] Collected: 12/08/20 1411     Updated: 12/10/20 1700    Narrative:      EXAMINATION: CT ABDOMEN PELVIS W CONTRAST- 12/08/2020      INDICATION: Vaginal bleeding, pelvic mass.     TECHNIQUE: 5 mm post IV contrast portal venous phase and delayed venous  phase images through the abdomen and pelvis.     The radiation dose reduction device was turned on for each scan per the  ALARA (As Low as Reasonably Achievable) protocol.     COMPARISON: 10/21/2020 abdomen and pelvis CT scan.     FINDINGS: Previous 10/21/2020 exam report indicates bilateral  obstructive uropathy right greater than left,  heterogeneous  hypoenhancing 5 cm right lobe liver mass. Diffuse bowel dilatation.  History today indicates abnormal uterine bleeding, history of ovarian  cancer and hysterectomy.     There is trace right effusion and minimal right basilar atelectasis.  Right lobe liver lesion appears increased from approximately 5 cm to  approximately 7 cm, and now has an extrahepatic/capsular component,  suggesting it could actually be an implanted metastasis, as well as  hematogenous metastasis with new erosion into the extra hepatic soft  tissues. There appears to be a new small hypoenhancing lesion of the  right liver lobe inferiorly, 14 mm in diameter, although there is some  marginal peripheral enhancement and this could be an incidental  hemangioma better seen today due to phase of contrast enhancement. No  new liver lesions are seen elsewhere.     Spleen is not enlarged. No significant abnormalities are seen of the  pancreas or adrenal glands. Gallbladder is normally distended and normal  in appearance. There appears to be at least moderate fecal stasis.  Bilateral hydronephrosis, right greater than left appears to be  gradually increasing, right renal pelvis increasing in diameter from 38  mm to 43 mm, left renal pelvis increasing from 22 mm to 28 mm. IVC  filter is again noted. There is a large new pelvoabdominal cyst at least  12 x 14 cm in diameter, with no evidence of cyst wall enhancement,  internal debris or septation, occupying much of the right pelvis. Milder  cystic changes in the right lower pelvis appear a little increased.  Cystic change of the left adnexal region appears stable. Amorphous  appearance of the pelvis may represent diffuse metastatic implants here  given the patient's previous history of hysterectomy. Overall size of  this area appears very similar to prior exam up to 10 cm in maximal  diameter. No intra-abdominal free air or significant free ascites is  seen. No bowel wall inflammatory changes  are noted. There is normal  contrast opacification of the mesenteric vasculature. Delayed venous  phase images again show high-grade obstructive uropathy bilaterally,  presumably as a result of the patient's ill-defined mass/metastatic  implants. Additional note is made of mild further enlargement of some  shotty inguinal and pelvic sidewall lymph nodes.       Impression:      1. Enlarging, now partially exophytic right lobe liver mass compared to  10/21/2020 exam. Questionable small new right lobe liver lesion.  2. Amorphous masslike appearance of pelvis presumably pelvic metastatic  disease/metastatic implants, similar to prior study.  3. New approximately 12 x 14 cm pelvoabdominal cysts/cystic mass.  4. Worsening bilateral hydronephrosis, likely as a result of pelvic  disease.  5. Moderate to marked fecal stasis.  6. Gradually enlarging pelvic sidewall and inguinal lymph nodes.     D:  12/08/2020  E:  12/09/2020     This report was finalized on 12/10/2020 4:57 PM by Dr. Danny Juarez MD.       XR Chest 1 View [[id number]] Collected: 12/09/20 1119     Updated: 12/09/20 1702    Narrative:         EXAMINATION: XR CHEST 1 VW - 12/09/2020     INDICATION: R19.00-Intra-abdominal and pelvic swelling, mass and lump,  unspecified site; R19.07-Generalized intra-abdominal and pelvic  swelling, mass and lump; R16.0-Hepatomegaly, not elsewhere classified;  N13.30-Unspecified hydronephrosis; D64.9-Anemia, unspecified;  N93.9-Abnormal uterine and vaginal bleeding, unspecified;  R10.9-Unspecified abdominal pain. Fever.     COMPARISON: 10/17/2020     FINDINGS: Portable chest reveals mild elevation seen the right  hemidiaphragm. Mild increased markings of the right lung base.  Portacatheter on the right tip in the SVC with no pneumothorax.  Degenerative changes seen within the spine. Nasogastric tube and deep  line catheter on the right previously visualized have been removed.         Impression:      Placement of a portacatheter on  the right tip in the SVC. No  pneumothorax with minimal increased markings again seen at the right  lung base and elevation of the right hemidiaphragm.     DICTATED:   2020  EDITED/ls :   2020      This report was finalized on 2020 4:59 PM by Dr. Ashley Waters MD.           I read her radiographic studies.    Impression:   1. Sepsis with fevers, anemia, tachycardia,  May be hypovolemia vs tumor load +/- mixed pelvic infection. Will continue Zosyn for now.   2. Fevers in a immunocompromised host likely from tumor load vs mixed pelvic infection  3. Anemia with acute blood loss  4. Vaginal bleeding  5. Metastatic ovarian cancer/hysterctomy 2017 with treatment.  Now with colon tumor, vaginal tumor, liver lesion found in   6. Systemic chemotherapy 12/3 through port a cath placed 20.  Not felt to be a good candidate now for systemic chemotherapy. Now undergoing palliative radiation therapy.  7. COPD  8. CAD/stent/CABG  9. H/o DVT  10. Hypothyrodism  11. H/o Hep C/treated  12. Seizure d/o  13. H/o TIAs  14. Chronic probably systolic CHF  15. Breast cancer/bilateral mastectomy    PLAN/RECOMMENDATIONS:   Thank you for asking us to see Hailee HUMPHREYS Jhony, I recommend the followin. Monitor blood cultures  2. D/c Vancomycin  3. Continue Zosyn for now to cover possible intra-abdominal infection  4. Hospice is being consider as she is not a good surgical candidate or a good candidate for systemic chemotherapy.     Chase Prasad MD saw and examined patient, verified hx and PE, read all radiographic studies, reviewed labs and micro data, and formulated dx, plan for treatment and all medical decision making.      Johnson Daniels PA-C for MD Chase Lyons MD  2020  22:12 EST

## 2020-12-11 NOTE — PROGRESS NOTES
[facility] Medicine Services  PROGRESS NOTE    Patient Name: Hailee Booker  : 1955  MRN: [id number]    Date of Admission: 2020  Primary Care Physician: Sheila Bran APRN    Subjective   Subjective     CC:  F/u vaginal hemorrhage     HPI:  Resting in bed, NAD. Cont to have small amt of vaginal bleeding, no clots. She is awaiting Hospice today to discuss GOC. Cont to have mild pain in RLQ.     ROS:  Gen- No fevers, chills  CV- No chest pain, palpitations  Resp- No cough, dyspnea  GI- No N/V/D, + abd tenderness        Objective   Objective     Vital Signs:   Temp:  [98.5 °F (36.9 °C)-100.2 °F (37.9 °C)] 98.5 °F (36.9 °C)  Heart Rate:  [] 91  Resp:  [16-19] 16  BP: (105-133)/(53-86) 133/73        Physical Exam:  Constitutional: No acute distress, awake, alert  HENT: NCAT, mucous membranes moist, left vermillion border with scab and erythematous base  Respiratory: Clear to auscultation bilaterally, respiratory effort normal   Cardiovascular: RRR, no murmurs, rubs, or gallops  Gastrointestinal: Positive bowel sounds, soft, RLQ mildly tender, nondistended, LLQ ostomy   Musculoskeletal: No bilateral ankle edema  Psychiatric: Appropriate affect, cooperative  Neurologic: Oriented x 3, strength symmetric in all extremities, Cranial Nerves grossly intact to confrontation, speech clear  Skin: No rashes    Results Reviewed:  Results from last 7 days   Lab Units 20  0735 12/10/20  2020 12/10/20  0916 12/10/20  0412  20  0737  20  1048 20  1032   WBC 10*3/mm3  --   --   --  3.92  --  5.70  --   --  6.57   HEMOGLOBIN g/dL 7.8* 7.5* 8.2* 7.2*   < > 8.1*   < >  --  8.8*   HEMATOCRIT % 25.4* 23.7* 27.3* 23.6*   < > 25.9*   < >  --  28.5*   PLATELETS 10*3/mm3  --   --   --  231  --  319  --   --  383   INR   --   --   --   --   --   --   --   --  1.12   PROCALCITONIN ng/mL  --   --   --   --   --   --   --  0.12  --     < > = values in this interval not  displayed.     Results from last 7 days   Lab Units 12/10/20  0412 12/09/20  1217 12/09/20  0737 12/08/20  1048   SODIUM mmol/L 134*  --  131* 134*   POTASSIUM mmol/L 3.9 3.3* 3.6 3.0*   CHLORIDE mmol/L 96*  --  94* 94*   CO2 mmol/L 31.0*  --  29.0 31.0*   BUN mg/dL 16  --  15 20   CREATININE mg/dL 0.60  --  0.56* 0.64   GLUCOSE mg/dL 99  --  105* 88   CALCIUM mg/dL 8.3*  --  8.4* 9.0   ALT (SGPT) U/L  --   --   --  16   AST (SGOT) U/L  --   --   --  28   TROPONIN T ng/mL  --   --   --  <0.010   PROBNP pg/mL  --   --   --  123.4     Estimated Creatinine Clearance: 64 mL/min (by C-G formula based on SCr of 0.6 mg/dL).    Microbiology Results Abnormal     Procedure Component Value - Date/Time    Blood Culture - Blood, Arm, Right [[id number]] Collected: 12/09/20 1941    Lab Status: Preliminary result Specimen: Blood from Arm, Right Updated: 12/10/20 2015     Blood Culture No growth at 24 hours    Blood Culture - Blood, Hand, Right [[id number]] Collected: 12/09/20 1945    Lab Status: Preliminary result Specimen: Blood from Hand, Right Updated: 12/10/20 2015     Blood Culture No growth at 24 hours    Narrative:      Aerobic bottle only      Blood Culture - Blood, Arm, Right [[id number]] Collected: 12/08/20 1540    Lab Status: Preliminary result Specimen: Blood from Arm, Right Updated: 12/10/20 1615     Blood Culture No growth at 2 days    Blood Culture - Blood, Arm, Left [[id number]] Collected: 12/08/20 1545    Lab Status: Preliminary result Specimen: Blood from Arm, Left Updated: 12/10/20 1615     Blood Culture No growth at 2 days    Urine Culture - Urine, Urine, Clean Catch [[id number]] Collected: 12/08/20 1024    Lab Status: Final result Specimen: Urine, Clean Catch Updated: 12/09/20 1733     Urine Culture 25,000 CFU/mL Normal Urogenital Mary    COVID-19,CEPHEID,DANUTA IN-HOUSE(OR EMERGENT/ADD-ON),NP SWAB IN TRANSPORT MEDIA 3-4 HR TAT - Swab, Nasopharynx [[id number]]  (Normal) Collected: 12/08/20 1107    Lab Status: Final  result Specimen: Swab from Nasopharynx Updated: 12/08/20 1206     COVID19 Not Detected    Narrative:      Fact sheet for providers: https://www.fda.gov/media/503256/download     Fact sheet for patients: https://www.fda.gov/media/664532/download          Imaging Results (Last 24 Hours)     Procedure Component Value Units Date/Time    CT Chest With Contrast [[id number]] Collected: 12/11/20 0958     Updated: 12/11/20 1010    Narrative:      EXAMINATION: CT CHEST W CONTRAST- 12/10/2020     INDICATION: Ovarian cancer staging; R19.00-Intra-abdominal and pelvic  swelling, mass and lump, unspecified site; R19.07-Generalized  intra-abdominal and pelvic swelling, mass and lump; R16.0-Hepatomegaly,  not elsewhere classified; N13.30-Unspecified hydronephrosis;  D64.9-Anemia, unspecified; N93.9-Abnormal uterine and vaginal bleeding,  unspecified; R10.9-Unspecified abdominal pain; Z74.09-Other reduced  mobility     TECHNIQUE: 5 mm post IV contrast images through the chest and upper  abdomen     The radiation dose reduction device was turned on for each scan per the  ALARA (As Low as Reasonably Achievable) protocol.     COMPARISON: Chest radiograph 12/09/2020     FINDINGS: History indicates ovarian cancer, staging.     There is diffuse thyroid goiter. No mediastinal hilar, or axillary  adenopathy is seen. No pulmonary parenchymal nodule is identified. There  is trace right basilar effusion and atelectasis, which may be associated  with the patient's exophytic mass in the dome of the right liver lobe.  There is a small hiatal hernia. Bony structures appear to be intact.  Note is again made of the patient's exophytic right lobe liver lesion,  small low-density lesion inferior right liver lobe. There appears to be  mild fecal stasis.       Impression:      1. Trace right basilar effusion and mild right lower lobe atelectasis,  which may be associated with the patient's exophytic right lobe liver  lesion, as from diaphragmatic  irritation.     2. No evidence of transdiaphragmatic spread of disease is seen.      3. No evidence of metastatic disease or other acute disease elsewhere in  the chest.     4. Incidentally noted goiter.     D:  12/11/2020  E:  12/11/2020          CT Abdomen Pelvis With Contrast [[id number]] Collected: 12/08/20 1411     Updated: 12/10/20 1700    Narrative:      EXAMINATION: CT ABDOMEN PELVIS W CONTRAST- 12/08/2020      INDICATION: Vaginal bleeding, pelvic mass.     TECHNIQUE: 5 mm post IV contrast portal venous phase and delayed venous  phase images through the abdomen and pelvis.     The radiation dose reduction device was turned on for each scan per the  ALARA (As Low as Reasonably Achievable) protocol.     COMPARISON: 10/21/2020 abdomen and pelvis CT scan.     FINDINGS: Previous 10/21/2020 exam report indicates bilateral  obstructive uropathy right greater than left, heterogeneous  hypoenhancing 5 cm right lobe liver mass. Diffuse bowel dilatation.  History today indicates abnormal uterine bleeding, history of ovarian  cancer and hysterectomy.     There is trace right effusion and minimal right basilar atelectasis.  Right lobe liver lesion appears increased from approximately 5 cm to  approximately 7 cm, and now has an extrahepatic/capsular component,  suggesting it could actually be an implanted metastasis, as well as  hematogenous metastasis with new erosion into the extra hepatic soft  tissues. There appears to be a new small hypoenhancing lesion of the  right liver lobe inferiorly, 14 mm in diameter, although there is some  marginal peripheral enhancement and this could be an incidental  hemangioma better seen today due to phase of contrast enhancement. No  new liver lesions are seen elsewhere.     Spleen is not enlarged. No significant abnormalities are seen of the  pancreas or adrenal glands. Gallbladder is normally distended and normal  in appearance. There appears to be at least moderate fecal  stasis.  Bilateral hydronephrosis, right greater than left appears to be  gradually increasing, right renal pelvis increasing in diameter from 38  mm to 43 mm, left renal pelvis increasing from 22 mm to 28 mm. IVC  filter is again noted. There is a large new pelvoabdominal cyst at least  12 x 14 cm in diameter, with no evidence of cyst wall enhancement,  internal debris or septation, occupying much of the right pelvis. Milder  cystic changes in the right lower pelvis appear a little increased.  Cystic change of the left adnexal region appears stable. Amorphous  appearance of the pelvis may represent diffuse metastatic implants here  given the patient's previous history of hysterectomy. Overall size of  this area appears very similar to prior exam up to 10 cm in maximal  diameter. No intra-abdominal free air or significant free ascites is  seen. No bowel wall inflammatory changes are noted. There is normal  contrast opacification of the mesenteric vasculature. Delayed venous  phase images again show high-grade obstructive uropathy bilaterally,  presumably as a result of the patient's ill-defined mass/metastatic  implants. Additional note is made of mild further enlargement of some  shotty inguinal and pelvic sidewall lymph nodes.       Impression:      1. Enlarging, now partially exophytic right lobe liver mass compared to  10/21/2020 exam. Questionable small new right lobe liver lesion.  2. Amorphous masslike appearance of pelvis presumably pelvic metastatic  disease/metastatic implants, similar to prior study.  3. New approximately 12 x 14 cm pelvoabdominal cysts/cystic mass.  4. Worsening bilateral hydronephrosis, likely as a result of pelvic  disease.  5. Moderate to marked fecal stasis.  6. Gradually enlarging pelvic sidewall and inguinal lymph nodes.     D:  12/08/2020  E:  12/09/2020     This report was finalized on 12/10/2020 4:57 PM by Dr. Danny Juarez MD.             Results for orders placed during the  hospital encounter of 10/16/20   Adult Transthoracic Echo Complete W/ Cont if Necessary Per Protocol    Narrative · Estimated left ventricular EF = 70%  · Mild tricuspid valve regurgitation is present.  · Estimated right ventricular systolic pressure from tricuspid   regurgitation is 37 mmHg.          I have reviewed the medications:  Scheduled Meds:Pharmacy Consult, , Does not apply, Once  clonazePAM, 0.5 mg, Oral, Q12H  docusate sodium, 100 mg, Oral, BID  isosorbide mononitrate, 60 mg, Oral, Daily  lactulose, 20 g, Oral, BID  oxyCODONE, 7.5 mg, Oral, TID  piperacillin-tazobactam, 3.375 g, Intravenous, Q8H  sodium chloride, 10 mL, Intravenous, Q12H  vancomycin, 1,000 mg, Intravenous, Q12H      Continuous Infusions:Pharmacy to dose vancomycin,       PRN Meds:.acetaminophen  •  albuterol  •  bisacodyl  •  HYDROmorphone **AND** naloxone  •  magnesium sulfate **OR** magnesium sulfate **OR** magnesium sulfate  •  mineral oil  •  oxyCODONE  •  Pharmacy to dose vancomycin  •  potassium chloride  •  potassium chloride  •  potassium chloride  •  sodium chloride  •  sodium chloride    Assessment/Plan   Assessment & Plan     Active Hospital Problems    Diagnosis  POA   • Vaginal bleeding [N93.9]  Yes      Resolved Hospital Problems   No resolved problems to display.        Brief Hospital Course to date:  Hailee Booker is a 65 y.o. 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  Presents with vaginal bleeding and uncontrolled pain.     This patient's problems and plans were partially entered by my partner and updated as appropriate by me 12/010/20.  All problems are new to me today     Vaginal bleeding, anemia  In setting of widespread ovarian cancer   - chemo started 12/3:  carboplatin/Taxol/bevacizumab   - Vaginal bleeding improved  --. H&H down to 7.2 will give one unit of blood since patient still having some vaginal bleeding   -  XRT with Dr. Ryan 5  treatment in total started on 12/9- will be completed on 12/15 (no therapy over the weekend)  - Gyn-Onc, Dr. Winter, has signed off   - Dr. Anderson- Hem/Onc, consulted on 12/10 and discussed only moving forward with completion of radiation, no systemic treatments/ chemo  --hospice to see patient today to discuss GOC  -- H/H q 12 hours      Recurrent Fever, sepsis unknown source   Immunosuppressed   -Pt with fever of 102.8 12/9  -Blood Cx are pending  -Urine Cx normal urogenital candelario   - CXR minimal markings in right lung base   -zosyn and vanc started, consulted ID on 12/10- await recommendations      Progressive bilateral hydronephrosis, presumably from compression by mass  - no CVA pain or tenderness  - creatinine nl; patient on chemo  - if creat rises or pain develops at CVAs, consider stenting.     Progressive pain R lower abd.   constipation  - Continue Oxycodone 10mg q4h prn   -- palliative following   -- pt refusing miralax, will start daily lactulose      Hypokalemia:   -Replaced and resolved      Abnl UA  - Cx 25,000 normal urogenital candelario      Social issues  -SW to see         DVT Prophylaxis:  [facility]      Disposition: I expect the patient to be discharged after completion of radiation therapy on 12/15, likely home with hospice     CODE STATUS:   Code Status and Medical Interventions:   Ordered at: 12/08/20 2661     Level Of Support Discussed With:    Patient     Code Status:    CPR     Medical Interventions (Level of Support Prior to Arrest):    Full       Jessa Moreno, APRN  12/11/20

## 2020-12-11 NOTE — PROGRESS NOTES
Continued Stay Note  [facility]     Patient Name: Hailee Booker  MRN: [id number]  Today's Date: 12/11/2020    Admit Date: 12/8/2020    Discharge Plan     Row Name 12/11/20 0814       Plan    Plan  home with hospice vs home health    Plan Comments  CM reviewed MD notes and Per Dr. Anderson's note pt is now requesting to return home with hospice support. Hospice Consult is pending and CM following at this time. If pt declines Hospice services she is current with [facility] and orders are in EPIC CM will need to notify them of pt's discharge and fax clinicals.    Final Discharge Disposition Code  30 - still a patient        Discharge Codes    No documentation.             Wendy Wilson RN

## 2020-12-11 NOTE — PLAN OF CARE
Goal Outcome Evaluation:  Plan of Care Reviewed With: patient  Progress: no change  Outcome Summary: Pt abdominal pain seems to be improving with evacuation of some formed stool this evening, lactulose and other scheduled laxatives were given. PRN and scheduled pain meds given throughout shift. Pt received 1 unit PRBCs, tolerated well. VSS.

## 2020-12-11 NOTE — PROGRESS NOTES
Continued Stay Note  [facility]     Patient Name: Hailee Booker  MRN: [id number]  Today's Date: 12/11/2020    Admit Date: 12/8/2020    Discharge Plan     Row Name 12/11/20 1711       Plan    Plan  Home with [facility]    Plan Comments  Hospice referral received, chart reviewed. Visit made to pt regarding hospice referral. Pt stated is aware of the referral. Teaching done on hospice services, goals of care, plan of care. Pt stated does want hospice services, though will remain at the hospital until palliative radiation is complete. Radiation is scheduled to be completed on Tuesday 12/15. Will continue to follow. Please call [phone] if can be of further assistance.        Discharge Codes    No documentation.             Melinda Kwan RN

## 2020-12-11 NOTE — PLAN OF CARE
Problem: Adult Inpatient Plan of Care  Goal: Plan of Care Review  Recent Flowsheet Documentation  Taken 12/11/2020 5493 by Jolanta Mayen OT  Progress: improving  Plan of Care Reviewed With: patient  Outcome Summary: Pt. incontinent of urine upon therapist's arrival on herself, the bed, and onto the floor. Pt. aware that she had urinated. Bed mobility: CGA, LBB: max A, UBD: min A, LBD: max A, grooming: set up, STS: min A, bed to chair t/f: CGA using RW, standing balance: CGA. Pt. with improving functional mobility, but fatigues quickly. Recommend home with 24/7 assist.   Goal Outcome Evaluation:  Plan of Care Reviewed With: patient  Progress: improving  Outcome Summary: Pt. incontinent of urine upon therapist's arrival on herself, the bed, and onto the floor. Pt. aware that she had urinated. Bed mobility: CGA, LBB: max A, UBD: min A, LBD: max A, grooming: set up, STS: min A, bed to chair t/f: CGA using RW, standing balance: CGA. Pt. with improving functional mobility, but fatigues quickly. Recommend home with 24/7 assist.

## 2020-12-11 NOTE — PROGRESS NOTES
HEMATOLOGY/ONCOLOGY PROGRESS NOTE    Subjective      CC: Metastatic ovarian carcinoma    SUBJECTIVE:   No acute events overnight.  Patient continues with radiation.  Denies any worsening pain or bleeding.  Was able to sit up in the chair yesterday without having increased bleeding.  More somnolent this afternoon.  Appears mildly depressed from our conversation about hospice yesterday.  Patient tolerating liquids but has not tried to eat her lunch yet.        Past Medical History, Past Surgical History, Social History, Family History have been reviewed and are without significant changes except as mentioned.      Medications:  The current medication list was reviewed in the EMR    ALLERGIES:   Allergies   Allergen Reactions   • Lisinopril Cough   • Losartan Unknown - High Severity     HAS STOMACH ULCERS   • Citrus Cough     Citrus blossoms   • Latex Itching   • Pollen Extract Unknown - Low Severity     RED, ITCHY EYES       ROS:  A comprehensive 10 point review of systems was performed and was negative except as mentioned.      Objective      Vitals:    12/11/20 0500 12/11/20 0600 12/11/20 0700 12/11/20 0809   BP:    133/73   BP Location:    Left arm   Patient Position:    Lying   Pulse: 95 91 94 91   Resp:    16   Temp:    98.5 °F (36.9 °C)   TempSrc:    Oral   SpO2:  91% 96% 96%   Weight:       Height:            ECOG: 3    General: Fragile appearing, in no acute distress  HEENT: sclerae anicteric, oropharynx clear  Lymphatics: no cervical, supraclavicular, inguinal, or axillary adenopathy  Cardiovascular: regular rate and rhythm, no murmurs  Lungs: clear to auscultation bilaterally  Abdomen: soft, nontender, nondistended.  No palpable organomegaly  Extremities: no lower extremity edema  Skin: no rashes, lesions, bruising, or petechiae  Neuro: Alert and oriented x 3. Moves all extremities.    RECENT LABS:    Results from last 7 days   Lab Units 12/11/20  0735 12/10/20  2020 12/10/20  0916 12/10/20  0412   12/09/20  0737  12/08/20  1032   WBC 10*3/mm3  --   --   --  3.92  --  5.70  --  6.57   HEMOGLOBIN g/dL 7.8* 7.5* 8.2* 7.2*   < > 8.1*   < > 8.8*   PLATELETS 10*3/mm3  --   --   --  231  --  319  --  383    < > = values in this interval not displayed.     Results from last 7 days   Lab Units 12/10/20  0412 12/09/20  1217 12/09/20  0737 12/08/20  1048   SODIUM mmol/L 134*  --  131* 134*   POTASSIUM mmol/L 3.9 3.3* 3.6 3.0*   CO2 mmol/L 31.0*  --  29.0 31.0*   BUN mg/dL 16  --  15 20   CREATININE mg/dL 0.60  --  0.56* 0.64   GLUCOSE mg/dL 99  --  105* 88     Results from last 7 days   Lab Units 12/08/20  1048   AST (SGOT) U/L 28   ALT (SGPT) U/L 16   BILIRUBIN mg/dL 0.3   ALK PHOS U/L 54         Ct Chest With Contrast    Result Date: 12/11/2020  1. Trace right basilar effusion and mild right lower lobe atelectasis, which may be associated with the patient's exophytic right lobe liver lesion, as from diaphragmatic irritation.  2. No evidence of transdiaphragmatic spread of disease is seen.  3. No evidence of metastatic disease or other acute disease elsewhere in the chest.  4. Incidentally noted goiter.  D:  12/11/2020 E:  12/11/2020       Ct Abdomen Pelvis With Contrast    Result Date: 12/10/2020  1. Enlarging, now partially exophytic right lobe liver mass compared to 10/21/2020 exam. Questionable small new right lobe liver lesion. 2. Amorphous masslike appearance of pelvis presumably pelvic metastatic disease/metastatic implants, similar to prior study. 3. New approximately 12 x 14 cm pelvoabdominal cysts/cystic mass. 4. Worsening bilateral hydronephrosis, likely as a result of pelvic disease. 5. Moderate to marked fecal stasis. 6. Gradually enlarging pelvic sidewall and inguinal lymph nodes.  D:  12/08/2020 E:  12/09/2020  This report was finalized on 12/10/2020 4:57 PM by Dr. Danny Juarez MD.      Xr Chest 1 View    Result Date: 12/9/2020  Placement of a portacatheter on the right tip in the SVC. No pneumothorax with  minimal increased markings again seen at the right lung base and elevation of the right hemidiaphragm.  DICTATED:   12/09/2020 EDITED/ls :   12/09/2020  This report was finalized on 12/9/2020 4:59 PM by Dr. Ashley Waters MD.            Assessment   ASSESSMENT & PLAN:  Ms. Booker is a pleasant 65 y.o. female past medical history of hypertension, anxiety, CAD, GERD, bilateral breast cancer, ovarian cancer status post treatment at [facility] in 2017 who presented to  with worsening vaginal bleeding.     Metastatic ovarian cancer  -Likely source of her vaginal bleeding  -Not a surgical candidate per GYN/ONC  -Agree with radiation consult for palliative radiation.  Status post 3/5 doses which she will complete inpatient  -CT scans on presentation with significant interval progression of disease from her last scan  -She is status post 1 cycle of carboplatin/Taxol/Avastin  -Given her poor performance status and rapidly progressive disease, I do not feel like she would be a candidate for further systemic chemotherapy.  -Next generation sequencing pending she may be a candidate for targeted treatment however this is very unlikely  -Discussed patient's poor prognosis and likely lack of benefit from further systemic treatment.  Discussed palliative care including hospice which she, her daughter-in-law, and son are now agreeable to.  Patient would like to have home hospice at her son's home which she is currently residing.  -Palliative consulted and following at this time  -Patient requesting CT scan of her chest to further evaluate extent of disease.  No metastatic disease noted in the chest     Thank you for the consult.  We will continue to follow while inpatient.  Please do not hesitate to contact with any questions or concerns    Shelton Anderson MD  Hematology and Oncology    12/11/2020  13:23 EST

## 2020-12-11 NOTE — PROGRESS NOTES
"Palliative Care Progress Note    Date of Admission: 12/8/2020    Code Status:  Reviewed code status with patient, no change in status  Current Code Status     Date Active Code Status Order ID Comments User Context       12/8/2020 1525 CPR [id number]  Grace Johnson MD ED     Advance Care Planning Activity      Questions for Current Code Status     Question Answer Comment    Code Status CPR     Medical Interventions (Level of Support Prior to Arrest) Full     Level Of Support Discussed With Patient         Subjective:  Patient reports having formed stool per ostomy.   Abdominal pain is improving  Prns: x3 oxy IR 5mg.   No nausea, dyspnea or anxiety.  Eating lunch during visit.  Patient is still reporting having vaginal bleeding with standing. Reviewed with nursing, reports patient up in her room to chair yesterday without any vaginal bleeding.  Reviewed current scheduled and prn medications for route, type, dose, and frequency. Reviewed medical record  Pharmacy to dose vancomycin,       •  acetaminophen  •  albuterol  •  bisacodyl  •  HYDROmorphone **AND** naloxone  •  lactulose  •  magnesium sulfate **OR** magnesium sulfate **OR** magnesium sulfate  •  mineral oil  •  ondansetron  •  oxyCODONE  •  Pharmacy to dose vancomycin  •  potassium chloride  •  potassium chloride  •  potassium chloride  •  sodium chloride  •  sodium chloride    Objective:  PPS 50%  /74 (BP Location: Left arm, Patient Position: Lying)   Pulse 98   Temp 99 °F (37.2 °C) (Oral)   Resp 16   Ht 165.1 cm (65\")   Wt 59.4 kg (131 lb)   LMP  (LMP Unknown)   SpO2 96%   BMI 21.80 kg/m²    Intake & Output (last day)       12/10 0701 - 12/11 0700 12/11 0701 - 12/12 0700    P.O. 1090 240    Blood 320     IV Piggyback 50     Total Intake(mL/kg) 1460 (24.6) 240 (4)    Urine (mL/kg/hr) 900 (0.6) 300 (0.4)    Stool 350     Total Output 1250 300    Net +210 -60          Urine Unmeasured Occurrence 2 x 2 x        Lab Results (last 24 hours)     " Procedure Component Value Units Date/Time    Blood Culture - Blood, Arm, Left [[id number]] Collected: 12/08/20 1545    Specimen: Blood from Arm, Left Updated: 12/11/20 1615     Blood Culture No growth at 3 days    Blood Culture - Blood, Arm, Right [[id number]] Collected: 12/08/20 1540    Specimen: Blood from Arm, Right Updated: 12/11/20 1615     Blood Culture No growth at 3 days    Hemoglobin & Hematocrit, Blood [[id number]]  (Abnormal) Collected: 12/11/20 0735    Specimen: Blood Updated: 12/11/20 0752     Hemoglobin 7.8 g/dL      Hematocrit 25.4 %     Vancomycin, Trough [[id number]]  (Normal) Collected: 12/11/20 0515    Specimen: Blood Updated: 12/11/20 0545     Vancomycin Trough 9.90 mcg/mL     Hemoglobin & Hematocrit, Blood [[id number]]  (Abnormal) Collected: 12/10/20 2020    Specimen: Blood Updated: 12/10/20 2032     Hemoglobin 7.5 g/dL      Hematocrit 23.7 %     Blood Culture - Blood, Arm, Right [[id number]] Collected: 12/09/20 1941    Specimen: Blood from Arm, Right Updated: 12/10/20 2015     Blood Culture No growth at 24 hours    Blood Culture - Blood, Hand, Right [[id number]] Collected: 12/09/20 1945    Specimen: Blood from Hand, Right Updated: 12/10/20 2015     Blood Culture No growth at 24 hours    Narrative:      Aerobic bottle only          Imaging Results (Last 24 Hours)     Procedure Component Value Units Date/Time    CT Chest With Contrast [[id number]] Collected: 12/11/20 0958     Updated: 12/11/20 1010    Narrative:      EXAMINATION: CT CHEST W CONTRAST- 12/10/2020     INDICATION: Ovarian cancer staging; R19.00-Intra-abdominal and pelvic  swelling, mass and lump, unspecified site; R19.07-Generalized  intra-abdominal and pelvic swelling, mass and lump; R16.0-Hepatomegaly,  not elsewhere classified; N13.30-Unspecified hydronephrosis;  D64.9-Anemia, unspecified; N93.9-Abnormal uterine and vaginal bleeding,  unspecified; R10.9-Unspecified abdominal pain; Z74.09-Other reduced  mobility     TECHNIQUE: 5 mm post IV  contrast images through the chest and upper  abdomen     The radiation dose reduction device was turned on for each scan per the  ALARA (As Low as Reasonably Achievable) protocol.     COMPARISON: Chest radiograph 12/09/2020     FINDINGS: History indicates ovarian cancer, staging.     There is diffuse thyroid goiter. No mediastinal hilar, or axillary  adenopathy is seen. No pulmonary parenchymal nodule is identified. There  is trace right basilar effusion and atelectasis, which may be associated  with the patient's exophytic mass in the dome of the right liver lobe.  There is a small hiatal hernia. Bony structures appear to be intact.  Note is again made of the patient's exophytic right lobe liver lesion,  small low-density lesion inferior right liver lobe. There appears to be  mild fecal stasis.       Impression:      1. Trace right basilar effusion and mild right lower lobe atelectasis,  which may be associated with the patient's exophytic right lobe liver  lesion, as from diaphragmatic irritation.     2. No evidence of transdiaphragmatic spread of disease is seen.      3. No evidence of metastatic disease or other acute disease elsewhere in  the chest.     4. Incidentally noted goiter.     D:  12/11/2020  E:  12/11/2020              Physical Exam:  Gen: NAD, resting in bed  Skin: warm, dry   Eyes: LB, conjunctiva clear and moist   HEENT: oropharynx clear, moist  Resp/thorax: even effort, non labored, CTA   CV: RRR   ABD: soft, bowel sounds +, non distended  Ext: no edema, no redness   Neuro: alert, interactive, no myoclonus     Reviewed labs and diagnostic results.   Lab Results   Component Value Date    HGBA1C 5.30 10/16/2020     Results from last 7 days   Lab Units 12/11/20  0735  12/10/20  0412   WBC 10*3/mm3  --   --  3.92   HEMOGLOBIN g/dL 7.8*   < > 7.2*   HEMATOCRIT % 25.4*   < > 23.6*   PLATELETS 10*3/mm3  --   --  231    < > = values in this interval not displayed.     Results from last 7 days   Lab  Units 12/10/20  0412  12/08/20  1048   SODIUM mmol/L 134*   < > 134*   POTASSIUM mmol/L 3.9   < > 3.0*   CHLORIDE mmol/L 96*   < > 94*   CO2 mmol/L 31.0*   < > 31.0*   BUN mg/dL 16   < > 20   CREATININE mg/dL 0.60   < > 0.64   CALCIUM mg/dL 8.3*   < > 9.0   BILIRUBIN mg/dL  --   --  0.3   ALK PHOS U/L  --   --  54   ALT (SGPT) U/L  --   --  16   AST (SGOT) U/L  --   --  28   GLUCOSE mg/dL 99   < > 88    < > = values in this interval not displayed.       Impression: 65 y.o. female with metastatic ovarian cancer stage IVb with progressive hydronephrosis, starting radiation Rx x5 fractions    Plan:   Abdominal pain - will adjust analgesia med regimen, oxy IR 5mg every 4 hours while awake with prn oxy 7.5mg.      Constipation - adjust scheduled bowel med regimen, with scheduled senna/docusate    Goals of care discussion - patient shared that she understood there is no further chemotherapy and plan is to return home with hospice care.   Patient is still hoping to have much time in her life and living for her grandchildren; however they all live [place] of state.   Reviewed resuscitation efforts and expected outcomes with metastatic cancer, patient wants to remain full code.   Palliative care team continue to provide support to pt/family.   Reviewed and updated with nursing  Time: 50 minutes   > 50% time spent in counseling and education concerning current orders for symptom management with patient    Joanna Agrawal, APRN  [phone]  12/11/20  18:15 EST

## 2020-12-11 NOTE — PLAN OF CARE
Goal Outcome Evaluation:  Plan of Care Reviewed With: patient  Progress: no change  Outcome Summary: pain seems to be controlled with administration of PRN and scheduled oxycodone. Pt c/o nausea x1 this shift, zofran order obtained and administered, nausea relieved. OT worked with pt, pt sat up for around 15 minutes then requested to go back to bed since sitting up is uncomfortable for her. RN encouraged that she works as much as possible with OT since she will need to function at home, pt states she is able to stand and will be mobile at home. Colostomy producing soft stool and pt's abdomen nondistended today. VSS.  Problem: Adult Inpatient Plan of Care  Goal: Patient-Specific Goal (Individualized)  Outcome: Ongoing, Progressing     Problem: Adult Inpatient Plan of Care  Goal: Absence of Hospital-Acquired Illness or Injury  Outcome: Ongoing, Progressing  Intervention: Identify and Manage Fall Risk  Description: Perform standard risk assessment with a validated tool or comprehensive approach appropriate to the patient on admission; reassess fall risk frequently, with change in status or transfer to another level of care.  Communicate fall injury risk to interprofessional healthcare team.  Determine need for increased observation, equipment and environmental modification, such as low bed and signage, as well as supportive, nonskid footwear.  Adjust safety measures to individual developmental age, stage and identified risk factors.  Reinforce the importance of safety and physical activity with patient and family.  Perform regular intentional rounding to assess need for position change, pain assessment, personal needs, including assistance with toileting.  Recent Flowsheet Documentation  Taken 12/11/2020 1600 by Dee Dee Fournier, RN  Safety Promotion/Fall Prevention:   activity supervised   fall prevention program maintained   nonskid shoes/slippers when out of bed   safety round/check completed  Taken 12/11/2020 1400 by  Irlanda, Dee Dee, RN  Safety Promotion/Fall Prevention:   activity supervised   fall prevention program maintained   nonskid shoes/slippers when out of bed   safety round/check completed  Taken 12/11/2020 1200 by Dee Dee Fournier RN  Safety Promotion/Fall Prevention:   activity supervised   fall prevention program maintained   nonskid shoes/slippers when out of bed   safety round/check completed  Taken 12/11/2020 1041 by Dee Dee Fournier RN  Safety Promotion/Fall Prevention:   activity supervised   fall prevention program maintained   nonskid shoes/slippers when out of bed   safety round/check completed  Taken 12/11/2020 0800 by Dee Dee Fournier RN  Safety Promotion/Fall Prevention:   activity supervised   fall prevention program maintained   nonskid shoes/slippers when out of bed   safety round/check completed  Intervention: Prevent Skin Injury  Description: Assess skin risk on admission and at regular intervals throughout hospital stay.  Keep all areas of skin (especially folds) clean and dry.  Maintain adequate skin hydration.  Relieve and redistribute pressure and protect bony prominences; implement measures based on patient-specific risk factors.  Match turning and repositioning schedule to clinical condition.  Encourage weight shift frequently; assist with reposition if unable to complete independently.  Float heels off bed. Avoid pressure on the Achilles tendon.  Keep skin free from extended contact with medical devices.  Use aids (e.g., slide boards, mechanical lift) during transfer.  Recent Flowsheet Documentation  Taken 12/11/2020 1600 by Dee Dee Fournier RN  Body Position: weight shift assistance provided  Taken 12/11/2020 1400 by Dee Dee Fournier RN  Body Position: supine  Taken 12/11/2020 1200 by Dee Dee Fournier RN  Body Position: position maintained  Taken 12/11/2020 1041 by Dee Dee Fournier RN  Body Position: supine  Intervention: Prevent Infection  Description: Maintain skin and mucous membrane integrity;  promote hand, oral and pulmonary hygiene.  Optimize fluid balance, nutrition, sleep and glycemic control to maximize infection resistance.  Identify potential sources of infection early to prevent or mitigate progression of infection (e.g., wound, lines, devices).  Evaluate ongoing need for invasive devices; remove promptly when no longer indicated.  Recent Flowsheet Documentation  Taken 12/11/2020 0800 by Dee Dee Fournier RN  Infection Prevention: hand hygiene promoted

## 2020-12-11 NOTE — PLAN OF CARE
Goal Outcome Evaluation:  Plan of Care Reviewed With: patient  Progress: no change   A/Ox4 and VSS. Currently on 2LNC and NSR on the monitor. C/O pain and prn medication given. Will continue to monitor.

## 2020-12-11 NOTE — THERAPY TREATMENT NOTE
Patient Name: Hailee Booker  : 1955    MRN: [id number]                              Today's Date: 2020       Admit Date: 2020    Visit Dx:     ICD-10-CM ICD-9-CM   1. Pelvic mass  R19.00 789.30   2. Generalized abdominal mass  R19.07 789.37   3. Liver mass  R16.0 573.8   4. Bilateral hydronephrosis  N13.30 591   5. Anemia, unspecified type  D64.9 285.9   6. Vagina bleeding  N93.9 623.8   7. Acute abdominal pain  R10.9 789.00     338.19   8. Impaired mobility and activities of daily living  Z74.09 V49.89    Z78.9    9. Vaginal bleeding  N93.9 623.8   10. Malignant neoplasm of both ovaries (CMS/HCC)  C56.1 183.0    C56.2      Patient Active Problem List   Diagnosis   • Seizures (CMS/HCC)   • Pelvic mass   • Rt Hydronephrosis 2* Pelvic Mass   • Malignant neoplasm of both ovaries (CMS/HCC)   • Recurrent malignant neoplasm of ovary (CMS/HCC)   • History of breast cancer   • History of pulmonary embolus (PE)   • History of DVT (deep vein thrombosis)   • Liver metastases (CMS/HCC)   • Large bowel obstruction (CMS/HCC)   • CHF (congestive heart failure) (CMS/HCC)   • Coronary artery disease   • Disease of thyroid gland   • Hypertension   • Hyperlipidemia   • Hyponatremia   • Malignant neoplasm of ovary (CMS/HCC)   • Vaginal bleeding     Past Medical History:   Diagnosis Date   • Arthritis    • Body piercing     ears   • Cancer (CMS/HCC)     breast cancer twice, basal cell carcinoma, ovarian   • CHF (congestive heart failure) (CMS/HCC)    • Colostomy in place (CMS/HCC) 10/17/2020   • Constipation    • COPD (chronic obstructive pulmonary disease) (CMS/HCC)    • Coronary artery disease    • Disease of thyroid gland    • DVT (deep venous thrombosis) (CMS/HCC)     Patient reported after CABG in  and that she had DVT x2   • Elevated cholesterol    • GERD (gastroesophageal reflux disease)    • Hepatitis C     Patient reported she has had treatment   • History of bronchitis    • History of transfusion  "    Patient reported no prior reaction   • Hx of CABG 2008    Patient reported 2 vessel - reported MI prior to this procedure   • Hyperlipidemia    • Hypertension    • Injury of back    • Latex allergy    • Myocardial infarction (CMS/[facility]) 11/2019    Patient reported \"very light\" and that she had medical attention [facility] in [facility] and had placement of 1 stent   • Seizures (CMS/[facility]) 11/24/2020    Patient reported since 1997 - epileptic.  Patient reported last seizure activity while in nursing home apx 20 days ago   • Sleep apnea     Patient reported prior use of CPAP and none since 2017 after weight loss   • Stroke (CMS/[facility])     Patient reported TIA x3 (reported last TIA 2003, 2010, 2015) - reported no residual issues from TIA's   • Tattoo     x1   • Wears glasses     OTC glasses for reading     Past Surgical History:   Procedure Laterality Date   • BREAST SURGERY     • CARDIAC CATHETERIZATION  11/2019    one stent   • CARDIAC SURGERY      CABG two vessel 2008   • COLONOSCOPY     • COLONOSCOPY N/A 10/12/2020    Procedure: COLONOSCOPY;  Surgeon: Himanshu Shen MD;  Location: [facility] ENDOSCOPY;  Service: Gastroenterology;  Laterality: N/A;   • COLOSTOMY N/A 10/17/2020    Procedure: LAPAROTOMY EXPLORATORY, COLOSTOMY, LYSIS OF ADHESIONS;  Surgeon: Juana Winter MD;  Location: [facility] OR;  Service: Gynecology Oncology;  Laterality: N/A;   • DILATATION AND CURETTAGE     • HYSTERECTOMY     • MASTECTOMY Bilateral     1979, 1989   • OTHER SURGICAL HISTORY      Patient reported \"torin in the right leg\"   • PORTACATH PLACEMENT Right 11/25/2020    Procedure: INSERTION OF PORTACATH RIGHT SUBCLAVIAN;  Surgeon: Himanshu Shen MD;  Location: [facility] OR;  Service: General;  Laterality: Right;   • SKIN BIOPSY      reported twice (2009 left shoulder, 2020 face)   • VAGINAL BIOPSY N/A 10/12/2020    Procedure: VAGINAL BIOPSY;  Surgeon: Himanshu Shen MD;  Location: [facility] ENDOSCOPY;  Service: Gastroenterology;  " Laterality: N/A;   • WISDOM TOOTH EXTRACTION       General Information     Row Name 12/11/20 1515          OT Time and Intention    Document Type  therapy note (daily note)  -SD     Mode of Treatment  occupational therapy  -SD     Row Name 12/11/20 1515          General Information    Patient Profile Reviewed  yes  -SD     Existing Precautions/Restrictions  (S) seizures;fall  -SD     Barriers to Rehab  medically complex  -SD     Row Name 12/11/20 1515          Cognition    Orientation Status (Cognition)  oriented x 3  -SD     Row Name 12/11/20 1515          Safety Issues, Functional Mobility    Impairments Affecting Function (Mobility)  balance;endurance/activity tolerance;strength  -SD       User Key  (r) = Recorded By, (t) = Taken By, (c) = Cosigned By    Initials Name Provider Type    SD Jolanta Mayen, OT Occupational Therapist        Mobility/ADL's     Row Name 12/11/20 1549          Bed Mobility    Bed Mobility  rolling left;scooting/bridging;supine-sit  -SD     Rolling Left Kleberg (Bed Mobility)  contact guard  -SD     Scooting/Bridging Kleberg (Bed Mobility)  contact guard  -SD     Supine-Sit Kleberg (Bed Mobility)  contact guard  -SD     Assistive Device (Bed Mobility)  bed rails;head of bed elevated  -SD     Comment (Bed Mobility)  upon therapist's arrival, pt. had urinated on herself, bedsheets & into the floor  -SD     Row Name 12/11/20 1549          Transfers    Transfers  bed-chair transfer;sit-stand transfer  -SD     Bed-Chair Kleberg (Transfers)  1 person assist;contact guard  -SD     Assistive Device (Bed-Chair Transfers)  walker, front-wheeled  -SD     Sit-Stand Kleberg (Transfers)  minimum assist (75% patient effort);1 person assist  -SD     Row Name 12/11/20 1549          Sit-Stand Transfer    Assistive Device (Sit-Stand Transfers)  walker, front-wheeled  -SD     Row Name 12/11/20 1549          Activities of Daily Living    60105 - OT Self Care/Mgmt Minutes  20   -SD     BADL Assessment/Intervention  toileting;bathing;lower body dressing;upper body dressing;grooming  -SD     Row Name 12/11/20 1549          Lower Body Dressing Assessment/Training    Barnum Level (Lower Body Dressing)  doff;don;socks;pants/bottoms;maximum assist (25% patient effort)  -SD     Position (Lower Body Dressing)  edge of bed sitting;supported sitting;supported standing  -SD     Row Name 12/11/20 1549          Grooming Assessment/Training    [place] Level (Grooming)  wash face, hands;hair care, combing/brushing;set up  -SD     Position (Grooming)  supported sitting  -SD     Row Name 12/11/20 1549          Toileting Assessment/Training    [place] Level (Toileting)  change pad/brief;perform perineal hygiene;maximum assist (25% patient effort)  -SD     Position (Toileting)  supine;supported standing;supported sitting  -SD     Row Name 12/11/20 1549          Bathing Assessment/Intervention    Barnum Level (Bathing)  distal lower extremities/feet;proximal lower extremities;perineal area;maximum assist (25% patient effort)  -SD     Position (Bathing)  supine;supported standing;supported sitting  -SD     Row Name 12/11/20 1549          Upper Body Dressing Assessment/Training    Barnum Level (Upper Body Dressing)  doff;don;pajama/robe;minimum assist (75% patient effort)  -SD     Position (Upper Body Dressing)  edge of bed sitting  -SD       User Key  (r) = Recorded By, (t) = Taken By, (c) = Cosigned By    Initials Name Provider Type    Jolanta Salomon OT Occupational Therapist        Obj/Interventions     Row Name 12/11/20 1554          Balance    Static Sitting Balance  WFL;supported;sitting, edge of bed;sitting in chair  -SD     Dynamic Sitting Balance  mild impairment;sitting, edge of bed  -SD     Static Standing Balance  mild impairment;supported;standing  -SD     Dynamic Standing Balance  mild impairment;supported;standing  -SD       User Key  (r) = Recorded By, (t)  = Taken By, (c) = Cosigned By    Initials Name Provider Type    Jolanta Salomon, OT Occupational Therapist        Goals/Plan    No documentation.       Clinical Impression     Row Name 12/11/20 1554          Pain Assessment    Additional Documentation  Pain Scale: Numbers Pre/Post-Treatment (Group)  -SD     Row Name 12/11/20 1554          Pain Scale: Numbers Pre/Post-Treatment    Pretreatment Pain Rating  0/10 - no pain  -SD     Posttreatment Pain Rating  0/10 - no pain  -SD     Row Name 12/11/20 1554          Plan of Care Review    Plan of Care Reviewed With  patient  -SD     Progress  improving  -SD     Outcome Summary  Pt. incontinent of urine upon therapist's arrival on herself, the bed, and onto the floor. Pt. aware that she had urinated. Bed mobility: CGA, LBB: max A, UBD: min A, LBD: max A, grooming: set up, STS: min A, bed to chair t/f: CGA using RW, standing balance: CGA. Pt. with improving functional mobility, but fatigues quickly. Recommend home with 24/7 assist.  -SD     Row Name 12/11/20 1559          Therapy Assessment/Plan (OT)    Rehab Potential (OT)  good, to achieve stated therapy goals  -SD     Criteria for Skilled Therapeutic Interventions Met (OT)  yes;skilled treatment is necessary  -SD     Therapy Frequency (OT)  daily  -SD     Row Name 12/11/20 1554          Therapy Plan Review/Discharge Plan (OT)    Anticipated Discharge Disposition (OT)  home with 24/7 care;other (see comments) Hospice  -SD     Row Name 12/11/20 1558          Vital Signs    O2 Delivery Pre Treatment  room air  -SD     O2 Delivery Intra Treatment  room air  -SD     O2 Delivery Post Treatment  room air  -SD     Pre Patient Position  Supine  -SD     Intra Patient Position  Standing  -SD     Post Patient Position  Sitting  -SD     Row Name 12/11/20 1557          Positioning and Restraints    Pre-Treatment Position  in bed  -SD     Post Treatment Position  chair  -SD     In Chair  notified nsg;reclined;call light  within reach;encouraged to call for assist;exit alarm on;legs elevated  -SD       User Key  (r) = Recorded By, (t) = Taken By, (c) = Cosigned By    Initials Name Provider Type    Jolanta Salomon OT Occupational Therapist        Outcome Measures     Row Name 12/11/20 1515          How much help from another is currently needed...    Bathing (including washing, rinsing, and drying)  2  -SD     Toileting (which includes using toilet bed pan or urinal)  2  -SD     Putting on and taking off regular upper body clothing  3  -SD     Taking care of personal grooming (such as brushing teeth)  4  -SD     Eating meals  4  -SD     Row Name 12/11/20 1515          Functional Assessment    Outcome Measure Options  AM-PAC 6 Clicks Daily Activity (OT)  -SD       User Key  (r) = Recorded By, (t) = Taken By, (c) = Cosigned By    Initials Name Provider Type    Jolanta Salomon OT Occupational Therapist        Occupational Therapy Education                 Title: PT OT SLP Therapies (In Progress)     Topic: Occupational Therapy (In Progress)     Point: ADL training (In Progress)     Description:   Instruct learner(s) on proper safety adaptation and remediation techniques during self care or transfers.   Instruct in proper use of assistive devices.              Learning Progress Summary           Patient Acceptance, E, NR by LC at 12/10/2020 1339    Acceptance, E,TB, VU by HR at 12/9/2020 0046                   Point: Home exercise program (Done)     Description:   Instruct learner(s) on appropriate technique for monitoring, assisting and/or progressing therapeutic exercises/activities.              Learning Progress Summary           Patient Acceptance, E,TB, VU by HR at 12/9/2020 0046                   Point: Precautions (In Progress)     Description:   Instruct learner(s) on prescribed precautions during self-care and functional transfers.              Learning Progress Summary           Patient Acceptance, E, NR  by  at 12/10/2020 1339    Acceptance, E,TB, VU by  at 12/9/2020 0046                   Point: Body mechanics (In Progress)     Description:   Instruct learner(s) on proper positioning and spine alignment during self-care, functional mobility activities and/or exercises.              Learning Progress Summary           Patient Acceptance, E, NR by  at 12/10/2020 1339    Acceptance, E,TB, VU by HR at 12/9/2020 0046                               User Key     Initials Effective Dates Name Provider Type Discipline     01/16/19 -  Brandi Rawls, RN Registered Nurse Nurse     07/18/19 -  Traci Velazco OT Occupational Therapist OT              OT Recommendation and Plan  Therapy Frequency (OT): daily  Plan of Care Review  Plan of Care Reviewed With: patient  Progress: improving  Outcome Summary: Pt. incontinent of urine upon therapist's arrival on herself, the bed, and onto the floor. Pt. aware that she had urinated. Bed mobility: CGA, LBB: max A, UBD: min A, LBD: max A, grooming: set up, STS: min A, bed to chair t/f: CGA using RW, standing balance: CGA. Pt. with improving functional mobility, but fatigues quickly. Recommend home with 24/7 assist.     Time Calculation:   Time Calculation- OT     Row Name 12/11/20 1558 12/11/20 1549          Time Calculation- OT    OT Received On  12/11/20  -SD  --     OT Goal Re-Cert Due Date  12/20/20  -SD  --        Timed Charges    86146 - OT Self Care/Mgmt Minutes  --  20  -SD       User Key  (r) = Recorded By, (t) = Taken By, (c) = Cosigned By    Initials Name Provider Type    Jolanta Salomon OT Occupational Therapist        Therapy Charges for Today     Code Description Service Date Service Provider Modifiers Qty    [id number] HC OT SELF CARE/MGMT/TRAIN EA 15 MIN 12/11/2020 Jolanta Mayen OT GO 2               Jolanta Mayen OT  12/11/2020   Comment: Patient is planning pregnancy, so will treat topically first. Detail Level: Simple

## 2020-12-11 NOTE — PLAN OF CARE
"1300 Palliative Care Interdisciplinary Rounds - Palliative Care Team members present:  KELLIE Christianson DO; CRISTINA KHAN; GRANT Tellez RN, CHPN; YAKELIN May RN, CHPN; MALIA Summers [facility], [facility]-; LEONARD Holloway MDiv; MAYTE Kwan RN, CHPN    Problem: Palliative Care  Goal: Enhanced Quality of Life  Intervention: Optimize Psychosocial Wellbeing  Recent Flowsheet Documentation  Taken 12/11/2020 1216 by Aster Summers, MSW  Supportive Measures: (symptom assessment)   active listening utilized   positive reinforcement provided   verbalization of feelings encouraged   other (see comments)  1216 Visit at bedside with patient and CRISTINA KHAN - patient eating lunch, reports abdominal pain 5/10, denies dyspnea, states she is queasy with mild nausea, + anxiety.  Patient understands Hospice is recommended at this time - she has spoken with her son who agrees to home hospice plan.  Code status discussed, patient remains full code at this time - she sees anything else to be \"giving up\".     "

## 2020-12-12 NOTE — NURSING NOTE
WOC nurse consult for ostomy appliance leaking    Per RN Bartolome - attempted to put on ostomy appliance but it leaked.    Appliance changed - Skin slightly red, otherwise intact.    Cheney 2 piece cut a 38mm flat - used    Would recommend Gypsy 2 piece flexible 2 piece with closed end pouches    Patient stated she is going home with hospice daughter in law to care for her ostomy      No further WOC nurse need

## 2020-12-12 NOTE — PROGRESS NOTES
INFECTIOUS DISEASE Progress Note    Hailee Booker  1955  [id number]      Admission Date: 12/8/2020      Requesting Provider: Sherine Keen DO  Evaluating Physician: Chase Prasad MD    Reason for Consultation: sepsis unknown source, recurrent fever, immunosuppressed    History of present illness:    12/11/20: Patient is a 65 y.o. female with h/o CHF, COPD, CAD/stent/CABG, DVT, hypothyroidism, Hep C/treated, HTN, seizure d/o, TIAs, breast cancer/bilateral mastectomy, and ovarian cancer/hysterectomy/treatment at [facility] 2017 who we were asked to see for sepsis with recurrent fever in immunosuppressed patient.  The patient was noted to have rectal and vaginal mass of recent examinations with lesions biopsy and found to be adenocarcinoma.  A CT scan of a/p was concerning for obstructive disease and a large liver lesion.  She underwent a diverting ostomy placement. Sh is residing at a rehab facility with discharge next week.  A port a cath was placed 11/25/20 with chemotherapy first started on 12/3 with carbo/Taxol/Avastin for metastatic ovarian cancer.  She presented to [facility] ED on 12/8/20 with vaginal spotting that progressed to hakan blood when standing or sitting.  She was felt not to be a surgical candidate as well as a poor candidate for systemic chemotherapy.  She was started on palliative radiation on 12/9 with daily radiation therapy.  Work up during her stay shows intermittent fevers with 102.8 on 12/9 and 100.2 on 12/10.  She has been afebrile today.  Blood cultures are pending.  A UA WBC on 12/8 was TNTC with culture showing normal candelario.  Her hgb today is 7.8.  A COVID-19 screen was negative. A CT scan of chest shows   RLL atelectasis with no evidence of metastatic disease.  A CT scan of a/p showed enlarging exophytic right lobe liver mass, amorphous masslike pelvoabdominal cystic mass, worsening bilateral hydronephrosis, marked fecal stasis, and gradually enlarging pelvic sidewall and inguinal lymph  "nodes.  She is currently on Vancomycin and Zosyn.  ID was asked to evaluate and manage her antibiotic therapy.    12/12/20: She has been afebrile overnight. Cultures have remained negative. She is undergoing palliative radiation which will be completed on 12/15.  She denies increased abdominal pain.    Past Medical History:   Diagnosis Date   • Arthritis    • Body piercing     ears   • Cancer (CMS/[facility])     breast cancer twice, basal cell carcinoma, ovarian   • CHF (congestive heart failure) (CMS/[facility]) 2003   • Colostomy in place (CMS/[facility]) 10/17/2020   • Constipation    • COPD (chronic obstructive pulmonary disease) (CMS/[facility])    • Coronary artery disease    • Disease of thyroid gland    • DVT (deep venous thrombosis) (CMS/[facility])     Patient reported after CABG in 2008 and that she had DVT x2   • Elevated cholesterol    • GERD (gastroesophageal reflux disease)    • Hepatitis C     Patient reported she has had treatment   • History of bronchitis    • History of transfusion     Patient reported no prior reaction   • Hx of CABG 2008    Patient reported 2 vessel - reported MI prior to this procedure   • Hyperlipidemia    • Hypertension    • Injury of back    • Latex allergy    • Myocardial infarction (CMS/[facility]) 11/2019    Patient reported \"very light\" and that she had medical attention [facility] in [facility] and had placement of 1 stent   • Seizures (CMS/[facility]) 11/24/2020    Patient reported since 1997 - epileptic.  Patient reported last seizure activity while in nursing home apx 20 days ago   • Sleep apnea     Patient reported prior use of CPAP and none since 2017 after weight loss   • Stroke (CMS/[facility])     Patient reported TIA x3 (reported last TIA 2003, 2010, 2015) - reported no residual issues from TIA's   • Tattoo     x1   • Wears glasses     OTC glasses for reading       Past Surgical History:   Procedure Laterality Date   • BREAST SURGERY     • CARDIAC CATHETERIZATION  11/2019    one stent   • CARDIAC SURGERY      " "CABG two vessel    • COLONOSCOPY     • COLONOSCOPY N/A 10/12/2020    Procedure: COLONOSCOPY;  Surgeon: Himanshu Shen MD;  Location: [facility] ENDOSCOPY;  Service: Gastroenterology;  Laterality: N/A;   • COLOSTOMY N/A 10/17/2020    Procedure: LAPAROTOMY EXPLORATORY, COLOSTOMY, LYSIS OF ADHESIONS;  Surgeon: Juana Winter MD;  Location: [facility] OR;  Service: Gynecology Oncology;  Laterality: N/A;   • DILATATION AND CURETTAGE     • HYSTERECTOMY     • MASTECTOMY Bilateral     1989   • OTHER SURGICAL HISTORY      Patient reported \"torin in the right leg\"   • PORTACATH PLACEMENT Right 2020    Procedure: INSERTION OF PORTACATH RIGHT SUBCLAVIAN;  Surgeon: Himanshu Shen MD;  Location: [facility] OR;  Service: General;  Laterality: Right;   • SKIN BIOPSY      reported twice ( left shoulder,  face)   • VAGINAL BIOPSY N/A 10/12/2020    Procedure: VAGINAL BIOPSY;  Surgeon: Himanshu Shen MD;  Location: [facility] ENDOSCOPY;  Service: Gastroenterology;  Laterality: N/A;   • WISDOM TOOTH EXTRACTION         Family History   Problem Relation Age of Onset   • Cancer Mother    • Hypertension Mother    • Hyperlipidemia Mother    • Stroke Father        Social History     Socioeconomic History   • Marital status: Legally      Spouse name: Not on file   • Number of children: Not on file   • Years of education: Not on file   • Highest education level: Not on file   Tobacco Use   • Smoking status: Former Smoker     Years: 10.00     Types: Cigarettes     Quit date: 1995     Years since quittin.2   • Smokeless tobacco: Never Used   Substance and Sexual Activity   • Alcohol use: Never     Frequency: Never   • Drug use: Never   • Sexual activity: Defer       Allergies   Allergen Reactions   • Lisinopril Cough   • Losartan Unknown - High Severity     HAS STOMACH ULCERS   • Citrus Cough     Citrus blossoms   • Latex Itching   • Pollen Extract Unknown - Low Severity     RED, ITCHY EYES "         Medication:    Current Facility-Administered Medications:   •  acetaminophen (TYLENOL) tablet 650 mg, 650 mg, Oral, Q6H PRN, Sherine Keen DO, 650 mg at 12/10/20 1200  •  albuterol (PROVENTIL) nebulizer solution 0.083% 2.5 mg/3mL, 2.5 mg, Nebulization, Q4H PRN, Grace Johnson MD  •  bisacodyl (DULCOLAX) EC tablet 10 mg, 10 mg, Oral, Daily PRN, Sherine Keen DO, 10 mg at 12/12/20 0550  •  clonazePAM (KlonoPIN) tablet 0.5 mg, 0.5 mg, Oral, Q12H, Grace Johnson MD, 0.5 mg at 12/11/20 2140  •  HYDROmorphone (DILAUDID) injection 1 mg, 1 mg, Intravenous, Q2H PRN **AND** naloxone (NARCAN) injection 0.4 mg, 0.4 mg, Intravenous, Q5 Min PRN, Grace Johnson MD  •  isosorbide mononitrate (IMDUR) 24 hr tablet 60 mg, 60 mg, Oral, Daily, Grace Johnson MD, 60 mg at 12/11/20 1040  •  lactulose (CHRONULAC) 10 GM/15ML solution 20 g, 20 g, Oral, BID PRN, Joanna Agrawal, APRN  •  Magnesium Sulfate 2 gram Bolus, followed by 8 gram infusion (total Mg dose 10 grams)- Mg less than or equal to 1mg/dL, 2 g, Intravenous, PRN **OR** Magnesium Sulfate 2 gram / 50mL Infusion (GIVE X 3 BAGS TO EQUAL 6GM TOTAL DOSE) - Mg 1.1 - 1.5 mg/dl, 2 g, Intravenous, PRN, Last Rate: 25 mL/hr at 12/08/20 2143, 2 g at 12/08/20 2143 **OR** Magnesium Sulfate 4 gram infusion- Mg 1.6-1.9 mg/dL, 4 g, Intravenous, PRN, Grace Johnson MD, Last Rate: 25 mL/hr at 12/09/20 1621, 4 g at 12/09/20 1621  •  mineral oil enema 1 enema, 1 enema, Rectal, Once PRN, Joanna Agrawal, APRN  •  ondansetron (ZOFRAN) injection 4 mg, 4 mg, Intravenous, Q6H PRN, Jessa Moreno, APRN, 4 mg at 12/11/20 1149  •  oxyCODONE (ROXICODONE) immediate release tablet 5 mg, 5 mg, Oral, Q4H While Awake, Joanna Agrawal APRN, 5 mg at 12/12/20 0544  •  oxyCODONE (ROXICODONE) immediate release tablet 7.5 mg, 7.5 mg, Oral, Q4H PRN, Joanna Agrawla APRN, 7.5 mg at 12/12/20 0345  •  Pharmacy to dose vancomycin, , Does not apply, Continuous PRN, Sherine Keen, DO  •   piperacillin-tazobactam (ZOSYN) 3.375 g in iso-osmotic dextrose 50 ml (premix), 3.375 g, Intravenous, Q8H, Sherine Keen DO, 3.375 g at 12/12/20 0008  •  potassium chloride (KLOR-CON) packet 40 mEq, 40 mEq, Oral, PRN, Grace Johnson MD  •  potassium chloride (MICRO-K) CR capsule 40 mEq, 40 mEq, Oral, PRN, Grace Johnson MD, 40 mEq at 12/08/20 1828  •  potassium chloride 10 mEq in 100 mL IVPB, 10 mEq, Intravenous, Q1H PRN, Grace Johnson MD, Last Rate: 100 mL/hr at 12/09/20 2307, 10 mEq at 12/09/20 2307  •  sennosides-docusate (PERICOLACE) 8.6-50 MG per tablet 2 tablet, 2 tablet, Oral, BID, Joanna Agrawal, APRN, 2 tablet at 12/11/20 2140  •  sodium chloride 0.9 % flush 10 mL, 10 mL, Intravenous, PRN, Kirk Carter MD  •  sodium chloride 0.9 % flush 10 mL, 10 mL, Intravenous, Q12H, Grace Johnson MD  •  sodium chloride 0.9 % flush 10 mL, 10 mL, Intravenous, PRN, Grace Johnson MD  •  vancomycin (VANCOCIN) in iso-osmotic dextrose IVPB 1 g (premix) 200 mL, 1,000 mg, Intravenous, Q12H, Malvin Rahman IV, PharmD, 1,000 mg at 12/12/20 0544    Antibiotics:  Anti-Infectives (From admission, onward)    Ordered     Dose/Rate Route Frequency Start Stop    12/11/20 0614  vancomycin (VANCOCIN) in iso-osmotic dextrose IVPB 1 g (premix) 200 mL     Xiao Sparks, PharmD reviewed the order on 12/11/20 1517.   Ordering Provider: Malvin Rahman IV, PharmD    1,000 mg  over 60 Minutes Intravenous Every 12 Hours Scheduled 12/11/20 0730 12/15/20 1759    12/09/20 1657  piperacillin-tazobactam (ZOSYN) 3.375 g in iso-osmotic dextrose 50 ml (premix)     Xiao Sparks, PharmD reviewed the order on 12/10/20 1414.   Ordering Provider: Sherine Keen DO    3.375 g  over 4 Hours Intravenous Every 8 Hours 12/10/20 0000 12/14/20 2359    12/09/20 1711  vancomycin 1500 mg/500 mL 0.9% NS IVPB ([facility])     Ordering Provider: Sixto Mcdonnell, PharmD    1,500 mg  over 90 Minutes Intravenous Once 12/09/20 1800 12/09/20 2040     20 1657  piperacillin-tazobactam (ZOSYN) 3.375 g in iso-osmotic dextrose 50 ml (premix)     Ordering Provider: Sherine Keen DO    3.375 g  over 30 Minutes Intravenous Once 20 1745 20 1848    20 1657  Pharmacy to dose vancomycin     Xiao Sparks, PharmD let the order  on 12/10/20 1414.   Ordering Provider: Sherine Keen DO     Does not apply Continuous PRN 20 1656 20 1655    20 1443  cefTRIAXone (ROCEPHIN) 2 g/100 mL 0.9% NS IVPB ([facility])     Ordering Provider: Federico Roman APRN    2 g  over 30 Minutes Intravenous Once 20 1445 20 1656            Review of Systems:  See HPI      Physical Exam:   Vital Signs  Temp (24hrs), Av.7 °F (37.1 °C), Min:98.4 °F (36.9 °C), Max:99 °F (37.2 °C)    Temp  Min: 98.4 °F (36.9 °C)  Max: 99 °F (37.2 °C)  BP  Min: 110/72  Max: 133/73  Pulse  Min: 91  Max: 105  Resp  Min: 16  Max: 18  SpO2  Min: 90 %  Max: 96 %    GENERAL: Awake and alert, in no acute distress.   HEENT: Normocephalic, atraumatic.  PERRL. EOMI. No conjunctival injection. No icterus. Oropharynx clear without evidence of thrush or exudate.  NECK: Supple   HEART: RRR; 2/6 systolic murmur  LUNGS: Clear to auscultation bilaterally without wheezing, rales, rhonchi. Normal respiratory effort.   ABDOMEN: Soft, nontender, nondistended. No rebound or guarding. Ostomy site ok  MSK:  FROM, no joint effusion  SKIN: Warm and dry without cutaneous eruptions on Inspection/palpation.    NEURO: Oriented to PPT. Speech and cognition normal  PSYCHIATRIC: Normal insight and judgment. Cooperative with PE    Laboratory Data    Results from last 7 days   Lab Units 20  0735 12/10/20  2020  12/10/20  0412  20  0737  20  1032   WBC 10*3/mm3  --   --   --   --  3.92  --  5.70  --  6.57   HEMOGLOBIN g/dL 7.5* 7.8* 7.5*   < > 7.2*   < > 8.1*   < > 8.8*   HEMATOCRIT % 24.0* 25.4* 23.7*   < > 23.6*   < > 25.9*   < > 28.5*   PLATELETS 10*3/mm3   --   --   --   --  231  --  319  --  383    < > = values in this interval not displayed.     Results from last 7 days   Lab Units 12/10/20  0412   SODIUM mmol/L 134*   POTASSIUM mmol/L 3.9   CHLORIDE mmol/L 96*   CO2 mmol/L 31.0*   BUN mg/dL 16   CREATININE mg/dL 0.60   GLUCOSE mg/dL 99   CALCIUM mg/dL 8.3*     Results from last 7 days   Lab Units 12/08/20  1048   ALK PHOS U/L 54   BILIRUBIN mg/dL 0.3   ALT (SGPT) U/L 16   AST (SGOT) U/L 28             Results from last 7 days   Lab Units 12/08/20  1545   LACTATE mmol/L 0.7         Results from last 7 days   Lab Units 12/11/20  0515   VANCOMYCIN TR mcg/mL 9.90     Estimated Creatinine Clearance: 64 mL/min (by C-G formula based on SCr of 0.6 mg/dL).      Microbiology:  Microbiology Results (last 10 days)     Procedure Component Value - Date/Time    Blood Culture - Blood, Hand, Right [[id number]] Collected: 12/09/20 1945    Lab Status: Preliminary result Specimen: Blood from Hand, Right Updated: 12/11/20 2015     Blood Culture No growth at 2 days    Narrative:      Aerobic bottle only      Blood Culture - Blood, Arm, Right [[id number]] Collected: 12/09/20 1941    Lab Status: Preliminary result Specimen: Blood from Arm, Right Updated: 12/11/20 2015     Blood Culture No growth at 2 days    Blood Culture - Blood, Arm, Left [[id number]] Collected: 12/08/20 1545    Lab Status: Preliminary result Specimen: Blood from Arm, Left Updated: 12/11/20 1615     Blood Culture No growth at 3 days    Blood Culture - Blood, Arm, Right [[id number]] Collected: 12/08/20 1540    Lab Status: Preliminary result Specimen: Blood from Arm, Right Updated: 12/11/20 1615     Blood Culture No growth at 3 days    COVID-19,CEPHEID,DANUTA IN-HOUSE(OR EMERGENT/ADD-ON),NP SWAB IN TRANSPORT MEDIA 3-4 HR TAT - Swab, Nasopharynx [[id number]]  (Normal) Collected: 12/08/20 1107    Lab Status: Final result Specimen: Swab from Nasopharynx Updated: 12/08/20 1206     COVID19 Not Detected    Narrative:      Fact sheet  for providers: https://www.fda.gov/media/107352/download     Fact sheet for patients: https://www.fda.gov/media/678155/download    Urine Culture - Urine, Urine, Clean Catch [[id number]] Collected: 12/08/20 1024    Lab Status: Final result Specimen: Urine, Clean Catch Updated: 12/09/20 1733     Urine Culture 25,000 CFU/mL Normal Urogenital Mary            Radiology:  Imaging Results (Last 72 Hours)     Procedure Component Value Units Date/Time    CT Chest With Contrast [[id number]] Collected: 12/11/20 0958     Updated: 12/11/20 1010    Narrative:      EXAMINATION: CT CHEST W CONTRAST- 12/10/2020     INDICATION: Ovarian cancer staging; R19.00-Intra-abdominal and pelvic  swelling, mass and lump, unspecified site; R19.07-Generalized  intra-abdominal and pelvic swelling, mass and lump; R16.0-Hepatomegaly,  not elsewhere classified; N13.30-Unspecified hydronephrosis;  D64.9-Anemia, unspecified; N93.9-Abnormal uterine and vaginal bleeding,  unspecified; R10.9-Unspecified abdominal pain; Z74.09-Other reduced  mobility     TECHNIQUE: 5 mm post IV contrast images through the chest and upper  abdomen     The radiation dose reduction device was turned on for each scan per the  ALARA (As Low as Reasonably Achievable) protocol.     COMPARISON: Chest radiograph 12/09/2020     FINDINGS: History indicates ovarian cancer, staging.     There is diffuse thyroid goiter. No mediastinal hilar, or axillary  adenopathy is seen. No pulmonary parenchymal nodule is identified. There  is trace right basilar effusion and atelectasis, which may be associated  with the patient's exophytic mass in the dome of the right liver lobe.  There is a small hiatal hernia. Bony structures appear to be intact.  Note is again made of the patient's exophytic right lobe liver lesion,  small low-density lesion inferior right liver lobe. There appears to be  mild fecal stasis.       Impression:      1. Trace right basilar effusion and mild right lower lobe  atelectasis,  which may be associated with the patient's exophytic right lobe liver  lesion, as from diaphragmatic irritation.     2. No evidence of transdiaphragmatic spread of disease is seen.      3. No evidence of metastatic disease or other acute disease elsewhere in  the chest.     4. Incidentally noted goiter.     D:  12/11/2020  E:  12/11/2020          CT Abdomen Pelvis With Contrast [[id number]] Collected: 12/08/20 1411     Updated: 12/10/20 1700    Narrative:      EXAMINATION: CT ABDOMEN PELVIS W CONTRAST- 12/08/2020      INDICATION: Vaginal bleeding, pelvic mass.     TECHNIQUE: 5 mm post IV contrast portal venous phase and delayed venous  phase images through the abdomen and pelvis.     The radiation dose reduction device was turned on for each scan per the  ALARA (As Low as Reasonably Achievable) protocol.     COMPARISON: 10/21/2020 abdomen and pelvis CT scan.     FINDINGS: Previous 10/21/2020 exam report indicates bilateral  obstructive uropathy right greater than left, heterogeneous  hypoenhancing 5 cm right lobe liver mass. Diffuse bowel dilatation.  History today indicates abnormal uterine bleeding, history of ovarian  cancer and hysterectomy.     There is trace right effusion and minimal right basilar atelectasis.  Right lobe liver lesion appears increased from approximately 5 cm to  approximately 7 cm, and now has an extrahepatic/capsular component,  suggesting it could actually be an implanted metastasis, as well as  hematogenous metastasis with new erosion into the extra hepatic soft  tissues. There appears to be a new small hypoenhancing lesion of the  right liver lobe inferiorly, 14 mm in diameter, although there is some  marginal peripheral enhancement and this could be an incidental  hemangioma better seen today due to phase of contrast enhancement. No  new liver lesions are seen elsewhere.     Spleen is not enlarged. No significant abnormalities are seen of the  pancreas or adrenal glands.  Gallbladder is normally distended and normal  in appearance. There appears to be at least moderate fecal stasis.  Bilateral hydronephrosis, right greater than left appears to be  gradually increasing, right renal pelvis increasing in diameter from 38  mm to 43 mm, left renal pelvis increasing from 22 mm to 28 mm. IVC  filter is again noted. There is a large new pelvoabdominal cyst at least  12 x 14 cm in diameter, with no evidence of cyst wall enhancement,  internal debris or septation, occupying much of the right pelvis. Milder  cystic changes in the right lower pelvis appear a little increased.  Cystic change of the left adnexal region appears stable. Amorphous  appearance of the pelvis may represent diffuse metastatic implants here  given the patient's previous history of hysterectomy. Overall size of  this area appears very similar to prior exam up to 10 cm in maximal  diameter. No intra-abdominal free air or significant free ascites is  seen. No bowel wall inflammatory changes are noted. There is normal  contrast opacification of the mesenteric vasculature. Delayed venous  phase images again show high-grade obstructive uropathy bilaterally,  presumably as a result of the patient's ill-defined mass/metastatic  implants. Additional note is made of mild further enlargement of some  shotty inguinal and pelvic sidewall lymph nodes.       Impression:      1. Enlarging, now partially exophytic right lobe liver mass compared to  10/21/2020 exam. Questionable small new right lobe liver lesion.  2. Amorphous masslike appearance of pelvis presumably pelvic metastatic  disease/metastatic implants, similar to prior study.  3. New approximately 12 x 14 cm pelvoabdominal cysts/cystic mass.  4. Worsening bilateral hydronephrosis, likely as a result of pelvic  disease.  5. Moderate to marked fecal stasis.  6. Gradually enlarging pelvic sidewall and inguinal lymph nodes.     D:  12/08/2020  E:  12/09/2020     This report was  finalized on 12/10/2020 4:57 PM by Dr. Danny Juarez MD.       XR Chest 1 View [[id number]] Collected: 12/09/20 1119     Updated: 12/09/20 1702    Narrative:         EXAMINATION: XR CHEST 1 VW - 12/09/2020     INDICATION: R19.00-Intra-abdominal and pelvic swelling, mass and lump,  unspecified site; R19.07-Generalized intra-abdominal and pelvic  swelling, mass and lump; R16.0-Hepatomegaly, not elsewhere classified;  N13.30-Unspecified hydronephrosis; D64.9-Anemia, unspecified;  N93.9-Abnormal uterine and vaginal bleeding, unspecified;  R10.9-Unspecified abdominal pain. Fever.     COMPARISON: 10/17/2020     FINDINGS: Portable chest reveals mild elevation seen the right  hemidiaphragm. Mild increased markings of the right lung base.  Portacatheter on the right tip in the SVC with no pneumothorax.  Degenerative changes seen within the spine. Nasogastric tube and deep  line catheter on the right previously visualized have been removed.         Impression:      Placement of a portacatheter on the right tip in the SVC. No  pneumothorax with minimal increased markings again seen at the right  lung base and elevation of the right hemidiaphragm.     DICTATED:   12/09/2020  EDITED/ls :   12/09/2020      This report was finalized on 12/9/2020 4:59 PM by Dr. Ashley Waters MD.           I read her radiographic studies.    Impression:   1. Fevers in a immunocompromised host likely from tumor load vs mixed pelvic infection  2. Anemia with acute blood loss  3. Vaginal bleeding  4. Metastatic ovarian cancer/hysterctomy 2017 with treatment.  Now with colon tumor, vaginal tumor, liver lesion found in 2020  5. Systemic chemotherapy 12/3 through port a cath placed 11/25/20.  Not felt to be a good candidate now for systemic chemotherapy. Now undergoing palliative radiation therapy.  6. COPD  7. CAD/stent/CABG  8. H/o DVT  9. Hypothyrodism  10. H/o Hep C/treated  11. Seizure d/o  12. H/o TIAs  13. Chronic probably systolic  CHF  14. Breast cancer/bilateral mastectomy    PLAN/RECOMMENDATIONS:   1.  Continue Zosyn-I will leave her on Zosyn while she is here  2.  Hospice at discharge  3.  Complete palliative radiation-scheduled to end on 12/15       Chase Prasad MD  12/12/2020  07:58 EST

## 2020-12-12 NOTE — PLAN OF CARE
Goal Outcome Evaluation:  Plan of Care Reviewed With: patient  Progress: no change  Outcome Summary: Pt has been resting well this shift. Currently using purewick due to pain with a bunch of movement. Pt has no complaints at this time. VSS. Will continue to monitor. 0215 12/12/2020

## 2020-12-12 NOTE — PLAN OF CARE
Goal Outcome Evaluation:  Plan of Care Reviewed With: patient  Progress: improving  Outcome Summary: Pt reported pain is well managed on current medication regimen. Stool is now soft, formed, pt stated stool exits via ostomy more easily, and related abdominal discomfort has drastically improved. Pt stated plan is to complete XRT course inpatient, discharge home on Tuesday with Hospice services at home.    Pt c/o dry, sore throat and dry nares; phenol spray and saline spray ordered prn.    Palliative team conference: GRANT Tellez RN, CHPN; KELLIE Christianson DO

## 2020-12-12 NOTE — PLAN OF CARE
Goal Outcome Evaluation:  Plan of Care Reviewed With: patient  Progress: improving  Outcome Summary: Pt was able to ambulate 6' today, req minAx1 and use of RW. Pt req cues for handplacement and obstacle navigation with AD. Further distance deferred due to fatigue.

## 2020-12-12 NOTE — THERAPY TREATMENT NOTE
Patient Name: Hailee Booker  : 1955    MRN: [id number]                              Today's Date: 2020       Admit Date: 2020    Visit Dx:     ICD-10-CM ICD-9-CM   1. Pelvic mass  R19.00 789.30   2. Generalized abdominal mass  R19.07 789.37   3. Liver mass  R16.0 573.8   4. Bilateral hydronephrosis  N13.30 591   5. Anemia, unspecified type  D64.9 285.9   6. Vagina bleeding  N93.9 623.8   7. Acute abdominal pain  R10.9 789.00     338.19   8. Impaired mobility and activities of daily living  Z74.09 V49.89    Z78.9    9. Vaginal bleeding  N93.9 623.8   10. Malignant neoplasm of both ovaries (CMS/HCC)  C56.1 183.0    C56.2      Patient Active Problem List   Diagnosis   • Seizures (CMS/HCC)   • Pelvic mass   • Rt Hydronephrosis 2* Pelvic Mass   • Malignant neoplasm of both ovaries (CMS/HCC)   • Recurrent malignant neoplasm of ovary (CMS/HCC)   • History of breast cancer   • History of pulmonary embolus (PE)   • History of DVT (deep vein thrombosis)   • Liver metastases (CMS/HCC)   • Large bowel obstruction (CMS/HCC)   • CHF (congestive heart failure) (CMS/HCC)   • Coronary artery disease   • Disease of thyroid gland   • Hypertension   • Hyperlipidemia   • Hyponatremia   • Malignant neoplasm of ovary (CMS/HCC)   • Vaginal bleeding     Past Medical History:   Diagnosis Date   • Arthritis    • Body piercing     ears   • Cancer (CMS/HCC)     breast cancer twice, basal cell carcinoma, ovarian   • CHF (congestive heart failure) (CMS/HCC)    • Colostomy in place (CMS/HCC) 10/17/2020   • Constipation    • COPD (chronic obstructive pulmonary disease) (CMS/HCC)    • Coronary artery disease    • Disease of thyroid gland    • DVT (deep venous thrombosis) (CMS/HCC)     Patient reported after CABG in  and that she had DVT x2   • Elevated cholesterol    • GERD (gastroesophageal reflux disease)    • Hepatitis C     Patient reported she has had treatment   • History of bronchitis    • History of transfusion  "    Patient reported no prior reaction   • Hx of CABG 2008    Patient reported 2 vessel - reported MI prior to this procedure   • Hyperlipidemia    • Hypertension    • Injury of back    • Latex allergy    • Myocardial infarction (CMS/[facility]) 11/2019    Patient reported \"very light\" and that she had medical attention [facility] in [facility] and had placement of 1 stent   • Seizures (CMS/[facility]) 11/24/2020    Patient reported since 1997 - epileptic.  Patient reported last seizure activity while in nursing home apx 20 days ago   • Sleep apnea     Patient reported prior use of CPAP and none since 2017 after weight loss   • Stroke (CMS/[facility])     Patient reported TIA x3 (reported last TIA 2003, 2010, 2015) - reported no residual issues from TIA's   • Tattoo     x1   • Wears glasses     OTC glasses for reading     Past Surgical History:   Procedure Laterality Date   • BREAST SURGERY     • CARDIAC CATHETERIZATION  11/2019    one stent   • CARDIAC SURGERY      CABG two vessel 2008   • COLONOSCOPY     • COLONOSCOPY N/A 10/12/2020    Procedure: COLONOSCOPY;  Surgeon: Himanshu Shen MD;  Location: [facility] ENDOSCOPY;  Service: Gastroenterology;  Laterality: N/A;   • COLOSTOMY N/A 10/17/2020    Procedure: LAPAROTOMY EXPLORATORY, COLOSTOMY, LYSIS OF ADHESIONS;  Surgeon: Juana Winter MD;  Location: [facility] OR;  Service: Gynecology Oncology;  Laterality: N/A;   • DILATATION AND CURETTAGE     • HYSTERECTOMY     • MASTECTOMY Bilateral     1979, 1989   • OTHER SURGICAL HISTORY      Patient reported \"torin in the right leg\"   • PORTACATH PLACEMENT Right 11/25/2020    Procedure: INSERTION OF PORTACATH RIGHT SUBCLAVIAN;  Surgeon: Himanshu Shen MD;  Location: [facility] OR;  Service: General;  Laterality: Right;   • SKIN BIOPSY      reported twice (2009 left shoulder, 2020 face)   • VAGINAL BIOPSY N/A 10/12/2020    Procedure: VAGINAL BIOPSY;  Surgeon: Himanshu Shen MD;  Location: [facility] ENDOSCOPY;  Service: Gastroenterology;  " Laterality: N/A;   • WISDOM TOOTH EXTRACTION       General Information     Row Name 12/12/20 1407          Physical Therapy Time and Intention    Document Type  therapy note (daily note)  -KIRSTY     Mode of Treatment  physical therapy  -KIRSTY     Row Name 12/12/20 1407          General Information    Patient Profile Reviewed  yes  -KIRSTY     Existing Precautions/Restrictions  seizures;fall  -KIRSTY     Row Name 12/12/20 1407          Cognition    Orientation Status (Cognition)  oriented x 3  -KIRSTY     Row Name 12/12/20 1407          Safety Issues, Functional Mobility    Impairments Affecting Function (Mobility)  balance;endurance/activity tolerance;strength;pain  -KIRSTY       User Key  (r) = Recorded By, (t) = Taken By, (c) = Cosigned By    Initials Name Provider Type    KIRSTY Yoselin Jimenez, PT Physical Therapist        Mobility     Row Name 12/12/20 1413          Bed Mobility    Bed Mobility  rolling left;scooting/bridging;supine-sit  -KIRSTY     Rolling Left Laurel (Bed Mobility)  supervision  -KIRSTY     Scooting/Bridging Laurel (Bed Mobility)  verbal cues;supervision  -KIRSTY     Supine-Sit Laurel (Bed Mobility)  verbal cues;standby assist  -KIRSTY     Assistive Device (Bed Mobility)  bed rails;head of bed elevated  -KIRSTY     Comment (Bed Mobility)  Pt able to bridge to don undergarments prior to mobility.  -KIRSTY     Row Name 12/12/20 1413          Transfers    Comment (Transfers)  VCs for handplacement with RW.  -KIRSTY     Row Name 12/12/20 1413          Sit-Stand Transfer    Sit-Stand Laurel (Transfers)  contact guard;1 person assist;verbal cues  -KIRSTY     Assistive Device (Sit-Stand Transfers)  walker, front-wheeled  -KIRSTY     Row Name 12/12/20 1413          Gait/Stairs (Locomotion)    Laurel Level (Gait)  minimum assist (75% patient effort);1 person assist  -KIRSTY     Assistive Device (Gait)  walker, front-wheeled  -KIRSTY     Distance in Feet (Gait)  6  -KIRSTY     Deviations/Abnormal Patterns (Gait)  gait speed decreased  -KIRSTY      "Bilateral Gait Deviations  forward flexed posture  -KIRSTY     Comment (Gait/Stairs)  Pt ambulated 6' in room and then requested to sit due to fatigue. Pt req assistance with navigation of RW.  -KIRSTY       User Key  (r) = Recorded By, (t) = Taken By, (c) = Cosigned By    Initials Name Provider Type    Yoselin Rodriguez PT Physical Therapist        Obj/Interventions     Row Name 12/12/20 1414          Balance    Balance Assessment  sitting static balance;standing static balance  -KIRSTY     Static Sitting Balance  WFL;supported;sitting in chair  -KIRSTY     Static Standing Balance  mild impairment  -KIRSTY     Dynamic Standing Balance  standing;supported  -KIRSTY       User Key  (r) = Recorded By, (t) = Taken By, (c) = Cosigned By    Initials Name Provider Type    Yoselin Rodriguez PT Physical Therapist        Goals/Plan    No documentation.       Clinical Impression     Row Name 12/12/20 1415          Pain    Additional Documentation  Pain Scale: Numbers Pre/Post-Treatment (Group)  -     Row Name 12/12/20 1415          Pain Scale: Numbers Pre/Post-Treatment    Pretreatment Pain Rating  0/10 - no pain  -KIRSTY     Posttreatment Pain Rating  0/10 - no pain  -KIRSTY     Pre/Posttreatment Pain Comment  Pain with movement, pt stated \"sore\" but once sitting in chair no pain.  -     Row Name 12/12/20 1415          Plan of Care Review    Plan of Care Reviewed With  patient  -KIRSTY     Progress  improving  -     Outcome Summary  Pt was able to ambulate 6' today, req minAx1 and use of RW. Pt req cues for handplacement and obstacle navigation with AD. Further distance deferred due to fatigue.  -     Row Name 12/12/20 1417          Therapy Assessment/Plan (PT)    Patient/Family Therapy Goals Statement (PT)  To go home and be able to walk.  -KIRSTY     Rehab Potential (PT)  good, to achieve stated therapy goals  -KIRSTY     Criteria for Skilled Interventions Met (PT)  yes;meets criteria;skilled treatment is necessary  -     Row Name 12/12/20 1415       "    Vital Signs    Pre Systolic BP Rehab  -- NT  -KIRSTY     O2 Delivery Pre Treatment  room air  -KIRSTY     O2 Delivery Intra Treatment  room air  -KIRSTY     O2 Delivery Post Treatment  room air  -KIRSTY     Pre Patient Position  Supine  -KIRSTY     Intra Patient Position  Standing  -KIRSTY     Post Patient Position  Sitting  -KIRSTY     Row Name 12/12/20 1415          Positioning and Restraints    Pre-Treatment Position  in bed  -KIRSTY     Post Treatment Position  chair  -KIRSTY     In Chair  notified nsg;encouraged to call for assist;call light within reach;with nsg Pt planning on sitting just until bedsheets changed. Notified nsg.  -KIRSTY       User Key  (r) = Recorded By, (t) = Taken By, (c) = Cosigned By    Initials Name Provider Type    Yoselin Rodriguez PT Physical Therapist        Outcome Measures     Row Name 12/12/20 1417          How much help from another person do you currently need...    Turning from your back to your side while in flat bed without using bedrails?  4  -KIRSTY     Moving from lying on back to sitting on the side of a flat bed without bedrails?  3  -KIRSTY     Moving to and from a bed to a chair (including a wheelchair)?  3  -KIRSTY     Standing up from a chair using your arms (e.g., wheelchair, bedside chair)?  3  -KIRSTY     Climbing 3-5 steps with a railing?  2  -KIRSTY     To walk in hospital room?  3  -KIRSTY     AM-PAC 6 Clicks Score (PT)  18  -KIRSTY     Row Name 12/12/20 1417          Functional Assessment    Outcome Measure Options  AM-PAC 6 Clicks Basic Mobility (PT)  -KIRSTY       User Key  (r) = Recorded By, (t) = Taken By, (c) = Cosigned By    Initials Name Provider Type    Yoselin Rodriguez PT Physical Therapist        Physical Therapy Education                 Title: PT OT SLP Therapies (In Progress)     Topic: Physical Therapy (Done)     Point: Mobility training (Done)     Learning Progress Summary           Patient Acceptance, E, VU,NR by KIRSTY at 12/12/2020 1418    Acceptance, E, VU,NR by NS at 12/10/2020 1534    Comment: Patient was  educated about PT POC, sequencing mobility, and benefits of OOB activity.    Acceptance, E,TB, VU by HR at 12/9/2020 0046                   Point: Home exercise program (Done)     Learning Progress Summary           Patient Acceptance, E,TB, VU by HR at 12/9/2020 0046                   Point: Body mechanics (Done)     Learning Progress Summary           Patient Acceptance, E, VU,NR by KIRSTY at 12/12/2020 1418    Acceptance, E, VU,NR by NS at 12/10/2020 1534    Comment: Patient was educated about PT POC, sequencing mobility, and benefits of OOB activity.    Acceptance, E,TB, VU by HR at 12/9/2020 0046                   Point: Precautions (Done)     Learning Progress Summary           Patient Acceptance, E, VU,NR by KIRSTY at 12/12/2020 1418    Acceptance, E, VU,NR by NS at 12/10/2020 1534    Comment: Patient was educated about PT POC, sequencing mobility, and benefits of OOB activity.    Acceptance, E,TB, VU by HR at 12/9/2020 0046                               User Key     Initials Effective Dates Name Provider Type Discipline     08/30/18 -  Yoselin Jimenez, PT Physical Therapist PT    HR 01/16/19 -  Brandi Rawls, RN Registered Nurse Nurse    NS 09/10/19 -  Aby Palacios, ARMANI Physical Therapist PT              PT Recommendation and Plan     Plan of Care Reviewed With: patient  Progress: improving  Outcome Summary: Pt was able to ambulate 6' today, req minAx1 and use of RW. Pt req cues for handplacement and obstacle navigation with AD. Further distance deferred due to fatigue.     Time Calculation:   PT Charges     Row Name 12/12/20 1418             Time Calculation    Start Time  1312  -KIRSTY      PT Received On  12/12/20  -      PT Goal Re-Cert Due Date  12/20/20  -         Timed Charges    80526 - Gait Training Minutes   10  -KIRSTY      27674 - PT Therapeutic Activity Minutes  22  -KIRSTY        User Key  (r) = Recorded By, (t) = Taken By, (c) = Cosigned By    Initials Name Provider Type    KIRSTY Yoselin Jimenez, PT  Physical Therapist        Therapy Charges for Today     Code Description Service Date Service Provider Modifiers Qty    72602132148 HC GAIT TRAINING EA 15 MIN 12/12/2020 Yoselin Jimenez, PT GP 1    58025081092 HC PT THERAPEUTIC ACT EA 15 MIN 12/12/2020 Yoselin Jimenez, PT GP 1          PT G-Codes  Outcome Measure Options: AM-PAC 6 Clicks Basic Mobility (PT)  AM-PAC 6 Clicks Score (PT): 18  AM-PAC 6 Clicks Score (OT): 15    Yoselin Jimenez PT  12/12/2020

## 2020-12-12 NOTE — PROGRESS NOTES
[facility] Medicine Services  PROGRESS NOTE    Patient Name: Hailee Booker  : 1955  MRN: [id number]    Date of Admission: 2020  Primary Care Physician: Sheila Bran APRN    Subjective   Subjective     CC:  F/u vaginal hemorrhage     HPI:  Still having a small amount of bleeding. Ongoing abdominal pain.     ROS:  Gen- No fevers, chills  CV- No chest pain, palpitations  Resp- No cough, dyspnea  GI- + abdominal pain        Objective   Objective     Vital Signs:   Temp:  [98.4 °F (36.9 °C)-99 °F (37.2 °C)] 98.5 °F (36.9 °C)  Heart Rate:  [] 95  Resp:  [16-18] 18  BP: (110-118)/(72-77) 114/77        Physical Exam:  Constitutional - thin female, in bed  HEENT-NCAT, mucous membranes moist  CV-RRR, S1 S2 normal, no m/r/g  Resp-grossly clear bilaterally  Abd-soft, nontender, nondistended, normoactive bowel sounds  Ext-No lower extremity cyanosis, clubbing or edema bilaterally  Neuro-alert and oriented, speech clear, moves all extremities   Psych-normal affect   Skin- No rash on exposed UE or LE bilaterally    Results Reviewed:  Results from last 7 days   Lab Units 20  0735 12/10/20  2020  12/10/20  0412  20  0737  20  1048 20  1032   WBC 10*3/mm3  --   --   --   --  3.92  --  5.70  --   --  6.57   HEMOGLOBIN g/dL 7.5* 7.8* 7.5*   < > 7.2*   < > 8.1*   < >  --  8.8*   HEMATOCRIT % 24.0* 25.4* 23.7*   < > 23.6*   < > 25.9*   < >  --  28.5*   PLATELETS 10*3/mm3  --   --   --   --  231  --  319  --   --  383   INR   --   --   --   --   --   --   --   --   --  1.12   PROCALCITONIN ng/mL  --   --   --   --   --   --   --   --  0.12  --     < > = values in this interval not displayed.     Results from last 7 days   Lab Units 12/10/20  0412 20  1217 20  0737 20  1048   SODIUM mmol/L 134*  --  131* 134*   POTASSIUM mmol/L 3.9 3.3* 3.6 3.0*   CHLORIDE mmol/L 96*  --  94* 94*   CO2 mmol/L 31.0*  --  29.0 31.0*   BUN mg/dL  16  --  15 20   CREATININE mg/dL 0.60  --  0.56* 0.64   GLUCOSE mg/dL 99  --  105* 88   CALCIUM mg/dL 8.3*  --  8.4* 9.0   ALT (SGPT) U/L  --   --   --  16   AST (SGOT) U/L  --   --   --  28   TROPONIN T ng/mL  --   --   --  <0.010   PROBNP pg/mL  --   --   --  123.4     Estimated Creatinine Clearance: 64 mL/min (by C-G formula based on SCr of 0.6 mg/dL).    Microbiology Results Abnormal     Procedure Component Value - Date/Time    Blood Culture - Blood, Arm, Right [[id number]] Collected: 12/09/20 1941    Lab Status: Preliminary result Specimen: Blood from Arm, Right Updated: 12/11/20 2015     Blood Culture No growth at 2 days    Blood Culture - Blood, Hand, Right [[id number]] Collected: 12/09/20 1945    Lab Status: Preliminary result Specimen: Blood from Hand, Right Updated: 12/11/20 2015     Blood Culture No growth at 2 days    Narrative:      Aerobic bottle only      Blood Culture - Blood, Arm, Left [[id number]] Collected: 12/08/20 1545    Lab Status: Preliminary result Specimen: Blood from Arm, Left Updated: 12/11/20 1615     Blood Culture No growth at 3 days    Blood Culture - Blood, Arm, Right [[id number]] Collected: 12/08/20 1540    Lab Status: Preliminary result Specimen: Blood from Arm, Right Updated: 12/11/20 1615     Blood Culture No growth at 3 days    Urine Culture - Urine, Urine, Clean Catch [[id number]] Collected: 12/08/20 1024    Lab Status: Final result Specimen: Urine, Clean Catch Updated: 12/09/20 1733     Urine Culture 25,000 CFU/mL Normal Urogenital Mary    COVID-19,CEPHEID,DANUTA IN-HOUSE(OR EMERGENT/ADD-ON),NP SWAB IN TRANSPORT MEDIA 3-4 HR TAT - Swab, Nasopharynx [[id number]]  (Normal) Collected: 12/08/20 1107    Lab Status: Final result Specimen: Swab from Nasopharynx Updated: 12/08/20 1206     COVID19 Not Detected    Narrative:      Fact sheet for providers: https://www.fda.gov/media/088670/download     Fact sheet for patients: https://www.fda.gov/media/455534/download          Imaging Results  (Last 24 Hours)     Procedure Component Value Units Date/Time    CT Chest With Contrast [[id number]] Collected: 12/11/20 0958     Updated: 12/11/20 1010    Narrative:      EXAMINATION: CT CHEST W CONTRAST- 12/10/2020     INDICATION: Ovarian cancer staging; R19.00-Intra-abdominal and pelvic  swelling, mass and lump, unspecified site; R19.07-Generalized  intra-abdominal and pelvic swelling, mass and lump; R16.0-Hepatomegaly,  not elsewhere classified; N13.30-Unspecified hydronephrosis;  D64.9-Anemia, unspecified; N93.9-Abnormal uterine and vaginal bleeding,  unspecified; R10.9-Unspecified abdominal pain; Z74.09-Other reduced  mobility     TECHNIQUE: 5 mm post IV contrast images through the chest and upper  abdomen     The radiation dose reduction device was turned on for each scan per the  ALARA (As Low as Reasonably Achievable) protocol.     COMPARISON: Chest radiograph 12/09/2020     FINDINGS: History indicates ovarian cancer, staging.     There is diffuse thyroid goiter. No mediastinal hilar, or axillary  adenopathy is seen. No pulmonary parenchymal nodule is identified. There  is trace right basilar effusion and atelectasis, which may be associated  with the patient's exophytic mass in the dome of the right liver lobe.  There is a small hiatal hernia. Bony structures appear to be intact.  Note is again made of the patient's exophytic right lobe liver lesion,  small low-density lesion inferior right liver lobe. There appears to be  mild fecal stasis.       Impression:      1. Trace right basilar effusion and mild right lower lobe atelectasis,  which may be associated with the patient's exophytic right lobe liver  lesion, as from diaphragmatic irritation.     2. No evidence of transdiaphragmatic spread of disease is seen.      3. No evidence of metastatic disease or other acute disease elsewhere in  the chest.     4. Incidentally noted goiter.     D:  12/11/2020  E:  12/11/2020                Results for orders  placed during the hospital encounter of 10/16/20   Adult Transthoracic Echo Complete W/ Cont if Necessary Per Protocol    Narrative · Estimated left ventricular EF = 70%  · Mild tricuspid valve regurgitation is present.  · Estimated right ventricular systolic pressure from tricuspid   regurgitation is 37 mmHg.          I have reviewed the medications:  Scheduled Meds:clonazePAM, 0.5 mg, Oral, Q12H  isosorbide mononitrate, 60 mg, Oral, Daily  oxyCODONE, 5 mg, Oral, Q4H While Awake  piperacillin-tazobactam, 3.375 g, Intravenous, Q8H  senna-docusate sodium, 2 tablet, Oral, BID  sodium chloride, 10 mL, Intravenous, Q12H  vancomycin, 1,000 mg, Intravenous, Q12H      Continuous Infusions:Pharmacy to dose vancomycin,       PRN Meds:.•  acetaminophen  •  albuterol  •  bisacodyl  •  HYDROmorphone **AND** naloxone  •  lactulose  •  magnesium sulfate **OR** magnesium sulfate **OR** magnesium sulfate  •  mineral oil  •  ondansetron  •  oxyCODONE  •  Pharmacy to dose vancomycin  •  potassium chloride  •  potassium chloride  •  potassium chloride  •  sodium chloride  •  sodium chloride    Assessment/Plan   Assessment & Plan     Active Hospital Problems    Diagnosis  POA   • Vaginal bleeding [N93.9]  Yes      Resolved Hospital Problems   No resolved problems to display.        Brief Hospital Course to date:  Hailee Booker is a 65 y.o. 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  Presents with vaginal bleeding and uncontrolled pain.     This patient's problems and plans were partially entered by my partner and updated as appropriate by me 12/12/20.  All problems are new to me today     Vaginal bleeding, anemia  In setting of widespread ovarian cancer   - chemo started 12/3:  carboplatin/Taxol/bevacizumab   - Vaginal bleeding improved  -  XRT with Dr. Ryan 5 treatment in total started on 12/9- will be completed on 12/15 (no therapy over the weekend)  - Gyn-Onc,   [facility], has signed off   - Dr. Anderson- Hem/Onc, consulted on 12/10 and discussed only moving forward with completion of radiation, no systemic treatments/ chemo planned for now due to poor performance status  - hemoglobin stable at 7.5     Recurrent Fever, sepsis unknown source   Immunosuppressed   -Pt with fever of 102.8 12/9  - may represent IA infection in setting of metastatic pelvic disease vs tumor fever  - zosyn for now     Progressive bilateral hydronephrosis, presumably from compression by mass  - no CVA pain or tenderness  - creatinine nl; patient on chemo  - if creat rises or pain develops at CVAs, consider stenting.     Progressive pain R lower abd.   constipation  - Continue Oxycodone 10mg q4h prn   -- palliative following   -- laxatives     Hypokalemia:   -Replaced and resolved      Abnl UA  - Cx 25,000 normal urogenital candelario      Social issues  -SW to see         DVT Prophylaxis:  [facility]      Disposition: I expect the patient to be discharged after completion of radiation therapy on 12/15, patient at this time declines home hospice.  CODE STATUS:   Code Status and Medical Interventions:   Ordered at: 12/08/20 1525     Level Of Support Discussed With:    Patient     Code Status:    CPR     Medical Interventions (Level of Support Prior to Arrest):    Full       Eliazar Bar MD  12/12/20

## 2020-12-13 NOTE — PLAN OF CARE
Goal Outcome Evaluation:  Plan of Care Reviewed With: patient  Progress: no change  Outcome Summary: VSS, room air. Pain managed with scheudeld and PRN daniel. Pt is anxious and tearful.

## 2020-12-13 NOTE — PROGRESS NOTES
[facility] Medicine Services  PROGRESS NOTE    Patient Name: Hailee Booker  : 1955  MRN: [id number]    Date of Admission: 2020  Primary Care Physician: Sheila Bran APRN    Subjective   Subjective     CC:  F/u vaginal hemorrhage     HPI:  Denies abdominal pain, but winces a bit when she moves her legs.    ROS:  Gen- No fevers, chills  CV- No chest pain, palpitations  Resp- No cough, dyspnea  GI- No N/V/D, abd pain          Objective   Objective     Vital Signs:   Temp:  [98.1 °F (36.7 °C)-98.6 °F (37 °C)] 98.1 °F (36.7 °C)  Heart Rate:  [] 91  Resp:  [16-18] 16  BP: (106-139)/(64-81) 139/76        Physical Exam:  Constitutional - no acute distress, nontoxic, in bed, frail  HEENT-NCAT, mucous membranes moist  CV-RRR, S1 S2 normal, no m/r/g  Resp-grossly clear bilaterally  Abd-soft, nontender to light palpation, nondistended, normoactive bowel sounds  Ext-No lower extremity cyanosis, clubbing or edema bilaterally  Neuro-alert, speech clear, moves all extremities   Psych-normal affect   Skin- No rash on exposed UE or LE bilaterally    Results Reviewed:  Results from last 7 days   Lab Units 20  0514 20  0249 12/12/20  2001  12/10/20  0412  20  0737  20  1048 20  1032   WBC 10*3/mm3  --   --   --   --  3.92  --  5.70  --   --  6.57   HEMOGLOBIN g/dL 8.0* 7.9* 7.2*   < > 7.2*   < > 8.1*   < >  --  8.8*   HEMATOCRIT % 26.6* 25.9* 23.7*   < > 23.6*   < > 25.9*   < >  --  28.5*   PLATELETS 10*3/mm3  --   --   --   --  231  --  319  --   --  383   INR   --   --   --   --   --   --   --   --   --  1.12   PROCALCITONIN ng/mL  --   --   --   --   --   --   --   --  0.12  --     < > = values in this interval not displayed.     Results from last 7 days   Lab Units 20  0222 12/10/20  0412 20  1217 20  0737 20  1048   SODIUM mmol/L 137 134*  --  131* 134*   POTASSIUM mmol/L 3.7 3.9 3.3* 3.6 3.0*   CHLORIDE mmol/L 96* 96*   --  94* 94*   CO2 mmol/L 32.0* 31.0*  --  29.0 31.0*   BUN mg/dL 10 16  --  15 20   CREATININE mg/dL 0.54* 0.60  --  0.56* 0.64   GLUCOSE mg/dL 126* 99  --  105* 88   CALCIUM mg/dL 8.6 8.3*  --  8.4* 9.0   ALT (SGPT) U/L  --   --   --   --  16   AST (SGOT) U/L  --   --   --   --  28   TROPONIN T ng/mL  --   --   --   --  <0.010   PROBNP pg/mL  --   --   --   --  123.4     Estimated Creatinine Clearance: 64 mL/min (A) (by C-G formula based on SCr of 0.54 mg/dL (L)).    Microbiology Results Abnormal     Procedure Component Value - Date/Time    Blood Culture - Blood, Arm, Right [[id number]] Collected: 12/09/20 1941    Lab Status: Preliminary result Specimen: Blood from Arm, Right Updated: 12/12/20 2015     Blood Culture No growth at 3 days    Blood Culture - Blood, Hand, Right [[id number]] Collected: 12/09/20 1945    Lab Status: Preliminary result Specimen: Blood from Hand, Right Updated: 12/12/20 2015     Blood Culture No growth at 3 days    Narrative:      Aerobic bottle only      Blood Culture - Blood, Arm, Left [[id number]] Collected: 12/08/20 1545    Lab Status: Preliminary result Specimen: Blood from Arm, Left Updated: 12/12/20 1615     Blood Culture No growth at 4 days    Blood Culture - Blood, Arm, Right [[id number]] Collected: 12/08/20 1540    Lab Status: Preliminary result Specimen: Blood from Arm, Right Updated: 12/12/20 1615     Blood Culture No growth at 4 days    Urine Culture - Urine, Urine, Clean Catch [[id number]] Collected: 12/08/20 1024    Lab Status: Final result Specimen: Urine, Clean Catch Updated: 12/09/20 1733     Urine Culture 25,000 CFU/mL Normal Urogenital Mary    COVID-19,CEPHEID,DANUTA IN-HOUSE(OR EMERGENT/ADD-ON),NP SWAB IN TRANSPORT MEDIA 3-4 HR TAT - Swab, Nasopharynx [[id number]]  (Normal) Collected: 12/08/20 1107    Lab Status: Final result Specimen: Swab from Nasopharynx Updated: 12/08/20 1206     COVID19 Not Detected    Narrative:      Fact sheet for providers:  https://www.fda.gov/media/028442/download     Fact sheet for patients: https://www.fda.gov/media/454315/download          Imaging Results (Last 24 Hours)     Procedure Component Value Units Date/Time    CT Chest With Contrast [[id number]] Collected: 12/11/20 0958     Updated: 12/12/20 1157    Narrative:      EXAMINATION: CT CHEST W CONTRAST- 12/10/2020     INDICATION: Ovarian cancer staging; R19.00-Intra-abdominal and pelvic  swelling, mass and lump, unspecified site; R19.07-Generalized  intra-abdominal and pelvic swelling, mass and lump; R16.0-Hepatomegaly,  not elsewhere classified; N13.30-Unspecified hydronephrosis;  D64.9-Anemia, unspecified; N93.9-Abnormal uterine and vaginal bleeding,  unspecified; R10.9-Unspecified abdominal pain; Z74.09-Other reduced  mobility     TECHNIQUE: 5 mm post IV contrast images through the chest and upper  abdomen     The radiation dose reduction device was turned on for each scan per the  ALARA (As Low as Reasonably Achievable) protocol.     COMPARISON: Chest radiograph 12/09/2020     FINDINGS: History indicates ovarian cancer, staging.     There is diffuse thyroid goiter. No mediastinal hilar, or axillary  adenopathy is seen. No pulmonary parenchymal nodule is identified. There  is trace right basilar effusion and atelectasis, which may be associated  with the patient's exophytic mass in the dome of the right liver lobe.  There is a small hiatal hernia. Bony structures appear to be intact.  Note is again made of the patient's exophytic right lobe liver lesion,  small low-density lesion inferior right liver lobe. There appears to be  mild fecal stasis.       Impression:      1. Trace right basilar effusion and mild right lower lobe atelectasis,  which may be associated with the patient's exophytic right lobe liver  lesion, as from diaphragmatic irritation. No direct evidence of  transdiaphragmatic spread of disease is seen.      2. No evidence of metastatic disease or other acute  disease elsewhere in  the chest.     3. Incidentally noted goiter.     D:  12/11/2020  E:  12/11/2020        This report was finalized on 12/12/2020 11:54 AM by Dr. Danny Juarez MD.             Results for orders placed during the hospital encounter of 10/16/20   Adult Transthoracic Echo Complete W/ Cont if Necessary Per Protocol    Narrative · Estimated left ventricular EF = 70%  · Mild tricuspid valve regurgitation is present.  · Estimated right ventricular systolic pressure from tricuspid   regurgitation is 37 mmHg.          I have reviewed the medications:  Scheduled Meds:clonazePAM, 0.5 mg, Oral, Q12H  isosorbide mononitrate, 60 mg, Oral, Daily  oxyCODONE, 5 mg, Oral, Q4H While Awake  piperacillin-tazobactam, 3.375 g, Intravenous, Q8H  senna-docusate sodium, 2 tablet, Oral, BID  sodium chloride, 10 mL, Intravenous, Q12H      Continuous Infusions:   PRN Meds:.•  acetaminophen  •  albuterol  •  bisacodyl  •  HYDROmorphone **AND** naloxone  •  lactulose  •  magnesium sulfate **OR** magnesium sulfate **OR** magnesium sulfate  •  mineral oil  •  ondansetron  •  oxyCODONE  •  phenol  •  potassium chloride  •  potassium chloride  •  potassium chloride  •  sodium chloride  •  sodium chloride  •  sodium chloride    Assessment/Plan   Assessment & Plan     Active Hospital Problems    Diagnosis  POA   • Vaginal bleeding [N93.9]  Yes   • Malignant neoplasm of ovary (CMS/HCC) [C56.9]  Yes   • Rt Hydronephrosis 2* Pelvic Mass [N13.30]  Yes      Resolved Hospital Problems   No resolved problems to display.        Brief Hospital Course to date:  aHilee Booker is a 65 y.o. 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  Presents with vaginal bleeding and uncontrolled pain.        Vaginal bleeding, anemia  In setting of widespread ovarian cancer   - chemo started 12/3:  carboplatin/Taxol/bevacizumab   - Vaginal bleeding improved  -  XRT with Dr. Ryan 5 treatment in  total started on 12/9- will be completed on 12/15 (no therapy over the weekend)  - Dr. Anderson- Hem/Onc, consulted on 12/10 and discussed only moving forward with completion of radiation, no systemic treatments/ chemo planned for now due to poor performance status  - hemoglobin stable at 8.0     Recurrent Fever, sepsis unknown source   Immunosuppressed   -Pt with fever of 102.8 12/9 -- no afebrile, Tmas 98.6 over past 24 hrs  - may represent IA infection in setting of metastatic pelvic disease vs tumor fever  - zosyn for now     Progressive bilateral hydronephrosis, presumably from compression by mass  - no CVA pain or tenderness  - creatinine nl; patient on chemo  - if creat rises or pain develops at CVAs, consider stenting.     Progressive pain R lower abd.   constipation  - Continue Oxycodone 10mg q4h prn   -- palliative following   -- laxatives     Hypokalemia:   -Replaced and resolved      Abnl UA  - Cx 25,000 normal urogenital candelario      Social issues  -SW to see         DVT Prophylaxis:  [facility]      Disposition: I expect the patient to be discharged after completion of radiation therapy on 12/15  CODE STATUS:   Code Status and Medical Interventions:   Ordered at: 12/08/20 1525     Level Of Support Discussed With:    Patient     Code Status:    CPR     Medical Interventions (Level of Support Prior to Arrest):    Full       Eliazar Bar MD  12/13/20

## 2020-12-13 NOTE — NURSING NOTE
Would recommend    [place] [employer]era[employer] soft convexity 2 3/4 #[id number]   Closed Pouch with filter # [id number]    This product is a soft flexible convexity vs the old firm convexity that she is currently using.      Will order samples from [employer] of the #[id number] and [id number], as well as a soft convex closed end 1 piece    Patient also concerned about stool staying at the top of the pouch - recommend lubricating deodorant or nonstick cooking spray to be applied into pouch opening to help stool fall into the pouch.    Appliance intact - formed stool in appliance    Patient falls asleep during conversation     FEDERICA Goldberg updated on POC

## 2020-12-13 NOTE — PROGRESS NOTES
Nutrition Services    Patient Name:  Hailee Booker  YOB: 1955  MRN: [id number]  Admit Date:  12/8/2020                      Clinical Nutrition     Nutrition Assessment  Reason for Visit:   LOS, Diagnosis      Patient Name: Hailee Booker  YOB: 1955  MRN: [id number]  Date of Encounter: 12/13/20 15:55 EST  Admission date: 12/8/2020      Comments: Put pt o GI diet so she can have chicken breast, turkey, cheese slices. Otherwise rpts will take only Frosted Flakes, Milk, Pudding, Ice Cream, Icy, Iced Tea, Boost Plus. WILL NOT TAKE other cereal, eggs, broth, soups, cot cheese, beef, pork, potatoes, noodles, bread. May order items off anytime menu. Rpts will drink the Boost and takes a lot of water. NOTE within 2 hr of giving prefs pt told [facility]CA no one had ever talked to her about food prefs.     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    Additional applicable diagnosis/conditions/procedures this adm:  Palliative radiation since 12/9     Applicable PMH/PSxH:     PMH: She  has a past medical history of Arthritis, Body piercing, Breast Cancer (CMS/HCC), CHF (congestive heart failure) (CMS/HCC) (2003), Colostomy in place (CMS/[facility]) (10/17/2020), Constipation, COPD (chronic obstructive pulmonary disease) (CMS/[facility]), Coronary artery disease, Disease of thyroid gland, DVT (deep venous thrombosis) (CMS/HCC), Elevated cholesterol, GERD (gastroesophageal reflux disease), Hepatitis C, History of bronchitis, History of transfusion, CABG (2008), Hyperlipidemia, Hypertension, Injury of back, Latex allergy, Myocardial infarction (CMS/HCC) (11/2019), Seizures (CMS/HCC) (11/24/2020), Sleep apnea, Stroke (CMS/[facility]), Tattoo, and Wears glasses. Per notes hx TIA's   PSxH: She  has a past  "surgical history that includes Hysterectomy; Colonoscopy; Dilation and curettage of uterus; Cardiac surgery; Colonoscopy (N/A, 10/12/2020); Vaginal Biopsy (N/A, 10/12/2020); Colostomy (N/A, 10/17/2020); Other surgical history; Mastectomy (Bilateral); Cardiac catheterization (11/2019); Breast surgery; [place] tooth extraction; Skin biopsy; and Portacath placement (Right, 11/25/2020). CABG Stent  Ostomy         Reported/Observed/Food/Nutrition Related History:     Pt rpts current wt now usual for her. Rpts will take only Frosted Flakes, Milk, Pudding, Ice Cream, Icy, Iced Tea, Boost Plus. WILL NOT TAKE other cereal, eggs, broth, soups, cot cheese, beef, pork, potatoes, noodles, lin as believes these upset her stomach and/or that she must not eat them. Rpts will drink the Boost and takes a lot of water.    Anthropometrics     Height: 165.1 cm (65\")  Last filed wt: Weight: 59.4 kg (131 lb) (12/08/20 0931)       BMI: BMI (Calculated): 21.8  Normal: 18.5-24.9kg/m2    Ideal Body Weight (IBW) (kg): 57.29      Weight Change   UBW: pt rpts current wt now usual for her per EMR standing wt 12/1 135 lb  Weight change: 4 lb if current wt accurate   % wt change: 3%  Time frame of weight loss: 1 wk     Labs reviewed     Results from last 7 days   Lab Units 12/13/20  0222 12/10/20  0412 12/09/20  1217 12/09/20  0737 12/08/20  1048   GLUCOSE mg/dL 126* 99  --  105* 88   BUN mg/dL 10 16  --  15 20   CREATININE mg/dL 0.54* 0.60  --  0.56* 0.64   SODIUM mmol/L 137 134*  --  131* 134*   CHLORIDE mmol/L 96* 96*  --  94* 94*   POTASSIUM mmol/L 3.7 3.9 3.3* 3.6 3.0*   MAGNESIUM mg/dL  --  2.4 1.9  --  1.4*   ALT (SGPT) U/L  --   --   --   --  16     Results from last 7 days   Lab Units 12/08/20  1048   ALBUMIN g/dL 3.50             Lab Results   Lab Value Date/Time    HGBA1C 5.30 10/16/2020 1721         Medications reviewed   Pertinent:  Abx, Klonopin, Pericolace Lactulose, Dulcolax      Intake/Ouptut 24 hrs (7:00AM - 6:59 AM)     Intake & " Output (last day)       12/12 0701 - 12/13 0700 12/13 0701 - 12/14 0700    P.O.  2641    Total Intake(mL/kg)  2641 (44.5)    Urine (mL/kg/hr) 800 (0.6)     Total Output 800     Net -800 +2641          Urine Unmeasured Occurrence 2 x 1 x        Nutrition Focused Physical Exam     Unable to perform exam due to: Potential for patient discomfort      Current Nutrition Prescription     PO: Diet Regular; GI Soft  Supplement: Boost Plus 3x/da    Intake: 62% of 8 meals recorded        Nutrition Diagnosis     12/13  Problem Inadequate oral intake   Etiology Limited food acceptance r/t rpted tolerance    Signs/Symptoms 62% intake of 6 meals recorded         Nutrition Intervention       Follow treatment progress, Care plan reviewed, Advise alternate selection, Menu provided, Menu adjusted      Goal:   General: Nutrition support treatment  PO: Increase intake      Monitoring/Evaluation:   Per protocol, PO intake, Supplement intake, Pertinent labs, GI status, Symptoms          Alejandra Greene RD,   Time Spent: 30 min        Electronically signed by:  Alejandra Greene RD  12/13/20 15:55 EST

## 2020-12-13 NOTE — PLAN OF CARE
Goal Outcome Evaluation:  Plan of Care Reviewed With: patient  Progress: improving  Outcome Summary: Pt resting well this shift. Given PRN pain medication once so far. Pt does seem sad and withdrawn this shift. No complaints at this time. VSS. Will continue to monitor. 0315 12/13/2020

## 2020-12-13 NOTE — PROGRESS NOTES
INFECTIOUS DISEASE Progress Note    Hailee Booker  1955  [id number]      Admission Date: 12/8/2020      Requesting Provider: Sherine Keen DO  Evaluating Physician: Chase Prasad MD    Reason for Consultation: sepsis unknown source, recurrent fever, immunosuppressed    History of present illness:    12/11/20: Patient is a 65 y.o. female with h/o CHF, COPD, CAD/stent/CABG, DVT, hypothyroidism, Hep C/treated, HTN, seizure d/o, TIAs, breast cancer/bilateral mastectomy, and ovarian cancer/hysterectomy/treatment at [facility] 2017 who we were asked to see for sepsis with recurrent fever in immunosuppressed patient.  The patient was noted to have rectal and vaginal mass of recent examinations with lesions biopsy and found to be adenocarcinoma.  A CT scan of a/p was concerning for obstructive disease and a large liver lesion.  She underwent a diverting ostomy placement. Sh is residing at a rehab facility with discharge next week.  A port a cath was placed 11/25/20 with chemotherapy first started on 12/3 with carbo/Taxol/Avastin for metastatic ovarian cancer.  She presented to [facility] ED on 12/8/20 with vaginal spotting that progressed to hakan blood when standing or sitting.  She was felt not to be a surgical candidate as well as a poor candidate for systemic chemotherapy.  She was started on palliative radiation on 12/9 with daily radiation therapy.  Work up during her stay shows intermittent fevers with 102.8 on 12/9 and 100.2 on 12/10.  She has been afebrile today.  Blood cultures are pending.  A UA WBC on 12/8 was TNTC with culture showing normal candelario.  Her hgb today is 7.8.  A COVID-19 screen was negative. A CT scan of chest shows   RLL atelectasis with no evidence of metastatic disease.  A CT scan of a/p showed enlarging exophytic right lobe liver mass, amorphous masslike pelvoabdominal cystic mass, worsening bilateral hydronephrosis, marked fecal stasis, and gradually enlarging pelvic sidewall and inguinal lymph  "nodes.  She is currently on Vancomycin and Zosyn.  ID was asked to evaluate and manage her antibiotic therapy.    12/12/20: She has been afebrile overnight. Cultures have remained negative. She is undergoing palliative radiation which will be completed on 12/15.  She denies increased abdominal pain.    12/13/2020: She remains afebrile.  She denies increased abdominal pain.  She is scheduled to be discharged on 12/15 on hospice.    Past Medical History:   Diagnosis Date   • Arthritis    • Body piercing     ears   • Cancer (CMS/Formerly [facility])     breast cancer twice, basal cell carcinoma, ovarian   • CHF (congestive heart failure) (CMS/Formerly [facility]) 2003   • Colostomy in place (CMS/HCC) 10/17/2020   • Constipation    • COPD (chronic obstructive pulmonary disease) (CMS/Formerly [facility])    • Coronary artery disease    • Disease of thyroid gland    • DVT (deep venous thrombosis) (CMS/Formerly [facility])     Patient reported after CABG in 2008 and that she had DVT x2   • Elevated cholesterol    • GERD (gastroesophageal reflux disease)    • Hepatitis C     Patient reported she has had treatment   • History of bronchitis    • History of transfusion     Patient reported no prior reaction   • Hx of CABG 2008    Patient reported 2 vessel - reported MI prior to this procedure   • Hyperlipidemia    • Hypertension    • Injury of back    • Latex allergy    • Myocardial infarction (CMS/Formerly [facility]) 11/2019    Patient reported \"very light\" and that she had medical attention [facility] in [facility] and had placement of 1 stent   • Seizures (CMS/Formerly [facility]) 11/24/2020    Patient reported since 1997 - epileptic.  Patient reported last seizure activity while in nursing home apx 20 days ago   • Sleep apnea     Patient reported prior use of CPAP and none since 2017 after weight loss   • Stroke (CMS/Formerly [facility])     Patient reported TIA x3 (reported last TIA 2003, 2010, 2015) - reported no residual issues from TIA's   • Tattoo     x1   • Wears glasses     OTC glasses for reading       Past Surgical " "History:   Procedure Laterality Date   • BREAST SURGERY     • CARDIAC CATHETERIZATION  2019    one stent   • CARDIAC SURGERY      CABG two vessel    • COLONOSCOPY     • COLONOSCOPY N/A 10/12/2020    Procedure: COLONOSCOPY;  Surgeon: Himanshu Shen MD;  Location: [facility] ENDOSCOPY;  Service: Gastroenterology;  Laterality: N/A;   • COLOSTOMY N/A 10/17/2020    Procedure: LAPAROTOMY EXPLORATORY, COLOSTOMY, LYSIS OF ADHESIONS;  Surgeon: Juana Winter MD;  Location: [facility] OR;  Service: Gynecology Oncology;  Laterality: N/A;   • DILATATION AND CURETTAGE     • HYSTERECTOMY     • MASTECTOMY Bilateral     ,    • OTHER SURGICAL HISTORY      Patient reported \"torin in the right leg\"   • PORTACATH PLACEMENT Right 2020    Procedure: INSERTION OF PORTACATH RIGHT SUBCLAVIAN;  Surgeon: Himanshu Shen MD;  Location: [facility] OR;  Service: General;  Laterality: Right;   • SKIN BIOPSY      reported twice ( left shoulder,  face)   • VAGINAL BIOPSY N/A 10/12/2020    Procedure: VAGINAL BIOPSY;  Surgeon: Himanshu Shen MD;  Location: [facility] ENDOSCOPY;  Service: Gastroenterology;  Laterality: N/A;   • WISDOM TOOTH EXTRACTION         Family History   Problem Relation Age of Onset   • Cancer Mother    • Hypertension Mother    • Hyperlipidemia Mother    • Stroke Father        Social History     Socioeconomic History   • Marital status: Legally      Spouse name: Not on file   • Number of children: Not on file   • Years of education: Not on file   • Highest education level: Not on file   Tobacco Use   • Smoking status: Former Smoker     Years: 10.00     Types: Cigarettes     Quit date: 1995     Years since quittin.2   • Smokeless tobacco: Never Used   Substance and Sexual Activity   • Alcohol use: Never     Frequency: Never   • Drug use: Never   • Sexual activity: Defer       Allergies   Allergen Reactions   • Lisinopril Cough   • Losartan Unknown - High Severity     HAS STOMACH ULCERS "   • Citrus Cough     Citrus blossoms   • Latex Itching   • Pollen Extract Unknown - Low Severity     RED, ITCHY EYES         Medication:    Current Facility-Administered Medications:   •  acetaminophen (TYLENOL) tablet 650 mg, 650 mg, Oral, Q6H PRN, Sherine Keen, , 650 mg at 12/10/20 1200  •  albuterol (PROVENTIL) nebulizer solution 0.083% 2.5 mg/3mL, 2.5 mg, Nebulization, Q4H PRN, Grace Johnson MD  •  bisacodyl (DULCOLAX) EC tablet 10 mg, 10 mg, Oral, Daily PRN, Sherine Keen, DO, 10 mg at 12/12/20 0550  •  clonazePAM (KlonoPIN) tablet 0.5 mg, 0.5 mg, Oral, Q12H, Grace Johnson MD, 0.5 mg at 12/13/20 0948  •  HYDROmorphone (DILAUDID) injection 1 mg, 1 mg, Intravenous, Q2H PRN **AND** naloxone (NARCAN) injection 0.4 mg, 0.4 mg, Intravenous, Q5 Min PRN, Grace Johnson MD  •  isosorbide mononitrate (IMDUR) 24 hr tablet 60 mg, 60 mg, Oral, Daily, Grace Johnson MD, 60 mg at 12/13/20 0948  •  lactulose (CHRONULAC) 10 GM/15ML solution 20 g, 20 g, Oral, BID PRN, Joanna Agrawal APRN, 20 g at 12/12/20 0838  •  Magnesium Sulfate 2 gram Bolus, followed by 8 gram infusion (total Mg dose 10 grams)- Mg less than or equal to 1mg/dL, 2 g, Intravenous, PRN **OR** Magnesium Sulfate 2 gram / 50mL Infusion (GIVE X 3 BAGS TO EQUAL 6GM TOTAL DOSE) - Mg 1.1 - 1.5 mg/dl, 2 g, Intravenous, PRN, Last Rate: 25 mL/hr at 12/08/20 2143, 2 g at 12/08/20 2143 **OR** Magnesium Sulfate 4 gram infusion- Mg 1.6-1.9 mg/dL, 4 g, Intravenous, PRN, Grace Johnson MD, Last Rate: 25 mL/hr at 12/09/20 1621, 4 g at 12/09/20 1621  •  mineral oil enema 1 enema, 1 enema, Rectal, Once PRN, Joanna Agrawal APRN  •  ondansetron (ZOFRAN) injection 4 mg, 4 mg, Intravenous, Q6H PRN, Jessa Moreno APRN, 4 mg at 12/11/20 1149  •  oxyCODONE (ROXICODONE) immediate release tablet 5 mg, 5 mg, Oral, Q4H While Awake, Joanna Agrawal APRN, 5 mg at 12/13/20 0948  •  oxyCODONE (ROXICODONE) immediate release tablet 7.5 mg, 7.5 mg, Oral, Q4H PRN, Tanja  ERIN Jones, 7.5 mg at 12/13/20 0203  •  phenol (CHLORASEPTIC) 1.4 % liquid 2 spray, 2 spray, Mouth/Throat, Q2H PRN, Harry Christianson DO, 2 spray at 12/12/20 1246  •  piperacillin-tazobactam (ZOSYN) 3.375 g in iso-osmotic dextrose 50 ml (premix), 3.375 g, Intravenous, Q8H, Chase Prasad MD, 3.375 g at 12/13/20 0949  •  potassium chloride (KLOR-CON) packet 40 mEq, 40 mEq, Oral, PRN, Grace Johnson MD  •  potassium chloride (MICRO-K) CR capsule 40 mEq, 40 mEq, Oral, PRN, Grace Johnson MD, 40 mEq at 12/08/20 1828  •  potassium chloride 10 mEq in 100 mL IVPB, 10 mEq, Intravenous, Q1H PRN, Grace Johnson MD, Last Rate: 100 mL/hr at 12/09/20 2307, 10 mEq at 12/09/20 2307  •  sennosides-docusate (PERICOLACE) 8.6-50 MG per tablet 2 tablet, 2 tablet, Oral, BID, Joanna Agrawal APRN, 2 tablet at 12/13/20 0949  •  sodium chloride 0.9 % flush 10 mL, 10 mL, Intravenous, PRN, Kirk Carter MD  •  sodium chloride 0.9 % flush 10 mL, 10 mL, Intravenous, Q12H, Grace Johnson MD, 10 mL at 12/12/20 0839  •  sodium chloride 0.9 % flush 10 mL, 10 mL, Intravenous, PRN, Grace Johnson MD  •  sodium chloride nasal spray 1 spray, 1 spray, Each Nare, PRN, Harry Christianson,     Antibiotics:  Anti-Infectives (From admission, onward)    Ordered     Dose/Rate Route Frequency Start Stop    12/09/20 1657  piperacillin-tazobactam (ZOSYN) 3.375 g in iso-osmotic dextrose 50 ml (premix)     Chase Prasad MD reviewed the order on 12/13/20 1115.   Ordering Provider: Chase Prasad MD    3.375 g  over 4 Hours Intravenous Every 8 Hours 12/10/20 0000 12/19/20 2359    12/09/20 1711  vancomycin 1500 mg/500 mL 0.9% NS IVPB ([facility])     Ordering Provider: Sixto Mcdonnell, PharmD    1,500 mg  over 90 Minutes Intravenous Once 12/09/20 1800 12/09/20 2043    12/09/20 1657  piperacillin-tazobactam (ZOSYN) 3.375 g in iso-osmotic dextrose 50 ml (premix)     Ordering Provider: Sherine Keen DO    3.375 g  over 30 Minutes Intravenous  Once 20 1745 20 1848    20 1443  cefTRIAXone (ROCEPHIN) 2 g/100 mL 0.9% NS IVPB ([facility])     Ordering Provider: Federico Roman APRN    2 g  over 30 Minutes Intravenous Once 20 1445 20 1656            Review of Systems:  See HPI      Physical Exam:   Vital Signs  Temp (24hrs), Av.3 °F (36.8 °C), Min:98.1 °F (36.7 °C), Max:98.6 °F (37 °C)    Temp  Min: 98.1 °F (36.7 °C)  Max: 98.6 °F (37 °C)  BP  Min: 106/70  Max: 139/76  Pulse  Min: 87  Max: 102  Resp  Min: 16  Max: 18  SpO2  Min: 91 %  Max: 94 %    GENERAL: Awake and alert, in no acute distress.   HEENT: Normocephalic, atraumatic.  PERRL. EOMI. No conjunctival injection. No icterus. Oropharynx clear without evidence of thrush or exudate.  NECK: Supple   HEART: RRR; 2/6 systolic murmur  LUNGS: Clear to auscultation bilaterally without wheezing, rales, rhonchi. Normal respiratory effort.   ABDOMEN: Soft, nontender, nondistended. No rebound or guarding. Ostomy site ok  MSK:  FROM, no joint effusion  SKIN: Warm and dry without cutaneous eruptions on Inspection/palpation.    NEURO: Oriented to PPT. Speech and cognition normal  PSYCHIATRIC: Normal insight and judgment. Cooperative with PE    Laboratory Data    Results from last 7 days   Lab Units 20  0514 20  0249 12/12/20  2001  12/10/20  0412  20  0737  20  1032   WBC 10*3/mm3  --   --   --   --  3.92  --  5.70  --  6.57   HEMOGLOBIN g/dL 8.0* 7.9* 7.2*   < > 7.2*   < > 8.1*   < > 8.8*   HEMATOCRIT % 26.6* 25.9* 23.7*   < > 23.6*   < > 25.9*   < > 28.5*   PLATELETS 10*3/mm3  --   --   --   --  231  --  319  --  383    < > = values in this interval not displayed.     Results from last 7 days   Lab Units 20  0222   SODIUM mmol/L 137   POTASSIUM mmol/L 3.7   CHLORIDE mmol/L 96*   CO2 mmol/L 32.0*   BUN mg/dL 10   CREATININE mg/dL 0.54*   GLUCOSE mg/dL 126*   CALCIUM mg/dL 8.6     Results from last 7 days   Lab Units 20  1048   ALK PHOS U/L 54   BILIRUBIN  mg/dL 0.3   ALT (SGPT) U/L 16   AST (SGOT) U/L 28             Results from last 7 days   Lab Units 12/08/20  1545   LACTATE mmol/L 0.7         Results from last 7 days   Lab Units 12/11/20  0515   VANCOMYCIN TR mcg/mL 9.90     Estimated Creatinine Clearance: 64 mL/min (A) (by C-G formula based on SCr of 0.54 mg/dL (L)).      Microbiology:  Microbiology Results (last 10 days)     Procedure Component Value - Date/Time    Blood Culture - Blood, Hand, Right [[id number]] Collected: 12/09/20 1945    Lab Status: Preliminary result Specimen: Blood from Hand, Right Updated: 12/12/20 2015     Blood Culture No growth at 3 days    Narrative:      Aerobic bottle only      Blood Culture - Blood, Arm, Right [[id number]] Collected: 12/09/20 1941    Lab Status: Preliminary result Specimen: Blood from Arm, Right Updated: 12/12/20 2015     Blood Culture No growth at 3 days    Blood Culture - Blood, Arm, Left [[id number]] Collected: 12/08/20 1545    Lab Status: Preliminary result Specimen: Blood from Arm, Left Updated: 12/12/20 1615     Blood Culture No growth at 4 days    Blood Culture - Blood, Arm, Right [[id number]] Collected: 12/08/20 1540    Lab Status: Preliminary result Specimen: Blood from Arm, Right Updated: 12/12/20 1615     Blood Culture No growth at 4 days    COVID-19,CEPHEID,DANUTA IN-HOUSE(OR EMERGENT/ADD-ON),NP SWAB IN TRANSPORT MEDIA 3-4 HR TAT - Swab, Nasopharynx [[id number]]  (Normal) Collected: 12/08/20 1107    Lab Status: Final result Specimen: Swab from Nasopharynx Updated: 12/08/20 1206     COVID19 Not Detected    Narrative:      Fact sheet for providers: https://www.fda.gov/media/760778/download     Fact sheet for patients: https://www.fda.gov/media/360660/download    Urine Culture - Urine, Urine, Clean Catch [[id number]] Collected: 12/08/20 1024    Lab Status: Final result Specimen: Urine, Clean Catch Updated: 12/09/20 1733     Urine Culture 25,000 CFU/mL Normal Urogenital Mary            Radiology:  Imaging Results  (Last 72 Hours)     Procedure Component Value Units Date/Time    CT Chest With Contrast [[id number]] Collected: 12/11/20 0958     Updated: 12/12/20 1157    Narrative:      EXAMINATION: CT CHEST W CONTRAST- 12/10/2020     INDICATION: Ovarian cancer staging; R19.00-Intra-abdominal and pelvic  swelling, mass and lump, unspecified site; R19.07-Generalized  intra-abdominal and pelvic swelling, mass and lump; R16.0-Hepatomegaly,  not elsewhere classified; N13.30-Unspecified hydronephrosis;  D64.9-Anemia, unspecified; N93.9-Abnormal uterine and vaginal bleeding,  unspecified; R10.9-Unspecified abdominal pain; Z74.09-Other reduced  mobility     TECHNIQUE: 5 mm post IV contrast images through the chest and upper  abdomen     The radiation dose reduction device was turned on for each scan per the  ALARA (As Low as Reasonably Achievable) protocol.     COMPARISON: Chest radiograph 12/09/2020     FINDINGS: History indicates ovarian cancer, staging.     There is diffuse thyroid goiter. No mediastinal hilar, or axillary  adenopathy is seen. No pulmonary parenchymal nodule is identified. There  is trace right basilar effusion and atelectasis, which may be associated  with the patient's exophytic mass in the dome of the right liver lobe.  There is a small hiatal hernia. Bony structures appear to be intact.  Note is again made of the patient's exophytic right lobe liver lesion,  small low-density lesion inferior right liver lobe. There appears to be  mild fecal stasis.       Impression:      1. Trace right basilar effusion and mild right lower lobe atelectasis,  which may be associated with the patient's exophytic right lobe liver  lesion, as from diaphragmatic irritation. No direct evidence of  transdiaphragmatic spread of disease is seen.      2. No evidence of metastatic disease or other acute disease elsewhere in  the chest.     3. Incidentally noted goiter.     D:  12/11/2020  E:  12/11/2020        This report was finalized on  12/12/2020 11:54 AM by Dr. Danny Juarez MD.       CT Abdomen Pelvis With Contrast [[id number]] Collected: 12/08/20 1411     Updated: 12/10/20 1700    Narrative:      EXAMINATION: CT ABDOMEN PELVIS W CONTRAST- 12/08/2020      INDICATION: Vaginal bleeding, pelvic mass.     TECHNIQUE: 5 mm post IV contrast portal venous phase and delayed venous  phase images through the abdomen and pelvis.     The radiation dose reduction device was turned on for each scan per the  ALARA (As Low as Reasonably Achievable) protocol.     COMPARISON: 10/21/2020 abdomen and pelvis CT scan.     FINDINGS: Previous 10/21/2020 exam report indicates bilateral  obstructive uropathy right greater than left, heterogeneous  hypoenhancing 5 cm right lobe liver mass. Diffuse bowel dilatation.  History today indicates abnormal uterine bleeding, history of ovarian  cancer and hysterectomy.     There is trace right effusion and minimal right basilar atelectasis.  Right lobe liver lesion appears increased from approximately 5 cm to  approximately 7 cm, and now has an extrahepatic/capsular component,  suggesting it could actually be an implanted metastasis, as well as  hematogenous metastasis with new erosion into the extra hepatic soft  tissues. There appears to be a new small hypoenhancing lesion of the  right liver lobe inferiorly, 14 mm in diameter, although there is some  marginal peripheral enhancement and this could be an incidental  hemangioma better seen today due to phase of contrast enhancement. No  new liver lesions are seen elsewhere.     Spleen is not enlarged. No significant abnormalities are seen of the  pancreas or adrenal glands. Gallbladder is normally distended and normal  in appearance. There appears to be at least moderate fecal stasis.  Bilateral hydronephrosis, right greater than left appears to be  gradually increasing, right renal pelvis increasing in diameter from 38  mm to 43 mm, left renal pelvis increasing from 22 mm to 28 mm.  IVC  filter is again noted. There is a large new pelvoabdominal cyst at least  12 x 14 cm in diameter, with no evidence of cyst wall enhancement,  internal debris or septation, occupying much of the right pelvis. Milder  cystic changes in the right lower pelvis appear a little increased.  Cystic change of the left adnexal region appears stable. Amorphous  appearance of the pelvis may represent diffuse metastatic implants here  given the patient's previous history of hysterectomy. Overall size of  this area appears very similar to prior exam up to 10 cm in maximal  diameter. No intra-abdominal free air or significant free ascites is  seen. No bowel wall inflammatory changes are noted. There is normal  contrast opacification of the mesenteric vasculature. Delayed venous  phase images again show high-grade obstructive uropathy bilaterally,  presumably as a result of the patient's ill-defined mass/metastatic  implants. Additional note is made of mild further enlargement of some  shotty inguinal and pelvic sidewall lymph nodes.       Impression:      1. Enlarging, now partially exophytic right lobe liver mass compared to  10/21/2020 exam. Questionable small new right lobe liver lesion.  2. Amorphous masslike appearance of pelvis presumably pelvic metastatic  disease/metastatic implants, similar to prior study.  3. New approximately 12 x 14 cm pelvoabdominal cysts/cystic mass.  4. Worsening bilateral hydronephrosis, likely as a result of pelvic  disease.  5. Moderate to marked fecal stasis.  6. Gradually enlarging pelvic sidewall and inguinal lymph nodes.     D:  12/08/2020  E:  12/09/2020     This report was finalized on 12/10/2020 4:57 PM by Dr. Danny Juarez MD.           I read her radiographic studies.    Impression:   1. Fevers-this is most likely secondary to tumor remains with abscess in the pelvis.  She is clinically improved on Zosyn.  2. Anemia with acute blood loss  3. Vaginal bleeding  4. Metastatic ovarian  cancer/hysterctomy 2017 with treatment.  Now with colon tumor, vaginal tumor, liver lesion found in 2020  5. Systemic chemotherapy 12/3 through port a cath placed 11/25/20.  Not felt to be a good candidate now for systemic chemotherapy. Now undergoing palliative radiation therapy.  6. COPD  7. CAD/stent/CABG  8. H/o DVT  9. Hypothyrodism  10. H/o Hep C/treated  11. Seizure d/o  12. H/o TIAs  13. Chronic probably systolic CHF  14. Breast cancer/bilateral mastectomy    PLAN/RECOMMENDATIONS:   1.  Continue Zosyn-I will leave her on Zosyn while she is here  2.  Hospice at discharge  3.  Complete palliative radiation-scheduled to end on 12/15       Chase Prasad MD  12/13/2020  11:15 EST

## 2020-12-14 NOTE — PROGRESS NOTES
Continued Stay Note  [facility]     Patient Name: Hailee Booker  MRN: [id number]  Today's Date: 12/14/2020    Admit Date: 12/8/2020    Discharge Plan     Row Name 12/14/20 1648       Plan    Plan  Home with [facility]    Plan Comments  Visit made to pt, pt with eyes closed, easily aroused with verbal stimulus. Pt stated the plan is have last radiation tomorrow then discharge home with hospice. Pt stated is too weak to sit up in a car for an extended amt of time, or get in/out of a car. Ambulance transportation arranged, no availability tomorrow, has  time at 1330 on Wednesday 12/16. Message sent to Dr. Bar regarding discharge and need for covid 19 test as [facility] requires a negative covid 19 test within 48 hours of discharge. Dr. Bar responded pt may discharge on 12/16, has ordered the covid test. Pt called daughter-in-law Shay and informed of ambulance  time. Notified MAYTE Leal RN, [facility], liaison of ambulance  time. Will continue to follow. Please call [phone] if can be of further assistance..        Discharge Codes    No documentation.             Melinda Kwan, RN

## 2020-12-14 NOTE — PLAN OF CARE
Goal Outcome Evaluation:  Plan of Care Reviewed With: patient  Progress: no change  Outcome Summary: Pt continues to be sad and withdrawn, wanting to go home. Resting between care. Pain controlled with PRN and scheduled pain medication. VSS. Will continue to monitor. 0230 12/14/2020

## 2020-12-14 NOTE — PLAN OF CARE
Goal Outcome Evaluation:  Plan of Care Reviewed With: patient  Progress: declining  Outcome Summary: Question if just fatigued, but large increase in assist this pm.  Ox2. Max/dep with bed mobility, min to max sitting EOB, min A grooming, mod to max A with self feeding.  Some resistance with UE TE and other task even after saying will try to attempt.  Cont OT POC pending pt ability to participate.

## 2020-12-14 NOTE — PROGRESS NOTES
"Palliative Care Progress Note    Date of Admission: 12/8/2020    Code Status:   Current Code Status     Date Active Code Status Order ID Comments User Context       12/8/2020 1525 CPR [id number]  Grace Johnson MD ED     Advance Care Planning Activity      Questions for Current Code Status     Question Answer Comment    Code Status CPR     Medical Interventions (Level of Support Prior to Arrest) Full     Level Of Support Discussed With Patient         Subjective:  Patient reports that she may be getting too much pain medicine.   However reports more persistent abdominal pain  Prns: x1 prn on 12/13  No nausea, eating meals.  No dyspnea.    No vaginal bleeding, up in shower today.  Reviewed current scheduled and prn medications for route, type, dose, and frequency. Reviewed medical record     •  acetaminophen  •  albuterol  •  bisacodyl  •  HYDROmorphone **AND** naloxone  •  lactulose  •  magnesium sulfate **OR** magnesium sulfate **OR** magnesium sulfate  •  mineral oil  •  ondansetron  •  oxyCODONE  •  phenol  •  potassium chloride  •  potassium chloride  •  potassium chloride  •  sodium chloride  •  sodium chloride  •  sodium chloride    Objective:  PPS 50%  /73 (BP Location: Left arm, Patient Position: Lying)   Pulse 96   Temp 99.2 °F (37.3 °C) (Axillary)   Resp 16   Ht 165.1 cm (65\")   Wt 59.4 kg (131 lb)   LMP  (LMP Unknown)   SpO2 90%   BMI 21.80 kg/m²    Intake & Output (last day)       12/13 0701 - 12/14 0700 12/14 0701 - 12/15 0700    P.O. 2881     Total Intake(mL/kg) 2881 (48.5)     Urine (mL/kg/hr) 1050 (0.7) 50 (0.1)    Total Output 1050 50    Net +1831 -50          Urine Unmeasured Occurrence 2 x 1 x        Lab Results (last 24 hours)     Procedure Component Value Units Date/Time    Blood Culture - Blood, Arm, Right [[id number]] Collected: 12/09/20 1941    Specimen: Blood from Arm, Right Updated: 12/13/20 2015     Blood Culture No growth at 4 days    Blood Culture - Blood, Hand, Right " [[id number]] Collected: 12/09/20 1945    Specimen: Blood from Hand, Right Updated: 12/13/20 2015     Blood Culture No growth at 4 days    Narrative:      Aerobic bottle only      Blood Culture - Blood, Arm, Left [[id number]] Collected: 12/08/20 1545    Specimen: Blood from Arm, Left Updated: 12/13/20 1616     Blood Culture No growth at 5 days    Blood Culture - Blood, Arm, Right [[id number]] Collected: 12/08/20 1540    Specimen: Blood from Arm, Right Updated: 12/13/20 1616     Blood Culture No growth at 5 days        Imaging Results (Last 24 Hours)     ** No results found for the last 24 hours. **        Physical Exam:  Gen: NAD, resting in bed  Skin: warm, dry   Eyes: LB, conjunctiva clear and moist   Resp/thorax: even effort, non labored, CTA   CV: RRR   ABD: soft, bowel sounds +, nontender  Ext: no edema, no redness   Neuro: awake, interactive, no myoclonus     Reviewed labs and diagnostic results.   Lab Results   Component Value Date    HGBA1C 5.30 10/16/2020     Results from last 7 days   Lab Units 12/13/20  0514  12/10/20  0412   WBC 10*3/mm3  --   --  3.92   HEMOGLOBIN g/dL 8.0*   < > 7.2*   HEMATOCRIT % 26.6*   < > 23.6*   PLATELETS 10*3/mm3  --   --  231    < > = values in this interval not displayed.     Results from last 7 days   Lab Units 12/13/20  0222  12/08/20  1048   SODIUM mmol/L 137   < > 134*   POTASSIUM mmol/L 3.7   < > 3.0*   CHLORIDE mmol/L 96*   < > 94*   CO2 mmol/L 32.0*   < > 31.0*   BUN mg/dL 10   < > 20   CREATININE mg/dL 0.54*   < > 0.64   CALCIUM mg/dL 8.6   < > 9.0   BILIRUBIN mg/dL  --   --  0.3   ALK PHOS U/L  --   --  54   ALT (SGPT) U/L  --   --  16   AST (SGOT) U/L  --   --  28   GLUCOSE mg/dL 126*   < > 88    < > = values in this interval not displayed.       Impression: 65 y.o. female with metastatic ovarian cancer stage IVb with progressive hydronephrosis, completed radiation 4/5 fractions    Plan:   abd pain - adjust scheduled oxy IR to every 6 hours and continue with prns.    Patient had filled prescription of oxycodone IR 10mg #180 on 12/4, will not need any additional provided    Constipation - continue to monitor - bisacodyl suppository in am if no stool output in stoma.  Last stool output on 12/13.    Goals of care - home with hospice care tomorrow after last radiation treatment.     Time: 40 minutes   > 50% time spent in counseling and education concerning current orders for symptom management with patient    Joanna Agrawal, APRN  [phone]  12/14/20  14:11 EST

## 2020-12-14 NOTE — PROGRESS NOTES
[facility] Medicine Services  PROGRESS NOTE    Patient Name: Hailee Booker  : 1955  MRN: [id number]    Date of Admission: 2020  Primary Care Physician: Sheila Bran APRN    Subjective   Subjective     CC:  F/u vaginal hemorrhage     HPI: no current abdominal pain    ROS:  Gen- No fevers, chills  CV- No chest pain, palpitations  Resp- No cough, dyspnea  GI- No N/V/D, abd pain      Objective   Objective     Vital Signs:   Temp:  [97.7 °F (36.5 °C)-101.6 °F (38.7 °C)] 101.6 °F (38.7 °C)  Heart Rate:  [] 105  Resp:  [16-18] 16  BP: (113-146)/(68-84) 136/78        Physical Exam:    Constitutional - no acute distress, nontoxic, in bed, appears fatigued.  HEENT- NCAT, mucous membranes moist  CV-RRR, S1 S2 normal, no m/r/g  Resp-CTAB, no wheezes, rhonchi or rales  Abd-soft, nondistended, normoactive bowel sounds  Ext-No lower extremity cyanosis, clubbing or edema bilaterally  Neuro-alert, speech clear, moves all extremities   Psych-normal affect   Skin- No rash on exposed UE or LE bilaterally      Results Reviewed:  Results from last 7 days   Lab Units 20  0514 20  0249 12/12/20  2001  12/10/20  0412  20  0737  20  1048 20  1032   WBC 10*3/mm3  --   --   --   --  3.92  --  5.70  --   --  6.57   HEMOGLOBIN g/dL 8.0* 7.9* 7.2*   < > 7.2*   < > 8.1*   < >  --  8.8*   HEMATOCRIT % 26.6* 25.9* 23.7*   < > 23.6*   < > 25.9*   < >  --  28.5*   PLATELETS 10*3/mm3  --   --   --   --  231  --  319  --   --  383   INR   --   --   --   --   --   --   --   --   --  1.12   PROCALCITONIN ng/mL  --   --   --   --   --   --   --   --  0.12  --     < > = values in this interval not displayed.     Results from last 7 days   Lab Units 20  0222 12/10/20  0412 20  1217 20  0737 20  1048   SODIUM mmol/L 137 134*  --  131* 134*   POTASSIUM mmol/L 3.7 3.9 3.3* 3.6 3.0*   CHLORIDE mmol/L 96* 96*  --  94* 94*   CO2 mmol/L 32.0* 31.0*  --   29.0 31.0*   BUN mg/dL 10 16  --  15 20   CREATININE mg/dL 0.54* 0.60  --  0.56* 0.64   GLUCOSE mg/dL 126* 99  --  105* 88   CALCIUM mg/dL 8.6 8.3*  --  8.4* 9.0   ALT (SGPT) U/L  --   --   --   --  16   AST (SGOT) U/L  --   --   --   --  28   TROPONIN T ng/mL  --   --   --   --  <0.010   PROBNP pg/mL  --   --   --   --  123.4     Estimated Creatinine Clearance: 64 mL/min (A) (by C-G formula based on SCr of 0.54 mg/dL (L)).    Microbiology Results Abnormal     Procedure Component Value - Date/Time    Blood Culture - Blood, Arm, Right [[id number]] Collected: 12/09/20 1941    Lab Status: Preliminary result Specimen: Blood from Arm, Right Updated: 12/13/20 2015     Blood Culture No growth at 4 days    Blood Culture - Blood, Hand, Right [[id number]] Collected: 12/09/20 1945    Lab Status: Preliminary result Specimen: Blood from Hand, Right Updated: 12/13/20 2015     Blood Culture No growth at 4 days    Narrative:      Aerobic bottle only      Blood Culture - Blood, Arm, Left [[id number]] Collected: 12/08/20 1545    Lab Status: Final result Specimen: Blood from Arm, Left Updated: 12/13/20 1616     Blood Culture No growth at 5 days    Blood Culture - Blood, Arm, Right [[id number]] Collected: 12/08/20 1540    Lab Status: Final result Specimen: Blood from Arm, Right Updated: 12/13/20 1616     Blood Culture No growth at 5 days    Urine Culture - Urine, Urine, Clean Catch [[id number]] Collected: 12/08/20 1024    Lab Status: Final result Specimen: Urine, Clean Catch Updated: 12/09/20 1733     Urine Culture 25,000 CFU/mL Normal Urogenital Mary    COVID-19,CEPHEID,DANUTA IN-HOUSE(OR EMERGENT/ADD-ON),NP SWAB IN TRANSPORT MEDIA 3-4 HR TAT - Swab, Nasopharynx [[id number]]  (Normal) Collected: 12/08/20 1107    Lab Status: Final result Specimen: Swab from Nasopharynx Updated: 12/08/20 1206     COVID19 Not Detected    Narrative:      Fact sheet for providers: https://www.fda.gov/media/917604/download     Fact sheet for patients:  https://www.fda.gov/media/273974/download          Imaging Results (Last 24 Hours)     ** No results found for the last 24 hours. **          Results for orders placed during the hospital encounter of 10/16/20   Adult Transthoracic Echo Complete W/ Cont if Necessary Per Protocol    Narrative · Estimated left ventricular EF = 70%  · Mild tricuspid valve regurgitation is present.  · Estimated right ventricular systolic pressure from tricuspid   regurgitation is 37 mmHg.          I have reviewed the medications:  Scheduled Meds:[START ON 12/15/2020] bisacodyl, 10 mg, Stoma, Once  clonazePAM, 0.5 mg, Oral, Q12H  isosorbide mononitrate, 60 mg, Oral, Daily  oxyCODONE, 5 mg, Oral, Q6H  piperacillin-tazobactam, 3.375 g, Intravenous, Q8H  senna-docusate sodium, 2 tablet, Oral, BID  sodium chloride, 10 mL, Intravenous, Q12H      Continuous Infusions:   PRN Meds:.•  acetaminophen  •  albuterol  •  bisacodyl  •  HYDROmorphone **AND** naloxone  •  lactulose  •  magnesium sulfate **OR** magnesium sulfate **OR** magnesium sulfate  •  mineral oil  •  ondansetron  •  oxyCODONE  •  phenol  •  potassium chloride  •  potassium chloride  •  potassium chloride  •  sodium chloride  •  sodium chloride  •  sodium chloride    Assessment/Plan   Assessment & Plan     Active Hospital Problems    Diagnosis  POA   • Vaginal bleeding [N93.9]  Yes   • Malignant neoplasm of ovary (CMS/HCC) [C56.9]  Yes   • Rt Hydronephrosis 2* Pelvic Mass [N13.30]  Yes      Resolved Hospital Problems   No resolved problems to display.        Brief Hospital Course to date:  Hailee Booker is a 65 y.o. 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  Presents with vaginal bleeding and uncontrolled pain.        Vaginal bleeding, anemia  In setting of widespread ovarian cancer   - chemo started 12/3:  carboplatin/Taxol/bevacizumab   - Vaginal bleeding improved  -  XRT with Dr. Ryan 5 treatment in total  started on 12/9- will be completed on 12/15 (no therapy over the weekend)  - Dr. Anderson- Hem/Onc, consulted on 12/10 and discussed only moving forward with completion of radiation, no systemic treatments/ chemo planned for now due to poor performance status  - hemoglobin stable at 8.0     Recurrent Fever, sepsis unknown source   Immunosuppressed   -Pt with fever of 102.8 12/9 -- no afebrile, Tmas 98.6 over past 24 hrs  - may represent IA infection in setting of metastatic pelvic disease vs tumor fever  - zosyn for now     Progressive bilateral hydronephrosis, presumably from compression by mass  - no CVA pain or tenderness  - creatinine nl; patient on chemo     Progressive pain R lower abd.   constipation  - Continue Oxycodone 10mg q4h prn   -- palliative following   -- laxatives     Hypokalemia:   -Replaced and resolved      Abnl UA  - Cx 25,000 normal urogenital candelario      Social issues  -SW to see      DVT Prophylaxis:  [facility]      Disposition: I expect the patient to be discharged after completion of radiation therapy, ambulance arranged Weds 12/16  CODE STATUS:   Code Status and Medical Interventions:   Ordered at: 12/08/20 1525     Level Of Support Discussed With:    Patient     Code Status:    CPR     Medical Interventions (Level of Support Prior to Arrest):    Full       Eliazar Bar MD  12/14/20

## 2020-12-14 NOTE — TELEPHONE ENCOUNTER
Jeni with [facility] called to see if [facility] will follow patient's care and do pain & symptom management. Patient is being discharged from hospital soon. Ph # [phone].

## 2020-12-14 NOTE — PLAN OF CARE
Problem: Adult Inpatient Plan of Care  Goal: Plan of Care Review  Outcome: Ongoing, Progressing  Flowsheets (Taken 12/14/2020 1014)  Progress: no change  Plan of Care Reviewed With: patient  Outcome Summary: Went to radiation this am, last treatment tomorrow, resting at this time, will continue to monitor     Problem: Adult Inpatient Plan of Care  Goal: Patient-Specific Goal (Individualized)  Outcome: Ongoing, Progressing  Flowsheets (Taken 12/8/2020 1703)  Patient-Specific Goals (Include Timeframe): monitor pain q2h and prn     Problem: Adult Inpatient Plan of Care  Goal: Absence of Hospital-Acquired Illness or Injury  Outcome: Ongoing, Progressing     Problem: Adult Inpatient Plan of Care  Goal: Absence of Hospital-Acquired Illness or Injury  Intervention: Identify and Manage Fall Risk  Recent Flowsheet Documentation  Taken 12/14/2020 0800 by Jess Palacios RN  Safety Promotion/Fall Prevention:   toileting scheduled   safety round/check completed   room organization consistent   activity supervised   assistive device/personal items within reach   clutter free environment maintained   fall prevention program maintained   gait belt   lighting adjusted   nonskid shoes/slippers when out of bed     Problem: Adult Inpatient Plan of Care  Goal: Absence of Hospital-Acquired Illness or Injury  Intervention: Prevent Skin Injury  Recent Flowsheet Documentation  Taken 12/14/2020 0800 by Jess Palacios RN  Body Position:   position changed independently   supine     Problem: Adult Inpatient Plan of Care  Goal: Absence of Hospital-Acquired Illness or Injury  Intervention: Prevent and Manage VTE (venous thromboembolism) Risk  Recent Flowsheet Documentation  Taken 12/14/2020 0800 by Jess Palacios RN  VTE Prevention/Management: bleeding risk factor(s) identified     Problem: Adult Inpatient Plan of Care  Goal: Optimal Comfort and Wellbeing  Outcome: Ongoing, Progressing     Problem: Adult Inpatient Plan of Care  Goal:  Optimal Comfort and Wellbeing  Intervention: Provide Person-Centered Care  Recent Flowsheet Documentation  Taken 12/14/2020 0800 by Jess Palacios RN  Trust Relationship/Rapport:   choices provided   care explained     Problem: Adult Inpatient Plan of Care  Goal: Readiness for Transition of Care  Outcome: Ongoing, Progressing     Problem: Fall Injury Risk  Goal: Absence of Fall and Fall-Related Injury  Outcome: Ongoing, Progressing     Problem: Fall Injury Risk  Goal: Absence of Fall and Fall-Related Injury  Intervention: Identify and Manage Contributors to Fall Injury Risk  Recent Flowsheet Documentation  Taken 12/14/2020 0800 by Jess Palacios RN  Medication Review/Management:   medications reviewed   high-risk medications identified     Problem: Fall Injury Risk  Goal: Absence of Fall and Fall-Related Injury  Intervention: Promote Injury-Free Environment  Recent Flowsheet Documentation  Taken 12/14/2020 0800 by Jess Palacios RN  Safety Promotion/Fall Prevention:   toileting scheduled   safety round/check completed   room organization consistent   activity supervised   assistive device/personal items within reach   clutter free environment maintained   fall prevention program maintained   gait belt   lighting adjusted   nonskid shoes/slippers when out of bed     Problem: Pain Acute  Goal: Optimal Pain Control  Outcome: Ongoing, Progressing     Problem: Pain Acute  Goal: Optimal Pain Control  Intervention: Optimize Psychosocial Wellbeing  Recent Flowsheet Documentation  Taken 12/14/2020 0800 by Jess Palacios RN  Diversional Activities: television     Problem: Skin Injury Risk Increased  Goal: Skin Health and Integrity  Outcome: Ongoing, Progressing     Problem: Skin Injury Risk Increased  Goal: Skin Health and Integrity  Intervention: Optimize Skin Protection  Recent Flowsheet Documentation  Taken 12/14/2020 0800 by Jess Palacios, RN  Pressure Reduction Techniques:   frequent weight shift encouraged   weight  shift assistance provided   sit time limited to 2 hours  Head of Bed (HOB): HOB flat  Pressure Reduction Devices: pressure-redistributing mattress utilized  Skin Protection: incontinence pads utilized     Problem: Palliative Care  Goal: Enhanced Quality of Life  Outcome: Ongoing, Progressing   Goal Outcome Evaluation:  Plan of Care Reviewed With: patient  Progress: no change  Outcome Summary: Went to radiation this am, last treatment tomorrow, resting at this time, will continue to monitor

## 2020-12-14 NOTE — TELEPHONE ENCOUNTER
Talked with Julianna at [facility]. Told her [facility] doesn't do pain management. She advised to transfer care to them.

## 2020-12-14 NOTE — PROGRESS NOTES
Continued Stay Note  [facility]     Patient Name: Hailee Booker  MRN: [id number]  Today's Date: 12/14/2020    Admit Date: 12/8/2020    Discharge Plan     Row Name 12/14/20 1151       Plan    Plan Comments  CM spoke with pt at bedside this morning, pt reports she will need transportation home at time of discharge and also wants to make sure [facility]osp[facility] knows she needs a bed and bedside commode. ANASTACIA spoke with Melinda with Hospice and she will speak with pt in regards to transportation needs today. Melinda aware of DME needs and [facility] is supposed to be arranging for her. Pt denies further needs at this time.    Final Discharge Disposition Code  50 - home with hospice        Discharge Codes    No documentation.             Wendy Wilson RN

## 2020-12-14 NOTE — PROGRESS NOTES
INFECTIOUS DISEASE Progress Note    Hailee Booker  1955  [id number]      Admission Date: 12/8/2020      Requesting Provider: Sherine Keen DO  Evaluating Physician: Chase Prasad MD    Reason for Consultation: sepsis unknown source, recurrent fever, immunosuppressed    History of present illness:    12/11/20: Patient is a 65 y.o. female with h/o CHF, COPD, CAD/stent/CABG, DVT, hypothyroidism, Hep C/treated, HTN, seizure d/o, TIAs, breast cancer/bilateral mastectomy, and ovarian cancer/hysterectomy/treatment at [facility] 2017 who we were asked to see for sepsis with recurrent fever in immunosuppressed patient.  The patient was noted to have rectal and vaginal mass of recent examinations with lesions biopsy and found to be adenocarcinoma.  A CT scan of a/p was concerning for obstructive disease and a large liver lesion.  She underwent a diverting ostomy placement. Sh is residing at a rehab facility with discharge next week.  A port a cath was placed 11/25/20 with chemotherapy first started on 12/3 with carbo/Taxol/Avastin for metastatic ovarian cancer.  She presented to [facility] ED on 12/8/20 with vaginal spotting that progressed to hakan blood when standing or sitting.  She was felt not to be a surgical candidate as well as a poor candidate for systemic chemotherapy.  She was started on palliative radiation on 12/9 with daily radiation therapy.  Work up during her stay shows intermittent fevers with 102.8 on 12/9 and 100.2 on 12/10.  She has been afebrile today.  Blood cultures are pending.  A UA WBC on 12/8 was TNTC with culture showing normal candelario.  Her hgb today is 7.8.  A COVID-19 screen was negative. A CT scan of chest shows   RLL atelectasis with no evidence of metastatic disease.  A CT scan of a/p showed enlarging exophytic right lobe liver mass, amorphous masslike pelvoabdominal cystic mass, worsening bilateral hydronephrosis, marked fecal stasis, and gradually enlarging pelvic sidewall and inguinal lymph  "nodes.  She is currently on Vancomycin and Zosyn.  ID was asked to evaluate and manage her antibiotic therapy.    12/12/20: She has been afebrile overnight. Cultures have remained negative. She is undergoing palliative radiation which will be completed on 12/15.  She denies increased abdominal pain.    12/13/2020: She remains afebrile.  She denies increased abdominal pain.  She is scheduled to be discharged on 12/15 on hospice.    12/14/20: She has remained afebrile.  She denies increased abdominal pain.  She denies severe nausea and vomiting.  Now scheduled to be discharged on 12/16.    Past Medical History:   Diagnosis Date   • Arthritis    • Body piercing     ears   • Cancer (CMS/[facility])     breast cancer twice, basal cell carcinoma, ovarian   • CHF (congestive heart failure) (CMS/[facility]) 2003   • Colostomy in place (CMS/[facility]) 10/17/2020   • Constipation    • COPD (chronic obstructive pulmonary disease) (CMS/[facility])    • Coronary artery disease    • Disease of thyroid gland    • DVT (deep venous thrombosis) (CMS/[facility])     Patient reported after CABG in 2008 and that she had DVT x2   • Elevated cholesterol    • GERD (gastroesophageal reflux disease)    • Hepatitis C     Patient reported she has had treatment   • History of bronchitis    • History of transfusion     Patient reported no prior reaction   • Hx of CABG 2008    Patient reported 2 vessel - reported MI prior to this procedure   • Hyperlipidemia    • Hypertension    • Injury of back    • Latex allergy    • Myocardial infarction (CMS/[facility]) 11/2019    Patient reported \"very light\" and that she had medical attention [facility] in [facility] and had placement of 1 stent   • Seizures (CMS/[facility]) 11/24/2020    Patient reported since 1997 - epileptic.  Patient reported last seizure activity while in nursing home apx 20 days ago   • Sleep apnea     Patient reported prior use of CPAP and none since 2017 after weight loss   • Stroke (CMS/[facility])     Patient reported TIA x3 " "(reported last TIA , , ) - reported no residual issues from TIA's   • Tattoo     x1   • Wears glasses     OTC glasses for reading       Past Surgical History:   Procedure Laterality Date   • BREAST SURGERY     • CARDIAC CATHETERIZATION  2019    one stent   • CARDIAC SURGERY      CABG two vessel    • COLONOSCOPY     • COLONOSCOPY N/A 10/12/2020    Procedure: COLONOSCOPY;  Surgeon: Himanshu Shen MD;  Location: [facility] END[facility]COPY;  Service: Gastroenterology;  Laterality: N/A;   • COLOSTOMY N/A 10/17/2020    Procedure: LAPAROTOMY EXPLORATORY, COLOSTOMY, LYSIS OF ADHESIONS;  Surgeon: Juana Winter MD;  Location: [facility] OR;  Service: Gynecology Oncology;  Laterality: N/A;   • DILATATION AND CURETTAGE     • HYSTERECTOMY     • MASTECTOMY Bilateral     ,    • OTHER SURGICAL HISTORY      Patient reported \"torin in the right leg\"   • PORTACATH PLACEMENT Right 2020    Procedure: INSERTION OF PORTACATH RIGHT SUBCLAVIAN;  Surgeon: Himanshu Shen MD;  Location: [facility] OR;  Service: General;  Laterality: Right;   • SKIN BIOPSY      reported twice ( left shoulder,  face)   • VAGINAL BIOPSY N/A 10/12/2020    Procedure: VAGINAL BIOPSY;  Surgeon: Himanshu Shen MD;  Location: [facility] ENDOSCOPY;  Service: Gastroenterology;  Laterality: N/A;   • WISDOM TOOTH EXTRACTION         Family History   Problem Relation Age of Onset   • Cancer Mother    • Hypertension Mother    • Hyperlipidemia Mother    • Stroke Father        Social History     Socioeconomic History   • Marital status: Legally      Spouse name: Not on file   • Number of children: Not on file   • Years of education: Not on file   • Highest education level: Not on file   Tobacco Use   • Smoking status: Former Smoker     Years: 10.00     Types: Cigarettes     Quit date: 1995     Years since quittin.2   • Smokeless tobacco: Never Used   Substance and Sexual Activity   • Alcohol use: Never     Frequency: Never "   • Drug use: Never   • Sexual activity: Defer       Allergies   Allergen Reactions   • Lisinopril Cough   • Losartan Unknown - High Severity     HAS STOMACH ULCERS   • Citrus Cough     Citrus blossoms   • Latex Itching   • Pollen Extract Unknown - Low Severity     RED, ITCHY EYES         Medication:    Current Facility-Administered Medications:   •  acetaminophen (TYLENOL) tablet 650 mg, 650 mg, Oral, Q6H PRN, Sherine Keen, DO, 650 mg at 12/10/20 1200  •  albuterol (PROVENTIL) nebulizer solution 0.083% 2.5 mg/3mL, 2.5 mg, Nebulization, Q4H PRN, Grace Johnson MD  •  bisacodyl (DULCOLAX) EC tablet 10 mg, 10 mg, Oral, Daily PRN, Sherine Keen DO, 10 mg at 12/12/20 0550  •  clonazePAM (KlonoPIN) tablet 0.5 mg, 0.5 mg, Oral, Q12H, Grace Johnson MD, 0.5 mg at 12/13/20 2201  •  HYDROmorphone (DILAUDID) injection 1 mg, 1 mg, Intravenous, Q2H PRN **AND** naloxone (NARCAN) injection 0.4 mg, 0.4 mg, Intravenous, Q5 Min PRN, Grace Johnson MD  •  isosorbide mononitrate (IMDUR) 24 hr tablet 60 mg, 60 mg, Oral, Daily, Grace Johnson MD, 60 mg at 12/13/20 0948  •  lactulose (CHRONULAC) 10 GM/15ML solution 20 g, 20 g, Oral, BID PRN, Joanna Agrawal APRN, 20 g at 12/12/20 0838  •  Magnesium Sulfate 2 gram Bolus, followed by 8 gram infusion (total Mg dose 10 grams)- Mg less than or equal to 1mg/dL, 2 g, Intravenous, PRN **OR** Magnesium Sulfate 2 gram / 50mL Infusion (GIVE X 3 BAGS TO EQUAL 6GM TOTAL DOSE) - Mg 1.1 - 1.5 mg/dl, 2 g, Intravenous, PRN, Last Rate: 25 mL/hr at 12/08/20 2143, 2 g at 12/08/20 2143 **OR** Magnesium Sulfate 4 gram infusion- Mg 1.6-1.9 mg/dL, 4 g, Intravenous, PRN, Grace Johnson MD, Last Rate: 25 mL/hr at 12/09/20 1621, 4 g at 12/09/20 1621  •  mineral oil enema 1 enema, 1 enema, Rectal, Once PRN, Joanna Agrawal, APRN  •  ondansetron (ZOFRAN) injection 4 mg, 4 mg, Intravenous, Q6H PRN, Jessa Moreno, APRN, 4 mg at 12/11/20 1149  •  oxyCODONE (ROXICODONE) immediate release tablet 5 mg, 5  mg, Oral, Q4H While Awake, Joanna Agrawal APRN, 5 mg at 12/14/20 0447  •  oxyCODONE (ROXICODONE) immediate release tablet 7.5 mg, 7.5 mg, Oral, Q4H PRN, Joanna Agrawal APRN, 7.5 mg at 12/13/20 0203  •  phenol (CHLORASEPTIC) 1.4 % liquid 2 spray, 2 spray, Mouth/Throat, Q2H PRN, Harry Christianson, , 2 spray at 12/12/20 1246  •  piperacillin-tazobactam (ZOSYN) 3.375 g in iso-osmotic dextrose 50 ml (premix), 3.375 g, Intravenous, Q8H, Chase Prasad MD, 3.375 g at 12/14/20 0112  •  potassium chloride (KLOR-CON) packet 40 mEq, 40 mEq, Oral, PRN, Grace Johnson MD  •  potassium chloride (MICRO-K) CR capsule 40 mEq, 40 mEq, Oral, PRN, Grace Johnson MD, 40 mEq at 12/08/20 1828  •  potassium chloride 10 mEq in 100 mL IVPB, 10 mEq, Intravenous, Q1H PRN, Grace Johnson MD, Last Rate: 100 mL/hr at 12/09/20 2307, 10 mEq at 12/09/20 2307  •  sennosides-docusate (PERICOLACE) 8.6-50 MG per tablet 2 tablet, 2 tablet, Oral, BID, Joanna Agrawal APRN, 2 tablet at 12/13/20 2201  •  sodium chloride 0.9 % flush 10 mL, 10 mL, Intravenous, PRN, Kirk Carter MD  •  sodium chloride 0.9 % flush 10 mL, 10 mL, Intravenous, Q12H, Grace Johnson MD, 10 mL at 12/12/20 0839  •  sodium chloride 0.9 % flush 10 mL, 10 mL, Intravenous, PRN, Grace Johnson MD  •  sodium chloride nasal spray 1 spray, 1 spray, Each Nare, PRN, Harry Christianson,     Antibiotics:  Anti-Infectives (From admission, onward)    Ordered     Dose/Rate Route Frequency Start Stop    12/09/20 1657  piperacillin-tazobactam (ZOSYN) 3.375 g in iso-osmotic dextrose 50 ml (premix)     Chase Prasad MD reviewed the order on 12/13/20 1115.   Ordering Provider: Chase Prasad MD    3.375 g  over 4 Hours Intravenous Every 8 Hours 12/10/20 0000 12/19/20 2359    12/09/20 1711  vancomycin 1500 mg/500 mL 0.9% NS IVPB ([facility])     Ordering Provider: Sixto Mcdonnell, PharmD    1,500 mg  over 90 Minutes Intravenous Once 12/09/20 1800 12/09/20 2043    12/09/20 1657   piperacillin-tazobactam (ZOSYN) 3.375 g in iso-osmotic dextrose 50 ml (premix)     Ordering Provider: Sherine Keen DO    3.375 g  over 30 Minutes Intravenous Once 20 1745 20 1848    20 1443  cefTRIAXone (ROCEPHIN) 2 g/100 mL 0.9% NS IVPB ([facility])     Ordering Provider: Federico Roman APRN    2 g  over 30 Minutes Intravenous Once 20 1445 20 1656            Review of Systems:  See HPI      Physical Exam:   Vital Signs  Temp (24hrs), Av.6 °F (37 °C), Min:98 °F (36.7 °C), Max:99.5 °F (37.5 °C)    Temp  Min: 98 °F (36.7 °C)  Max: 99.5 °F (37.5 °C)  BP  Min: 113/68  Max: 146/84  Pulse  Min: 87  Max: 99  Resp  Min: 16  Max: 18  SpO2  Min: 90 %  Max: 97 %    GENERAL: Awake and alert, in no acute distress.   HEENT: Normocephalic, atraumatic.  PERRL. EOMI. No conjunctival injection. No icterus. Oropharynx clear without evidence of thrush or exudate.  NECK: Supple   HEART: RRR; 2/6 systolic murmur  LUNGS: Clear to auscultation bilaterally without wheezing, rales, rhonchi. Normal respiratory effort.   ABDOMEN: Soft, nontender, nondistended. No rebound or guarding. Ostomy site ok  MSK:  FROM, no joint effusion  SKIN: Warm and dry without cutaneous eruptions on Inspection/palpation.    NEURO: Oriented to PPT. Speech and cognition normal  PSYCHIATRIC: Normal insight and judgment. Cooperative with PE    Laboratory Data    Results from last 7 days   Lab Units 20  0514 20  0249 12/12/20  2001  12/10/20  0412  20  0737  20  1032   WBC 10*3/mm3  --   --   --   --  3.92  --  5.70  --  6.57   HEMOGLOBIN g/dL 8.0* 7.9* 7.2*   < > 7.2*   < > 8.1*   < > 8.8*   HEMATOCRIT % 26.6* 25.9* 23.7*   < > 23.6*   < > 25.9*   < > 28.5*   PLATELETS 10*3/mm3  --   --   --   --  231  --  319  --  383    < > = values in this interval not displayed.     Results from last 7 days   Lab Units 20  0222   SODIUM mmol/L 137   POTASSIUM mmol/L 3.7   CHLORIDE mmol/L 96*   CO2 mmol/L 32.0*   BUN  mg/dL 10   CREATININE mg/dL 0.54*   GLUCOSE mg/dL 126*   CALCIUM mg/dL 8.6     Results from last 7 days   Lab Units 12/08/20  1048   ALK PHOS U/L 54   BILIRUBIN mg/dL 0.3   ALT (SGPT) U/L 16   AST (SGOT) U/L 28             Results from last 7 days   Lab Units 12/08/20  1545   LACTATE mmol/L 0.7         Results from last 7 days   Lab Units 12/11/20  0515   VANCOMYCIN TR mcg/mL 9.90     Estimated Creatinine Clearance: 64 mL/min (A) (by C-G formula based on SCr of 0.54 mg/dL (L)).      Microbiology:  Microbiology Results (last 10 days)     Procedure Component Value - Date/Time    Blood Culture - Blood, Hand, Right [[id number]] Collected: 12/09/20 1945    Lab Status: Preliminary result Specimen: Blood from Hand, Right Updated: 12/13/20 2015     Blood Culture No growth at 4 days    Narrative:      Aerobic bottle only      Blood Culture - Blood, Arm, Right [[id number]] Collected: 12/09/20 1941    Lab Status: Preliminary result Specimen: Blood from Arm, Right Updated: 12/13/20 2015     Blood Culture No growth at 4 days    Blood Culture - Blood, Arm, Left [[id number]] Collected: 12/08/20 1545    Lab Status: Final result Specimen: Blood from Arm, Left Updated: 12/13/20 1616     Blood Culture No growth at 5 days    Blood Culture - Blood, Arm, Right [[id number]] Collected: 12/08/20 1540    Lab Status: Final result Specimen: Blood from Arm, Right Updated: 12/13/20 1616     Blood Culture No growth at 5 days    COVID-19,CEPHEID,DANUTA IN-HOUSE(OR EMERGENT/ADD-ON),NP SWAB IN TRANSPORT MEDIA 3-4 HR TAT - Swab, Nasopharynx [[id number]]  (Normal) Collected: 12/08/20 1107    Lab Status: Final result Specimen: Swab from Nasopharynx Updated: 12/08/20 1206     COVID19 Not Detected    Narrative:      Fact sheet for providers: https://www.fda.gov/media/002265/download     Fact sheet for patients: https://www.fda.gov/media/749655/download    Urine Culture - Urine, Urine, Clean Catch [[id number]] Collected: 12/08/20 1024    Lab Status: Final result  Specimen: Urine, Clean Catch Updated: 12/09/20 1733     Urine Culture 25,000 CFU/mL Normal Urogenital Mary            Radiology:  Imaging Results (Last 72 Hours)     Procedure Component Value Units Date/Time    CT Chest With Contrast [[id number]] Collected: 12/11/20 0958     Updated: 12/12/20 1157    Narrative:      EXAMINATION: CT CHEST W CONTRAST- 12/10/2020     INDICATION: Ovarian cancer staging; R19.00-Intra-abdominal and pelvic  swelling, mass and lump, unspecified site; R19.07-Generalized  intra-abdominal and pelvic swelling, mass and lump; R16.0-Hepatomegaly,  not elsewhere classified; N13.30-Unspecified hydronephrosis;  D64.9-Anemia, unspecified; N93.9-Abnormal uterine and vaginal bleeding,  unspecified; R10.9-Unspecified abdominal pain; Z74.09-Other reduced  mobility     TECHNIQUE: 5 mm post IV contrast images through the chest and upper  abdomen     The radiation dose reduction device was turned on for each scan per the  ALARA (As Low as Reasonably Achievable) protocol.     COMPARISON: Chest radiograph 12/09/2020     FINDINGS: History indicates ovarian cancer, staging.     There is diffuse thyroid goiter. No mediastinal hilar, or axillary  adenopathy is seen. No pulmonary parenchymal nodule is identified. There  is trace right basilar effusion and atelectasis, which may be associated  with the patient's exophytic mass in the dome of the right liver lobe.  There is a small hiatal hernia. Bony structures appear to be intact.  Note is again made of the patient's exophytic right lobe liver lesion,  small low-density lesion inferior right liver lobe. There appears to be  mild fecal stasis.       Impression:      1. Trace right basilar effusion and mild right lower lobe atelectasis,  which may be associated with the patient's exophytic right lobe liver  lesion, as from diaphragmatic irritation. No direct evidence of  transdiaphragmatic spread of disease is seen.      2. No evidence of metastatic disease or other  acute disease elsewhere in  the chest.     3. Incidentally noted goiter.     D:  12/11/2020  E:  12/11/2020        This report was finalized on 12/12/2020 11:54 AM by Dr. Danny Juarez MD.           I read her radiographic studies.    Impression:   1. Fevers-this is most likely secondary to tumor remains with abscess in the pelvis.  She is clinically improved on Zosyn.  2. Anemia with acute blood loss  3. Vaginal bleeding  4. Metastatic ovarian cancer/hysterctomy 2017 with treatment.  Now with colon tumor, vaginal tumor, liver lesion found in 2020  5. Systemic chemotherapy 12/3 through port a cath placed 11/25/20.  Not felt to be a good candidate now for systemic chemotherapy. Now undergoing palliative radiation therapy.  6. COPD  7. CAD/stent/CABG  8. H/o DVT  9. Hypothyrodism  10. H/o Hep C/treated  11. Seizure d/o  12. H/o TIAs  13. Chronic probably systolic CHF  14. Breast cancer/bilateral mastectomy    PLAN/RECOMMENDATIONS:   1.  Continue Zosyn-I will leave her on Zosyn while she is here  2.  Hospice at discharge  3.  Complete palliative radiation-scheduled to end on 12/15       Chase Prasad MD  12/14/2020  06:23 EST

## 2020-12-14 NOTE — THERAPY TREATMENT NOTE
Patient Name: Hailee Booker  : 1955    MRN: [id number]                              Today's Date: 2020       Admit Date: 2020    Visit Dx:     ICD-10-CM ICD-9-CM   1. Pelvic mass  R19.00 789.30   2. Generalized abdominal mass  R19.07 789.37   3. Liver mass  R16.0 573.8   4. Bilateral hydronephrosis  N13.30 591   5. Anemia, unspecified type  D64.9 285.9   6. Vagina bleeding  N93.9 623.8   7. Acute abdominal pain  R10.9 789.00     338.19   8. Impaired mobility and activities of daily living  Z74.09 V49.89    Z78.9    9. Vaginal bleeding  N93.9 623.8   10. Malignant neoplasm of both ovaries (CMS/HCC)  C56.1 183.0    C56.2      Patient Active Problem List   Diagnosis   • Seizures (CMS/HCC)   • Pelvic mass   • Rt Hydronephrosis 2* Pelvic Mass   • Malignant neoplasm of both ovaries (CMS/HCC)   • Recurrent malignant neoplasm of ovary (CMS/HCC)   • History of breast cancer   • History of pulmonary embolus (PE)   • History of DVT (deep vein thrombosis)   • Liver metastases (CMS/HCC)   • Large bowel obstruction (CMS/HCC)   • CHF (congestive heart failure) (CMS/HCC)   • Coronary artery disease   • Disease of thyroid gland   • Hypertension   • Hyperlipidemia   • Hyponatremia   • Malignant neoplasm of ovary (CMS/HCC)   • Vaginal bleeding     Past Medical History:   Diagnosis Date   • Arthritis    • Body piercing     ears   • Cancer (CMS/HCC)     breast cancer twice, basal cell carcinoma, ovarian   • CHF (congestive heart failure) (CMS/HCC)    • Colostomy in place (CMS/HCC) 10/17/2020   • Constipation    • COPD (chronic obstructive pulmonary disease) (CMS/HCC)    • Coronary artery disease    • Disease of thyroid gland    • DVT (deep venous thrombosis) (CMS/HCC)     Patient reported after CABG in  and that she had DVT x2   • Elevated cholesterol    • GERD (gastroesophageal reflux disease)    • Hepatitis C     Patient reported she has had treatment   • History of bronchitis    • History of transfusion  "    Patient reported no prior reaction   • Hx of CABG 2008    Patient reported 2 vessel - reported MI prior to this procedure   • Hyperlipidemia    • Hypertension    • Injury of back    • Latex allergy    • Myocardial infarction (CMS/[facility]) 11/2019    Patient reported \"very light\" and that she had medical attention [facility] in [facility] and had placement of 1 stent   • Seizures (CMS/[facility]) 11/24/2020    Patient reported since 1997 - epileptic.  Patient reported last seizure activity while in nursing home apx 20 days ago   • Sleep apnea     Patient reported prior use of CPAP and none since 2017 after weight loss   • Stroke (CMS/[facility])     Patient reported TIA x3 (reported last TIA 2003, 2010, 2015) - reported no residual issues from TIA's   • Tattoo     x1   • Wears glasses     OTC glasses for reading     Past Surgical History:   Procedure Laterality Date   • BREAST SURGERY     • CARDIAC CATHETERIZATION  11/2019    one stent   • CARDIAC SURGERY      CABG two vessel 2008   • COLONOSCOPY     • COLONOSCOPY N/A 10/12/2020    Procedure: COLONOSCOPY;  Surgeon: Himanshu Shen MD;  Location: [facility] ENDOSCOPY;  Service: Gastroenterology;  Laterality: N/A;   • COLOSTOMY N/A 10/17/2020    Procedure: LAPAROTOMY EXPLORATORY, COLOSTOMY, LYSIS OF ADHESIONS;  Surgeon: Juana Winter MD;  Location: [facility] OR;  Service: Gynecology Oncology;  Laterality: N/A;   • DILATATION AND CURETTAGE     • HYSTERECTOMY     • MASTECTOMY Bilateral     1979, 1989   • OTHER SURGICAL HISTORY      Patient reported \"torin in the right leg\"   • PORTACATH PLACEMENT Right 11/25/2020    Procedure: INSERTION OF PORTACATH RIGHT SUBCLAVIAN;  Surgeon: Himanshu Shen MD;  Location: [facility] OR;  Service: General;  Laterality: Right;   • SKIN BIOPSY      reported twice (2009 left shoulder, 2020 face)   • VAGINAL BIOPSY N/A 10/12/2020    Procedure: VAGINAL BIOPSY;  Surgeon: Himanshu Shen MD;  Location: [facility] ENDOSCOPY;  Service: Gastroenterology;  " Laterality: N/A;   • WISDOM TOOTH EXTRACTION       General Information     Row Name 12/14/20 1429          OT Time and Intention    Mode of Treatment  individual therapy;occupational therapy  -     Row Name 12/14/20 1429          General Information    Existing Precautions/Restrictions  seizures;fall  -     Barriers to Rehab  medically complex  -     Row Name 12/14/20 1421          Cognition    Orientation Status (Cognition)  oriented to;person;place;refused to attempt declined to answer other questions, confused statements, limited arousal, some confusion.  -       User Key  (r) = Recorded By, (t) = Taken By, (c) = Cosigned By    Initials Name Provider Type    Ratna Ibanez, OT Occupational Therapist        Mobility/ADL's     Row Name 12/14/20 1430          Bed Mobility    Bed Mobility  sit-supine  -     Scooting/Bridging [place] (Bed Mobility)  maximum assist (25% patient effort);nonverbal cues (demo/gesture);verbal cues;dependent (less than 25% patient effort)  -     Supine-Sit [place] (Bed Mobility)  maximum assist (25% patient effort);verbal cues;nonverbal cues (demo/gesture)  -KIRSTY     Sit-Supine [place] (Bed Mobility)  maximum assist (25% patient effort);verbal cues;nonverbal cues (demo/gesture)  -     Bed Mobility, Safety Issues  cognitive deficits limit understanding;decreased use of arms for pushing/pulling;decreased use of legs for bridging/pushing;impaired trunk control for bed mobility  -     Assistive Device (Bed Mobility)  bed rails;draw sheet;head of bed elevated  -     Comment (Bed Mobility)  Pt. stating she would try to sit up and helped brings legs partially off EOB, but then max A for trunk dependent for scooting to EOB.  -     Row Name 12/14/20 4688          Transfers    Comment (Transfers)  unable to attempt due to limited sitting balance.  Sat max then to cGA then to max pushing posteriorly and had to return supine.  -     Row Name 12/14/20 3083           Functional Mobility    Functional Mobility- Ind. Level  unable to perform  -     Row Name 12/14/20 1430          Activities of Daily Living    BADL Assessment/Intervention  feeding;grooming  -     Row Name 12/14/20 1430          Grooming Assessment/Training    [place] Level (Grooming)  minimum assist (75% patient effort);wash face, hands vaseline to lips  -     Assistive Devices (Grooming)  hand over hand  -     Row Name 12/14/20 1430          Self-Feeding Assessment/Training    [place] Level (Feeding)  moderate assist (50% patient effort);maximum assist (25% patient effort);scoop food and bring to mouth;liquids to mouth  -     Position (Self-Feeding)  sitting up in bed  -       User Key  (r) = Recorded By, (t) = Taken By, (c) = Cosigned By    Initials Name Provider Type    Ratna Ibanez, OT Occupational Therapist        Obj/Interventions     [facility] Name 12/14/20 1433          Shoulder (Therapeutic Exercise)    Shoulder (Therapeutic Exercise)  AAROM (active assistive range of motion)  -     Shoulder AAROM (Therapeutic Exercise)  bilateral;flexion;extension;aBduction;aDduction;supine pt. intermittent assisting to resisting  -[facility] Name 12/14/20 [facility]          Elbow/Forearm (Therapeutic Exercise)    Elbow/Forearm (Therapeutic Exercise)  AAROM (active assistive range of motion)  -     Elbow/Forearm AAROM (Therapeutic Exercise)  bilateral;flexion;extension;10 repetitions  -[facility] Name 12/14/20 [facility]          Balance    Static Sitting Balance  moderate impairment  -     Comment, Balance  pt. ability appears to wax and wane.  Per nurse had shower then radiation and may just be exhausted.  -     Row Name 12/14/20 [facility]          Therapeutic Exercise    Therapeutic Exercise  shoulder;elbow/forearm  -       User Key  (r) = Recorded By, (t) = Taken By, (c) = Cosigned By    Initials Name Provider Type    Ratna Ibanez, OT Occupational Therapist        Goals/Plan     Row Name 12/14/20  1438          Bed Mobility Goal 1 (OT)    Progress/Outcomes (Bed Mobility Goal 1, OT)  progress slower than expected  -KIRSTY     Row Name 12/14/20 1438          Transfer Goal 1 (OT)    Progress/Outcome (Transfer Goal 1, OT)  progress slower than expected  -KIRSTY     [facility] Name 12/14/20 1438          Dressing Goal 1 (OT)    Progress/Outcome (Dressing Goal 1, OT)  progress slower than expected  -KIRSTY       User Key  (r) = Recorded By, (t) = Taken By, (c) = Cosigned By    Initials Name Provider Type    Ratna Ibanez, OT Occupational Therapist        Clinical Impression     Row Name 12/14/20 1435          Pain Scale: Numbers Pre/Post-Treatment    Pretreatment Pain Rating  0/10 - no pain  -KIRSTY     Posttreatment Pain Rating  0/10 - no pain  -KIRSTY     Row Name 12/14/20 1435          Plan of Care Review    Plan of Care Reviewed With  patient  -KIRSTY     Progress  declining  -KIRSTY     Outcome Summary  Question if just fatigued, but large increase in assist this pm.  Ox2. Max/dep with bed mobility, min to max sitting EOB, min A grooming, mod to max A with self feeding.  Some resistance with UE TE and other task even after saying will try to attempt.  Cont OT POC pending pt ability to participate.  -KIRSTY     Row Name 12/14/20 1435          Vital Signs    Pre Systolic BP Rehab  128  -KIRSTY     Pre Treatment Diastolic BP  73  -KIRSTY     Post Systolic BP Rehab  136  -KIRSTY     Post Treatment Diastolic BP  78  -KIRSTY     Pretreatment Heart Rate (beats/min)  103  -KIRSTY     Posttreatment Heart Rate (beats/min)  98  -KIRSTY     O2 Delivery Pre Treatment  room air  -KIRSTY     O2 Delivery Intra Treatment  room air  -KIRSTY     O2 Delivery Post Treatment  room air  -KIRSTY     Pre Patient Position  Supine  -KIRSTY     Intra Patient Position  Sitting  -KIRSTY     Post Patient Position  Supine  -KIRSTY     Row Name 12/14/20 1435          Positioning and Restraints    Pre-Treatment Position  in bed  -KIRSTY     Post Treatment Position  bed  -KIRSTY     In Bed  supine;encouraged to call for assist;call  light within reach;exit alarm on rail up with seizure pads in place  -KIRSTY       User Key  (r) = Recorded By, (t) = Taken By, (c) = Cosigned By    Initials Name Provider Type    Ratna Ibanez OT Occupational Therapist        Outcome Measures     Row Name 12/14/20 1438          How much help from another is currently needed...    Putting on and taking off regular lower body clothing?  1  -KIRSTY     Bathing (including washing, rinsing, and drying)  1  -KIRSTY     Toileting (which includes using toilet bed pan or urinal)  1  -KIRSTY     Putting on and taking off regular upper body clothing  1  -KIRSTY     Taking care of personal grooming (such as brushing teeth)  2  -KIRSTY     Eating meals  2  -KIRSTY     AM-PAC 6 Clicks Score (OT)  8  -KIRSTY       User Key  (r) = Recorded By, (t) = Taken By, (c) = Cosigned By    Initials Name Provider Type    Ratna Ibanez OT Occupational Therapist        Occupational Therapy Education                 Title: PT OT SLP Therapies (In Progress)     Topic: Occupational Therapy (In Progress)     Point: ADL training (In Progress)     Description:   Instruct learner(s) on proper safety adaptation and remediation techniques during self care or transfers.   Instruct in proper use of assistive devices.              Learning Progress Summary           Patient Acceptance, E,D, NR by  at 12/14/2020 1439    Comment: bed mobility, balance midline, UE TE, self feeding and grooming sequencing    Acceptance, E, NR by  at 12/10/2020 1339    Acceptance, E,TB, VU by HR at 12/9/2020 0046                   Point: Home exercise program (In Progress)     Description:   Instruct learner(s) on appropriate technique for monitoring, assisting and/or progressing therapeutic exercises/activities.              Learning Progress Summary           Patient Acceptance, E,D, NR by KIRSTY at 12/14/2020 1439    Comment: bed mobility, balance midline, UE TE, self feeding and grooming sequencing    Acceptance, E,TB, VU by HR at 12/9/2020  0046                   Point: Precautions (In Progress)     Description:   Instruct learner(s) on prescribed precautions during self-care and functional transfers.              Learning Progress Summary           Patient Acceptance, E, NR by  at 12/10/2020 1339    Acceptance, E,TB, VU by HR at 12/9/2020 0046                   Point: Body mechanics (In Progress)     Description:   Instruct learner(s) on proper positioning and spine alignment during self-care, functional mobility activities and/or exercises.              Learning Progress Summary           Patient Acceptance, E,D, NR by  at 12/14/2020 1439    Comment: bed mobility, balance midline, UE TE, self feeding and grooming sequencing    Acceptance, E, NR by  at 12/10/2020 1339    Acceptance, E,TB, VU by HR at 12/9/2020 0046                               User Key     Initials Effective Dates Name Provider Type Discipline     06/08/18 -  Ratna Orozco, OT Occupational Therapist OT    HR 01/16/19 -  Brandi Rawls, RN Registered Nurse Nurse     07/18/19 -  Traci Velazco OT Occupational Therapist OT              OT Recommendation and Plan     Plan of Care Review  Plan of Care Reviewed With: patient  Progress: declining  Outcome Summary: Question if just fatigued, but large increase in assist this pm.  Ox2. Max/dep with bed mobility, min to max sitting EOB, min A grooming, mod to max A with self feeding.  Some resistance with UE TE and other task even after saying will try to attempt.  Cont OT POC pending pt ability to participate.     Time Calculation:   Time Calculation- OT     Row Name 12/14/20 1440             Time Calculation- OT    OT Start Time  1401  -      OT Received On  12/14/20  -      OT Goal Re-Cert Due Date  12/20/20  -         Timed Charges    34075 - OT Therapeutic Exercise Minutes  5  -KIRSTY      80332 - OT Therapeutic Activity Minutes  7  -KIRSTY      52934 - OT Self Care/Mgmt Minutes  12  -KIRSTY        User Key  (r) = Recorded By, (t)  = Taken By, (c) = Cosigned By    Initials Name Provider Type    Ratna Ibanez, OT Occupational Therapist        Therapy Charges for Today     Code Description Service Date Service Provider Modifiers Qty    81389588013  OT THERAPEUTIC ACT EA 15 MIN 12/14/2020 Ratna Orozco, OT GO 1    [id number]  OT SELF CARE/MGMT/TRAIN EA 15 MIN 12/14/2020 Ratna Orozco, OT GO 1               Ratna Orozco OT  12/14/2020

## 2020-12-14 NOTE — PLAN OF CARE
1300 Palliative Care Interdisciplinary Rounds - Palliative Care Team members present:  KELLIE Christianson DO; CRISTINA Agrawal APRN; LEONARD Mckenzie APRN; GRANT Tellez RN, CHPN; MALIA Summers [facility], [facility]; LEONARD Aguilar RN; MAYTE Kwan RN, CHPN    Home with Hospice tomorrow after final XRT session.  Problem: Palliative Care  Goal: Enhanced Quality of Life  Intervention: Optimize Psychosocial Wellbeing  Recent Flowsheet Documentation  Taken 12/14/2020 1157 by Aster Summers, MSW  Supportive Measures: (symptom assessment)   active listening utilized   positive reinforcement provided   verbalization of feelings encouraged   other (see comments)

## 2020-12-15 NOTE — PLAN OF CARE
Goal Outcome Evaluation:  Plan of Care Reviewed With: patient  Progress: no change  Outcome Summary: Pt resting throughout the shift. Room air. Vital signs stable. Will continue to monitor.

## 2020-12-15 NOTE — PROGRESS NOTES
[facility] Medicine Services  PROGRESS NOTE    Patient Name: Hailee Booker  : 1955  MRN: [id number]    Date of Admission: 2020  Primary Care Physician: Sheila Bran APRN    Subjective   Subjective     CC:  F/u vaginal hemorrhage     HPI: denies abdominal pain. Looking forward to go home tomorrow.    ROS:  Gen- No fevers, chills  CV- No chest pain, palpitations  Resp- No cough, dyspnea  GI- No N/V/D, abd pain        Objective   Objective     Vital Signs:   Temp:  [98.4 °F (36.9 °C)-101.6 °F (38.7 °C)] 100.1 °F (37.8 °C)  Heart Rate:  [] 100  Resp:  [16] 16  BP: (118-139)/(73-87) 139/77        Physical Exam:    Constitutional - no acute distress, nontoxic, in bed, appears fatigued  HEENT-NCAT, mucous membranes moist  CV-RRR, S1 S2 normal, no m/r/g  Resp-grossly clear bilaterally  Abd-soft, nontender to light palpation, nondistended, normoactive bowel sounds  Ext-No lower extremity cyanosis, clubbing or edema bilaterally  Neuro-alert, speech clear, moves all extremities   Psych-flat affect   Skin- No rash on exposed UE or LE bilaterally    Results Reviewed:  Results from last 7 days   Lab Units 20  0514 20  0249 12/12/20  2001  12/10/20  0412  20  0737   WBC 10*3/mm3  --   --   --   --  3.92  --  5.70   HEMOGLOBIN g/dL 8.0* 7.9* 7.2*   < > 7.2*   < > 8.1*   HEMATOCRIT % 26.6* 25.9* 23.7*   < > 23.6*   < > 25.9*   PLATELETS 10*3/mm3  --   --   --   --  231  --  319    < > = values in this interval not displayed.     Results from last 7 days   Lab Units 20  0222 12/10/20  0412 12/09/20  1217 20  0737   SODIUM mmol/L 137 134*  --  131*   POTASSIUM mmol/L 3.7 3.9 3.3* 3.6   CHLORIDE mmol/L 96* 96*  --  94*   CO2 mmol/L 32.0* 31.0*  --  29.0   BUN mg/dL 10 16  --  15   CREATININE mg/dL 0.54* 0.60  --  0.56*   GLUCOSE mg/dL 126* 99  --  105*   CALCIUM mg/dL 8.6 8.3*  --  8.4*     Estimated Creatinine Clearance: 64 mL/min (A) (by C-G  formula based on SCr of 0.54 mg/dL (L)).    Microbiology Results Abnormal     Procedure Component Value - Date/Time    Blood Culture - Blood, Arm, Right [[id number]] Collected: 12/09/20 1941    Lab Status: Final result Specimen: Blood from Arm, Right Updated: 12/14/20 2015     Blood Culture No growth at 5 days    Blood Culture - Blood, Hand, Right [[id number]] Collected: 12/09/20 1945    Lab Status: Final result Specimen: Blood from Hand, Right Updated: 12/14/20 2015     Blood Culture No growth at 5 days    Narrative:      Aerobic bottle only      Blood Culture - Blood, Arm, Left [[id number]] Collected: 12/08/20 1545    Lab Status: Final result Specimen: Blood from Arm, Left Updated: 12/13/20 1616     Blood Culture No growth at 5 days    Blood Culture - Blood, Arm, Right [[id number]] Collected: 12/08/20 1540    Lab Status: Final result Specimen: Blood from Arm, Right Updated: 12/13/20 1616     Blood Culture No growth at 5 days    Urine Culture - Urine, Urine, Clean Catch [[id number]] Collected: 12/08/20 1024    Lab Status: Final result Specimen: Urine, Clean Catch Updated: 12/09/20 1733     Urine Culture 25,000 CFU/mL Normal Urogenital Mary    COVID-19,CEPHEID,DANUTA IN-HOUSE(OR EMERGENT/ADD-ON),NP SWAB IN TRANSPORT MEDIA 3-4 HR TAT - Swab, Nasopharynx [[id number]]  (Normal) Collected: 12/08/20 1107    Lab Status: Final result Specimen: Swab from Nasopharynx Updated: 12/08/20 1206     COVID19 Not Detected    Narrative:      Fact sheet for providers: https://www.fda.gov/media/782086/download     Fact sheet for patients: https://www.fda.gov/media/285282/download          Imaging Results (Last 24 Hours)     ** No results found for the last 24 hours. **          Results for orders placed during the hospital encounter of 10/16/20   Adult Transthoracic Echo Complete W/ Cont if Necessary Per Protocol    Narrative · Estimated left ventricular EF = 70%  · Mild tricuspid valve regurgitation is present.  · Estimated right ventricular  systolic pressure from tricuspid   regurgitation is 37 mmHg.          I have reviewed the medications:  Scheduled Meds:clonazePAM, 0.5 mg, Oral, Q12H  isosorbide mononitrate, 60 mg, Oral, Daily  oxyCODONE, 5 mg, Oral, Q6H  piperacillin-tazobactam, 3.375 g, Intravenous, Q8H  senna-docusate sodium, 2 tablet, Oral, BID  sodium chloride, 10 mL, Intravenous, Q12H      Continuous Infusions:   PRN Meds:.•  acetaminophen  •  albuterol  •  bisacodyl  •  HYDROmorphone **AND** naloxone  •  lactulose  •  magnesium sulfate **OR** magnesium sulfate **OR** magnesium sulfate  •  mineral oil  •  ondansetron  •  oxyCODONE  •  phenol  •  potassium chloride  •  potassium chloride  •  potassium chloride  •  sodium chloride  •  sodium chloride  •  sodium chloride    Assessment/Plan   Assessment & Plan     Active Hospital Problems    Diagnosis  POA   • Vaginal bleeding [N93.9]  Yes   • Malignant neoplasm of ovary (CMS/HCC) [C56.9]  Yes   • Rt Hydronephrosis 2* Pelvic Mass [N13.30]  Yes      Resolved Hospital Problems   No resolved problems to display.        Brief Hospital Course to date:  Hailee Booker is a 65 y.o. 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  Presents with vaginal bleeding and uncontrolled pain.        Vaginal bleeding, anemia  In setting of widespread ovarian cancer   - chemo started 12/3:  carboplatin/Taxol/bevacizumab   - Vaginal bleeding improved  -  XRT with Dr. Ryan 5 treatment in total started on 12/9-completed on 12/15 (no therapy over the weekend)  - Dr. Anderson- Hem/Onc, consulted on 12/10 and discussed only moving forward with completion of radiation, no systemic treatments/ chemo planned for now due to poor performance status  - hemoglobin stable at 8.0     Recurrent Fever, sepsis unknown source   Immunosuppressed   -Pt with fever of 102.8 12/9 -- no afebrile, Tmas 98.6 over past 24 hrs  - may represent IA infection in setting of metastatic  pelvic disease vs tumor fever  - zosyn for now     Progressive bilateral hydronephrosis, presumably from compression by mass  - no CVA pain or tenderness  - creatinine nl; patient on chemo     Progressive pain R lower abd.   constipation  - Continue Oxycodone 10mg q4h prn   -- palliative following   -- laxatives     Hypokalemia:   -Replaced and resolved      Abnl UA  - Cx 25,000 normal urogenital candelario      Social issues  -SW to see      DVT Prophylaxis:  [facility]      Disposition: I expect the patient to be discharged after completion of radiation therapy, ambulance arranged Weds 12/16  CODE STATUS:   Code Status and Medical Interventions:   Ordered at: 12/08/20 1525     Level Of Support Discussed With:    Patient     Code Status:    CPR     Medical Interventions (Level of Support Prior to Arrest):    Full       Eliazar Bar MD  12/15/20

## 2020-12-15 NOTE — PLAN OF CARE
Goal Outcome Evaluation:  Plan of Care Reviewed With: (P) patient  Progress: (P) no change  Outcome Summary: (P) Pt presents with decreased muscular endurance and strength in B LEs, decreased balance, and decreased cognition that all lower pt independence in ADLs and mobility. Pt sup to sit at mod A. When sitting EOB, pt lays on back despite VCs. Pt STS at max A X 1 with RW. stands appx. 1 min. Pt states they cannot stand longer despite giving no reason. Perhaps mild FoF present. Pt SPT at max A X 2 to chair. Pt reports no pain or weakness, cognition may be limiting factor. PT recommends SNF upon D/C

## 2020-12-15 NOTE — PROGRESS NOTES
INFECTIOUS DISEASE Progress Note    Hailee Booker  1955  [id number]      Admission Date: 12/8/2020      Requesting Provider: Sherine Keen DO  Evaluating Physician: Chase Prasad MD    Reason for Consultation: sepsis unknown source, recurrent fever, immunosuppressed    History of present illness:    12/11/20: Patient is a 65 y.o. female with h/o CHF, COPD, CAD/stent/CABG, DVT, hypothyroidism, Hep C/treated, HTN, seizure d/o, TIAs, breast cancer/bilateral mastectomy, and ovarian cancer/hysterectomy/treatment at [facility] 2017 who we were asked to see for sepsis with recurrent fever in immunosuppressed patient.  The patient was noted to have rectal and vaginal mass of recent examinations with lesions biopsy and found to be adenocarcinoma.  A CT scan of a/p was concerning for obstructive disease and a large liver lesion.  She underwent a diverting ostomy placement. Sh is residing at a rehab facility with discharge next week.  A port a cath was placed 11/25/20 with chemotherapy first started on 12/3 with carbo/Taxol/Avastin for metastatic ovarian cancer.  She presented to [facility] ED on 12/8/20 with vaginal spotting that progressed to hakan blood when standing or sitting.  She was felt not to be a surgical candidate as well as a poor candidate for systemic chemotherapy.  She was started on palliative radiation on 12/9 with daily radiation therapy.  Work up during her stay shows intermittent fevers with 102.8 on 12/9 and 100.2 on 12/10.  She has been afebrile today.  Blood cultures are pending.  A UA WBC on 12/8 was TNTC with culture showing normal candelario.  Her hgb today is 7.8.  A COVID-19 screen was negative. A CT scan of chest shows   RLL atelectasis with no evidence of metastatic disease.  A CT scan of a/p showed enlarging exophytic right lobe liver mass, amorphous masslike pelvoabdominal cystic mass, worsening bilateral hydronephrosis, marked fecal stasis, and gradually enlarging pelvic sidewall and inguinal lymph  "nodes.  She is currently on Vancomycin and Zosyn.  ID was asked to evaluate and manage her antibiotic therapy.    12/12/20: She has been afebrile overnight. Cultures have remained negative. She is undergoing palliative radiation which will be completed on 12/15.  She denies increased abdominal pain.    12/13/2020: She remains afebrile.  She denies increased abdominal pain.  She is scheduled to be discharged on 12/15 on hospice.    12/14/20: She has remained afebrile.  She denies increased abdominal pain.  She denies severe nausea and vomiting.  Now scheduled to be discharged on 12/16.    12/15/20: She had a fever to 101.6° yesterday. She is afebrile this morning. She had a low-grade fever to 100.3° today.  She denies increased abdominal pain.  She denies nausea, vomiting, and severe diarrhea.  She denies increased cough and sputum production.    Past Medical History:   Diagnosis Date   • Arthritis    • Body piercing     ears   • Cancer (CMS/[facility])     breast cancer twice, basal cell carcinoma, ovarian   • CHF (congestive heart failure) (CMS/[facility]) 2003   • Colostomy in place (CMS/[facility]) 10/17/2020   • Constipation    • COPD (chronic obstructive pulmonary disease) (CMS/[facility])    • Coronary artery disease    • Disease of thyroid gland    • DVT (deep venous thrombosis) (CMS/[facility])     Patient reported after CABG in 2008 and that she had DVT x2   • Elevated cholesterol    • GERD (gastroesophageal reflux disease)    • Hepatitis C     Patient reported she has had treatment   • History of bronchitis    • History of transfusion     Patient reported no prior reaction   • Hx of CABG 2008    Patient reported 2 vessel - reported MI prior to this procedure   • Hyperlipidemia    • Hypertension    • Injury of back    • Latex allergy    • Myocardial infarction (CMS/HCC) 11/2019    Patient reported \"very light\" and that she had medical attention [facility] in [facility] and had placement of 1 stent   • Seizures (CMS/[facility]) 11/24/2020    " "Patient reported since 1997 - epileptic.  Patient reported last seizure activity while in nursing home apx 20 days ago   • Sleep apnea     Patient reported prior use of CPAP and none since 2017 after weight loss   • Stroke (CMS/HCC)     Patient reported TIA x3 (reported last TIA 2003, 2010, 2015) - reported no residual issues from TIA's   • Tattoo     x1   • Wears glasses     OTC glasses for reading       Past Surgical History:   Procedure Laterality Date   • BREAST SURGERY     • CARDIAC CATHETERIZATION  11/2019    one stent   • CARDIAC SURGERY      CABG two vessel 2008   • COLONOSCOPY     • COLONOSCOPY N/A 10/12/2020    Procedure: COLONOSCOPY;  Surgeon: Himanshu Shen MD;  Location: [facility] ENDOSCOPY;  Service: Gastroenterology;  Laterality: N/A;   • COLOSTOMY N/A 10/17/2020    Procedure: LAPAROTOMY EXPLORATORY, COLOSTOMY, LYSIS OF ADHESIONS;  Surgeon: Juana Winter MD;  Location: [facility] OR;  Service: Gynecology Oncology;  Laterality: N/A;   • DILATATION AND CURETTAGE     • HYSTERECTOMY     • MASTECTOMY Bilateral     1979, 1989   • OTHER SURGICAL HISTORY      Patient reported \"torin in the right leg\"   • PORTACATH PLACEMENT Right 11/25/2020    Procedure: INSERTION OF PORTACATH RIGHT SUBCLAVIAN;  Surgeon: Himanshu Shen MD;  Location: [facility] OR;  Service: General;  Laterality: Right;   • SKIN BIOPSY      reported twice (2009 left shoulder, 2020 face)   • VAGINAL BIOPSY N/A 10/12/2020    Procedure: VAGINAL BIOPSY;  Surgeon: Himanshu Shen MD;  Location: [facility] ENDOSCOPY;  Service: Gastroenterology;  Laterality: N/A;   • WISDOM TOOTH EXTRACTION         Family History   Problem Relation Age of Onset   • Cancer Mother    • Hypertension Mother    • Hyperlipidemia Mother    • Stroke Father        Social History     Socioeconomic History   • Marital status: Legally      Spouse name: Not on file   • Number of children: Not on file   • Years of education: Not on file   • Highest education level: Not " on file   Tobacco Use   • Smoking status: Former Smoker     Years: 10.00     Types: Cigarettes     Quit date: 1995     Years since quittin.2   • Smokeless tobacco: Never Used   Substance and Sexual Activity   • Alcohol use: Never     Frequency: Never   • Drug use: Never   • Sexual activity: Defer       Allergies   Allergen Reactions   • Lisinopril Cough   • Losartan Unknown - High Severity     HAS STOMACH ULCERS   • Citrus Cough     Citrus blossoms   • Latex Itching   • Pollen Extract Unknown - Low Severity     RED, ITCHY EYES         Medication:    Current Facility-Administered Medications:   •  acetaminophen (TYLENOL) tablet 650 mg, 650 mg, Oral, Q6H PRN, Sherine Keen, DO, 650 mg at 20 1718  •  albuterol (PROVENTIL) nebulizer solution 0.083% 2.5 mg/3mL, 2.5 mg, Nebulization, Q4H PRN, Grace Johnson MD  •  bisacodyl (DULCOLAX) EC tablet 10 mg, 10 mg, Oral, Daily PRN, Sherine Keen, DO, 10 mg at 20 0550  •  bisacodyl (DULCOLAX) suppository 10 mg, 10 mg, Stoma, Once, Joanna Agrawal APRN  •  clonazePAM (KlonoPIN) tablet 0.5 mg, 0.5 mg, Oral, Q12H, Grace Johnson MD, 0.5 mg at 20  •  HYDROmorphone (DILAUDID) injection 1 mg, 1 mg, Intravenous, Q2H PRN **AND** naloxone (NARCAN) injection 0.4 mg, 0.4 mg, Intravenous, Q5 Min PRN, Grace Johnson MD  •  isosorbide mononitrate (IMDUR) 24 hr tablet 60 mg, 60 mg, Oral, Daily, Grace Johnson MD, 60 mg at 20 0911  •  lactulose (CHRONULAC) 10 GM/15ML solution 20 g, 20 g, Oral, BID PRN, Joanna Agrawal APRN, 20 g at 20 0838  •  Magnesium Sulfate 2 gram Bolus, followed by 8 gram infusion (total Mg dose 10 grams)- Mg less than or equal to 1mg/dL, 2 g, Intravenous, PRN **OR** Magnesium Sulfate 2 gram / 50mL Infusion (GIVE X 3 BAGS TO EQUAL 6GM TOTAL DOSE) - Mg 1.1 - 1.5 mg/dl, 2 g, Intravenous, PRN, Last Rate: 25 mL/hr at 20, 2 g at 20 **OR** Magnesium Sulfate 4 gram infusion- Mg 1.6-1.9 mg/dL, 4 g,  Intravenous, PRN, Grace Johnson MD, Last Rate: 25 mL/hr at 12/09/20 1621, 4 g at 12/09/20 1621  •  mineral oil enema 1 enema, 1 enema, Rectal, Once PRN, Joanna Agrawal, APRN  •  ondansetron (ZOFRAN) injection 4 mg, 4 mg, Intravenous, Q6H PRN, Jessa Moreno, APRN, 4 mg at 12/11/20 1149  •  oxyCODONE (ROXICODONE) immediate release tablet 5 mg, 5 mg, Oral, Q4H PRN, Joanna Agrawal, APRN  •  oxyCODONE (ROXICODONE) immediate release tablet 5 mg, 5 mg, Oral, Q6H, Joanna Agrawal APRN, 5 mg at 12/15/20 0013  •  phenol (CHLORASEPTIC) 1.4 % liquid 2 spray, 2 spray, Mouth/Throat, Q2H PRN, Harry Christianson, DO, 2 spray at 12/12/20 1246  •  piperacillin-tazobactam (ZOSYN) 3.375 g in iso-osmotic dextrose 50 ml (premix), 3.375 g, Intravenous, Q8H, Chase Prasad MD, 3.375 g at 12/15/20 0013  •  potassium chloride (KLOR-CON) packet 40 mEq, 40 mEq, Oral, PRN, Grace Johnson MD  •  potassium chloride (MICRO-K) CR capsule 40 mEq, 40 mEq, Oral, PRN, Grace Johnson MD, 40 mEq at 12/08/20 1828  •  potassium chloride 10 mEq in 100 mL IVPB, 10 mEq, Intravenous, Q1H PRN, Grace Johnson MD, Last Rate: 100 mL/hr at 12/09/20 2307, 10 mEq at 12/09/20 2307  •  sennosides-docusate (PERICOLACE) 8.6-50 MG per tablet 2 tablet, 2 tablet, Oral, BID, Joanna Agrawal, APRN, 2 tablet at 12/14/20 2051  •  sodium chloride 0.9 % flush 10 mL, 10 mL, Intravenous, PRN, Kirk Carter MD  •  sodium chloride 0.9 % flush 10 mL, 10 mL, Intravenous, Q12H, Grace Johnson MD, 10 mL at 12/12/20 0839  •  sodium chloride 0.9 % flush 10 mL, 10 mL, Intravenous, PRN, Grace Johnson MD  •  sodium chloride nasal spray 1 spray, 1 spray, Each Nare, PRN, Harry Christianson, DO    Antibiotics:  Anti-Infectives (From admission, onward)    Ordered     Dose/Rate Route Frequency Start Stop    12/09/20 1657  piperacillin-tazobactam (ZOSYN) 3.375 g in iso-osmotic dextrose 50 ml (premix)     Chase Prasad MD reviewed the order on 12/13/20 1115.   Ordering Provider:  Chase Prasad MD    3.375 g  over 4 Hours Intravenous Every 8 Hours 12/10/20 0000 20 2359    20 1711  vancomycin 1500 mg/500 mL 0.9% NS IVPB ([facility])     Ordering Provider: Sixto Mcdonnell, PharmD    1,500 mg  over 90 Minutes Intravenous Once 20 1800 20 2043    20 1657  piperacillin-tazobactam (ZOSYN) 3.375 g in iso-osmotic dextrose 50 ml (premix)     Ordering Provider: Sherine Keen DO    3.375 g  over 30 Minutes Intravenous Once 20 1745 20 1848    20 1443  cefTRIAXone (ROCEPHIN) 2 g/100 mL 0.9% NS IVPB ([facility])     Ordering Provider: Federico Roamn APRN    2 g  over 30 Minutes Intravenous Once 20 1445 20 1656            Review of Systems:  See HPI      Physical Exam:   Vital Signs  Temp (24hrs), Av.1 °F (37.3 °C), Min:97.7 °F (36.5 °C), Max:101.6 °F (38.7 °C)    Temp  Min: 97.7 °F (36.5 °C)  Max: 101.6 °F (38.7 °C)  BP  Min: 118/77  Max: 141/74  Pulse  Min: 90  Max: 108  Resp  Min: 16  Max: 16  No data recorded    GENERAL: Awake and alert, in no acute distress.   HEENT: Normocephalic, atraumatic.  PERRL. EOMI. No conjunctival injection. No icterus. Oropharynx clear without evidence of thrush or exudate.  NECK: Supple   HEART: RRR; 2/6 systolic murmur  LUNGS: Clear to auscultation bilaterally without wheezing, rales, rhonchi. Normal respiratory effort.   ABDOMEN: Soft, nontender, nondistended. No rebound or guarding. Ostomy site ok  MSK:  FROM, no joint effusion  SKIN: Warm and dry without cutaneous eruptions on Inspection/palpation.    NEURO: Oriented to PPT. Speech and cognition normal  PSYCHIATRIC: Normal insight and judgment. Cooperative with PE    Laboratory Data    Results from last 7 days   Lab Units 20  0514 20  0249 12/12/20  2001  12/10/20  0412  20  0737  20  1032   WBC 10*3/mm3  --   --   --   --  3.92  --  5.70  --  6.57   HEMOGLOBIN g/dL 8.0* 7.9* 7.2*   < > 7.2*   < > 8.1*   < > 8.8*   HEMATOCRIT % 26.6*  25.9* 23.7*   < > 23.6*   < > 25.9*   < > 28.5*   PLATELETS 10*3/mm3  --   --   --   --  231  --  319  --  383    < > = values in this interval not displayed.     Results from last 7 days   Lab Units 12/13/20  0222   SODIUM mmol/L 137   POTASSIUM mmol/L 3.7   CHLORIDE mmol/L 96*   CO2 mmol/L 32.0*   BUN mg/dL 10   CREATININE mg/dL 0.54*   GLUCOSE mg/dL 126*   CALCIUM mg/dL 8.6     Results from last 7 days   Lab Units 12/08/20  1048   ALK PHOS U/L 54   BILIRUBIN mg/dL 0.3   ALT (SGPT) U/L 16   AST (SGOT) U/L 28             Results from last 7 days   Lab Units 12/08/20  1545   LACTATE mmol/L 0.7         Results from last 7 days   Lab Units 12/11/20  0515   VANCOMYCIN TR mcg/mL 9.90     Estimated Creatinine Clearance: 64 mL/min (A) (by C-G formula based on SCr of 0.54 mg/dL (L)).      Microbiology:  Microbiology Results (last 10 days)     Procedure Component Value - Date/Time    Blood Culture - Blood, Hand, Right [[id number]] Collected: 12/09/20 1945    Lab Status: Final result Specimen: Blood from Hand, Right Updated: 12/14/20 2015     Blood Culture No growth at 5 days    Narrative:      Aerobic bottle only      Blood Culture - Blood, Arm, Right [[id number]] Collected: 12/09/20 1941    Lab Status: Final result Specimen: Blood from Arm, Right Updated: 12/14/20 2015     Blood Culture No growth at 5 days    Blood Culture - Blood, Arm, Left [[id number]] Collected: 12/08/20 1545    Lab Status: Final result Specimen: Blood from Arm, Left Updated: 12/13/20 1616     Blood Culture No growth at 5 days    Blood Culture - Blood, Arm, Right [[id number]] Collected: 12/08/20 1540    Lab Status: Final result Specimen: Blood from Arm, Right Updated: 12/13/20 1616     Blood Culture No growth at 5 days    COVID-19,CEPHEID,DANUTA IN-HOUSE(OR EMERGENT/ADD-ON),NP SWAB IN TRANSPORT MEDIA 3-4 HR TAT - Swab, Nasopharynx [[id number]]  (Normal) Collected: 12/08/20 1107    Lab Status: Final result Specimen: Swab from Nasopharynx Updated: 12/08/20 1204      COVID19 Not Detected    Narrative:      Fact sheet for providers: https://www.fda.gov/media/494031/download     Fact sheet for patients: https://www.fda.gov/media/405958/download    Urine Culture - Urine, Urine, Clean Catch [[id number]] Collected: 12/08/20 1024    Lab Status: Final result Specimen: Urine, Clean Catch Updated: 12/09/20 1733     Urine Culture 25,000 CFU/mL Normal Urogenital Mary            Radiology:  Imaging Results (Last 72 Hours)     Procedure Component Value Units Date/Time    CT Chest With Contrast [[id number]] Collected: 12/11/20 0958     Updated: 12/12/20 1157    Narrative:      EXAMINATION: CT CHEST W CONTRAST- 12/10/2020     INDICATION: Ovarian cancer staging; R19.00-Intra-abdominal and pelvic  swelling, mass and lump, unspecified site; R19.07-Generalized  intra-abdominal and pelvic swelling, mass and lump; R16.0-Hepatomegaly,  not elsewhere classified; N13.30-Unspecified hydronephrosis;  D64.9-Anemia, unspecified; N93.9-Abnormal uterine and vaginal bleeding,  unspecified; R10.9-Unspecified abdominal pain; Z74.09-Other reduced  mobility     TECHNIQUE: 5 mm post IV contrast images through the chest and upper  abdomen     The radiation dose reduction device was turned on for each scan per the  ALARA (As Low as Reasonably Achievable) protocol.     COMPARISON: Chest radiograph 12/09/2020     FINDINGS: History indicates ovarian cancer, staging.     There is diffuse thyroid goiter. No mediastinal hilar, or axillary  adenopathy is seen. No pulmonary parenchymal nodule is identified. There  is trace right basilar effusion and atelectasis, which may be associated  with the patient's exophytic mass in the dome of the right liver lobe.  There is a small hiatal hernia. Bony structures appear to be intact.  Note is again made of the patient's exophytic right lobe liver lesion,  small low-density lesion inferior right liver lobe. There appears to be  mild fecal stasis.       Impression:      1. Trace  right basilar effusion and mild right lower lobe atelectasis,  which may be associated with the patient's exophytic right lobe liver  lesion, as from diaphragmatic irritation. No direct evidence of  transdiaphragmatic spread of disease is seen.      2. No evidence of metastatic disease or other acute disease elsewhere in  the chest.     3. Incidentally noted goiter.     D:  12/11/2020  E:  12/11/2020        This report was finalized on 12/12/2020 11:54 AM by Dr. Danny Juarez MD.           I read her radiographic studies.    Impression:   1. Fevers-her fevers may be due to a combination of tumor fevers and tumor mixed with abscess in her pelvis.  She now has some recurrent fevers despite Zosyn.  I would plan to discontinue Zosyn therapy and de-access her Port-A-Cath.  2. Anemia with acute blood loss  3. Vaginal bleeding  4. Metastatic ovarian cancer/hysterctomy 2017 with treatment.  Now with colon tumor, vaginal tumor, liver lesion found in 2020  5. Systemic chemotherapy 12/3 through port a cath placed 11/25/20.  Not felt to be a good candidate now for systemic chemotherapy. Now undergoing palliative radiation therapy.  6. COPD  7. CAD/stent/CABG  8. H/o DVT  9. Hypothyrodism  10. H/o Hep C/treated  11. Seizure d/o  12. H/o TIAs  13. Chronic probably systolic CHF  14. Breast cancer/bilateral mastectomy    PLAN/RECOMMENDATIONS:   1.  Discontinue Zosyn   2.  De-access the Port-A-Cath   3.  Probable discharge tomorrow    Chase Prasad MD  12/15/2020  06:21 EST

## 2020-12-15 NOTE — NURSING NOTE
Pt continues to be drowsy, awakening for short periods of time in which she appears uncomfortable as she wears a scowl on her face. VSS although temp has been elevated this afternoon to a tmax of 101.3. Presently temp is now down to 100.4. Pt was up in the chair today and tolerated for an hour--she then began requesting needing her bed. Pt required 2-3 staff for repositioning--not assisting staff--verbalizes a general allover discomfort with repositioning. Given scheduled oxycodone however pt was somnolent at noon dose and was held for a few hours until awakening. Tele shows a SR/ST. Not showing interest in meals other than her Boost and ice cream. Requiring assistance with tray set up and eating due to weakness and decreased motor control. Received her last radiation treatment today--expressed sadness that she was no longer a candidate for chemo. Plan is to d/c home tomorrow with Hospice/daughter.

## 2020-12-15 NOTE — PLAN OF CARE
Problem: Adult Inpatient Plan of Care  Goal: Plan of Care Review  12/15/2020 1723 by Etta Spivey RN  Outcome: Ongoing, Not Progressing  12/15/2020 1722 by Etta Spivey RN  Outcome: Ongoing, Not Progressing  12/15/2020 1722 by Etta Spivey RN  Outcome: Ongoing, Progressing  Goal: Patient-Specific Goal (Individualized)  12/15/2020 1723 by Etta Spivey RN  Outcome: Ongoing, Not Progressing  12/15/2020 1722 by Etta Spivey RN  Outcome: Ongoing, Not Progressing  12/15/2020 1722 by Etta Spivey RN  Outcome: Ongoing, Progressing  Goal: Absence of Hospital-Acquired Illness or Injury  12/15/2020 1723 by Etta Spivey RN  Outcome: Ongoing, Not Progressing  12/15/2020 1722 by Etta Spivey RN  Outcome: Ongoing, Not Progressing  12/15/2020 1722 by Etta Spivey RN  Outcome: Ongoing, Progressing  Intervention: Identify and Manage Fall Risk  Recent Flowsheet Documentation  Taken 12/15/2020 1600 by Etta Spivey RN  Safety Promotion/Fall Prevention:   activity supervised   assistive device/personal items within reach   clutter free environment maintained   fall prevention program maintained   nonskid shoes/slippers when out of bed   room organization consistent   safety round/check completed  Taken 12/15/2020 1400 by Etta Spivey RN  Safety Promotion/Fall Prevention:   activity supervised   assistive device/personal items within reach  Taken 12/15/2020 1200 by Etta Spivey RN  Safety Promotion/Fall Prevention:   activity supervised   assistive device/personal items within reach   clutter free environment maintained   fall prevention program maintained   gait belt   nonskid shoes/slippers when out of bed   room organization consistent   safety round/check completed  Taken 12/15/2020 1000 by Etta Spivey RN  Safety Promotion/Fall Prevention:   activity supervised   assistive device/personal items within reach   clutter free environment maintained   fall prevention program maintained   nonskid shoes/slippers  when out of bed   room organization consistent   safety round/check completed  Taken 12/15/2020 0904 by Etta Spivey RN  Safety Promotion/Fall Prevention: (radiation tx) patient off unit  Taken 12/15/2020 0750 by Etta Spivey RN  Safety Promotion/Fall Prevention:   activity supervised   assistive device/personal items within reach   clutter free environment maintained   fall prevention program maintained   nonskid shoes/slippers when out of bed   room organization consistent   safety round/check completed  Intervention: Prevent Skin Injury  Recent Flowsheet Documentation  Taken 12/15/2020 1600 by Etta Spivey RN  Body Position:   neutral body alignment   neutral head position   turned  Taken 12/15/2020 1400 by Etta Spivey RN  Body Position:   neutral body alignment   neutral head position  Taken 12/15/2020 1200 by Etta Spivey RN  Body Position: legs elevated  Taken 12/15/2020 1000 by Etta Spivey RN  Body Position:   neutral body alignment   neutral head position   turned  Taken 12/15/2020 0750 by Etta Spivey RN  Body Position:   neutral body alignment   neutral head position  Intervention: Prevent and Manage VTE (venous thromboembolism) Risk  Recent Flowsheet Documentation  Taken 12/15/2020 1600 by Etta Spivey RN  VTE Prevention/Management: bleeding risk factor(s) identified  Taken 12/15/2020 1400 by Etta Spivey RN  VTE Prevention/Management: bleeding risk factor(s) identified  Taken 12/15/2020 1000 by Etta Spivey RN  VTE Prevention/Management: bleeding risk factor(s) identified  Taken 12/15/2020 0750 by Etta Spivey RN  VTE Prevention/Management: bleeding risk factor(s) identified  Intervention: Prevent Infection  Recent Flowsheet Documentation  Taken 12/15/2020 1600 by Etta Spivey RN  Infection Prevention:   environmental surveillance performed   equipment surfaces disinfected   hand hygiene promoted   rest/sleep promoted   single patient room provided  Taken 12/15/2020 1400 by  Spivey, Etta F, RN  Infection Prevention:   environmental surveillance performed   rest/sleep promoted   personal protective equipment utilized   equipment surfaces disinfected   hand hygiene promoted  Taken 12/15/2020 1300 by Etta Spivey RN  Infection Prevention:   environmental surveillance performed   equipment surfaces disinfected   hand hygiene promoted   rest/sleep promoted  Taken 12/15/2020 1200 by Etta Spivey RN  Infection Prevention:   environmental surveillance performed   equipment surfaces disinfected   hand hygiene promoted   rest/sleep promoted   single patient room provided  Taken 12/15/2020 1000 by Etta Spivey RN  Infection Prevention:   environmental surveillance performed   equipment surfaces disinfected   hand hygiene promoted   rest/sleep promoted   personal protective equipment utilized   single patient room provided  Taken 12/15/2020 0750 by Etta Spivey RN  Infection Prevention:   environmental surveillance performed   equipment surfaces disinfected   hand hygiene promoted   rest/sleep promoted   personal protective equipment utilized   single patient room provided  Goal: Optimal Comfort and Wellbeing  12/15/2020 1723 by Etta Spivey RN  Outcome: Ongoing, Not Progressing  12/15/2020 1722 by Etta Spivey RN  Outcome: Ongoing, Not Progressing  12/15/2020 1722 by Etta Spivey RN  Outcome: Ongoing, Progressing  Intervention: Provide Person-Centered Care  Recent Flowsheet Documentation  Taken 12/15/2020 1200 by Etta Spivey RN  Trust Relationship/Rapport:   care explained   choices provided   emotional support provided   empathic listening provided   questions answered   questions encouraged   reassurance provided   thoughts/feelings acknowledged  Taken 12/15/2020 0750 by Etta Spivey RN  Trust Relationship/Rapport:   care explained   choices provided   emotional support provided   empathic listening provided   questions answered   questions encouraged   reassurance provided    thoughts/feelings acknowledged  Goal: Readiness for Transition of Care  12/15/2020 1723 by Etta Spivey RN  Outcome: Ongoing, Not Progressing  12/15/2020 1722 by Etta Spivey RN  Outcome: Ongoing, Not Progressing  12/15/2020 1722 by Etta Spivey RN  Outcome: Ongoing, Progressing     Problem: Fall Injury Risk  Goal: Absence of Fall and Fall-Related Injury  12/15/2020 1723 by Etta Spivey RN  Outcome: Ongoing, Not Progressing  12/15/2020 1722 by Etta Spivey RN  Outcome: Ongoing, Not Progressing  12/15/2020 1722 by Etta Spivey RN  Outcome: Ongoing, Progressing  Intervention: Identify and Manage Contributors to Fall Injury Risk  Recent Flowsheet Documentation  Taken 12/15/2020 1600 by Etta Spivey RN  Self-Care Promotion:   independence encouraged   BADL personal objects within reach   BADL personal routines maintained   safe use of adaptive equipment encouraged  Taken 12/15/2020 1400 by Etta Spivey RN  Self-Care Promotion:   independence encouraged   BADL personal objects within reach   BADL personal routines maintained   safe use of adaptive equipment encouraged  Taken 12/15/2020 1300 by Etta Spivey RN  Self-Care Promotion: meal setup provided  Taken 12/15/2020 1000 by Etta Spivey RN  Self-Care Promotion:   independence encouraged   BADL personal objects within reach   BADL personal routines maintained   safe use of adaptive equipment encouraged  Taken 12/15/2020 0750 by Etta Spivey RN  Medication Review/Management: medications reviewed  Intervention: Promote Injury-Free Environment  Recent Flowsheet Documentation  Taken 12/15/2020 1600 by Etta Spivey RN  Safety Promotion/Fall Prevention:   activity supervised   assistive device/personal items within reach   clutter free environment maintained   fall prevention program maintained   nonskid shoes/slippers when out of bed   room organization consistent   safety round/check completed  Taken 12/15/2020 1400 by Etta Spivey RN  Safety  Promotion/Fall Prevention:   activity supervised   assistive device/personal items within reach  Taken 12/15/2020 1200 by Etta Spivey RN  Safety Promotion/Fall Prevention:   activity supervised   assistive device/personal items within reach   clutter free environment maintained   fall prevention program maintained   gait belt   nonskid shoes/slippers when out of bed   room organization consistent   safety round/check completed  Taken 12/15/2020 1000 by Etta Spivey RN  Safety Promotion/Fall Prevention:   activity supervised   assistive device/personal items within reach   clutter free environment maintained   fall prevention program maintained   nonskid shoes/slippers when out of bed   room organization consistent   safety round/check completed  Taken 12/15/2020 0904 by Etta Spivey RN  Safety Promotion/Fall Prevention: (radiation tx) patient off unit  Taken 12/15/2020 0750 by Etta Spivey RN  Safety Promotion/Fall Prevention:   activity supervised   assistive device/personal items within reach   clutter free environment maintained   fall prevention program maintained   nonskid shoes/slippers when out of bed   room organization consistent   safety round/check completed     Problem: Pain Acute  Goal: Optimal Pain Control  12/15/2020 1723 by Etta Spivey, [place]  Outcome: Ongoing, Not Progressing  12/15/2020 1722 by Etta Spivey RN  Outcome: Ongoing, Not Progressing  12/15/2020 1722 by Etta Spivey RN  Outcome: Ongoing, Progressing  Intervention: Develop Pain Management Plan  Recent Flowsheet Documentation  Taken 12/15/2020 1708 by Etta Spivey RN  Pain Management Interventions: quiet environment facilitated  Taken 12/15/2020 1612 by Etta Spivey RN  Pain Management Interventions:   see MAR   position adjusted  Taken 12/15/2020 1200 by Etta Spivey RN  Pain Management Interventions:   pain management plan reviewed with patient/caregiver   position adjusted  Taken 12/15/2020 1000 by Etta Spivey  RN  Pain Management Interventions:   pillow support provided   pain management plan reviewed with patient/caregiver   no interventions per patient request  Intervention: Prevent or Manage Pain  Recent Flowsheet Documentation  Taken 12/15/2020 1600 by Etta Spivey RN  Sensory Stimulation Regulation:   care clustered   quiet environment promoted   television on  Sleep/Rest Enhancement:   consistent schedule promoted   awakenings minimized   regular sleep/rest pattern promoted   relaxation techniques promoted   room darkened  Taken 12/15/2020 1400 by Etta Spivey RN  Sensory Stimulation Regulation:   care clustered   quiet environment promoted  Sleep/Rest Enhancement:   consistent schedule promoted   awakenings minimized   regular sleep/rest pattern promoted   relaxation techniques promoted   room darkened  Taken 12/15/2020 1200 by Etta Spivey RN  Sensory Stimulation Regulation: quiet environment promoted  Sleep/Rest Enhancement:   awakenings minimized   consistent schedule promoted   regular sleep/rest pattern promoted   relaxation techniques promoted  Taken 12/15/2020 1000 by Etta Spivey RN  Sensory Stimulation Regulation:   quiet environment promoted   care clustered  Sleep/Rest Enhancement:   consistent schedule promoted   family presence promoted   regular sleep/rest pattern promoted   relaxation techniques promoted  Taken 12/15/2020 0750 by Etta Spivey RN  Sensory Stimulation Regulation:   care clustered   quiet environment promoted  Sleep/Rest Enhancement:   awakenings minimized   consistent schedule promoted   regular sleep/rest pattern promoted   relaxation techniques promoted  Intervention: Optimize Psychosocial Wellbeing  Recent Flowsheet Documentation  Taken 12/15/2020 1600 by Etta Spivey RN  Supportive Measures:   active listening utilized   verbalization of feelings encouraged   positive reinforcement provided   problem-solving facilitated   relaxation techniques promoted  Diversional  Activities: television  Taken 12/15/2020 1400 by Etta Spivey RN  Supportive Measures:   active listening utilized   verbalization of feelings encouraged   self-care encouraged   relaxation techniques promoted   problem-solving facilitated   positive reinforcement provided  Diversional Activities: television  Taken 12/15/2020 1200 by Etta Spivey RN  Supportive Measures:   active listening utilized   verbalization of feelings encouraged   self-care encouraged   relaxation techniques promoted   problem-solving facilitated   positive reinforcement provided  Diversional Activities: television  Taken 12/15/2020 1000 by Etta Spivey RN  Supportive Measures:   active listening utilized   verbalization of feelings encouraged   self-care encouraged   relaxation techniques promoted   problem-solving facilitated   positive reinforcement provided  Diversional Activities: television  Taken 12/15/2020 0750 by Etta Spivey RN  Supportive Measures:   active listening utilized   verbalization of feelings encouraged   self-care encouraged   relaxation techniques promoted   positive reinforcement provided   problem-solving facilitated  Diversional Activities: television     Problem: Skin Injury Risk Increased  Goal: Skin Health and Integrity  12/15/2020 1723 by Etta Spivey RN  Outcome: Ongoing, Not Progressing  12/15/2020 1722 by Etta Spivey RN  Outcome: Ongoing, Not Progressing  12/15/2020 1722 by Etta Spivey RN  Outcome: Ongoing, Progressing  Intervention: Optimize Skin Protection  Recent Flowsheet Documentation  Taken 12/15/2020 1600 by Etta Spivey RN  Pressure Reduction Techniques:   frequent weight shift encouraged   weight shift assistance provided   pressure points protected  Head of Bed (HOB): HOB elevated  Pressure Reduction Devices: pressure-redistributing mattress utilized  Skin Protection:   adhesive use limited   incontinence pads utilized   protective footwear used   tubing/devices free from skin  contact  Taken 12/15/2020 1400 by Etta Spivey RN  Pressure Reduction Techniques:   frequent weight shift encouraged   pressure points protected   weight shift assistance provided   rest period provided between sit times  Head of Bed (HOB): HOB elevated  Pressure Reduction Devices:   pressure-redistributing mattress utilized   positioning supports utilized  Skin Protection:   adhesive use limited   incontinence pads utilized   protective footwear used   tubing/devices free from skin contact  Taken 12/15/2020 1200 by Etta Spivey RN  Head of Bed (HOB): HOB elevated  Taken 12/15/2020 1000 by Etta Spivey RN  Pressure Reduction Techniques:   frequent weight shift encouraged   weight shift assistance provided   pressure points protected  Head of Bed (HOB): \[facility]\"" elevated  Pressure Reduction Devices: pressure-redistributing mattress utilized  Skin Protection:   adhesive use limited   incontinence pads utilized   protective footwear used   tubing/devices free from skin contact  Taken 12/15/2020 0750 by Etta Spivey RN  Pressure Reduction Techniques:   frequent weight shift encouraged   weight shift assistance provided   pressure points protected  Head of Bed (HOB): \[facility]\"" elevated  Pressure Reduction Devices: pressure-redistributing mattress utilized  Skin Protection:   adhesive use limited   incontinence pads utilized   protective footwear used   tubing/devices free from skin contact     Problem: Palliative Care  Goal: Enhanced Quality of Life  12/15/2020 1723 by Etta Spivey RN  Outcome: Ongoing, Not Progressing  12/15/2020 1722 by Etta Spivey RN  Outcome: Ongoing, Not Progressing  12/15/2020 1722 by Etta Spivey RN  Outcome: Ongoing, Progressing  Intervention: Maximize Comfort  Recent Flowsheet Documentation  Taken 12/15/2020 1708 by Etta Spivey RN  Pain Management Interventions: quiet environment facilitated  Taken 12/15/2020 1612 by Etta Spivey RN  Pain Management Interventions:   see MAR   position  adjusted  Taken 12/15/2020 1600 by Etta Spivey RN  Nutrition Interventions: supplemental drinks provided  Taken 12/15/2020 1400 by Etta Spivey RN  Nutrition Interventions: supplemental drinks provided  Taken 12/15/2020 1200 by Etta Spivey RN  Pain Management Interventions:   pain management plan reviewed with patient/caregiver   position adjusted  Nutrition Interventions: supplemental drinks provided  Taken 12/15/2020 1000 by Etta Spivey RN  Pain Management Interventions:   pillow support provided   pain management plan reviewed with patient/caregiver   no interventions per patient request  Nutrition Interventions: supplemental drinks provided  Intervention: Optimize Function  Recent Flowsheet Documentation  Taken 12/15/2020 1600 by Etta Spivey RN  Sensory Stimulation Regulation:   care clustered   quiet environment promoted   television on  Sleep/Rest Enhancement:   consistent schedule promoted   awakenings minimized   regular sleep/rest pattern promoted   relaxation techniques promoted   room darkened  Taken 12/15/2020 1400 by Etta Spivey RN  Sensory Stimulation Regulation:   care clustered   quiet environment promoted  Sleep/Rest Enhancement:   consistent schedule promoted   awakenings minimized   regular sleep/rest pattern promoted   relaxation techniques promoted   room darkened  Taken 12/15/2020 1200 by Etta Spivey RN  Sensory Stimulation Regulation: quiet environment promoted  Sleep/Rest Enhancement:   awakenings minimized   consistent schedule promoted   regular sleep/rest pattern promoted   relaxation techniques promoted  Taken 12/15/2020 1000 by Etta Spivey RN  Sensory Stimulation Regulation:   quiet environment promoted   care clustered  Sleep/Rest Enhancement:   consistent schedule promoted   family presence promoted   regular sleep/rest pattern promoted   relaxation techniques promoted  Taken 12/15/2020 0750 by Etta Spivey RN  Sensory Stimulation Regulation:   care clustered    quiet environment promoted  Sleep/Rest Enhancement:   awakenings minimized   consistent schedule promoted   regular sleep/rest pattern promoted   relaxation techniques promoted  Intervention: Optimize Psychosocial Wellbeing  Recent Flowsheet Documentation  Taken 12/15/2020 1600 by Etta Spivey RN  Supportive Measures:   active listening utilized   verbalization of feelings encouraged   positive reinforcement provided   problem-solving facilitated   relaxation techniques promoted  Family/Support System Care: support provided  Taken 12/15/2020 1400 by Etta Spivey, RN  Supportive Measures:   active listening utilized   verbalization of feelings encouraged   self-care encouraged   relaxation techniques promoted   problem-solving facilitated   positive reinforcement provided  Family/Support System Care: support provided  Taken 12/15/2020 1200 by Etta Spivey, RN  Supportive Measures:   active listening utilized   verbalization of feelings encouraged   self-care encouraged   relaxation techniques promoted   problem-solving facilitated   positive reinforcement provided  Family/Support System Care:   support provided   self-care encouraged  Taken 12/15/2020 1000 by Etta Spivey, RN  Supportive Measures:   active listening utilized   verbalization of feelings encouraged   self-care encouraged   relaxation techniques promoted   problem-solving facilitated   positive reinforcement provided  Family/Support System Care:   support provided   self-care encouraged  Taken 12/15/2020 0750 by Etta Spivey, RN  Supportive Measures:   active listening utilized   verbalization of feelings encouraged   self-care encouraged   relaxation techniques promoted   positive reinforcement provided   problem-solving facilitated  Family/Support System Care:   support provided   self-care encouraged   Goal Outcome Evaluation:  Plan of Care Reviewed With: patient  Progress: no change

## 2020-12-15 NOTE — THERAPY TREATMENT NOTE
Patient Name: Hailee Booker  : 1955    MRN: [id number]                              Today's Date: 12/15/2020       Admit Date: 2020    Visit Dx:     ICD-10-CM ICD-9-CM   1. Pelvic mass  R19.00 789.30   2. Generalized abdominal mass  R19.07 789.37   3. Liver mass  R16.0 573.8   4. Bilateral hydronephrosis  N13.30 591   5. Anemia, unspecified type  D64.9 285.9   6. Vagina bleeding  N93.9 623.8   7. Acute abdominal pain  R10.9 789.00     338.19   8. Impaired mobility and activities of daily living  Z74.09 V49.89    Z78.9    9. Vaginal bleeding  N93.9 623.8   10. Malignant neoplasm of both ovaries (CMS/HCC)  C56.1 183.0    C56.2      Patient Active Problem List   Diagnosis   • Seizures (CMS/HCC)   • Pelvic mass   • Rt Hydronephrosis 2* Pelvic Mass   • Malignant neoplasm of both ovaries (CMS/HCC)   • Recurrent malignant neoplasm of ovary (CMS/HCC)   • History of breast cancer   • History of pulmonary embolus (PE)   • History of DVT (deep vein thrombosis)   • Liver metastases (CMS/HCC)   • Large bowel obstruction (CMS/HCC)   • CHF (congestive heart failure) (CMS/HCC)   • Coronary artery disease   • Disease of thyroid gland   • Hypertension   • Hyperlipidemia   • Hyponatremia   • Malignant neoplasm of ovary (CMS/HCC)   • Vaginal bleeding     Past Medical History:   Diagnosis Date   • Arthritis    • Body piercing     ears   • Cancer (CMS/HCC)     breast cancer twice, basal cell carcinoma, ovarian   • CHF (congestive heart failure) (CMS/HCC)    • Colostomy in place (CMS/HCC) 10/17/2020   • Constipation    • COPD (chronic obstructive pulmonary disease) (CMS/HCC)    • Coronary artery disease    • Disease of thyroid gland    • DVT (deep venous thrombosis) (CMS/HCC)     Patient reported after CABG in  and that she had DVT x2   • Elevated cholesterol    • GERD (gastroesophageal reflux disease)    • Hepatitis C     Patient reported she has had treatment   • History of bronchitis    • History of transfusion  "    Patient reported no prior reaction   • Hx of CABG 2008    Patient reported 2 vessel - reported MI prior to this procedure   • Hyperlipidemia    • Hypertension    • Injury of back    • Latex allergy    • Myocardial infarction (CMS/[facility]) 11/2019    Patient reported \"very light\" and that she had medical attention [facility] in [facility] and had placement of 1 stent   • Seizures (CMS/[facility]) 11/24/2020    Patient reported since 1997 - epileptic.  Patient reported last seizure activity while in nursing home apx 20 days ago   • Sleep apnea     Patient reported prior use of CPAP and none since 2017 after weight loss   • Stroke (CMS/[facility])     Patient reported TIA x3 (reported last TIA 2003, 2010, 2015) - reported no residual issues from TIA's   • Tattoo     x1   • Wears glasses     OTC glasses for reading     Past Surgical History:   Procedure Laterality Date   • BREAST SURGERY     • CARDIAC CATHETERIZATION  11/2019    one stent   • CARDIAC SURGERY      CABG two vessel 2008   • COLONOSCOPY     • COLONOSCOPY N/A 10/12/2020    Procedure: COLONOSCOPY;  Surgeon: Himanshu Shen MD;  Location: [facility] ENDOSCOPY;  Service: Gastroenterology;  Laterality: N/A;   • COLOSTOMY N/A 10/17/2020    Procedure: LAPAROTOMY EXPLORATORY, COLOSTOMY, LYSIS OF ADHESIONS;  Surgeon: Juana Winter MD;  [facility];  Service: Gynecology Oncology;  Laterality: N/A;   • DILATATION AND CURETTAGE     • HYSTERECTOMY     • MASTECTOMY Bilateral     1979, 1989   • OTHER SURGICAL HISTORY      Patient reported \"torin in the right leg\"   • PORTACATH PLACEMENT Right 11/25/2020    Procedure: INSERTION OF PORTACATH RIGHT SUBCLAVIAN;  Surgeon: Himanshu Shen MD;  Location: [facility] OR;  Service: General;  Laterality: Right;   • SKIN BIOPSY      reported twice (2009 left shoulder, 2020 face)   • VAGINAL BIOPSY N/A 10/12/2020    Procedure: VAGINAL BIOPSY;  Surgeon: Himanshu Shen MD;  Location: [facility] ENDOSCOPY;  Service: Gastroenterology;  " Laterality: N/A;   • WISDOM TOOTH EXTRACTION       General Information     Row Name 12/15/20 1102          Physical Therapy Time and Intention    Document Type  therapy note (daily note)  (Pended)   -JT     Mode of Treatment  physical therapy  (Pended)   -JT     Row Name 12/15/20 1102          General Information    Patient Profile Reviewed  yes  (Pended)   -JT     Existing Precautions/Restrictions  fall  (Pended)   -JT     Barriers to Rehab  medically complex;cognitive status;previous functional deficit  (Pended)   -JT     Row Name 12/15/20 1102          Cognition    Orientation Status (Cognition)  oriented to;person  (Pended)   -JT     Row Name 12/15/20 1102          Safety Issues, Functional Mobility    Safety Issues Affecting Function (Mobility)  ability to follow commands;at risk behavior observed;awareness of need for assistance;friction/shear risk;impulsivity;insight into deficits/self-awareness;safety precaution awareness;safety precautions follow-through/compliance;sequencing abilities  (Pended)   -JT     Impairments Affecting Function (Mobility)  balance;cognition;endurance/activity tolerance;coordination;strength;motor planning;grasp  (Pended)   -JT     Cognitive Impairments, Mobility Safety/Performance  attention;awareness, need for assistance;impulsivity;insight into deficits/self-awareness;safety precaution awareness;safety precaution follow-through;sequencing abilities  (Pended)   -JT     Comment, Safety Issues/Impairments (Mobility)  Pt is impulsive and will lie down at will from EOB. Pt needs assistance with all mobility and constant cues for sequencing and safety  (Pended)   -JT       User Key  (r) = Recorded By, (t) = Taken By, (c) = Cosigned By    Initials Name Provider Type    JT Arturo Mejia, PT Student PT Student        Mobility     Row Name 12/15/20 1102          Bed Mobility    Bed Mobility  scooting/bridging;supine-sit  (Pended)   -JT     Scooting/Bridging [place] (Bed Mobility)   moderate assist (50% patient effort);1 person assist  (Pended)   -JT     Supine-Sit [place] (Bed Mobility)  moderate assist (50% patient effort);1 person assist  (Pended)   -JT     Assistive Device (Bed Mobility)  bed rails;draw sheet;head of bed elevated  (Pended)   -JT     Comment (Bed Mobility)  VCs for handplacement and MCs for trunk and foot placement  (Pended)   -JT     Row Name 12/15/20 1102          Transfers    Comment (Transfers)  Pt required motivation for movement. Attempted multiple times to lie down from EOB when instructed to stand. Unsafe when standing due to posterior lean  (Pended)   -JT     Row Name 12/15/20 1102          Bed-Chair Transfer    Bed-Chair [place] (Transfers)  maximum assist (25% patient effort);2 person assist  (Pended)   -JT     Assistive Device (Bed-Chair Transfers)  --  (Pended)  handheld assist  -JT     Row Name 12/15/20 1102          Sit-Stand Transfer    Sit-Stand [place] (Transfers)  maximum assist (25% patient effort);1 person assist  (Pended)   -JT     Assistive Device (Sit-Stand Transfers)  walker, front-wheeled  (Pended)   -JT     Row Name 12/15/20 1102          Gait/Stairs (Locomotion)    [place] Level (Gait)  unable to assess  (Pended)   -JT       User Key  (r) = Recorded By, (t) = Taken By, (c) = Cosigned By    Initials Name Provider Type    JT Arturo Mejia, PT Student PT Student        Obj/Interventions     Row Name 12/15/20 1102          Balance    Balance Assessment  sitting static balance;sitting dynamic balance;standing static balance;standing dynamic balance  (Pended)   -JT     Static Sitting Balance  mild impairment  (Pended)   -JT     Dynamic Sitting Balance  moderate impairment  (Pended)   -JT     Static Standing Balance  severe impairment  (Pended)   -JT     Dynamic Standing Balance  severe impairment  (Pended)   -JT     Comment, Balance  Pt has posterior lean that needs therapist assist to maintain standing.  (Pended)   -JT       User  Key  (r) = Recorded By, (t) = Taken By, (c) = Cosigned By    Initials Name Provider Type    JT Arturo Mejia P, PT Student PT Student        Goals/Plan    No documentation.       Clinical Impression     Row Name 12/15/20 1102          Pain    Additional Documentation  Pain Scale: Numbers Pre/Post-Treatment (Group)  (Pended)   -JT     Row Name 12/15/20 1102          Pain Scale: Numbers Pre/Post-Treatment    Pretreatment Pain Rating  0/10 - no pain  (Pended)   -JT     Posttreatment Pain Rating  0/10 - no pain  (Pended)   -JT     Row Name 12/15/20 1102          Plan of Care Review    Plan of Care Reviewed With  patient  (Pended)   -JT     Progress  no change  (Pended)   -JT     Outcome Summary  Pt presents with decreased muscular endurance and strength in B LEs, decreased balance, and decreased cognition that all lower pt independence in ADLs and mobility. Pt sup to sit at mod A. When sitting EOB, pt lays on back despite VCs. Pt STS at max A X 1 with RW. stands appx. 1 min. Pt states they cannot stand longer despite giving no reason. Perhaps mild FoF present. Pt SPT at max A X 2 to chair. Pt reports no pain or weakness, cognition may be limiting factor. PT recommends SNF upon D/C  (Pended)   -JT     Row Name 12/15/20 1102          Therapy Assessment/Plan (PT)    Patient/Family Therapy Goals Statement (PT)  HEP  (Pended)   -JT     Rehab Potential (PT)  good, to achieve stated therapy goals  (Pended)   -JT     Criteria for Skilled Interventions Met (PT)  yes  (Pended)   -JT     Predicted Duration of Therapy Intervention (PT)  10 days  (Pended)   -JT     Row Name 12/15/20 1102          Positioning and Restraints    Pre-Treatment Position  in bed  (Pended)   -JT     Post Treatment Position  chair  (Pended)   -JT     In Chair  notified nsg;reclined;call light within reach;encouraged to call for assist;exit alarm on;with other staff;with nsg  (Pended)   -JT       User Key  (r) = Recorded By, (t) = Taken By, (c) = Cosigned By     Initials Name Provider Type    JT Arturo Mejia P, PT Student PT Student        Outcome Measures     Row Name 12/15/20 1102          How much help from another person do you currently need...    Turning from your back to your side while in flat bed without using bedrails?  2  (Pended)   -JT     Moving from lying on back to sitting on the side of a flat bed without bedrails?  2  (Pended)   -JT     Moving to and from a bed to a chair (including a wheelchair)?  2  (Pended)   -JT     Standing up from a chair using your arms (e.g., wheelchair, bedside chair)?  2  (Pended)   -JT     Climbing 3-5 steps with a railing?  1  (Pended)   -JT     To walk in hospital room?  1  (Pended)   -JT     AM-PAC 6 Clicks Score (PT)  10  (Pended)   -JT     Row Name 12/15/20 1102          Functional Assessment    Outcome Measure Options  AM-PAC 6 Clicks Basic Mobility (PT)  (Pended)   -JT       User Key  (r) = Recorded By, (t) = Taken By, (c) = Cosigned By    Initials Name Provider Type    JT Arturo Mejia P, PT Student PT Student        Physical Therapy Education                 Title: PT OT SLP Therapies (In Progress)     Topic: Physical Therapy (Done)     Point: Mobility training (Done)     Learning Progress Summary           Patient Acceptance, E,D, VU,DU,NR by JT at 12/15/2020 1134    Comment: HEP  Mobility, gait training and sequencing    Acceptance, E, VU,NR by KIRSTY at 12/12/2020 1418    Acceptance, E, VU,NR by NS at 12/10/2020 1534    Comment: Patient was educated about PT POC, sequencing mobility, and benefits of OOB activity.    Acceptance, E,TB, VU by HR at 12/9/2020 0046                   Point: Home exercise program (Done)     Learning Progress Summary           Patient Acceptance, E,D, VU,DU,NR by JT at 12/15/2020 1134    Comment: HEP  Mobility, gait training and sequencing    Acceptance, E,TB, VU by HR at 12/9/2020 0046                   Point: Body mechanics (Done)     Learning Progress Summary           Patient Acceptance,  E,D, VU,DU,NR by JT at 12/15/2020 1134    Comment: HEP  Mobility, gait training and sequencing    Acceptance, E, VU,NR by KIRSTY at 12/12/2020 1418    Acceptance, E, VU,NR by NS at 12/10/2020 1534    Comment: Patient was educated about PT POC, sequencing mobility, and benefits of OOB activity.    Acceptance, E,TB, VU by HR at 12/9/2020 0046                   Point: Precautions (Done)     Learning Progress Summary           Patient Acceptance, E,D, VU,DU,NR by JT at 12/15/2020 1134    Comment: HEP  Mobility, gait training and sequencing    Acceptance, E, VU,NR by KIRSTY at 12/12/2020 1418    Acceptance, E, VU,NR by NS at 12/10/2020 1534    Comment: Patient was educated about PT POC, sequencing mobility, and benefits of OOB activity.    Acceptance, E,TB, VU by HR at 12/9/2020 0046                               User Key     Initials Effective Dates Name Provider Type Discipline    KIRSTY 08/30/18 -  Yoselin Jimenez, PT Physical Therapist PT    HR 01/16/19 -  Brandi Rawls, RN Registered Nurse Nurse    NS 09/10/19 -  Aby Palacios, PT Physical Therapist PT    JT 10/16/20 -  Arturo Mejia, PT Student PT Student PT              PT Recommendation and Plan     Plan of Care Reviewed With: (P) patient  Progress: (P) no change  Outcome Summary: (P) Pt presents with decreased muscular endurance and strength in B LEs, decreased balance, and decreased cognition that all lower pt independence in ADLs and mobility. Pt sup to sit at mod A. When sitting EOB, pt lays on back despite VCs. Pt STS at max A X 1 with RW. stands appx. 1 min. Pt states they cannot stand longer despite giving no reason. Perhaps mild FoF present. Pt SPT at max A X 2 to chair. Pt reports no pain or weakness, cognition may be limiting factor. PT recommends SNF upon D/C     Time Calculation:   PT Charges     Row Name 12/15/20 1102             Time Calculation    Start Time  1102  (Pended)   -JT      PT Received On  12/15/20  (Pended)   -JT      PT Goal Re-Cert Due Date   12/20/20  (Pended)   -JT         Time Calculation- PT    Total Timed Code Minutes- PT  25 minute(s)  (Pended)   -JT         Timed Charges    49743 - PT Therapeutic Activity Minutes  25  (Pended)   -JT        User Key  (r) = Recorded By, (t) = Taken By, (c) = Cosigned By    Initials Name Provider Type    JT Arturo Mejia, PT Student PT Student        Therapy Charges for Today     Code Description Service Date Service Provider Modifiers Qty    67620847440 HC PT THERAPEUTIC ACT EA 15 MIN 12/15/2020 Arturo Mejia, PT Student GP 2          PT G-Codes  Outcome Measure Options: (P) AM-PAC 6 Clicks Basic Mobility (PT)  AM-PAC 6 Clicks Score (PT): (P) 10  AM-PAC 6 Clicks Score (OT): 8    Arturo Mejia PT Student  12/15/2020

## 2020-12-15 NOTE — PROGRESS NOTES
HEMATOLOGY/ONCOLOGY PROGRESS NOTE    Subjective      CC: Metastatic ovarian carcinoma    SUBJECTIVE:   No acute events overnight.  Patient finished with radiation today.  Still having significant weakness and fatigue.  Requiring two people for transition from bed to chair.  Tolerating diet however not eating very much.  Denies any new or worsening vaginal bleeding        Past Medical History, Past Surgical History, Social History, Family History have been reviewed and are without significant changes except as mentioned.      Medications:  The current medication list was reviewed in the EMR    ALLERGIES:   Allergies   Allergen Reactions   • Lisinopril Cough   • Losartan Unknown - High Severity     HAS STOMACH ULCERS   • Citrus Cough     Citrus blossoms   • Latex Itching   • Pollen Extract Unknown - Low Severity     RED, ITCHY EYES       ROS:  Endorses significant weakness/fatigue but improved bleeding otherwise a comprehensive 10 point review of systems was performed and was negative except as mentioned.      Objective      Vitals:    12/15/20 0500 12/15/20 0600 12/15/20 0851 12/15/20 1013   BP:   136/87 139/77   BP Location:    Left arm   Patient Position:    Lying   Pulse: 102 107 106 98   Resp:    16   Temp:    100.1 °F (37.8 °C)   TempSrc:    Oral   SpO2:    93%   Weight:       Height:            ECOG: 3    General: Fragile appearing, in no acute distress  HEENT: sclerae anicteric, oropharynx clear  Lymphatics: no cervical, supraclavicular, inguinal, or axillary adenopathy  Cardiovascular: regular rate and rhythm, no murmurs  Lungs: clear to auscultation bilaterally  Abdomen: soft, nontender, nondistended.  No palpable organomegaly  Extremities: no lower extremity edema  Skin: no rashes, lesions, bruising, or petechiae  Neuro: Alert and oriented x 3. Moves all extremities.    RECENT LABS:    Results from last 7 days   Lab Units 12/13/20  0514 12/13/20  0249 12/12/20  2001  12/10/20  0412  12/09/20  0737   WBC  10*3/mm3  --   --   --   --  3.92  --  5.70   HEMOGLOBIN g/dL 8.0* 7.9* 7.2*   < > 7.2*   < > 8.1*   PLATELETS 10*3/mm3  --   --   --   --  231  --  319    < > = values in this interval not displayed.     Results from last 7 days   Lab Units 12/13/20  0222 12/10/20  0412 12/09/20  1217 12/09/20  0737   SODIUM mmol/L 137 134*  --  131*   POTASSIUM mmol/L 3.7 3.9 3.3* 3.6   CO2 mmol/L 32.0* 31.0*  --  29.0   BUN mg/dL 10 16  --  15   CREATININE mg/dL 0.54* 0.60  --  0.56*   GLUCOSE mg/dL 126* 99  --  105*             Ct Chest With Contrast    Result Date: 12/12/2020  1. Trace right basilar effusion and mild right lower lobe atelectasis, which may be associated with the patient's exophytic right lobe liver lesion, as from diaphragmatic irritation. No direct evidence of transdiaphragmatic spread of disease is seen.  2. No evidence of metastatic disease or other acute disease elsewhere in the chest.  3. Incidentally noted goiter.  D:  12/11/2020 E:  12/11/2020   This report was finalized on 12/12/2020 11:54 AM by Dr. Danny Juarez MD.      Ct Abdomen Pelvis With Contrast    Result Date: 12/10/2020  1. Enlarging, now partially exophytic right lobe liver mass compared to 10/21/2020 exam. Questionable small new right lobe liver lesion. 2. Amorphous masslike appearance of pelvis presumably pelvic metastatic disease/metastatic implants, similar to prior study. 3. New approximately 12 x 14 cm pelvoabdominal cysts/cystic mass. 4. Worsening bilateral hydronephrosis, likely as a result of pelvic disease. 5. Moderate to marked fecal stasis. 6. Gradually enlarging pelvic sidewall and inguinal lymph nodes.  D:  12/08/2020 E:  12/09/2020  This report was finalized on 12/10/2020 4:57 PM by Dr. Danny Juarez MD.      Xr Chest 1 View    Result Date: 12/9/2020  Placement of a portacatheter on the right tip in the SVC. No pneumothorax with minimal increased markings again seen at the right lung base and elevation of the right hemidiaphragm.   DICTATED:   12/09/2020 EDITED/ls :   12/09/2020  This report was finalized on 12/9/2020 4:59 PM by Dr. Ashley Waters MD.            Assessment   ASSESSMENT & PLAN:  Ms. Booker is a pleasant 65 y.o. female past medical history of hypertension, anxiety, CAD, GERD, bilateral breast cancer, ovarian cancer status post treatment at [facility] in 2017 who presented to [facility] with worsening vaginal bleeding.     Metastatic ovarian cancer  -Likely source of her vaginal bleeding  -Not a surgical candidate per GYN/ONC  -Agree with radiation consult for palliative radiation.  Status post 3/5 doses which she will complete inpatient  -CT scans on presentation with significant interval progression of disease from her last scan  -She is status post 1 cycle of carboplatin/Taxol/Avastin  -Given her poor performance status and rapidly progressive disease, I do not feel like she would be a candidate for further systemic chemotherapy.  -Next generation sequencing pending she may be a candidate for targeted treatment however this is very unlikely  -Discussed patient's poor prognosis and likely lack of benefit from further systemic treatment.  Discussed palliative care including hospice which she, her daughter-in-law, and son are now agreeable to.  Patient would like to have home hospice at her son's home which she is currently residing.  -Palliative consulted and following at this time  -Patient requesting CT scan of her chest to further evaluate extent of disease.  No metastatic disease noted in the chest  -Patient scheduled to go home with hospice tomorrow     Thank you for the consult.  We will continue to follow while inpatient.  Please do not hesitate to contact with any questions or concerns    Shelton Anderson MD  Hematology and Oncology    12/15/2020  11:28 EST

## 2020-12-16 PROBLEM — R50.9 FEVER: Status: ACTIVE | Noted: 2020-01-01

## 2020-12-16 NOTE — PROGRESS NOTES
INFECTIOUS DISEASE Progress Note    Hailee Booker  1955  [id number]      Admission Date: 12/8/2020      Requesting Provider: Sherine Keen DO  Evaluating Physician: Chase Prasad MD    Reason for Consultation: sepsis unknown source, recurrent fever, immunosuppressed    History of present illness:    12/11/20: Patient is a 65 y.o. female with h/o CHF, COPD, CAD/stent/CABG, DVT, hypothyroidism, Hep C/treated, HTN, seizure d/o, TIAs, breast cancer/bilateral mastectomy, and ovarian cancer/hysterectomy/treatment at [facility] 2017 who we were asked to see for sepsis with recurrent fever in immunosuppressed patient.  The patient was noted to have rectal and vaginal mass of recent examinations with lesions biopsy and found to be adenocarcinoma.  A CT scan of a/p was concerning for obstructive disease and a large liver lesion.  She underwent a diverting ostomy placement. Sh is residing at a rehab facility with discharge next week.  A port a cath was placed 11/25/20 with chemotherapy first started on 12/3 with carbo/Taxol/Avastin for metastatic ovarian cancer.  She presented to [facility] ED on 12/8/20 with vaginal spotting that progressed to hakan blood when standing or sitting.  She was felt not to be a surgical candidate as well as a poor candidate for systemic chemotherapy.  She was started on palliative radiation on 12/9 with daily radiation therapy.  Work up during her stay shows intermittent fevers with 102.8 on 12/9 and 100.2 on 12/10.  She has been afebrile today.  Blood cultures are pending.  A UA WBC on 12/8 was TNTC with culture showing normal candelario.  Her hgb today is 7.8.  A COVID-19 screen was negative. A CT scan of chest shows   RLL atelectasis with no evidence of metastatic disease.  A CT scan of a/p showed enlarging exophytic right lobe liver mass, amorphous masslike pelvoabdominal cystic mass, worsening bilateral hydronephrosis, marked fecal stasis, and gradually enlarging pelvic sidewall and inguinal lymph  nodes.  She is currently on Vancomycin and Zosyn.  ID was asked to evaluate and manage her antibiotic therapy.    12/12/20: She has been afebrile overnight. Cultures have remained negative. She is undergoing palliative radiation which will be completed on 12/15.  She denies increased abdominal pain.    12/13/2020: She remains afebrile.  She denies increased abdominal pain.  She is scheduled to be discharged on 12/15 on hospice.    12/14/20: She has remained afebrile.  She denies increased abdominal pain.  She denies severe nausea and vomiting.  Now scheduled to be discharged on 12/16.    12/15/20: She had a fever to 101.6° yesterday. She is afebrile this morning. She had a low-grade fever to 100.3° today.  She denies increased abdominal pain.  She denies nausea, vomiting, and severe diarrhea.  She denies increased cough and sputum production.    12/16/2020: She had a fever to 101.3 degrees yesterday afternoon.  She is afebrile this morning.  She denies increased nausea, vomiting, abdominal pain.  She denies cough and sputum production.    Past Medical History:   Diagnosis Date   • Arthritis    • Body piercing     ears   • Cancer (CMS/[facility])     breast cancer twice, basal cell carcinoma, ovarian   • CHF (congestive heart failure) (CMS/[facility]) 2003   • Colostomy in place (CMS/[facility]) 10/17/2020   • Constipation    • COPD (chronic obstructive pulmonary disease) (CMS/[facility])    • Coronary artery disease    • Disease of thyroid gland    • DVT (deep venous thrombosis) (CMS/[facility])     Patient reported after CABG in 2008 and that she had DVT x2   • Elevated cholesterol    • GERD (gastroesophageal reflux disease)    • Hepatitis C     Patient reported she has had treatment   • History of bronchitis    • History of transfusion     Patient reported no prior reaction   • Hx of CABG 2008    Patient reported 2 vessel - reported MI prior to this procedure   • Hyperlipidemia    • Hypertension    • Injury of back    • Latex allergy    •  "Myocardial infarction (CMS/[facility]) 11/2019    Patient reported \"very light\" and that she had medical attention [facility] and had placement of 1 stent   • Seizures (CMS/[facility]) 11/24/2020    Patient reported since 1997 - epileptic.  Patient reported last seizure activity while in nursing home apx 20 days ago   • Sleep apnea     Patient reported prior use of CPAP and none since 2017 after weight loss   • Stroke (CMS/[facility])     Patient reported TIA x3 (reported last TIA 2003, 2010, 2015) - reported no residual issues from TIA's   • Tattoo     x1   • Wears glasses     OTC glasses for reading       Past Surgical History:   Procedure Laterality Date   • BREAST SURGERY     • CARDIAC CATHETERIZATION  11/2019    one stent   • CARDIAC SURGERY      CABG two vessel 2008   • COLONOSCOPY     • COLONOSCOPY N/A 10/12/2020    Procedure: COLONOSCOPY;  Surgeon: Himanshu Shen MD;  Location: [facility] ENDOSCOPY;  Service: Gastroenterology;  Laterality: N/A;   • COLOSTOMY N/A 10/17/2020    Procedure: LAPAROTOMY EXPLORATORY, COLOSTOMY, LYSIS OF ADHESIONS;  Surgeon: Juana Winter MD;  Location[facility] OR;  Service: Gynecology Oncology;  Laterality: N/A;   • DILATATION AND CURETTAGE     • HYSTERECTOMY     • MASTECTOMY Bilateral     1979, 1989   • OTHER SURGICAL HISTORY      Patient reported \"torin in the right leg\"   • PORTACATH PLACEMENT Right 11/25/2020    Procedure: INSERTION OF PORTACATH RIGHT SUBCLAVIAN;  Surgeon: Himanshu Shen MD;  Location: [facility] OR;  Service: General;  Laterality: Right;   • SKIN BIOPSY      reported twice (2009 left shoulder, 2020 face)   • VAGINAL BIOPSY N/A 10/12/2020    Procedure: VAGINAL BIOPSY;  Surgeon: Himanshu Shen MD;  Location: [facility] ENDOSCOPY;  Service: Gastroenterology;  Laterality: N/A;   • WISDOM TOOTH EXTRACTION         Family History   Problem Relation Age of Onset   • Cancer Mother    • Hypertension Mother    • Hyperlipidemia Mother    • Stroke Father        Social " History     Socioeconomic History   • Marital status: Legally      Spouse name: Not on file   • Number of children: Not on file   • Years of education: Not on file   • Highest education level: Not on file   Tobacco Use   • Smoking status: Former Smoker     Years: 10.00     Types: Cigarettes     Quit date: 1995     Years since quittin.3   • Smokeless tobacco: Never Used   Substance and Sexual Activity   • Alcohol use: Never     Frequency: Never   • Drug use: Never   • Sexual activity: Defer       Allergies   Allergen Reactions   • Lisinopril Cough   • Losartan Unknown - High Severity     HAS STOMACH ULCERS   • Citrus Cough     Citrus blossoms   • Latex Itching   • Pollen Extract Unknown - Low Severity     RED, ITCHY EYES         Medication:    Current Facility-Administered Medications:   •  acetaminophen (TYLENOL) tablet 650 mg, 650 mg, Oral, Q6H PRN, Sherine Keen DO, 650 mg at 20 0038  •  albuterol (PROVENTIL) nebulizer solution 0.083% 2.5 mg/3mL, 2.5 mg, Nebulization, Q4H PRN, Grace Johnson MD  •  bisacodyl (DULCOLAX) EC tablet 10 mg, 10 mg, Oral, Daily PRN, Sherine Keen DO, 10 mg at 20 0550  •  clonazePAM (KlonoPIN) tablet 0.5 mg, 0.5 mg, Oral, Q12H, Grace Johnson MD, 0.5 mg at 20 0936  •  isosorbide mononitrate (IMDUR) 24 hr tablet 60 mg, 60 mg, Oral, Daily, Grace Johnson MD, 60 mg at 20 0936  •  lactulose (CHRONULAC) 10 GM/15ML solution 20 g, 20 g, Oral, BID PRN, Joanna Agrawal APRN, 20 g at 20 0838  •  Magnesium Sulfate 2 gram Bolus, followed by 8 gram infusion (total Mg dose 10 grams)- Mg less than or equal to 1mg/dL, 2 g, Intravenous, PRN **OR** Magnesium Sulfate 2 gram / 50mL Infusion (GIVE X 3 BAGS TO EQUAL 6GM TOTAL DOSE) - Mg 1.1 - 1.5 mg/dl, 2 g, Intravenous, PRN, Last Rate: 25 mL/hr at 20, 2 g at 20 **OR** Magnesium Sulfate 4 gram infusion- Mg 1.6-1.9 mg/dL, 4 g, Intravenous, PRN, Grace Johnson MD, Last Rate: 25  mL/hr at 20 1621, 4 g at 20 1621  •  mineral oil enema 1 enema, 1 enema, Rectal, Once PRN, Joanna Agrawal APRN  •  [] HYDROmorphone (DILAUDID) injection 1 mg, 1 mg, Intravenous, Q2H PRN **AND** naloxone (NARCAN) injection 0.4 mg, 0.4 mg, Intravenous, Q5 Min PRN, Grace Johnson MD  •  ondansetron (ZOFRAN) injection 4 mg, 4 mg, Intravenous, Q6H PRN, Jessa Moreno APRN, 4 mg at 20 1149  •  oxyCODONE (ROXICODONE) immediate release tablet 5 mg, 5 mg, Oral, Q4H PRN, Joanna Agrawal APRN  •  oxyCODONE (ROXICODONE) immediate release tablet 5 mg, 5 mg, Oral, Q6H, Joanna Agrawal APRN, 5 mg at 20 1146  •  phenol (CHLORASEPTIC) 1.4 % liquid 2 spray, 2 spray, Mouth/Throat, Q2H PRN, Harry Christianson DO, 2 spray at 20 1246  •  potassium chloride (KLOR-CON) packet 40 mEq, 40 mEq, Oral, PRN, Grace Johnson MD  •  potassium chloride (MICRO-K) CR capsule 40 mEq, 40 mEq, Oral, PRN, Grace Johnson MD, 40 mEq at 20 1828  •  potassium chloride 10 mEq in 100 mL IVPB, 10 mEq, Intravenous, Q1H PRN, Grace Johnson MD, Last Rate: 100 mL/hr at 20 2307, 10 mEq at 20 2307  •  sennosides-docusate (PERICOLACE) 8.6-50 MG per tablet 2 tablet, 2 tablet, Oral, BID, Joanna Agrawal APRN, 2 tablet at 20 0936  •  sodium chloride 0.9 % flush 10 mL, 10 mL, Intravenous, PRN, Kirk Carter MD  •  sodium chloride 0.9 % flush 10 mL, 10 mL, Intravenous, Q12H, Grace Johnson MD, 10 mL at 12/15/20 0852  •  sodium chloride 0.9 % flush 10 mL, 10 mL, Intravenous, PRN, Grace Johnson MD  •  sodium chloride nasal spray 1 spray, 1 spray, Each Nare, PRN, Harry Christianson, DO    Antibiotics:  Anti-Infectives (From admission, onward)    Ordered     Dose/Rate Route Frequency Start Stop    20 1711  vancomycin 1500 mg/500 mL 0.9% NS IVPB ([facility])     Ordering Provider: Sixto Mcdonnell, PharmD    1,500 mg  over 90 Minutes Intravenous Once 20 1800 20 1651   piperacillin-tazobactam (ZOSYN) 3.375 g in iso-osmotic dextrose 50 ml (premix)     Ordering Provider: Sherine Keen DO    3.375 g  over 30 Minutes Intravenous Once 20 1745 20 1848    20 1443  cefTRIAXone (ROCEPHIN) 2 g/100 mL 0.9% NS IVPB ([facility])     Ordering Provider: Federico Roman APRN    2 g  over 30 Minutes Intravenous Once 20 1445 20 1656            Review of Systems:  See HPI      Physical Exam:   Vital Signs  Temp (24hrs), Av.5 °F (37.5 °C), Min:97.8 °F (36.6 °C), Max:101.3 °F (38.5 °C)    Temp  Min: 97.8 °F (36.6 °C)  Max: 101.3 °F (38.5 °C)  BP  Min: 108/75  Max: 155/93  Pulse  Min: 91  Max: 111  Resp  Min: 17  Max: 18  No data recorded    GENERAL: Awake and alert, in no acute distress.   HEENT: Normocephalic, atraumatic.  PERRL. EOMI. No conjunctival injection. No icterus. Oropharynx clear without evidence of thrush or exudate.  NECK: Supple   HEART: RRR; 2/6 systolic murmur  LUNGS: Clear to auscultation bilaterally without wheezing, rales, rhonchi. Normal respiratory effort.   ABDOMEN: Soft, nontender, nondistended. No rebound or guarding. Ostomy site ok  MSK:  FROM, no joint effusion  SKIN: Warm and dry without cutaneous eruptions on Inspection/palpation.    NEURO: Oriented to PPT. Speech and cognition normal  PSYCHIATRIC: Normal insight and judgment. Cooperative with PE    Laboratory Data    Results from last 7 days   Lab Units 20  0514 20  0249 12/12/20  2001  12/10/20  0412   WBC 10*3/mm3  --   --   --   --  3.92   HEMOGLOBIN g/dL 8.0* 7.9* 7.2*   < > 7.2*   HEMATOCRIT % 26.6* 25.9* 23.7*   < > 23.6*   PLATELETS 10*3/mm3  --   --   --   --  231    < > = values in this interval not displayed.     Results from last 7 days   Lab Units 20  0222   SODIUM mmol/L 137   POTASSIUM mmol/L 3.7   CHLORIDE mmol/L 96*   CO2 mmol/L 32.0*   BUN mg/dL 10   CREATININE mg/dL 0.54*   GLUCOSE mg/dL 126*   CALCIUM mg/dL 8.6                         Results from last 7  days   Lab Units 12/11/20  0515   VANCOMYCIN TR mcg/mL 9.90     Estimated Creatinine Clearance: 64 mL/min (A) (by C-G formula based on SCr of 0.54 mg/dL (L)).      Microbiology:  Microbiology Results (last 10 days)     Procedure Component Value - Date/Time    COVID PRE-OP / PRE-PROCEDURE SCREENING ORDER (NO ISOLATION) - Swab, Nasopharynx [[id number]] Collected: 12/14/20 1634    Lab Status: Final result Specimen: Swab from Nasopharynx Updated: 12/15/20 1409    Narrative:      The following orders were created for panel order COVID PRE-OP / PRE-PROCEDURE SCREENING ORDER (NO ISOLATION) - Swab, Nasopharynx.  Procedure                               Abnormality         Status                     ---------                               -----------         ------                     COVID-19 PCR, [facility]...[[id number]]                      Final result                 Please view results for these tests on the individual orders.    COVID-19 PCR, [facility], NP SWAB IN LEXAR VIRAL TRANSPORT MEDIA 24-30 HR TAT - Swab, Nasopharynx [[id number]] Collected: 12/14/20 1634    Lab Status: Final result Specimen: Swab from Nasopharynx Updated: 12/15/20 1409     SARS-CoV-2 LUISITO Not Detected    Blood Culture - Blood, Hand, Right [[id number]] Collected: 12/09/20 1945    Lab Status: Final result Specimen: Blood from Hand, Right Updated: 12/14/20 2015     Blood Culture No growth at 5 days    Narrative:      Aerobic bottle only      Blood Culture - Blood, Arm, Right [[id number]] Collected: 12/09/20 1941    Lab Status: Final result Specimen: Blood from Arm, Right Updated: 12/14/20 2015     Blood Culture No growth at 5 days    Blood Culture - Blood, Arm, Left [[id number]] Collected: 12/08/20 1545    Lab Status: Final result Specimen: Blood from Arm, Left Updated: 12/13/20 1616     Blood Culture No growth at 5 days    Blood Culture - Blood, Arm, Right [[id number]] Collected: 12/08/20 1540    Lab Status: Final result Specimen: Blood from Arm, Right  Updated: 12/13/20 1616     Blood Culture No growth at 5 days    COVID-19,CEPHEID,[facility] IN-HOUSE(OR EMERGENT/ADD-ON),NP SWAB IN TRANSPORT MEDIA 3-4 HR TAT - Swab, Nasopharynx [[id number]]  (Normal) Collected: 12/08/20 1107    Lab Status: Final result Specimen: Swab from Nasopharynx Updated: 12/08/20 1206     COVID19 Not Detected    Narrative:      Fact sheet for providers: https://www.fda.gov/media/078069/download     Fact sheet for patients: https://www.fda.gov/media/327902/download    Urine Culture - Urine, Urine, Clean Catch [[id number]] Collected: 12/08/20 1024    Lab Status: Final result Specimen: Urine, Clean Catch Updated: 12/09/20 1733     Urine Culture 25,000 CFU/mL Normal Urogenital Mary            Radiology:  Imaging Results (Last 72 Hours)     ** No results found for the last 72 hours. **        I read her radiographic studies.    Impression:   1. Fevers-her fevers may be due to a combination of tumor fevers and tumor mixed with abscess in her pelvis.  She now has some recurrent fevers despite Zosyn.  Her current fevers may be tumor fever.  I think we should consider prednisone as a comfort measure.  2. Anemia with acute blood loss  3. Vaginal bleeding  4. Metastatic ovarian cancer/hysterctomy 2017 with treatment.  Now with colon tumor, vaginal tumor, liver lesion found in 2020  5. Systemic chemotherapy 12/3 through port a cath placed 11/25/20.  Not felt to be a good candidate now for systemic chemotherapy. Now undergoing palliative radiation therapy.  6. COPD  7. CAD/stent/CABG  8. H/o DVT  9. Hypothyrodism  10. H/o Hep C/treated  11. Seizure d/o  12. H/o TIAs  13. Chronic probably systolic CHF  14. Breast cancer/bilateral mastectomy    PLAN/RECOMMENDATIONS:   1.  Discharge off of antibiotic therapy  2.  Prednisone 20 mg p.o. daily x10 days  3.  Discharge today on hospice    Chase Prasad MD  12/16/2020  13:29 EST

## 2020-12-16 NOTE — THERAPY TREATMENT NOTE
Patient Name: Hailee Booker  : 1955    MRN: [id number]                              Today's Date: 2020       Admit Date: 2020    Visit Dx:     ICD-10-CM ICD-9-CM   1. Pelvic mass  R19.00 789.30   2. Generalized abdominal mass  R19.07 789.37   3. Liver mass  R16.0 573.8   4. Bilateral hydronephrosis  N13.30 591   5. Anemia, unspecified type  D64.9 285.9   6. Vagina bleeding  N93.9 623.8   7. Acute abdominal pain  R10.9 789.00     338.19   8. Impaired mobility and activities of daily living  Z74.09 V49.89    Z78.9    9. Vaginal bleeding  N93.9 623.8   10. Malignant neoplasm of both ovaries (CMS/HCC)  C56.1 183.0    C56.2      Patient Active Problem List   Diagnosis   • Seizures (CMS/HCC)   • Pelvic mass   • Rt Hydronephrosis 2* Pelvic Mass   • Malignant neoplasm of both ovaries (CMS/HCC)   • Recurrent malignant neoplasm of ovary (CMS/HCC)   • History of breast cancer   • History of pulmonary embolus (PE)   • History of DVT (deep vein thrombosis)   • Liver metastases (CMS/HCC)   • Large bowel obstruction (CMS/HCC)   • CHF (congestive heart failure) (CMS/HCC)   • Coronary artery disease   • Disease of thyroid gland   • Hypertension   • Hyperlipidemia   • Hyponatremia   • Malignant neoplasm of ovary (CMS/HCC)   • Vaginal bleeding     Past Medical History:   Diagnosis Date   • Arthritis    • Body piercing     ears   • Cancer (CMS/HCC)     breast cancer twice, basal cell carcinoma, ovarian   • CHF (congestive heart failure) (CMS/HCC)    • Colostomy in place (CMS/HCC) 10/17/2020   • Constipation    • COPD (chronic obstructive pulmonary disease) (CMS/HCC)    • Coronary artery disease    • Disease of thyroid gland    • DVT (deep venous thrombosis) (CMS/HCC)     Patient reported after CABG in  and that she had DVT x2   • Elevated cholesterol    • GERD (gastroesophageal reflux disease)    • Hepatitis C     Patient reported she has had treatment   • History of bronchitis    • History of transfusion  "    Patient reported no prior reaction   • Hx of CABG 2008    Patient reported 2 vessel - reported MI prior to this procedure   • Hyperlipidemia    • Hypertension    • Injury of back    • Latex allergy    • Myocardial infarction (CMS/[facility]) 11/2019    Patient reported \"very light\" and that she had medical attention [facility] in [facility] and had placement of 1 stent   • Seizures (CMS/[facility]) 11/24/2020    Patient reported since 1997 - epileptic.  Patient reported last seizure activity while in nursing home apx 20 days ago   • Sleep apnea     Patient reported prior use of CPAP and none since 2017 after weight loss   • Stroke (CMS/[facility])     Patient reported TIA x3 (reported last TIA 2003, 2010, 2015) - reported no residual issues from TIA's   • Tattoo     x1   • Wears glasses     OTC glasses for reading     Past Surgical History:   Procedure Laterality Date   • BREAST SURGERY     • CARDIAC CATHETERIZATION  11/2019    one stent   • CARDIAC SURGERY      CABG two vessel 2008   • COLONOSCOPY     • COLONOSCOPY N/A 10/12/2020    Procedure: COLONOSCOPY;  Surgeon: Himanshu Shen MD;  Location: [facility] ENDOSCOPY;  Service: Gastroenterology;  Laterality: N/A;   • COLOSTOMY N/A 10/17/2020    Procedure: LAPAROTOMY EXPLORATORY, COLOSTOMY, LYSIS OF ADHESIONS;  Surgeon: Juana Winter MD;  Location: [facility];  Service: Gynecology Oncology;  Laterality: N/A;   • DILATATION AND CURETTAGE     • HYSTERECTOMY     • MASTECTOMY Bilateral     1979, 1989   • OTHER SURGICAL HISTORY      Patient reported \"torin in the right leg\"   • PORTACATH PLACEMENT Right 11/25/2020    Procedure: INSERTION OF PORTACATH RIGHT SUBCLAVIAN;  Surgeon: Himanshu Shen MD;  Location: [facility] OR;  Service: General;  Laterality: Right;   • SKIN BIOPSY      reported twice (2009 left shoulder, 2020 face)   • VAGINAL BIOPSY N/A 10/12/2020    Procedure: VAGINAL BIOPSY;  Surgeon: Himanshu Shen MD;  Location: [facility] ENDOSCOPY;  Service: Gastroenterology;  " Laterality: N/A;   • WISDOM TOOTH EXTRACTION       General Information     Row Name 12/16/20 0943          OT Time and Intention    Document Type  therapy note (daily note)  -TA     Mode of Treatment  occupational therapy  -TA     Row Name 12/16/20 0943          General Information    Patient Profile Reviewed  yes  -TA     Existing Precautions/Restrictions  fall impulsive, confused  -TA     Barriers to Rehab  medically complex;cognitive status  -TA     Row Name 12/16/20 0943          Cognition    Orientation Status (Cognition)  oriented to;person;disoriented to;place;situation;time  -TA     Row Name 12/16/20 0943          Safety Issues, Functional Mobility    Safety Issues Affecting Function (Mobility)  ability to follow commands;awareness of need for assistance;impulsivity;insight into deficits/self-awareness;problem-solving;safety precaution awareness;safety precautions follow-through/compliance;sequencing abilities  -TA     Impairments Affecting Function (Mobility)  balance;cognition;coordination;endurance/activity tolerance;strength;postural/trunk control  -TA     Cognitive Impairments, Mobility Safety/Performance  attention;awareness, need for assistance;impulsivity;insight into deficits/self-awareness;judgment;problem-solving/reasoning;safety precaution awareness;safety precaution follow-through;sequencing abilities  -TA       User Key  (r) = Recorded By, (t) = Taken By, (c) = Cosigned By    Initials Name Provider Type    TA Mario Gupta, OT Occupational Therapist          Mobility/ADL's     Row Name 12/16/20 0943          Bed Mobility    Bed Mobility  rolling left;rolling right;scooting/bridging;supine-sit  -TA     Rolling Left [place] (Bed Mobility)  moderate assist (50% patient effort);verbal cues;nonverbal cues (demo/gesture)  -TA     Rolling Right [place] (Bed Mobility)  moderate assist (50% patient effort);verbal cues;nonverbal cues (demo/gesture)  -TA     Scooting/Bridging [place]  (Bed Mobility)  moderate assist (50% patient effort);verbal cues;nonverbal cues (demo/gesture)  -TA     Supine-Sit [place] (Bed Mobility)  maximum assist (25% patient effort);verbal cues;nonverbal cues (demo/gesture);1 person assist  -TA     Sit-Supine [place] (Bed Mobility)  not tested  -TA     Bed Mobility, Safety Issues  cognitive deficits limit understanding;decreased use of arms for pushing/pulling;decreased use of legs for bridging/pushing  -TA     Assistive Device (Bed Mobility)  bed rails;draw sheet;head of bed elevated  -TA     Row Name 12/16/20 0943          Transfers    Transfers  bed-chair transfer  -TA     Bed-Chair [place] (Transfers)  maximum assist (25% patient effort);1 person assist stand step pivot  -TA     Assistive Device (Bed-Chair Transfers)  -- gait belt, UE support  -TA     Row Name 12/16/20 0943          Functional Mobility    Functional Mobility- Ind. Level  unable to perform  -TA     Row Name 12/16/20 0943          Activities of Daily Living    63584 - OT Self Care/Mgmt Minutes  17  -TA     BADL Assessment/Intervention  upper body dressing;lower body dressing;grooming;toileting  -TA     Row Name 12/16/20 0943          Lower Body Dressing Assessment/Training    [place] Level (Lower Body Dressing)  don;socks;dependent (less than 25% patient effort)  -TA     Position (Lower Body Dressing)  sitting up in bed  -TA     Row Name 12/16/20 0943          Grooming Assessment/Training    [place] Level (Grooming)  hair care, combing/brushing;wash face, hands;maximum assist (25% patient effort) Hand over hand  -TA     Position (Grooming)  supported sitting  -TA     Row Name 12/16/20 0943          Toileting Assessment/Training    [place] Level (Toileting)  adjust/manage clothing;perform perineal hygiene;dependent (less than 25% patient effort)  -TA     Assistive Devices (Toileting)  bedpan  -TA     Position (Toileting)  supine  -TA     Row Name 12/16/20 0943          Upper  Body Dressing Assessment/Training    [place] Level (Upper Body Dressing)  doff;don clean gown  -TA     Position (Upper Body Dressing)  sitting up in bed  -TA     Row Name 12/16/20 0943          Self-Feeding Assessment/Training    [place] Level (Feeding)  liquids to mouth;set up;standby assist;verbal cues  -TA     Position (Self-Feeding)  sitting up in bed  -TA       User Key  (r) = Recorded By, (t) = Taken By, (c) = Cosigned By    Initials Name Provider Type    Mario Rodriguez OT Occupational Therapist        Obj/Interventions     Row Name 12/16/20 0943          Vision Assessment/Intervention    Visual Impairment/Limitations  corrective lenses full-time  -TA     Row Name 12/16/20 0943          Balance    Balance Assessment  sitting static balance;standing static balance  -TA     Static Sitting Balance  moderate impairment;sitting, edge of bed Pt impulsively attempts to lay back across bed  -TA     Static Standing Balance  severe impairment;supported  -TA     Balance Interventions  sit to stand;occupation based/functional task;weight shifting activity  -TA       User Key  (r) = Recorded By, (t) = Taken By, (c) = Cosigned By    Initials Name Provider Type    Mario Rodriguez OT Occupational Therapist        Goals/Plan     Row Name 12/16/20 0943          Therapy Assessment/Plan (OT)    Planned Therapy Interventions (OT)  activity tolerance training;BADL retraining;functional balance retraining;occupation/activity based interventions;patient/caregiver education/training;ROM/therapeutic exercise;strengthening exercise;transfer/mobility retraining  -TA       User Key  (r) = Recorded By, (t) = Taken By, (c) = Cosigned By    Initials Name Provider Type    Mario Rodriguez OT Occupational Therapist        Clinical Impression     Row Name 12/16/20 0943          Pain Assessment    Additional Documentation  Pain Scale: Numbers Pre/Post-Treatment (Group)  -TA     Row Name 12/16/20 0943           Pain Scale: Numbers Pre/Post-Treatment    Pretreatment Pain Rating  0/10 - no pain  -TA     Posttreatment Pain Rating  0/10 - no pain  -TA     Pre/Posttreatment Pain Comment  Pt denied pain, tolerated  -TA     Pain Intervention(s)  Ambulation/increased activity;Repositioned  -TA     Row Name 12/16/20 0943          Plan of Care Review    Plan of Care Reviewed With  patient  -TA     Progress  no change  -TA     Outcome Summary  VSS; OX1. Pt incontinent of bladder upon OT's entry. Pt required Mod A to roll R/L for placement of bedpan, dependent for post toilet hygiene, Max A to doff soiled gown and don clean gown, Max A supine>sit at EOB, pt impulsively attempts to lay back across bed and required Mod A for static sitting balance, Max A stand step pivot to chair with gait belt/UE support, completed washing face, brushing hair with Max A/hand over hand. Meal tray set up in front of pt, SBA for liquids to mouth. Progressing slowly. Continue per OT POC.  -TA     Row Name 12/16/20 0943          Therapy Assessment/Plan (OT)    Patient/Family Therapy Goal Statement (OT)  go home  -TA     Rehab Potential (OT)  fair, will monitor progress closely  -TA     Criteria for Skilled Therapeutic Interventions Met (OT)  yes;skilled treatment is necessary  -TA     Therapy Frequency (OT)  daily  -TA     Predicted Duration of Therapy Intervention (OT)  1 week  -TA     Row Name 12/16/20 0943          Therapy Plan Review/Discharge Plan (OT)    Anticipated Discharge Disposition (OT)  skilled nursing facility rehab  -TA     Row Name 12/16/20 0943          Vital Signs    Pre Systolic BP Rehab  -- VSS; RN cleared pt for tx  -TA     O2 Delivery Pre Treatment  room air  -TA     O2 Delivery Post Treatment  room air  -TA     Pre Patient Position  Supine  -TA     Intra Patient Position  Standing  -TA     Post Patient Position  Sitting  -TA     Row Name 12/16/20 0943          Positioning and Restraints    Pre-Treatment Position  in bed  -TA     Post  Treatment Position  chair  -TA     In Chair  notified nsg;reclined;call light within reach;encouraged to call for assist;exit alarm on;LUE elevated;legs elevated Meal tray set up for pt  -TA       User Key  (r) = Recorded By, (t) = Taken By, (c) = Cosigned By    Initials Name Provider Type    Mario Rodriguez OT Occupational Therapist        Outcome Measures     Row Name 12/16/20 0943          How much help from another is currently needed...    Putting on and taking off regular lower body clothing?  1  -TA     Bathing (including washing, rinsing, and drying)  2  -TA     Toileting (which includes using toilet bed pan or urinal)  1  -TA     Putting on and taking off regular upper body clothing  2  -TA     Taking care of personal grooming (such as brushing teeth)  2  -TA     Eating meals  3  -TA     AM-PAC 6 Clicks Score (OT)  11  -TA     Row Name 12/16/20 0943          Functional Assessment    Outcome Measure Options  AM-PAC 6 Clicks Daily Activity (OT)  -TA       User Key  (r) = Recorded By, (t) = Taken By, (c) = Cosigned By    Initials Name Provider Type    Mario Rodriguez OT Occupational Therapist        Occupational Therapy Education                 Title: PT OT SLP Therapies (In Progress)     Topic: Occupational Therapy (In Progress)     Point: ADL training (In Progress)     Description:   Instruct learner(s) on proper safety adaptation and remediation techniques during self care or transfers.   Instruct in proper use of assistive devices.              Learning Progress Summary           Patient Acceptance, E,D, NR by KIRSTY at 12/14/2020 1439    Comment: bed mobility, balance midline, UE TE, self feeding and grooming sequencing    Acceptance, E, NR by LC at 12/10/2020 1339    Acceptance, E,TB, VU by HR at 12/9/2020 0046                   Point: Home exercise program (In Progress)     Description:   Instruct learner(s) on appropriate technique for monitoring, assisting and/or progressing therapeutic  exercises/activities.              Learning Progress Summary           Patient Acceptance, E,D, NR by  at 12/14/2020 1439    Comment: bed mobility, balance midline, UE TE, self feeding and grooming sequencing    Acceptance, E,TB, VU by HR at 12/9/2020 0046                   Point: Precautions (In Progress)     Description:   Instruct learner(s) on prescribed precautions during self-care and functional transfers.              Learning Progress Summary           Patient Acceptance, E, NR by  at 12/10/2020 1339    Acceptance, E,TB, VU by HR at 12/9/2020 0046                   Point: Body mechanics (In Progress)     Description:   Instruct learner(s) on proper positioning and spine alignment during self-care, functional mobility activities and/or exercises.              Learning Progress Summary           Patient Acceptance, E,D, NR by  at 12/14/2020 1439    Comment: bed mobility, balance midline, UE TE, self feeding and grooming sequencing    Acceptance, E, NR by  at 12/10/2020 1339    Acceptance, E,TB, VU by HR at 12/9/2020 0046                               User Key     Initials Effective Dates Name Provider Type Discipline     06/08/18 -  Ratna Orozco, OT Occupational Therapist OT     01/16/19 -  Brandi Rawls, RN Registered Nurse Nurse     07/18/19 -  Traci Velazco OT Occupational Therapist OT              OT Recommendation and Plan  Planned Therapy Interventions (OT): activity tolerance training, BADL retraining, functional balance retraining, occupation/activity based interventions, patient/caregiver education/training, ROM/therapeutic exercise, strengthening exercise, transfer/mobility retraining  Therapy Frequency (OT): daily  Plan of Care Review  Plan of Care Reviewed With: patient  Progress: no change  Outcome Summary: VSS; OX1. Pt incontinent of bladder upon OT's entry. Pt required Mod A to roll R/L for placement of bedpan, dependent for post toilet hygiene, Max A to doff soiled gown and  don clean gown, Max A supine>sit at EOB, pt impulsively attempts to lay back across bed and required Mod A for static sitting balance, Max A stand step pivot to chair with gait belt/UE support, completed washing face, brushing hair with Max A/hand over hand. Meal tray set up in front of pt, SBA for liquids to mouth. Progressing slowly. Continue per OT POC.     Time Calculation:   Time Calculation- OT     Row Name 12/16/20 0943             Time Calculation- OT    OT Start Time  0943 ttc 27 minutes  -TA      Total Timed Code Minutes- OT  27 minute(s)  -TA      OT Received On  12/16/20  -TA      OT Goal Re-Cert Due Date  12/20/20  -TA         Timed Charges    15772 - OT Self Care/Mgmt Minutes  17  -TA        User Key  (r) = Recorded By, (t) = Taken By, (c) = Cosigned By    Initials Name Provider Type    TA Mario Gupta OT Occupational Therapist        Therapy Charges for Today     Code Description Service Date Service Provider Modifiers Qty    79392036601 HC OT SELF CARE/MGMT/TRAIN EA 15 MIN 12/16/2020 Mario Gupta OT GO 1    06931521328 HC OT THERAPEUTIC ACT EA 15 MIN 12/16/2020 Mario Gupta OT GO 1               Mario Gupta OT  12/16/2020

## 2020-12-16 NOTE — PROGRESS NOTES
Continued Stay Note  [facility]     Patient Name: Hailee Booker  MRN: [id number]  Today's Date: 12/16/2020    Admit Date: 12/8/2020    Discharge Plan     Row Name 12/16/20 1022       Plan    Plan  Home with [facility]    Final Discharge Disposition Code  50 - home with hospice    Final Note  Visit made to pt, pt sitting up in recliner, physical therapist leaving pt's room upon this writer's arrival. Therapist stated pt needed max assist to transfer to the recliner. Pt appears fatigued, stated is ready to go home. Plan remains for pt to discharge home today via ambulance transportation at 1330. [facility] will be transporting pt. Reinforced to pt to call [facility] when arrives home, pt verbalized understanding. Please call [phone] if can be of further assistance.        Discharge Codes    No documentation.             Melinda Kwan RN

## 2020-12-16 NOTE — DISCHARGE SUMMARY
[facility] Medicine Services  DISCHARGE SUMMARY    Patient Name: Hailee Booker  : 1955  MRN: [id number]    Date of Admission: 2020  9:28 AM  Date of Discharge: 20  Primary Care Physician: Sheila Bran APRN    Consults     Date and Time Order Name Status Description    12/10/2020 1443 Inpatient Infectious Diseases Consult Completed     2020 1147 Inpatient Palliative Care MD Consult Completed     2020 1747 Inpatient Radiation Oncology Consult Completed     2020 1711 Inpatient Hematology & Oncology Consult Completed     2020 1541 Inpatient Gynecologic Oncology Consult Completed           Hospital Course     Presenting Problem:   Pelvic mass [R19.00]  Vaginal bleeding [N93.9]    Active Hospital Problems    Diagnosis  POA   • Fever [R50.9]  No   • Vaginal bleeding [N93.9]  Yes   • Malignant neoplasm of ovary (CMS/HCC) [C56.9]  Yes   • Rt Hydronephrosis 2* Pelvic Mass [N13.30]  Yes      Resolved Hospital Problems   No resolved problems to display.          Hospital Course:  Hailee Booker is a 65 y.o. female with recurrent ovarian cancer with metastases to rectum, vagina, liver, also bilateral hydronephrosis. Presents with vaginal bleeding and uncontrolled pain.     Vaginal bleeding, anemia  In setting of widespread ovarian cancer   - chemo started 12/3:  carboplatin/Taxol/bevacizumab   - Vaginal bleeding improved  - XRT with Dr. Ryan 5 treatments in total, started on  and completed on 12/15 (no therapy over the weekend)  - Dr. Anderson, Hem/Onc, consulted on 12/10 and discussed only moving forward with completion of radiation, no further systemic treatments/ chemo planned for now due to poor performance status  - hemoglobin stable at 8.0     Recurrent Fever, likely tumor related  Immunosuppressed   - recurrent fever overnight, despite zosyn  - BC negative x5d  - per ID note, fevers may be due to a combination of tumor fevers and tumor  mixed with abscess in her pelvis.    - prednisone at dc  - dc zosyn at dc      Progressive bilateral hydronephrosis, presumably from compression by mass  - no CVA pain or tenderness  - creatinine nl     Progressive pain R lower abd.   constipation  - Continue Oxycodone 5mg q6h scheduled and q4h prn   - palliative following   - continue bowel regimen     Hypokalemia, resolved  -Replaced per protocol      Abnl UA  - Cx 25,000 normal urogenital candelario          Discharge Follow Up Recommendations for outpatient labs/diagnostics:  Hospice to eval on arrival home to son's house    Day of Discharge     HPI:   Tmax 101.3 overnight  No complaints currently  Denies pain    Review of Systems  Gen- as above  CV- No chest pain, palpitations  Resp- No cough, dyspnea  GI- No N/V/D, abd pain    Vital Signs:   Temp:  [97.8 °F (36.6 °C)-101.3 °F (38.5 °C)] 97.8 °F (36.6 °C)  Heart Rate:  [] 111  Resp:  [17-18] 17  BP: (132-155)/(78-93) 153/82     Physical Exam:  Constitutional: No acute distress, drowsy but awakens for short period of time  HENT: NCAT, mucous membranes moist  Respiratory: Clear to auscultation bilaterally, respiratory effort normal   Cardiovascular: regular, tachycardic, no murmurs, rubs, or gallops  Gastrointestinal: Positive bowel sounds, soft, nontender, nondistended  Musculoskeletal: No bilateral ankle edema  Psychiatric: Flat affect, cooperative  Neurologic: Oriented x 2, ANDREW, speech clear  Skin: No rashes      Pertinent  and/or Most Recent Results     Results from last 7 days   Lab Units 12/13/20  0514 12/13/20  0249 12/13/20  0222 12/12/20 2001 12/12/20  0800 12/11/20  1951 12/11/20  0735 12/10/20  2020  12/10/20  0412   WBC 10*3/mm3  --   --   --   --   --   --   --   --   --  3.92   HEMOGLOBIN g/dL 8.0* 7.9*  --  7.2* 7.9* 7.5* 7.8* 7.5*   < > 7.2*   HEMATOCRIT % 26.6* 25.9*  --  23.7* 26.8* 24.0* 25.4* 23.7*   < > 23.6*   PLATELETS 10*3/mm3  --   --   --   --   --   --   --   --   --  231   SODIUM  mmol/L  --   --  137  --   --   --   --   --   --  134*   POTASSIUM mmol/L  --   --  3.7  --   --   --   --   --   --  3.9   CHLORIDE mmol/L  --   --  96*  --   --   --   --   --   --  96*   CO2 mmol/L  --   --  32.0*  --   --   --   --   --   --  31.0*   BUN mg/dL  --   --  10  --   --   --   --   --   --  16   CREATININE mg/dL  --   --  0.54*  --   --   --   --   --   --  0.60   GLUCOSE mg/dL  --   --  126*  --   --   --   --   --   --  99   CALCIUM mg/dL  --   --  8.6  --   --   --   --   --   --  8.3*    < > = values in this interval not displayed.         Brief Urine Lab Results  (Last result in the past 365 days)      Color   Clarity   Blood   Leuk Est   Nitrite   Protein   CREAT   Urine HCG        12/08/20 1024 Yellow Cloudy Large (3+) Large (3+) Negative 100 mg/dL (2+)               Microbiology Results Abnormal     Procedure Component Value - Date/Time    COVID PRE-OP / PRE-PROCEDURE SCREENING ORDER (NO ISOLATION) - Swab, Nasopharynx [[id number]] Collected: 12/14/20 1634    Lab Status: Final result Specimen: Swab from Nasopharynx Updated: 12/15/20 3169    Narrative:      The following orders were created for panel order COVID PRE-OP / PRE-PROCEDURE SCREENING ORDER (NO ISOLATION) - Swab, Nasopharynx.  Procedure                               Abnormality         Status                     ---------                               -----------         ------                     COVID-19 PCR, [facility]...[[id number]]                      Final result                 Please view results for these tests on the individual orders.    COVID-19 PCR, [facility], NP SWAB IN [facility]EX[facility] VIRAL TRANSPORT MEDIA 24-30 HR TAT - Swab, Nasopharynx [[id number]] Collected: 12/14/20 1634    Lab Status: Final result Specimen: Swab from Nasopharynx Updated: 12/15/20 1409     SARS-CoV-2 LUISITO Not Detected    Blood Culture - Blood, Arm, Right [[id number]] Collected: 12/09/20 1941    Lab Status: Final result Specimen: Blood from Arm, Right Updated:  12/14/20 2015     Blood Culture No growth at 5 days    Blood Culture - Blood, Hand, Right [[id number]] Collected: 12/09/20 1945    Lab Status: Final result Specimen: Blood from Hand, Right Updated: 12/14/20 2015     Blood Culture No growth at 5 days    Narrative:      Aerobic bottle only      Blood Culture - Blood, Arm, Left [[id number]] Collected: 12/08/20 1545    Lab Status: Final result Specimen: Blood from Arm, Left Updated: 12/13/20 1616     Blood Culture No growth at 5 days    Blood Culture - Blood, Arm, Right [[id number]] Collected: 12/08/20 1540    Lab Status: Final result Specimen: Blood from Arm, Right Updated: 12/13/20 1616     Blood Culture No growth at 5 days    Urine Culture - Urine, Urine, Clean Catch [[id number]] Collected: 12/08/20 1024    Lab Status: Final result Specimen: Urine, Clean Catch Updated: 12/09/20 1733     Urine Culture 25,000 CFU/mL Normal Urogenital Mary    COVID-19,CEPHEID,[facility] IN-HOUSE(OR EMERGENT/ADD-ON),NP SWAB IN TRANSPORT MEDIA 3-4 HR TAT - Swab, Nasopharynx [[id number]]  (Normal) Collected: 12/08/20 1107    Lab Status: Final result Specimen: Swab from Nasopharynx Updated: 12/08/20 1206     COVID19 Not Detected    Narrative:      Fact sheet for providers: https://www.fda.gov/media/580727/download     Fact sheet for patients: https://www.fda.gov/media/776936/download          Imaging Results (All)     Procedure Component Value Units Date/Time    CT Chest With Contrast [[id number]] Collected: 12/11/20 0958     Updated: 12/12/20 1157    Narrative:      EXAMINATION: CT CHEST W CONTRAST- 12/10/2020     INDICATION: Ovarian cancer staging; R19.00-Intra-abdominal and pelvic  swelling, mass and lump, unspecified site; R19.07-Generalized  intra-abdominal and pelvic swelling, mass and lump; R16.0-Hepatomegaly,  not elsewhere classified; N13.30-Unspecified hydronephrosis;  D64.9-Anemia, unspecified; N93.9-Abnormal uterine and vaginal bleeding,  unspecified; R10.9-Unspecified abdominal pain;  Z74.09-Other reduced  mobility     TECHNIQUE: 5 mm post IV contrast images through the chest and upper  abdomen     The radiation dose reduction device was turned on for each scan per the  ALARA (As Low as Reasonably Achievable) protocol.     COMPARISON: Chest radiograph 12/09/2020     FINDINGS: History indicates ovarian cancer, staging.     There is diffuse thyroid goiter. No mediastinal hilar, or axillary  adenopathy is seen. No pulmonary parenchymal nodule is identified. There  is trace right basilar effusion and atelectasis, which may be associated  with the patient's exophytic mass in the dome of the right liver lobe.  There is a small hiatal hernia. Bony structures appear to be intact.  Note is again made of the patient's exophytic right lobe liver lesion,  small low-density lesion inferior right liver lobe. There appears to be  mild fecal stasis.       Impression:      1. Trace right basilar effusion and mild right lower lobe atelectasis,  which may be associated with the patient's exophytic right lobe liver  lesion, as from diaphragmatic irritation. No direct evidence of  transdiaphragmatic spread of disease is seen.      2. No evidence of metastatic disease or other acute disease elsewhere in  the chest.     3. Incidentally noted goiter.     D:  12/11/2020  E:  12/11/2020        This report was finalized on 12/12/2020 11:54 AM by Dr. Danny Juarez MD.       CT Abdomen Pelvis With Contrast [[id number]] Collected: 12/08/20 1411     Updated: 12/10/20 1700    Narrative:      EXAMINATION: CT ABDOMEN PELVIS W CONTRAST- 12/08/2020      INDICATION: Vaginal bleeding, pelvic mass.     TECHNIQUE: 5 mm post IV contrast portal venous phase and delayed venous  phase images through the abdomen and pelvis.     The radiation dose reduction device was turned on for each scan per the  ALARA (As Low as Reasonably Achievable) protocol.     COMPARISON: 10/21/2020 abdomen and pelvis CT scan.     FINDINGS: Previous 10/21/2020 exam  report indicates bilateral  obstructive uropathy right greater than left, heterogeneous  hypoenhancing 5 cm right lobe liver mass. Diffuse bowel dilatation.  History today indicates abnormal uterine bleeding, history of ovarian  cancer and hysterectomy.     There is trace right effusion and minimal right basilar atelectasis.  Right lobe liver lesion appears increased from approximately 5 cm to  approximately 7 cm, and now has an extrahepatic/capsular component,  suggesting it could actually be an implanted metastasis, as well as  hematogenous metastasis with new erosion into the extra hepatic soft  tissues. There appears to be a new small hypoenhancing lesion of the  right liver lobe inferiorly, 14 mm in diameter, although there is some  marginal peripheral enhancement and this could be an incidental  hemangioma better seen today due to phase of contrast enhancement. No  new liver lesions are seen elsewhere.     Spleen is not enlarged. No significant abnormalities are seen of the  pancreas or adrenal glands. Gallbladder is normally distended and normal  in appearance. There appears to be at least moderate fecal stasis.  Bilateral hydronephrosis, right greater than left appears to be  gradually increasing, right renal pelvis increasing in diameter from 38  mm to 43 mm, left renal pelvis increasing from 22 mm to 28 mm. IVC  filter is again noted. There is a large new pelvoabdominal cyst at least  12 x 14 cm in diameter, with no evidence of cyst wall enhancement,  internal debris or septation, occupying much of the right pelvis. Milder  cystic changes in the right lower pelvis appear a little increased.  Cystic change of the left adnexal region appears stable. Amorphous  appearance of the pelvis may represent diffuse metastatic implants here  given the patient's previous history of hysterectomy. Overall size of  this area appears very similar to prior exam up to 10 cm in maximal  diameter. No intra-abdominal free air  or significant free ascites is  seen. No bowel wall inflammatory changes are noted. There is normal  contrast opacification of the mesenteric vasculature. Delayed venous  phase images again show high-grade obstructive uropathy bilaterally,  presumably as a result of the patient's ill-defined mass/metastatic  implants. Additional note is made of mild further enlargement of some  shotty inguinal and pelvic sidewall lymph nodes.       Impression:      1. Enlarging, now partially exophytic right lobe liver mass compared to  10/21/2020 exam. Questionable small new right lobe liver lesion.  2. Amorphous masslike appearance of pelvis presumably pelvic metastatic  disease/metastatic implants, similar to prior study.  3. New approximately 12 x 14 cm pelvoabdominal cysts/cystic mass.  4. Worsening bilateral hydronephrosis, likely as a result of pelvic  disease.  5. Moderate to marked fecal stasis.  6. Gradually enlarging pelvic sidewall and inguinal lymph nodes.     D:  12/08/2020  E:  12/09/2020     This report was finalized on 12/10/2020 4:57 PM by Dr. aDnny Juarez MD.       XR Chest 1 View [[id number]] Collected: 12/09/20 1119     Updated: 12/09/20 1702    Narrative:         EXAMINATION: XR CHEST 1 VW - 12/09/2020     INDICATION: R19.00-Intra-abdominal and pelvic swelling, mass and lump,  unspecified site; R19.07-Generalized intra-abdominal and pelvic  swelling, mass and lump; R16.0-Hepatomegaly, not elsewhere classified;  N13.30-Unspecified hydronephrosis; D64.9-Anemia, unspecified;  N93.9-Abnormal uterine and vaginal bleeding, unspecified;  R10.9-Unspecified abdominal pain. Fever.     COMPARISON: 10/17/2020     FINDINGS: Portable chest reveals mild elevation seen the right  hemidiaphragm. Mild increased markings of the right lung base.  Portacatheter on the right tip in the SVC with no pneumothorax.  Degenerative changes seen within the spine. Nasogastric tube and deep  line catheter on the right previously visualized have  been removed.         Impression:      Placement of a portacatheter on the right tip in the SVC. No  pneumothorax with minimal increased markings again seen at the right  lung base and elevation of the right hemidiaphragm.     DICTATED:   12/09/2020  EDITED/ls :   12/09/2020      This report was finalized on 12/9/2020 4:59 PM by Dr. Ashley Waters MD.               Results for orders placed during the hospital encounter of 10/16/20   Adult Transthoracic Echo Complete W/ Cont if Necessary Per Protocol    Narrative · Estimated left ventricular EF = 70%  · Mild tricuspid valve regurgitation is present.  · Estimated right ventricular systolic pressure from tricuspid   regurgitation is 37 mmHg.            Discharge Details        Discharge Medications      New Medications      Instructions Start Date   acetaminophen 325 MG tablet  Commonly known as: TYLENOL   650 mg, Oral, Every 6 Hours PRN      clonazePAM 0.5 MG tablet  Commonly known as: KlonoPIN  Replaces: cloBAZam 10 MG tablet   0.5 mg, Oral, Every 12 Hours Scheduled      predniSONE 20 MG tablet  Commonly known as: DELTASONE   20 mg, Oral, Daily      sennosides-docusate 8.6-50 MG per tablet  Commonly known as: PERICOLACE   2 tablets, Oral, 2 Times Daily, OVER THE COUNTER      sodium chloride 0.65 % nasal spray   1 spray, Nasal, As Needed, OVER THE COUNTER         Changes to Medications      Instructions Start Date   oxyCODONE 10 MG tablet  Commonly known as: ROXICODONE  What changed:   · how much to take  · how to take this  · when to take this  · reasons to take this  · additional instructions   1/2 tab (5mg) every 6h scheduled and 5mg every 4h as needed         Continue These Medications      Instructions Start Date   albuterol sulfate  (90 Base) MCG/ACT inhaler  Commonly known as: PROVENTIL HFA;VENTOLIN HFA;PROAIR HFA   2 puffs, Inhalation, Every 4 Hours PRN      isosorbide mononitrate 60 MG 24 hr tablet  Commonly known as: IMDUR   60 mg, Oral, Daily       metoclopramide 10 MG tablet  Commonly known as: REGLAN   10 mg, Oral, Every 6 Hours PRN         Stop These Medications    amLODIPine 10 MG tablet  Commonly known as: NORVASC     aspirin 81 MG EC tablet     chlorthalidone 25 MG tablet  Commonly known as: HYGROTON     cloBAZam 10 MG tablet  Commonly known as: ONFI  Replaced by: clonazePAM 0.5 MG tablet     dexamethasone 4 MG tablet  Commonly known as: DECADRON     fish oil 1000 MG capsule capsule     fluticasone 50 MCG/ACT nasal spray  Commonly known as: FLONASE     L-Carnitine 500 MG tablet     lidocaine-prilocaine 2.5-2.5 % cream  Commonly known as: EMLA     multivitamin with minerals tablet tablet     potassium chloride 20 MEQ CR tablet  Commonly known as: K-DUR,KLOR-CON            Allergies   Allergen Reactions   • Lisinopril Cough   • Losartan Unknown - High Severity     HAS STOMACH ULCERS   • Citrus Cough     Citrus blossoms   • Latex Itching   • Pollen Extract Unknown - Low Severity     RED, ITCHY EYES         Discharge Disposition:  Hospice/Home    Diet:  Hospital:  Diet Order   Procedures   • Diet Regular; GI Soft       Activity:  Activity Instructions     Activity as Tolerated              CODE STATUS:    Code Status and Medical Interventions:   Ordered at: 12/08/20 1525     Level Of Support Discussed With:    Patient     Code Status:    CPR     Medical Interventions (Level of Support Prior to Arrest):    Full       Future Appointments   Date Time Provider Department Center   12/18/2020 11:00 AM Yudelka Gauthier APRN [facility]   12/31/2020  9:00 AM Shelton Anderson MD [facility]   12/31/2020  9:30 AM CHAIR 4 - [facility]   1/15/2021 11:30 AM Marty Gotti APRN [facility] None                 ERIN Louise  12/16/20      Time Spent on Discharge:  I spent 30 minutes on this discharge activity which included: face-to-face encounter with the patient, reviewing the data in the system, coordination of the  care with the nursing staff as well as consultants, documentation, and entering orders.

## 2020-12-16 NOTE — PLAN OF CARE
Problem: Adult Inpatient Plan of Care  Goal: Plan of Care Review  Recent Flowsheet Documentation  Taken 12/16/2020 0943 by Mario Gupta, OT  Progress: no change  Plan of Care Reviewed With: patient  Outcome Summary:   VSS   OX1. Pt incontinent of bladder upon OT's entry. Pt required Mod A to roll R/L for placement of bedpan, dependent for post toilet hygiene, Max A to doff soiled gown and don clean gown, Max A supine>sit at EOB, pt impulsively attempts to lay back across bed and required Mod A for static sitting balance, Max A stand step pivot to chair with gait belt/UE support, completed washing face, brushing hair with Max A/hand over hand. Meal tray set up in front of pt, SBA for liquids to mouth. Progressing slowly. Continue per OT POC.   Goal Outcome Evaluation:  Plan of Care Reviewed With: patient  Progress: no change  Outcome Summary: VSS; OX1. Pt incontinent of bladder upon OT's entry. Pt required Mod A to roll R/L for placement of bedpan, dependent for post toilet hygiene, Max A to doff soiled gown and don clean gown, Max A supine>sit at EOB, pt impulsively attempts to lay back across bed and required Mod A for static sitting balance, Max A stand step pivot to chair with gait belt/UE support, completed washing face, brushing hair with Max A/hand over hand. Meal tray set up in front of pt, SBA for liquids to mouth. Progressing slowly. Continue per OT POC.

## 2020-12-16 NOTE — PROGRESS NOTES
Continued Stay Note  [facility]     Patient Name: Hailee Booker  MRN: [id number]  Today's Date: 12/16/2020    Admit Date: 12/8/2020    Discharge Plan     Row Name 12/16/20 1128       Plan    Plan  Home wiht [facility]    Plan Comments  Telephone call from MAYTE Culp stating due to illness [facility] is unable to transport pt today. Telephone call to [facility] regarding transportation today, was informed [facility] can transport pt today, will come to hospital when a truck is available. Informed staff nurse kamla Grewal, pt and [facility] of change in transportation time. Please call [phone] if can be of further assistance.    Row Name 12/16/20 1022       Plan    Plan  Home with [facility]    Final Discharge Disposition Code  50 - home with hospice    Final Note  Visit made to pt, pt sitting up in recliner, physical therapist leaving pt's room upon this writer's arrival. Therapist stated pt needed max assist to transfer to the recliner. Pt appears fatigued, stated is ready to go home. Plan remains for pt to discharge home today via ambulance transportation at 1330. [facility] will be transporting pt. Reinforced to pt to call [facility] when arrives home, pt verbalized understanding. Please call [phone] if can be of further assistance.        Discharge Codes    No documentation.             Melinda Kwan RN

## 2020-12-16 NOTE — PLAN OF CARE
Goal Outcome Evaluation:  Plan of Care Reviewed With: patient  Progress: no change  Outcome Summary: Pt resting throughout the shift. On room air. Pain meds administered. Will continue to monitor.

## 2020-12-17 NOTE — PROGRESS NOTES
Oncology Nutrition Screening    Patient Name:  Hailee Booker  YOB: 1955  MRN: [id number]  Date:  12/17/20  Physician:  Justin    Type of Cancer Treatment:   Chemotherapy:  Type: MVASI/CARBO/TAXOL  Treatment Schedule: N/A; Hospice    Patient Active Problem List   Diagnosis   • Seizures (CMS/HCC)   • Pelvic mass   • Rt Hydronephrosis 2* Pelvic Mass   • Malignant neoplasm of both ovaries (CMS/HCC)   • Recurrent malignant neoplasm of ovary (CMS/HCC)   • History of breast cancer   • History of pulmonary embolus (PE)   • History of DVT (deep vein thrombosis)   • Liver metastases (CMS/HCC)   • Large bowel obstruction (CMS/HCC)   • CHF (congestive heart failure) (CMS/HCC)   • Coronary artery disease   • Disease of thyroid gland   • Hypertension   • Hyperlipidemia   • Hyponatremia   • Malignant neoplasm of ovary (CMS/HCC)   • Vaginal bleeding   • Fever       Current Outpatient Medications   Medication Sig Dispense Refill   • acetaminophen (TYLENOL) 325 MG tablet Take 2 tablets by mouth Every 6 (Six) Hours As Needed for Mild Pain .     • albuterol sulfate  (90 Base) MCG/ACT inhaler Inhale 2 puffs Every 4 (Four) Hours As Needed for Wheezing.     • clonazePAM (KlonoPIN) 0.5 MG tablet Take 1 tablet by mouth Every 12 (Twelve) Hours. 6 tablet 0   • isosorbide mononitrate (IMDUR) 60 MG 24 hr tablet Take 60 mg by mouth Daily.     • metoclopramide (REGLAN) 10 MG tablet Take 1 tablet by mouth Every 6 (Six) Hours As Needed (nausea, vomiting).     • oxyCODONE (ROXICODONE) 10 MG tablet 1/2 tab (5mg) every 6h scheduled and 5mg every 4h as needed     • predniSONE (DELTASONE) 20 MG tablet Take 1 tablet by mouth Daily for 10 days. 10 tablet 0   • sennosides-docusate (PERICOLACE) 8.6-50 MG per tablet Take 2 tablets by mouth 2 (Two) Times a Day. OVER THE COUNTER     • sodium chloride 0.65 % nasal spray 1 spray into the nostril(s) as directed by provider As Needed for Congestion (dry nares). OVER THE COUNTER    "    No current facility-administered medications for this visit.        Glycemic Risk:   N/A    Weight:   Height: 65\"  Weight: 131 lbs.  Usual Body Weight: 130 lbs.   BMI: 21  Normal  Weight has been stable    Oral Food Intake:  Regular Diet - No Restrictions  Compared to normal intake, current food intake is the same  Drinking supplements daily    Hydration Status:   How many 8 ounce glass of water of fluid do you drink per day?  4    Enteral Feeding:   N/A    Nutrition Symptoms:   Altered Apetite  Nausea  Constipation    Activity:   Rarely out of bed, pretty much bedridden     reports that she quit smoking about 25 years ago. Her smoking use included cigarettes. She quit after 10.00 years of use. She has never used smokeless tobacco. She reports that she does not drink alcohol or use drugs.    Evaluation of Nutritional Risk:   Patient identified to be at nutritional risk and/or for nutritional consultation; will follow patient through course of treatment.   Nutrition Impact Symptoms    RD spoke with pt over the phone and reviewed Oncology Nutrition Screening, notes listed above. RD encouraged pt to request \"Healthy Eating during Chemotherapy\" recipe book by [employer] at next infusion. RD mailed handouts including \"Managing Your Cancer Treatment Side Effects\" from [employer], \"Nutrition During and After Cancer Treatment\" \"Oncology: Nutrition Tips for Well Being\", \"Oncology: Cooking Tips\", \"Nutrition Therapy for Nausea and Vomiting\" from AND, and \"Taste or Smell Changes\" from [employer]. RD provided contact information and encouraged pt to reach out with any questions or concerns. RD available PRN.    Electronically signed by:  Jeniffer Obrien RD  11:47 EST  "

## 2020-12-26 NOTE — PROGRESS NOTES
RADIATION ONCOLOGY COMPLETION NOTE    PATIENT:   Hailee Booker  MEDICAL RECORD:  [id number]  :    1955  COMPLETION DATE:  12/15/2020   DIAGNOSIS:   Ovarian cancer  FIGO Stage IVB      BRIEF HISTORY:  This 65 y.o. patient completed radiotherapy.  She received a short course of palliative radiotherapy to a large symptomatic pelvic tumor causing pain and bleeding.      TREATMENT COURSE:  The pelvic mass received a dose of 20 Gy in 5 daily fractions of 4 Gy using 15 MV photons through a custom 4 field beam arrangement.     DATES OF TREATMENT:  2020 thru 12/15/2020    TOLERANCE:   no unexpected difficulties     STATUS:  too early to determine response    DISPOSITION:  Follow up in Radiation Oncology in approximately 1 month.        Olivier Ryan MD

## 2021-01-01 ENCOUNTER — LAB REQUISITION (OUTPATIENT)
Dept: LAB | Facility: HOSPITAL | Age: 66
End: 2021-01-01

## 2021-01-01 ENCOUNTER — NURSING HOME (OUTPATIENT)
Dept: INTERNAL MEDICINE | Facility: CLINIC | Age: 66
End: 2021-01-01

## 2021-01-01 ENCOUNTER — HOSPITAL ENCOUNTER (OUTPATIENT)
Dept: RADIATION ONCOLOGY | Facility: HOSPITAL | Age: 66
Setting detail: RADIATION/ONCOLOGY SERIES
Discharge: HOME | End: 2021-01-15

## 2021-01-01 ENCOUNTER — TELEPHONE (OUTPATIENT)
Dept: INTERNAL MEDICINE | Facility: CLINIC | Age: 66
End: 2021-01-01

## 2021-01-01 ENCOUNTER — OFFICE VISIT (OUTPATIENT)
Dept: RADIATION ONCOLOGY | Facility: HOSPITAL | Age: 66
End: 2021-01-01

## 2021-01-01 VITALS
HEART RATE: 104 BPM | SYSTOLIC BLOOD PRESSURE: 158 MMHG | OXYGEN SATURATION: 94 % | TEMPERATURE: 98.9 F | RESPIRATION RATE: 20 BRPM | DIASTOLIC BLOOD PRESSURE: 90 MMHG

## 2021-01-01 VITALS
SYSTOLIC BLOOD PRESSURE: 150 MMHG | DIASTOLIC BLOOD PRESSURE: 86 MMHG | TEMPERATURE: 100.4 F | OXYGEN SATURATION: 95 % | HEART RATE: 100 BPM | RESPIRATION RATE: 22 BRPM

## 2021-01-01 VITALS
SYSTOLIC BLOOD PRESSURE: 160 MMHG | TEMPERATURE: 98.2 F | RESPIRATION RATE: 20 BRPM | DIASTOLIC BLOOD PRESSURE: 88 MMHG | OXYGEN SATURATION: 98 % | HEART RATE: 85 BPM

## 2021-01-01 VITALS
TEMPERATURE: 97.4 F | OXYGEN SATURATION: 96 % | RESPIRATION RATE: 18 BRPM | HEART RATE: 76 BPM | DIASTOLIC BLOOD PRESSURE: 69 MMHG | SYSTOLIC BLOOD PRESSURE: 127 MMHG

## 2021-01-01 VITALS
OXYGEN SATURATION: 95 % | HEART RATE: 78 BPM | RESPIRATION RATE: 18 BRPM | DIASTOLIC BLOOD PRESSURE: 82 MMHG | SYSTOLIC BLOOD PRESSURE: 144 MMHG | TEMPERATURE: 98.4 F

## 2021-01-01 VITALS
HEART RATE: 98 BPM | DIASTOLIC BLOOD PRESSURE: 90 MMHG | BODY MASS INDEX: 27.29 KG/M2 | SYSTOLIC BLOOD PRESSURE: 137 MMHG | WEIGHT: 164 LBS | OXYGEN SATURATION: 93 % | TEMPERATURE: 97 F | RESPIRATION RATE: 20 BRPM

## 2021-01-01 DIAGNOSIS — Z74.09 IMPAIRED MOBILITY AND ADLS: ICD-10-CM

## 2021-01-01 DIAGNOSIS — C79.89 MALIGNANT NEOPLASM METASTATIC TO OTHER SITE (HCC): ICD-10-CM

## 2021-01-01 DIAGNOSIS — Z78.9 IMPAIRED MOBILITY AND ADLS: ICD-10-CM

## 2021-01-01 DIAGNOSIS — R10.32 LEFT LOWER QUADRANT ABDOMINAL PAIN: ICD-10-CM

## 2021-01-01 DIAGNOSIS — C56.3 MALIGNANT NEOPLASM OF BOTH OVARIES (HCC): ICD-10-CM

## 2021-01-01 DIAGNOSIS — Z51.5 HOSPICE CARE PATIENT: ICD-10-CM
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
## 2021-01-01 DIAGNOSIS — Z86.718 HISTORY OF DVT (DEEP VEIN THROMBOSIS): ICD-10-CM

## 2021-01-01 DIAGNOSIS — R53.83 FATIGUE, UNSPECIFIED TYPE: ICD-10-CM

## 2021-01-01 DIAGNOSIS — C56.9 RECURRENT MALIGNANT NEOPLASM OF OVARY (HCC): Primary | ICD-10-CM

## 2021-01-01 DIAGNOSIS — R09.81 NASAL CONGESTION: ICD-10-CM

## 2021-01-01 DIAGNOSIS — G89.3 CHRONIC PAIN DUE TO NEOPLASM: ICD-10-CM

## 2021-01-01 DIAGNOSIS — C56.9 RECURRENT MALIGNANT NEOPLASM OF OVARY (HCC): ICD-10-CM

## 2021-01-01 DIAGNOSIS — I82.412 ACUTE DEEP VEIN THROMBOSIS (DVT) OF FEMORAL VEIN OF LEFT LOWER EXTREMITY (HCC): ICD-10-CM

## 2021-01-01 DIAGNOSIS — N18.9 CHRONIC KIDNEY DISEASE, UNSPECIFIED CKD STAGE: ICD-10-CM

## 2021-01-01 DIAGNOSIS — C56.3 MALIGNANT NEOPLASM OF BOTH OVARIES (HCC): Primary | ICD-10-CM

## 2021-01-01 DIAGNOSIS — L08.9 PUSTULAR LESION: ICD-10-CM

## 2021-01-01 DIAGNOSIS — N89.8 VAGINAL LESION: ICD-10-CM

## 2021-01-01 DIAGNOSIS — Z11.59 ENCOUNTER FOR SCREENING FOR OTHER VIRAL DISEASES: ICD-10-CM

## 2021-01-01 DIAGNOSIS — C78.7 LIVER METASTASES: ICD-10-CM

## 2021-01-01 DIAGNOSIS — Z86.718 HISTORY OF DVT (DEEP VEIN THROMBOSIS): Primary | ICD-10-CM

## 2021-01-01 LAB
REF LAB TEST METHOD: NORMAL
SARS-COV-2 RNA PNL SPEC NAA+PROBE: NOT DETECTED

## 2021-01-01 PROCEDURE — 99309 SBSQ NF CARE MODERATE MDM 30: CPT | Performed by: PHYSICIAN ASSISTANT

## 2021-01-01 PROCEDURE — 99308 SBSQ NF CARE LOW MDM 20: CPT | Performed by: PHYSICIAN ASSISTANT

## 2021-01-01 PROCEDURE — 99310 SBSQ NF CARE HIGH MDM 45: CPT | Performed by: PHYSICIAN ASSISTANT

## 2021-01-01 PROCEDURE — 99305 1ST NF CARE MODERATE MDM 35: CPT | Performed by: INTERNAL MEDICINE

## 2021-01-01 RX ORDER — MORPHINE SULFATE 100 MG/5ML
5 SOLUTION ORAL
Qty: 60 ML | Refills: 0 | Status: SHIPPED | OUTPATIENT
Start: 2021-01-01 | End: 2021-01-01 | Stop reason: SDUPTHER

## 2021-01-01 RX ORDER — OXYCODONE HYDROCHLORIDE 10 MG/1
TABLET ORAL
Start: 2021-01-01 | End: 2021-01-01

## 2021-01-01 RX ORDER — MORPHINE SULFATE 100 MG/5ML
10 SOLUTION ORAL EVERY 4 HOURS PRN
Qty: 60 ML | Refills: 0 | Status: SHIPPED | OUTPATIENT
Start: 2021-01-01 | End: 2021-01-01

## 2021-01-01 RX ORDER — LORAZEPAM 1 MG/1
1 TABLET ORAL EVERY 6 HOURS PRN
Qty: 120 TABLET | Refills: 0 | Status: SHIPPED | OUTPATIENT
Start: 2021-01-01

## 2021-01-01 RX ORDER — MORPHINE SULFATE 30 MG/1
60 TABLET, FILM COATED, EXTENDED RELEASE ORAL 2 TIMES DAILY
Qty: 120 TABLET | Refills: 0 | Status: SHIPPED | OUTPATIENT
Start: 2021-01-01

## 2021-01-01 RX ORDER — MORPHINE SULFATE 30 MG/1
30 TABLET, FILM COATED, EXTENDED RELEASE ORAL 2 TIMES DAILY
Qty: 60 TABLET | Refills: 0 | Status: SHIPPED | OUTPATIENT
Start: 2021-01-01 | End: 2021-01-01 | Stop reason: SDUPTHER

## 2021-01-01 RX ORDER — OXYCODONE HYDROCHLORIDE 10 MG/1
10 TABLET ORAL EVERY 4 HOURS PRN
Qty: 180 TABLET | Refills: 0 | Status: SHIPPED | OUTPATIENT
Start: 2021-01-01 | End: 2021-01-01 | Stop reason: SDUPTHER

## 2021-01-01 RX ORDER — LORAZEPAM 0.5 MG/1
1 TABLET ORAL EVERY 6 HOURS PRN
COMMUNITY
Start: 2021-01-01 | End: 2021-01-01 | Stop reason: SDUPTHER

## 2021-01-01 RX ORDER — OXYCODONE HYDROCHLORIDE 10 MG/1
TABLET ORAL
Qty: 60 TABLET | Refills: 0 | Status: SHIPPED | OUTPATIENT
Start: 2021-01-01 | End: 2021-01-01 | Stop reason: SDUPTHER

## 2021-01-01 RX ORDER — MORPHINE SULFATE 100 MG/5ML
10 SOLUTION ORAL
Qty: 60 ML | Refills: 0 | Status: SHIPPED | OUTPATIENT
Start: 2021-01-01

## 2021-01-01 RX ORDER — LORAZEPAM 0.5 MG/1
0.5 TABLET ORAL EVERY 8 HOURS
Qty: 90 TABLET | Refills: 0 | Status: SHIPPED | OUTPATIENT
Start: 2021-01-01 | End: 2021-01-01 | Stop reason: DRUGHIGH

## 2021-01-01 RX ORDER — OXYCODONE HYDROCHLORIDE 10 MG/1
10 TABLET ORAL EVERY 6 HOURS
Qty: 120 TABLET | Refills: 0 | Status: SHIPPED | OUTPATIENT
Start: 2021-01-01

## 2021-01-01 RX ORDER — LORAZEPAM 0.5 MG/1
0.5 TABLET ORAL EVERY 6 HOURS PRN
Qty: 60 TABLET | Refills: 0 | Status: SHIPPED | OUTPATIENT
Start: 2021-01-01 | End: 2021-01-01 | Stop reason: SDUPTHER

## 2021-01-15 NOTE — PROGRESS NOTES
TELEMEDICINE FOLLOW-UP NOTE    PATIENT:                                                      Hailee Booker  MEDICAL RECORD #:                        [id number]  :                                                          1955  COMPLETION DATE:   12/15/2020  DIAGNOSIS:     Malignant neoplasm of both ovaries (CMS/HCC)  - FIGO Stage IVB      Time Devoted to Visit: 6 min devoted to direct discussion.    Reason for Visit:  I personally contacted Hailee Booker today in an effort to screen for coronavirus symptoms and also to perform her scheduled follow-up visit remotely per patient preference/consent in light of the coronavirus pandemic and with transition to Hospice care.    COVID-19 RISK SCREEN     1. Has the patient had close contact without PPE with a lab confirmed COVID-19 (+) person or a person under investigation (PUI) for COVID-19 infection?  -- No     2. Has the patient had respiratory symptoms, worsened/new cough and/or SOA, unexplained fever, or sudden loss of smell and/or taste in the past 7 days? --  No    3. Does the patient have baseline higher exposure risk such as working in healthcare field or currently residing in healthcare facility?  --  No           BRIEF HISTORY:   Hailee Booker is a 65 y.o. 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   She was admitted to [facility] 1 month ago for vaginal bleeding, progressive pain, and mass-effect of tumor on the urinary bladder with subsequent bilateral hydronephrosis.  Given her poor performance status and rapidly progressive disease, systemic chemotherapy was further discontinued.  She underwent a palliative course of radiotherapy to treat the bulky disease in the pelvis. The large symptomatic pelvic tumor was treated to a dose of 20 Gy in 5 daily fractions.  She tolerated treatment well and was subsequently discharged home with hospice care.  She reports that vaginal bleeding resolved shortly after  treatment.  She notes abdominal/pelvic/vaginal pain has nearly resolved she has been able to substantially reduce pain medications.  Ostomy continues to function, with subjective constipation at times.  Hospice is managing bowel regimen.  She is voiding well and has no acute urinary voiding complaints.  Energy level is fair.  She gets around with a walker.  She is eating.  She has no acute concerns at this time.        MEDICATIONS: Medication reconciliation for the patient was reviewed and confirmed in the electronic medical record.    Review of Systems   Constitutional: Positive for fatigue.   Gastrointestinal: Positive for constipation.   Musculoskeletal: Positive for gait problem (walker).   Neurological: Positive for gait problem (walker).        Generalized weakness   All other systems reviewed and are negative.      KPS 80%    Physical Exam  Pulmonary:      Respirations even, unlabored. No audible wheezing or cough.  Neurological:      A+Ox4, conversant, answers questions appropriately.  Psychiatric:     Judgement, affect, and decision-making WNL.    Limited physical exam as visit was conducted remotely via telephone in light of the COVID-19 pandemic.      The following portions of the patient's history were reviewed and updated as appropriate: allergies, current medications, past family history, past medical history, past social history, past surgical history and problem list.         Diagnoses and all orders for this visit:    1. Malignant neoplasm of both ovaries (CMS/HCC) (Primary)         IMPRESSION:  Courtesy/non-billable follow-up, recurrent ovarian cancer, now with Hospice.  She is now 1 month status post palliative pelvic radiation.  She tolerated treatment well and did not develop significant acute radiation-related toxicities.  She had excellent palliation of pain, resolution of vaginal hemorrhage, and urinary function appears to be in tact.  At this point, there is no role for systemic therapy per  Dr. Anderson and I do not see further indication for additional radiotherapy.  Unfortunately, overall prognosis remains grim though I am relieved to know that she is comfortable at present.      RECOMMENDATIONS:  Further care/symptom management per home hospice.    Return if symptoms worsen or fail to improve, for Office Visit.    Marty Gotti, APRN

## 2021-03-29 NOTE — ED PROVIDER NOTES
Subjective   History of Present Illness  Is a 65-year-old female who comes in today complaining of chronic constipation for the past 4 to 6 months.  She states that typically she can take Ex-Lax to have a bowel movement but has not been able to do so for the last 4 months.  She states she has to manually remove the stool from her rectum.  She states that it is gotten progressively worse over the past few weeks.  She is also having painful urination over the past few days.  She states she has had intermittent fevers over the past few days as well.  She complains of nausea without vomiting.  Review of Systems   Constitutional: Positive for fever.   HENT: Negative.    Eyes: Negative.    Respiratory: Negative.    Cardiovascular: Negative.    Gastrointestinal: Positive for abdominal pain, constipation and nausea.   Endocrine: Negative.    Genitourinary: Positive for dysuria.   Musculoskeletal: Negative.    Skin: Negative.    Allergic/Immunologic: Negative.    Neurological: Negative.    Hematological: Negative.    Psychiatric/Behavioral: Negative.        Past Medical History:   Diagnosis Date   • Arthritis    • Cancer (CMS/HCC)    • CHF (congestive heart failure) (CMS/HCC)    • COPD (chronic obstructive pulmonary disease) (CMS/HCC)    • Coronary artery disease    • Disease of thyroid gland    • History of transfusion    • Hyperlipidemia    • Hypertension    • Injury of back    • Myocardial infarction (CMS/HCC)    • Seizures (CMS/HCC)    • Stroke (CMS/HCC)        Allergies   Allergen Reactions   • Lisinopril Cough   • Losartan Other (See Comments)     HAS STOMACH ULCERS   • Citrus Cough   • Latex Itching   • Pollen Extract Other (See Comments)     RED, ITCHY EYES       Past Surgical History:   Procedure Laterality Date   • BREAST SURGERY     • CARDIAC SURGERY     • COLONOSCOPY     • DILATATION AND CURETTAGE     • HYSTERECTOMY     • MASTECTOMY Bilateral    • SKIN BIOPSY         Family History   Problem Relation Age of Onset    • Cancer Mother    • Hypertension Mother    • Hyperlipidemia Mother    • Stroke Father        Social History     Socioeconomic History   • Marital status: Legally      Spouse name: Not on file   • Number of children: Not on file   • Years of education: Not on file   • Highest education level: Not on file   Tobacco Use   • Smoking status: Former Smoker     Years: 10.00     Types: Cigarettes     Quit date: 1996     Years since quittin.1   Substance and Sexual Activity   • Alcohol use: Never     Frequency: Never   • Drug use: Never           Objective   Physical Exam  Vitals signs and nursing note reviewed.   Constitutional:       Appearance: She is well-developed and normal weight.   Neurological:      Mental Status: She is alert.     GEN: No acute distress  Head: Normocephalic, atraumatic  Eyes: Pupils equal round reactive to light  ENT: Posterior pharynx normal in appearance, oral mucosa is moist  Chest: Nontender to palpation  Cardiovascular: Regular rate  Lungs: Clear to auscultation bilaterally  Abdomen: Soft, lower abdomen tender, nondistended, no peritoneal signs  Extremities: No edema, normal appearance  Neuro: GCS 15  Psych: Mood and affect are appropriate      Procedures           ED Course  ED Course as of Oct 07 1606   Wed Oct 07, 2020   1542 Consult with Dr. Mtz will advised to measure post void residual, if retaining will anchor dean and have her see him in his office for discussion about possible stent placement.      [TW]   1600 Consult with Dr. Shen, will see in his office in [place] tomorrow at 1 pm.     [TW]      ED Course User Index  [TW] Rhona Haas, APRN                                           MDM  Number of Diagnoses or Management Options     Amount and/or Complexity of Data Reviewed  Clinical lab tests: ordered and reviewed  Tests in the radiology section of CPT®: ordered and reviewed  Review and summarize past medical records: yes  Discuss the patient with other  providers: yes  Independent visualization of images, tracings, or specimens: yes    Risk of Complications, Morbidity, and/or Mortality  Presenting problems: moderate  Diagnostic procedures: moderate  Management options: moderate        Final diagnoses:   Mass of colon   Acute UTI            Rhona Haas, APRN  10/07/20 1603     [Mother] : mother

## 2021-05-04 NOTE — PROGRESS NOTES
Nursing Home Progress Note        Brando Dominguez DO []  ERIN Young []  [address] [place] [address]  Phone: [phone]  Fax: [phone] Adilson Rosenberg MD []  Michael Smith DO []  Traci Archuleta PA-C [x]   [address] [place] [address]  Phone: [phone]  Fax: [phone]     PATIENT NAME: Hailee Booker                                                                          YOB: 1955           DATE OF SERVICE: 5/4/2021  FACILITY: [facility]    CHIEF COMPLAINT:  Follow up on DVT.       HISTORY OF PRESENT ILLNESS:   Ms. 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  She is s/p hysterectomy and colostomy.  Additional history of COPD, CAD, hypothyroidism, chronic hep C, hypertension, hyperlipidemia, seizures.  She does have history of DVT.  She is under hospice care.  Patient was noted to have left lower extremity edema and venous ultrasound obtained on 4/26.  Notable for thrombosis to the CFV, SFV, and GSV.  She was placed on Eliquis, dosed by pharmacy.  She denies any shortness of breath or chest pain.  Patient had complaint of left upper and lower quadrant to the abdomen last date.  Has history of ostomy, senna was recently adjusted to 3 times a day dosing.  She is on MS Contin 15 mg twice a day and Norco 10/325 every 4 hours as needed.  Per nursing she has had increased pain and requiring as needed more often.  She also notes she can 10 used to have constipation, ostomy is not producing stools as often as in the past.  In addition patient palpated a new lesion to her left mid axillary/anterior chest.  There is some mild discomfort.  She has also noted lesions to the vulvovaginal area, she describes as itchy.  No pelvic pain.  No vaginal bleeding.     PAST MEDICAL & SURGICAL HISTORY:   Past Medical History:   Diagnosis Date   • Arthritis    • Body  "piercing     ears   • Cancer (CMS/[facility])     breast cancer twice, basal cell carcinoma, ovarian   • CHF (congestive heart failure) (CMS/[facility]) 2003   • Colostomy in place (CMS/[facility]) 10/17/2020   • Constipation    • COPD (chronic obstructive pulmonary disease) (CMS/[facility])    • Coronary artery disease    • Disease of thyroid gland    • DVT (deep venous thrombosis) (CMS/[facility])     Patient reported after CABG in 2008 and that she had DVT x2   • Elevated cholesterol    • GERD (gastroesophageal reflux disease)    • Hepatitis C     Patient reported she has had treatment   • History of bronchitis    • History of radiation therapy 12/15/2020    pelvis   • History of transfusion     Patient reported no prior reaction   • Hx of CABG 2008    Patient reported 2 vessel - reported MI prior to this procedure   • Hyperlipidemia    • Hypertension    • Injury of back    • Latex allergy    • Myocardial infarction (CMS/[facility]) 11/2019    Patient reported \"very light\" and that she had medical attention [facility] and had placement of 1 stent   • Seizures (CMS/[facility]) 11/24/2020    Patient reported since 1997 - epileptic.  Patient reported last seizure activity while in nursing home apx 20 days ago   • Sleep apnea     Patient reported prior use of CPAP and none since 2017 after weight loss   • Stroke (CMS/[facility])     Patient reported TIA x3 (reported last TIA 2003, 2010, 2015) - reported no residual issues from TIA's   • Tattoo     x1   • Wears glasses     OTC glasses for reading      Past Surgical History:   Procedure Laterality Date   • BREAST SURGERY     • CARDIAC CATHETERIZATION  11/2019    one stent   • CARDIAC SURGERY      CABG two vessel 2008   • COLONOSCOPY     • COLONOSCOPY N/A 10/12/2020    Procedure: COLONOSCOPY;  Surgeon: Himanshu Shen MD;  Location: [facility] ENDOSCOPY;  Service: Gastroenterology;  Laterality: N/A;   • COLOSTOMY N/A 10/17/2020    Procedure: LAPAROTOMY EXPLORATORY, COLOSTOMY, LYSIS OF ADHESIONS;  Surgeon: " "Juana Winter MD;  Location: [facility] OR;  Service: Gynecology Oncology;  Laterality: N/A;   • DILATATION AND CURETTAGE     • HYSTERECTOMY     • MASTECTOMY Bilateral     ,    • OTHER SURGICAL HISTORY      Patient reported \"torin in the right leg\"   • PORTACATH PLACEMENT Right 2020    Procedure: INSERTION OF PORTACATH RIGHT SUBCLAVIAN;  Surgeon: Himanshu Shen MD;  Location: [facility] OR;  Service: General;  Laterality: Right;   • SKIN BIOPSY      reported twice ( left shoulder,  face)   • VAGINAL BIOPSY N/A 10/12/2020    Procedure: VAGINAL BIOPSY;  Surgeon: Himanshu Shen MD;  Location: [facility] ENDOSCOPY;  Service: Gastroenterology;  Laterality: N/A;   • WISDOM TOOTH EXTRACTION           MEDICATIONS:  I have reviewed and reconciled the patients medication list in the patients chart at the skilled nursing facility today.      ALLERGIES:  Allergies   Allergen Reactions   • Lisinopril Cough   • Losartan Unknown - High Severity     HAS STOMACH ULCERS   • Citrus Cough     Citrus blossoms   • Latex Itching   • Pollen Extract Unknown - Low Severity     RED, ITCHY EYES         SOCIAL HISTORY:  Social History     Socioeconomic History   • Marital status: Legally      Spouse name: Not on file   • Number of children: Not on file   • Years of education: Not on file   • Highest education level: Not on file   Tobacco Use   • Smoking status: Former Smoker     Years: 10.00     Types: Cigarettes     Quit date: 1995     Years since quittin.6   • Smokeless tobacco: Never Used   Substance and Sexual Activity   • Alcohol use: Never   • Drug use: Never   • Sexual activity: Defer       FAMILY HISTORY:  Family History   Problem Relation Age of Onset   • Cancer Mother    • Hypertension Mother    • Hyperlipidemia Mother    • Stroke Father        REVIEW OF SYSTEMS:    Constitutional: Negative for fever, chills, diaphoresis or fatigue.   HENT:  Negative for congestion or problems " swallowing.  Respiratory: Negative for shortness of breath, cough, or wheezing.   Cardiovascular: Negative for chest pain, palpitations, leg swelling.   Gastrointestinal: Negative for diarrhea, nausea, vomiting, abdominal pain.   Genitourinary: Negative for dysuria hematuria, and frequency.   Musculoskeletal: Negative for joint swelling  Neurological: Negative for syncope and seizures.   Psychological: Agitation, anxiety, or sleep disturbance  Positive:  Abdominal pain, constipation, vaginal itching/leisons, pain.     PHYSICAL EXAMINATION:     VITAL SIGNS:  /88   Pulse 85   Temp 98.2 °F (36.8 °C)   Resp 20   LMP  (LMP Unknown)   SpO2 98%   Chaperone present  Constitutional: Chronically ill-appearing, frail.  Deconditioning noted.  No acute distress, very talkative.  HENT:   Head: Normocephalic and atraumatic.   Eyes: Conjunctivae and EOM are normal. Pupils are equal, round, and reactive to light.   Neck: Normal range of motion. Neck supple. No JVD present.   Cardiovascular: Normal rate, regular rhythm and normal heart sounds.   Pulmonary/Chest: Effort normal and breath sounds normal. No respiratory distress. Bilateral mastectomy.   Abdominal: Soft. Bowel sounds are normal. No distention.  Musculoskeletal: Normal range of motion. No edema.  Neurological: Alert and oriented at baseline.  Skin: Skin is warm and dry.   Psychiatric: Normal mood and affect.  Normal behavior.   Left anterior chest/axillary:  Immobile firm nodule.  Mild tenderness with palpation.    Ostomy:  No stools present to ostomy bag.  There is generalized discomfort with palpation of the abdomen surrounding ostomy.  No peritoneal signs.   :  Several lesions noted to the right external genitalia.  2 lesions appear ulcerated.  They are large in size, aproximately 5 mm in size.  Several smaller lesions noted.  They are not pustular.  Tender to touch.  No exudate.  No erythema.    Nursing note and vitals reviewed.    RECORDS REVIEW:    Hospice DC summary reviewed.     ASSESSMENT     Diagnoses and all orders for this visit:    1. Recurrent malignant neoplasm of ovary (CMS/HCC) (Primary)    2. Malignant neoplasm metastatic to other site (CMS/HCC)    3. Acute deep vein thrombosis (DVT) of femoral vein of left lower extremity (CMS/HCC)    4. Left lower quadrant abdominal pain    5. Vaginal lesion        PLAN  Ovarian cancer with mets: Continue with supportive care, patient is followed by hospice.  Has lesion to the chest, recently noted by patient.  Potentially related to metastatic cancer.  As per prior GO, continue to manage symptoms, no desire to pursue aggressive treatment/intervention.  She does report some increased pain, having to utilize as needed Norco more often.  Will increase MS Contin to 30 mg twice a day.  Nursing instructed to hold if lethargic.  DVT: Edema improved.  She continues on Eliquis.  Continue to monitor.  Left lower quadrant abdominal pain: Has had some increased constipation due to opiate pain medication use.  Senna adjusted to 3 times a day dosing.  Will start lactulose 10 mL 3 times a day as needed.  Continue to monitor.  Vaginal lesions: Patient denies any previous vaginal lesions or similar symptoms.  She reports they are itchy, only has pain with palpation.  We will treat symptomatically with Lotrisone 3 times a day x1 week.  Monitor for any worsening signs or symptoms.    [x]  Discussed Patient in detail with nursing/staff, addressed all needs today.     [x]  Plan of Care Reviewed   []  PT/OT Reviewed   []  Order Changes  []  Discharge Plans Reviewed  [x]  Advance Directive on file with Nursing Home.   [x]  POA on file with Nursing Home.    [x]  Code Status: DNR         Traci Archuleta PA-C.  5/4/2021

## 2021-05-06 NOTE — TELEPHONE ENCOUNTER
[facility] NEW MEDICATION REQUEST FOR LORAZEPAM 0.5 MG.    DIRECTIONS: TAKE 1 TAB PO Q 6 HRS PRN. - 14 REQUIREMENT PRESCRIPTION.

## 2021-05-12 NOTE — TELEPHONE ENCOUNTER
[facility] NEW MEDICATION REQUEST FOR LORAZEPAM 0.5 MG, MORPHINE 20 MG/ML, AND  MS CONTIN 45 MG.    LORAZEPAM DIRECTIONS: TAKE 1 TAB PO Q 8 HRS SCHEDULED.    MS CONTIN DIRECTIONS: TAKE 1 TAB PO BID.

## 2021-06-07 NOTE — PROGRESS NOTES
Nursing Home Progress Note        Brando Dominguez DO []  ERIN Young []  [address] [facility], [place] [address]  Phone: [phone]  Fax: [phone] Adilson Rosenberg MD []  Michael Smith DO []  Traci Archuleta PA-C [x]   [address] [place] [address]  Phone: [phone]  Fax: [phone]     PATIENT NAME: Hailee Booker                                                                          YOB: 1955           DATE OF SERVICE: 5/14/2021  FACILITY: [facility]    CHIEF COMPLAINT:  Follow up on BLE pain.       HISTORY OF PRESENT ILLNESS:   Ms. 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  She is s/p hysterectomy and colostomy.  She presents today for follow up on BLE pain.  She had recent DVT, subsequently placed on eliquis.  She has since developed BLE edema and discomfort.  She is followed by hospice care.  Recently norco was discontinued and roxanol PRN added.  MS contin dose adjusted.  She reports overall improvement of her pain.  She has c/o of nasal congestion and occasional nausea.  Denies any vomiting.  No acute abdominal pain.      PAST MEDICAL & SURGICAL HISTORY:   Past Medical History:   Diagnosis Date   • Arthritis    • Body piercing     ears   • Cancer (CMS/HCC)     breast cancer twice, basal cell carcinoma, ovarian   • CHF (congestive heart failure) (CMS/HCC) 2003   • Colostomy in place (CMS/HCC) 10/17/2020   • Constipation    • COPD (chronic obstructive pulmonary disease) (CMS/HCC)    • Coronary artery disease    • Disease of thyroid gland    • DVT (deep venous thrombosis) (CMS/HCC)     Patient reported after CABG in 2008 and that she had DVT x2   • Elevated cholesterol    • GERD (gastroesophageal reflux disease)    • Hepatitis C     Patient reported she has had treatment   • History of bronchitis    • History of radiation therapy 12/15/2020    pelvis   •  "History of transfusion     Patient reported no prior reaction   • Hx of CABG 2008    Patient reported 2 vessel - reported MI prior to this procedure   • Hyperlipidemia    • Hypertension    • Injury of back    • Latex allergy    • Myocardial infarction (CMS/[facility]) 11/2019    Patient reported \"very light\" and that she had medical attention [facility] in [facility] and had placement of 1 stent   • Seizures (CMS/[facility]) 11/24/2020    Patient reported since 1997 - epileptic.  Patient reported last seizure activity while in nursing home apx 20 days ago   • Sleep apnea     Patient reported prior use of CPAP and none since 2017 after weight loss   • Stroke (CMS/[facility])     Patient reported TIA x3 (reported last TIA 2003, 2010, 2015) - reported no residual issues from TIA's   • Tattoo     x1   • Wears glasses     OTC glasses for reading      Past Surgical History:   Procedure Laterality Date   • BREAST SURGERY     • CARDIAC CATHETERIZATION  11/2019    one stent   • CARDIAC SURGERY      CABG two vessel 2008   • COLONOSCOPY     • COLONOSCOPY N/A 10/12/2020    Procedure: COLONOSCOPY;  Surgeon: Himanshu Shen MD;  Location: [facility] ENDOSCOPY;  Service: Gastroenterology;  Laterality: N/A;   • COLOSTOMY N/A 10/17/2020    Procedure: LAPAROTOMY EXPLORATORY, COLOSTOMY, LYSIS OF ADHESIONS;  Surgeon: Juana Winter MD;  Location: [facility] OR;  Service: Gynecology Oncology;  Laterality: N/A;   • DILATATION AND CURETTAGE     • HYSTERECTOMY     • MASTECTOMY Bilateral     1979, 1989   • OTHER SURGICAL HISTORY      Patient reported \"torin in the right leg\"   • PORTACATH PLACEMENT Right 11/25/2020    Procedure: INSERTION OF PORTACATH RIGHT SUBCLAVIAN;  Surgeon: Himanshu Shen MD;  Location: [facility] OR;  Service: General;  Laterality: Right;   • SKIN BIOPSY      reported twice (2009 left shoulder, 2020 face)   • VAGINAL BIOPSY N/A 10/12/2020    Procedure: VAGINAL BIOPSY;  Surgeon: Himanshu Shen MD;  Location: [facility] ENDOSCOPY;  Service: " Gastroenterology;  Laterality: N/A;   • WISDOM TOOTH EXTRACTION           MEDICATIONS:  I have reviewed and reconciled the patients medication list in the patients chart at the skilled nursing facility today.      ALLERGIES:  Allergies   Allergen Reactions   • Lisinopril Cough   • Losartan Unknown - High Severity     HAS STOMACH ULCERS   • Citrus Cough     Citrus blossoms   • Latex Itching   • Pollen Extract Unknown - Low Severity     RED, ITCHY EYES         SOCIAL HISTORY:  Social History     Socioeconomic History   • Marital status: Legally      Spouse name: Not on file   • Number of children: Not on file   • Years of education: Not on file   • Highest education level: Not on file   Tobacco Use   • Smoking status: Former Smoker     Years: 10.00     Types: Cigarettes     Quit date: 1995     Years since quittin.7   • Smokeless tobacco: Never Used   Substance and Sexual Activity   • Alcohol use: Never   • Drug use: Never   • Sexual activity: Defer       FAMILY HISTORY:  Family History   Problem Relation Age of Onset   • Cancer Mother    • Hypertension Mother    • Hyperlipidemia Mother    • Stroke Father        REVIEW OF SYSTEMS:    Constitutional: Negative for fever, chills, diaphoresis.  HENT:  Negative for congestion or problems swallowing.  Respiratory: Negative for shortness of breath, cough, or wheezing.   Cardiovascular: Negative for chest pain, palpitations.  Gastrointestinal: Negative for diarrhea, vomiting, abdominal pain.   Genitourinary: Negative for dysuria hematuria, and frequency.   Musculoskeletal: Negative for joint swelling  Neurological: Negative for syncope and seizures.   Psychological: Agitation, anxiety, or sleep disturbance  Positive: Bilateral lower extremity edema and discomfort, nasal congestion, infrequent nausea    PHYSICAL EXAMINATION:     VITAL SIGNS:  /90   Pulse 104   Temp 98.9 °F (37.2 °C)   Resp 20   LMP  (LMP Unknown)   SpO2 94%      Constitutional:  Chronically ill-appearing, frail.  Deconditioning noted.  No acute distress.  HENT:   Head: Normocephalic and atraumatic.   Eyes: Conjunctivae and EOM are normal. Pupils are equal, round, and reactive to light.   Neck: Normal range of motion. Neck supple. No JVD present.   Cardiovascular: Normal rate, regular rhythm and normal heart sounds.   Pulmonary/Chest: Effort normal and breath sounds normal. No respiratory distress.  Abdominal: Soft. Bowel sounds are normal. No distention. Ostomy present.  Musculoskeletal: Normal range of motion. BLE edema.  Neurological: Alert and oriented at baseline.  Skin: Skin is warm and dry.   Psychiatric: Normal mood and affect.  Normal behavior.   Nursing note and vitals reviewed.    RECORDS REVIEW:   N/A    ASSESSMENT     Diagnoses and all orders for this visit:    1. Recurrent malignant neoplasm of ovary (CMS/HCC) (Primary)    2. Malignant neoplasm metastatic to other site (CMS/HCC)    3. Acute deep vein thrombosis (DVT) of femoral vein of left lower extremity (CMS/HCC)    4. Nasal congestion        PLAN  Patient reports improved symptom control with current pain medication regimen.  She is followed closely by hospice services.  Her lower extremity edema potentially related to Mets within the abdomen.  She has been anticoagulated with eliquis for several weeks.  She denies any shortness of breath.  She has had some nasal congestion, will address with deep saline nasal spray twice daily x 30 days.  Zofran for nausea.  Continue to monitor patient condition closely for any acute changes or worsening of condition.      [x]  Discussed Patient in detail with nursing/staff, addressed all needs today.     [x]  Plan of Care Reviewed   []  PT/OT Reviewed   []  Order Changes  []  Discharge Plans Reviewed  [x]  Advance Directive on file with Nursing Home.   [x]  POA on file with Nursing Home.    [x]  Code Status: DNR         Traci Archuleta PA-C.  6/7/2021

## 2021-06-14 NOTE — TELEPHONE ENCOUNTER
[facility] NEW MEDICATION REQUEST FOR OXYCODONE 10 MG AND MORPHINE SULFATE 30 MG.    OXYCODONE DIRECTIONS: TAKE 1 TAB PO Q 4 HRS PRN.    MORPHINE DIRECTIONS: TAKE 2 TABS PO BID

## 2021-06-16 NOTE — PROGRESS NOTES
Nursing Home Progress Note        Brando Dominguez DO []  ERIN Young []  [address] [facility], [place] [address]  Phone: [phone]  Fax: [phone] Adilson Rosenberg MD []  Michael Smith DO []  Traci Archuleta PA-C [x]   [address] [place] [address]  Phone: [phone]  Fax: [phone]     PATIENT NAME: Hailee Booker                                                                          YOB: 1955           DATE OF SERVICE: 5/21/2021  FACILITY: [facility]    CHIEF COMPLAINT:  Follow up on BLE pain, generalized fatigue, poor appetite.      HISTORY OF PRESENT ILLNESS:   Ms. 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  She is s/p hysterectomy and colostomy.  She presents today for follow up.  Patient reports symptoms have been well controlled with current pain medication regimen.  She continues to have poor appetite, nausea is somewhat better.  Denies any vomiting, abdominal pain.  Has ostomy, denies constipation.        PAST MEDICAL & SURGICAL HISTORY:   Past Medical History:   Diagnosis Date   • Arthritis    • Body piercing     ears   • Cancer (CMS/HCC)     breast cancer twice, basal cell carcinoma, ovarian   • CHF (congestive heart failure) (CMS/HCC) 2003   • Colostomy in place (CMS/[facility]) 10/17/2020   • Constipation    • COPD (chronic obstructive pulmonary disease) (CMS/[facility])    • Coronary artery disease    • Disease of thyroid gland    • DVT (deep venous thrombosis) (CMS/[facility])     Patient reported after CABG in 2008 and that she had DVT x2   • Elevated cholesterol    • GERD (gastroesophageal reflux disease)    • Hepatitis C     Patient reported she has had treatment   • History of bronchitis    • History of radiation therapy 12/15/2020    pelvis   • History of transfusion     Patient reported no prior reaction   • Hx of CABG 2008    Patient reported 2 vessel -  "reported MI prior to this procedure   • Hyperlipidemia    • Hypertension    • Injury of back    • Latex allergy    • Myocardial infarction (CMS/HCC) 11/2019    Patient reported \"very light\" and that she had medical attention [facility] in [facility] and had placement of 1 stent   • Seizures (CMS/HCC) 11/24/2020    Patient reported since 1997 - epileptic.  Patient reported last seizure activity while in nursing home apx 20 days ago   • Sleep apnea     Patient reported prior use of CPAP and none since 2017 after weight loss   • Stroke (CMS/[facility])     Patient reported TIA x3 (reported last TIA 2003, 2010, 2015) - reported no residual issues from TIA's   • Tattoo     x1   • Wears glasses     OTC glasses for reading      Past Surgical History:   Procedure Laterality Date   • BREAST SURGERY     • CARDIAC CATHETERIZATION  11/2019    one stent   • CARDIAC SURGERY      CABG two vessel 2008   • COLONOSCOPY     • COLONOSCOPY N/A 10/12/2020    Procedure: COLONOSCOPY;  Surgeon: Himanshu Shen MD;  Location: [facility] ENDOSCOPY;  Service: Gastroenterology;  Laterality: N/A;   • COLOSTOMY N/A 10/17/2020    Procedure: LAPAROTOMY EXPLORATORY, COLOSTOMY, LYSIS OF ADHESIONS;  Surgeon: Juana Winter MD;  Location: [facility] OR;  Service: Gynecology Oncology;  Laterality: N/A;   • DILATATION AND CURETTAGE     • HYSTERECTOMY     • MASTECTOMY Bilateral     1979, 1989   • OTHER SURGICAL HISTORY      Patient reported \"torin in the right leg\"   • PORTACATH PLACEMENT Right 11/25/2020    Procedure: INSERTION OF PORTACATH RIGHT SUBCLAVIAN;  Surgeon: Himanshu Shen MD;  Location: [facility] OR;  Service: General;  Laterality: Right;   • SKIN BIOPSY      reported twice (2009 left shoulder, 2020 face)   • VAGINAL BIOPSY N/A 10/12/2020    Procedure: VAGINAL BIOPSY;  Surgeon: Himanshu Shen MD;  Location: [facility] ENDOSCOPY;  Service: Gastroenterology;  Laterality: N/A;   • WISDOM TOOTH EXTRACTION           MEDICATIONS:  I have reviewed and " reconciled the patients medication list in the patients chart at the skilled nursing facility today.      ALLERGIES:  Allergies   Allergen Reactions   • Lisinopril Cough   • Losartan Unknown - High Severity     HAS STOMACH ULCERS   • Citrus Cough     Citrus blossoms   • Latex Itching   • Pollen Extract Unknown - Low Severity     RED, ITCHY EYES         SOCIAL HISTORY:  Social History     Socioeconomic History   • Marital status: Legally      Spouse name: Not on file   • Number of children: Not on file   • Years of education: Not on file   • Highest education level: Not on file   Tobacco Use   • Smoking status: Former Smoker     Years: 10.00     Types: Cigarettes     Quit date: 1995     Years since quittin.8   • Smokeless tobacco: Never Used   Substance and Sexual Activity   • Alcohol use: Never   • Drug use: Never   • Sexual activity: Defer       FAMILY HISTORY:  Family History   Problem Relation Age of Onset   • Cancer Mother    • Hypertension Mother    • Hyperlipidemia Mother    • Stroke Father        REVIEW OF SYSTEMS:    Constitutional: Negative for fever, chills, diaphoresis.  HENT:  Negative for congestion or problems swallowing.  Respiratory: Negative for shortness of breath, cough, or wheezing.   Cardiovascular: Negative for chest pain, palpitations.  Gastrointestinal: Negative for diarrhea, vomiting, abdominal pain.   Genitourinary: Negative for dysuria hematuria, and frequency.   Musculoskeletal: Negative for joint swelling  Neurological: Negative for syncope and seizures.   Psychological: Agitation, anxiety, or sleep disturbance  Positive: Bilateral lower extremity edema and discomfort, fatigue/weakness, infrequent nausea    PHYSICAL EXAMINATION:     VITAL SIGNS:  /82   Pulse 78   Temp 98.4 °F (36.9 °C)   Resp 18   LMP  (LMP Unknown)   SpO2 95%      Constitutional: Chronically ill-appearing, frail.  Deconditioning noted.  No acute distress.  HENT:   Head: Normocephalic and  atraumatic.   Eyes: Conjunctivae and EOM are normal. Pupils are equal, round, and reactive to light.   Neck: Normal range of motion. Neck supple. No JVD present.   Cardiovascular: Normal rate, regular rhythm and normal heart sounds.   Pulmonary/Chest: Effort normal and breath sounds normal. No respiratory distress.  Abdominal: Soft. Bowel sounds are normal. No distention. Ostomy present.  Musculoskeletal: Normal range of motion. BLE edema.  Neurological: Alert and oriented at baseline.  Skin: Skin is warm and dry.   Psychiatric: Normal mood and affect.  Normal behavior.   Nursing note and vitals reviewed.    RECORDS REVIEW:   N/A    ASSESSMENT     Diagnoses and all orders for this visit:    1. Recurrent malignant neoplasm of ovary (CMS/HCC) (Primary)    2. Malignant neoplasm metastatic to other site (CMS/HCC)    3. Acute deep vein thrombosis (DVT) of femoral vein of left lower extremity (CMS/HCC)    4. Chronic pain due to neoplasm        PLAN  Continues to have good pain control on her regimen.  Denies any constipation.  Nausea somewhat improved, continues with as needed Zofran.  She is followed closely by hospice services.  Continues on eliquis for recently diagnosed DVT.  She does have increased BLE edema.  She denies any increased pain or discomfort.  Continue to monitor patient condition closely for any acute changes or worsening of condition.      [x]  Discussed Patient in detail with nursing/staff, addressed all needs today.     [x]  Plan of Care Reviewed   []  PT/OT Reviewed   []  Order Changes  []  Discharge Plans Reviewed  [x]  Advance Directive on file with Nursing Home.   [x]  POA on file with Nursing Home.    [x]  Code Status: DNR         Traci Archuleta PA-C.  6/16/2021

## 2021-06-20 NOTE — PROGRESS NOTES
Nursing Home Progress Note        Brando Dominguez DO []  ERIN Young []  [address] [place] [address]  Phone: [phone]  Fax: [phone] Adilson Rosenberg MD []  Michael Smith DO []  Traci Archuleta PA-C [x]   [address] [place] [address]  Phone: [phone]  Fax: [phone]     PATIENT NAME: Hailee Booker                                                                          YOB: 1955           DATE OF SERVICE: 5/25/2021  FACILITY: [facility]    CHIEF COMPLAINT:  Skin lesions.       HISTORY OF PRESENT ILLNESS:   Ms. 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  She is s/p hysterectomy and colostomy.  She presents today with complaint of painful vaginal lesions.  Per wound care nurse, noted last date.  She does have history of pelvic abscess.  She was subsequently started on doxycycline and bactroban.  Patient describes symptoms as progressive and worsening.  She does not have any history of similar rash.  She has noted the lesions to her bilateral groin and labia.      PAST MEDICAL & SURGICAL HISTORY:   Past Medical History:   Diagnosis Date   • Arthritis    • Body piercing     ears   • Cancer (CMS/[facility])     breast cancer twice, basal cell carcinoma, ovarian   • CHF (congestive heart failure) (CMS/[facility]) 2003   • Colostomy in place (CMS/[facility]) 10/17/2020   • Constipation    • COPD (chronic obstructive pulmonary disease) (CMS/[facility])    • Coronary artery disease    • Disease of thyroid gland    • DVT (deep venous thrombosis) (CMS/[facility])     Patient reported after CABG in 2008 and that she had DVT x2   • Elevated cholesterol    • GERD (gastroesophageal reflux disease)    • Hepatitis C     Patient reported she has had treatment   • History of bronchitis    • History of radiation therapy 12/15/2020    pelvis   • History of transfusion     Patient reported no prior  "reaction   • Hx of CABG 2008    Patient reported 2 vessel - reported MI prior to this procedure   • Hyperlipidemia    • Hypertension    • Injury of back    • Latex allergy    • Myocardial infarction (CMS/[facility]) 11/2019    Patient reported \"very light\" and that she had medical attention [facility] in [facility] and had placement of 1 stent   • Seizures (CMS/[facility]) 11/24/2020    Patient reported since 1997 - epileptic.  Patient reported last seizure activity while in nursing home apx 20 days ago   • Sleep apnea     Patient reported prior use of CPAP and none since 2017 after weight loss   • Stroke (CMS/[facility])     Patient reported TIA x3 (reported last TIA 2003, 2010, 2015) - reported no residual issues from TIA's   • Tattoo     x1   • Wears glasses     OTC glasses for reading      Past Surgical History:   Procedure Laterality Date   • BREAST SURGERY     • CARDIAC CATHETERIZATION  11/2019    one stent   • CARDIAC SURGERY      CABG two vessel 2008   • COLONOSCOPY     • COLONOSCOPY N/A 10/12/2020    Procedure: COLONOSCOPY;  Surgeon: Himanshu Shen MD;  Location: [facility] ENDOSCOPY;  Service: Gastroenterology;  Laterality: N/A;   • COLOSTOMY N/A 10/17/2020    Procedure: LAPAROTOMY EXPLORATORY, COLOSTOMY, LYSIS OF ADHESIONS;  Surgeon: Juana Winter MD;  Location: [facility] OR;  Service: Gynecology Oncology;  Laterality: N/A;   • DILATATION AND CURETTAGE     • HYSTERECTOMY     • MASTECTOMY Bilateral     1979, 1989   • OTHER SURGICAL HISTORY      Patient reported \"torin in the right leg\"   • PORTACATH PLACEMENT Right 11/25/2020    Procedure: INSERTION OF PORTACATH RIGHT SUBCLAVIAN;  Surgeon: Himanshu Shen MD;  Location: [facility] OR;  Service: General;  Laterality: Right;   • SKIN BIOPSY      reported twice (2009 left shoulder, 2020 face)   • VAGINAL BIOPSY N/A 10/12/2020    Procedure: VAGINAL BIOPSY;  Surgeon: Himanshu Shen MD;  Location: [facility] ENDOSCOPY;  Service: Gastroenterology;  Laterality: N/A;   • WISDOM TOOTH " EXTRACTION           MEDICATIONS:  I have reviewed and reconciled the patients medication list in the patients chart at the skilled nursing facility today.      ALLERGIES:  Allergies   Allergen Reactions   • Lisinopril Cough   • Losartan Unknown - High Severity     HAS STOMACH ULCERS   • Citrus Cough     Citrus blossoms   • Latex Itching   • Pollen Extract Unknown - Low Severity     RED, ITCHY EYES         SOCIAL HISTORY:  Social History     Socioeconomic History   • Marital status: Legally      Spouse name: Not on file   • Number of children: Not on file   • Years of education: Not on file   • Highest education level: Not on file   Tobacco Use   • Smoking status: Former Smoker     Years: 10.00     Types: Cigarettes     Quit date: 1995     Years since quittin.8   • Smokeless tobacco: Never Used   Substance and Sexual Activity   • Alcohol use: Never   • Drug use: Never   • Sexual activity: Defer       FAMILY HISTORY:  Family History   Problem Relation Age of Onset   • Cancer Mother    • Hypertension Mother    • Hyperlipidemia Mother    • Stroke Father        REVIEW OF SYSTEMS:    Positive: nausea, vaginal lesions, generalized fatigue and weakness.  All other systems reviewed and are negative.  \  PHYSICAL EXAMINATION:     VITAL SIGNS:  /90   Pulse 98   Temp 97 °F (36.1 °C)   Resp 20   Wt 74.4 kg (164 lb)   LMP  (LMP Unknown)   SpO2 93%   BMI 27.29 kg/m²      Chaperone present.  Constitutional: Chronically ill-appearing, frail.  Deconditioning noted.  No acute distress.  HENT:   Head: Normocephalic and atraumatic.   Eyes: Conjunctivae and EOM are normal. Pupils are equal, round, and reactive to light.   Neck: Normal range of motion. Neck supple. No JVD present.   Cardiovascular: Normal rate, regular rhythm and normal heart sounds.   Pulmonary/Chest: Effort normal and breath sounds normal. No respiratory distress.  :  Pustular lesions of varying sizes, largest approximately 5 mm.   Painful to palpation no surrounding erythema or streaking.  Abdominal: Soft. Bowel sounds are normal. No distention. Ostomy present.  Musculoskeletal: Normal range of motion. BLE edema.  Neurological: Alert and oriented at baseline.  Skin: Skin is warm and dry.   Psychiatric: Normal mood and affect.  Normal behavior.   Nursing note and vitals reviewed.    RECORDS REVIEW:   N/A    ASSESSMENT     Diagnoses and all orders for this visit:    1. Recurrent malignant neoplasm of ovary (CMS/HCC) (Primary)    2. Malignant neoplasm metastatic to other site (CMS/HCC)    3. Pustular lesion        PLAN  Suspect this is in relation to her underlying ovarian neoplasm and metastasis.  Will cover for MRSA as she has history of pelvic abscesses.  Daily wound care to include Bactroban.  Continue doxycycline.  We will also schedule Zofran 3 times a day, as she continues to have some underlying nausea.  Overall her pain has been well controlled.  Continue to monitor closely, continue following along with hospice services.    [x]  Discussed Patient in detail with nursing/staff, addressed all needs today.     [x]  Plan of Care Reviewed   []  PT/OT Reviewed   []  Order Changes  []  Discharge Plans Reviewed  [x]  Advance Directive on file with Nursing Home.   [x]  POA on file with Nursing Home.    [x]  Code Status: DNR         Traci Archuleta PA-C.  6/20/2021

## 2021-07-08 NOTE — PROGRESS NOTES
Nursing Home Progress Note        Brando Dominguez DO []  ERIN Young []  [address] [place] [address]  Phone: [phone]  Fax: [phone] Adilson Rosenberg MD []  Michael Smith DO []  Traci Archuleta PA-C [x]   [address] [place] [address]  Phone: [phone]  Fax: [phone]     PATIENT NAME: Hailee Booker                                                                          YOB: 1955           DATE OF SERVICE: 6/22/2021  FACILITY: [facility]    CHIEF COMPLAINT:  Fall, AMS.       HISTORY OF PRESENT ILLNESS:   Ms. 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  Patient had fall this morning when she was ambulating to the restroom and became dizzy.  She reported that she sat down on the bathroom floor, no injuries reported.  Staff shared that she has been restless with increased pain this morning.  After routine medications given, she has had symptom relief and has been resting comfortably.  Nursing note she has been sleeping on and off, with some increased confusion.  Hospice has been notified of recent decline and plan to evaluate.      PAST MEDICAL & SURGICAL HISTORY:   Past Medical History:   Diagnosis Date   • Arthritis    • Body piercing     ears   • Cancer (CMS/[facility])     breast cancer twice, basal cell carcinoma, ovarian   • CHF (congestive heart failure) (CMS/[facility]) 2003   • Colostomy in place (CMS/[facility]) 10/17/2020   • Constipation    • COPD (chronic obstructive pulmonary disease) (CMS/[facility])    • Coronary artery disease    • Disease of thyroid gland    • DVT (deep venous thrombosis) (CMS/[facility])     Patient reported after CABG in 2008 and that she had DVT x2   • Elevated cholesterol    • GERD (gastroesophageal reflux disease)    • Hepatitis C     Patient reported she has had treatment   • History of bronchitis    • History of radiation therapy 12/15/2020     "pelvis   • History of transfusion     Patient reported no prior reaction   • Hx of CABG 2008    Patient reported 2 vessel - reported MI prior to this procedure   • Hyperlipidemia    • Hypertension    • Injury of back    • Latex allergy    • Myocardial infarction (CMS/[facility]) 11/2019    Patient reported \"very light\" and that she had medical attention [facility] in [facility] and had placement of 1 stent   • Seizures (CMS/[facility]) 11/24/2020    Patient reported since 1997 - epileptic.  Patient reported last seizure activity while in nursing home apx 20 days ago   • Sleep apnea     Patient reported prior use of CPAP and none since 2017 after weight loss   • Stroke (CMS/[facility])     Patient reported TIA x3 (reported last TIA 2003, 2010, 2015) - reported no residual issues from TIA's   • Tattoo     x1   • Wears glasses     OTC glasses for reading      Past Surgical History:   Procedure Laterality Date   • BREAST SURGERY     • CARDIAC CATHETERIZATION  11/2019    one stent   • CARDIAC SURGERY      CABG two vessel 2008   • COLONOSCOPY     • COLONOSCOPY N/A 10/12/2020    Procedure: COLONOSCOPY;  Surgeon: Himanshu Shen MD;  Location: [facility] ENDOSCOPY;  Service: Gastroenterology;  Laterality: N/A;   • COLOSTOMY N/A 10/17/2020    Procedure: LAPAROTOMY EXPLORATORY, COLOSTOMY, LYSIS OF ADHESIONS;  Surgeon: Juana Winter MD;  Location: [facility] OR;  Service: Gynecology Oncology;  Laterality: N/A;   • DILATATION AND CURETTAGE     • HYSTERECTOMY     • MASTECTOMY Bilateral     1979, 1989   • OTHER SURGICAL HISTORY      Patient reported \"torin in the right leg\"   • PORTACATH PLACEMENT Right 11/25/2020    Procedure: INSERTION OF PORTACATH RIGHT SUBCLAVIAN;  Surgeon: Himanshu Shen MD;  Location: [facility] OR;  Service: General;  Laterality: Right;   • SKIN BIOPSY      reported twice (2009 left shoulder, 2020 face)   • VAGINAL BIOPSY N/A 10/12/2020    Procedure: VAGINAL BIOPSY;  Surgeon: Himanshu Sehn MD;  Location: [facility] " ENDOSCOPY;  Service: Gastroenterology;  Laterality: N/A;   • WISDOM TOOTH EXTRACTION           MEDICATIONS:  I have reviewed and reconciled the patients medication list in the patients chart at the skilled nursing facility today.      ALLERGIES:  Allergies   Allergen Reactions   • Lisinopril Cough   • Losartan Unknown - High Severity     HAS STOMACH ULCERS   • Citrus Cough     Citrus blossoms   • Latex Itching   • Pollen Extract Unknown - Low Severity     RED, ITCHY EYES         SOCIAL HISTORY:  Social History     Socioeconomic History   • Marital status: Legally      Spouse name: Not on file   • Number of children: Not on file   • Years of education: Not on file   • Highest education level: Not on file   Tobacco Use   • Smoking status: Former Smoker     Years: 10.00     Types: Cigarettes     Quit date: 1995     Years since quittin.8   • Smokeless tobacco: Never Used   Substance and Sexual Activity   • Alcohol use: Never   • Drug use: Never   • Sexual activity: Defer       FAMILY HISTORY:  Family History   Problem Relation Age of Onset   • Cancer Mother    • Hypertension Mother    • Hyperlipidemia Mother    • Stroke Father        REVIEW OF SYSTEMS:    Unable to preform ROS due to mental status changes.     PHYSICAL EXAMINATION:     VITAL SIGNS:  /86   Pulse 100   Temp 100.4 °F (38 °C)   Resp 22   LMP  (LMP Unknown)   SpO2 95%      Constitutional: Chronically ill-appearing, appears comfortable lying in bed asleep.   HENT:   Head: Normocephalic and atraumatic.   Eyes: Conjunctivae and EOM are normal. Pupils are equal, round, and reactive to light.   Neck: Normal range of motion. Neck supple. No JVD present.   Cardiovascular: Increased rate, regular rhythm and normal heart sounds.   Pulmonary/Chest: Effort normal and breath sounds normal. No respiratory distress.  Abdominal: Soft. Bowel sounds are normal. Ascites noted.  Ostomy present.  Musculoskeletal: Normal range of motion. BLE  edema.  Neurological: Sleeping, awakens but does not answer questions.    Skin: Skin is warm and dry.   Psychiatric: No agitation or restlessness.   Nursing note and vitals reviewed.    RECORDS REVIEW:   N/A    ASSESSMENT     Diagnoses and all orders for this visit:    1. Malignant neoplasm of both ovaries (CMS/HCC) (Primary)    2. Fall, initial encounter    3. Hospice care patient        PLAN  No report of any head trauma or acute injury post fall.  She was having agitation and restlessness early this morning, has improved with scheduled medications.  Nursing continue to monitor closely.  She awoke only briefly during my exam, appears comfortable.  Temperature elevated at 100.4, offered Tylenol and patient refused.  Hospice are coming to evaluate as well.  Monitor closely for any acute changes in condition, contact myself or MD if noted.  Staff to discontinue neurochecks due to causing agitation.     [x]  Discussed Patient in detail with nursing/staff, addressed all needs today.     [x]  Plan of Care Reviewed   []  PT/OT Reviewed   []  Order Changes  []  Discharge Plans Reviewed  [x]  Advance Directive on file with Nursing Home.   [x]  POA on file with Nursing Home.    [x]  Code Status: DNR         Traci Archuleta PA-C.  7/8/2021

## 2021-07-14 NOTE — PROGRESS NOTES
Nursing Home History and Physical        Brandolanny Dominguez DO []  ERIN Yonug []  [address] [place] [address]  Phone: [phone]  Fax: [phone] Adilson Rosenberg MD[x]  Michael Smith DO []   IRENE Clark []    [address] [place] [address]  Phone: [phone]  Fax: [phone]     PATIENT NAME: Hailee Booker                                                                          YOB: 1955           DATE OF SERVICE: 04/28/2021  FACILITY:   [] [facility] [x] [facility]  [] [facility]    [] [facility]      ______________________________________________________________________    CHIEF COMPLAINT:  Initial visit, metastatic ovarian carcinoma      HISTORY OF PRESENT ILLNESS:   Mrs. Booker is a 66 years old female with recurrent metastatic ovarian carcinoma with metastasis to rectum, vagina, liver and hydronephrosis, status post colostomy placement.  Additionally patient has history of breast carcinoma, status post bilateral mastectomy, rectal carcinoma, COPD, coronary artery disease, chronic hepatitis C, hypertension as well as DVT.  Per prior discussions regarding goals of care, patient elected to proceed with comfort measures via hospice services which were initially provided at home.      Due to complex home/family dynamics, [facility] [place] [facility].  Due to her stable condition, she was transferred to this facility for continued comfort measures via hospice services.  Since admission, patient remained in stable condition with no reported fever, chills or other acute systemic issues.  She however was noted to have left lower extremity pain, Doppler ultrasound confirmed DVT which was addressed with Lovenox transition to Eliquis.  She is receiving MS Contin and Roxanol as needed for pain along with Ativan as needed for anxiety.      During my visit, patient was lying comfortably in her bed, complained of  "pain but stated that current management is effective.    PAST MEDICAL & SURGICAL HISTORY:   Past Medical History:   Diagnosis Date   • Arthritis    • Body piercing     ears   • Cancer (CMS/[facility])     breast cancer twice, basal cell carcinoma, ovarian   • CHF (congestive heart failure) (CMS/[facility]) 2003   • Colostomy in place (CMS/[facility]) 10/17/2020   • Constipation    • COPD (chronic obstructive pulmonary disease) (CMS/[facility])    • Coronary artery disease    • Disease of thyroid gland    • DVT (deep venous thrombosis) (CMS/[facility])     Patient reported after CABG in 2008 and that she had DVT x2   • Elevated cholesterol    • GERD (gastroesophageal reflux disease)    • Hepatitis C     Patient reported she has had treatment   • History of bronchitis    • History of radiation therapy 12/15/2020    pelvis   • History of transfusion     Patient reported no prior reaction   • Hx of CABG 2008    Patient reported 2 vessel - reported MI prior to this procedure   • Hyperlipidemia    • Hypertension    • Injury of back    • Latex allergy    • Myocardial infarction (CMS/[facility]) 11/2019    Patient reported \"very light\" and that she had medical attention [facility] in [facility] and had placement of 1 stent   • Seizures (CMS/[facility]) 11/24/2020    Patient reported since 1997 - epileptic.  Patient reported last seizure activity while in nursing home apx 20 days ago   • Sleep apnea     Patient reported prior use of CPAP and none since 2017 after weight loss   • Stroke (CMS/[facility])     Patient reported TIA x3 (reported last TIA 2003, 2010, 2015) - reported no residual issues from TIA's   • Tattoo     x1   • Wears glasses     OTC glasses for reading      Past Surgical History:   Procedure Laterality Date   • BREAST SURGERY     • CARDIAC CATHETERIZATION  11/2019    one stent   • CARDIAC SURGERY      CABG two vessel 2008   • COLONOSCOPY     • COLONOSCOPY N/A 10/12/2020    Procedure: COLONOSCOPY;  Surgeon: Himanshu Shen MD;  Location: [facility] ENDOSCOPY;  " "Service: Gastroenterology;  Laterality: N/A;   • COLOSTOMY N/A 10/17/2020    Procedure: LAPAROTOMY EXPLORATORY, COLOSTOMY, LYSIS OF ADHESIONS;  Surgeon: Juana Winter MD;  Location: [facility] OR;  Service: Gynecology Oncology;  Laterality: N/A;   • DILATATION AND CURETTAGE     • HYSTERECTOMY     • MASTECTOMY Bilateral     1989   • OTHER SURGICAL HISTORY      Patient reported \"torin in the right leg\"   • PORTACATH PLACEMENT Right 2020    Procedure: INSERTION OF PORTACATH RIGHT SUBCLAVIAN;  Surgeon: Himanshu Shen MD;  Location: [facility] OR;  Service: General;  Laterality: Right;   • SKIN BIOPSY      reported twice ( left shoulder,  face)   • VAGINAL BIOPSY N/A 10/12/2020    Procedure: VAGINAL BIOPSY;  Surgeon: Himanshu Shen MD;  Location: [facility] ENDOSCOPY;  Service: Gastroenterology;  Laterality: N/A;   • WISDOM TOOTH EXTRACTION           MEDICATIONS:  I have reviewed and reconciled the patients medication list in the patients chart at the skilled nursing facility today.      ALLERGIES:  Allergies   Allergen Reactions   • Lisinopril Cough   • Losartan Unknown - High Severity     HAS STOMACH ULCERS   • Citrus Cough     Citrus blossoms   • Latex Itching   • Pollen Extract Unknown - Low Severity     RED, ITCHY EYES         SOCIAL HISTORY:  Social History     Socioeconomic History   • Marital status: Legally      Spouse name: Not on file   • Number of children: Not on file   • Years of education: Not on file   • Highest education level: Not on file   Tobacco Use   • Smoking status: Former Smoker     Years: 10.00     Types: Cigarettes     Quit date: 1995     Years since quittin.8   • Smokeless tobacco: Never Used   Substance and Sexual Activity   • Alcohol use: Never   • Drug use: Never   • Sexual activity: Defer       FAMILY HISTORY:  Family History   Problem Relation Age of Onset   • Cancer Mother    • Hypertension Mother    • Hyperlipidemia Mother    • Stroke Father  "       REVIEW OF SYSTEMS:  Review of Systems  • General: Patient complains of fatigue  • Psychological: No history of any hallucinations and delusions.  • Respiratory: No history of cough or shortness of breath.   • Cardiovascular: No history of chest pain or palpitations.   • Gastrointestinal:  Nutrition via PEG tube: Periodic abdominal discomfort; colostomy  • Genitourinary: No history of dysuria or hematuria.  • Musculoskeletal:  Patient complains of back pain, myalgias and arthralgias  • Neurological: No symptoms to suggest focal neurological deficits  • Dermatological: No history of any redness or pruritis.    PHYSICAL EXAMINATION:   Vital signs: /82, HR 82/min, RR 18/min, temp 92.4 °F, O2 sat 95% on room air    Physical Exam  General Appearance:  Patient appears frail, cachectic, chronically ill   Head:  Atraumatic and normocephalic, without obvious abnormality.   Eyes:          PERRLA, conjunctivae and sclerae normal, no Icterus. No pallor. Extraocular movements are within normal limits.   Lungs:   BS heard bilaterally equally.  No crackles or wheezing. No Pleural rub or bronchial breathing.  Left axillary lymphadenopathy noted.  Bilateral mastectomy noted.   Heart:  Normal S1 and S2, no murmur, no gallop, no rub. No JVD.   Abdomen:   Normal BS, no masses, no organomegaly. Soft, nontender, nondistended, no guarding, no rebound tenderness.  Colostomy site noted with no signs of infection.  Inguinal lymphadenopathy noted   Extremities: Bilateral lower extremity edema noted   Pulses: Pulses palpable and equal bilaterally.   Skin: No bleeding, bruising or rash.     Neurologic: No signs to suggest focal neurological deficits.         RECORDS REVIEW:   I have reviewed in detail available records including discharge summary and workup    ASSESSMENT:  · Recurrent metastatic ovarian carcinoma with metastasis to rectum, vagina, liver and bilateral hydronephrosis  · History of rectal carcinoma  · History of breast  carcinoma, status post bilateral mastectomy  · This post colostomy placement  · History of DVT  · Chronic obstructive pulmonary disease  · Coronary artery disease, history of CABG  · Chronic hepatitis C  · Chronic essential hypertension next line history of seizure disorder    PLAN:  · Preadmission medications reviewed, reconciled and reviewed  · Fall and aspiration precautions   · Routine baseline labs  · Colostomy site care per facility protocol  · Nutritional support recommended and monitored with interdisciplinary support  · Patient is being monitored closely, further plans were modified accordingly  · Patient continue to receive hospice services  · Pain management and comfort measures as clinically indicated    [x]  Discussed Patient in detail with nursing/staff, addressed all needs today.     [x]  Plan of Care Reviewed   [x]  PT/OT Reviewed     []  Wound assessment and management documentation reviewed    []  Antipsychotic medications and benzodiazepine addressed  []  Order Changes  []  Opioid medications addressed  []  Order Changes  []  Discharge Plans Reviewed   []  Advance Directive: DO NOT RESUSCITATE, DO NOT INTUBATE on file with Nursing Home.   []  POA on file with Nursing Home.   [x]  Code Status listed on patients chart at the nursing home facility.         Adilson Rosenberg MD.  4/28/2021

## 2021-07-27 NOTE — TELEPHONE ENCOUNTER
FYI  NURSE AT [place] WITNESSED FALL WITH NO INJURIES .  RESIDENT STATES SHE FELT WEAK, LOST HER BALANCE SLID TO FLOOR, LANDING ON HER BOTTOM.  SHE WAS USING HER ROLLING WALKER     TEMP-97.4, /88, , RR 16, O2-90% RA, ADMINISTERED 2 LPM O2 TO INCREASE 02 ABOVE 90%.

## 2021-08-10 NOTE — TELEPHONE ENCOUNTER
PLEASE SEND ASAP, PATIENT IS COMPLETELY OUT OF MEDICATION.       [facility] MEDICATION REQUEST FOR MORPHINE 20 MG/ML AND OXYCODONE 10 MG.    MORPHINE DIRECTIONS: GIVE 0.5 ML PO Q 3 HRS PRN.    OXYCODONE DIRECTIONS: TAKE 1 TAB PO Q 6 HRS SCHEDULED ( CHANGED BY JESSICA)

## 2021-08-30 NOTE — PROGRESS NOTES
Nursing Home Progress Note        Brando Dominguez DO []  ERIN Young []  [address], [place] [address]  Phone: [phone]  Fax: [phone] Adilson Rosenberg MD []  Michael Smith DO []  Traci Archuleta PA-C [x]   [address] [place] [address]  Phone: [phone]  Fax: [phone]     PATIENT NAME: Hailee Booker                                                                          YOB: 1955           DATE OF SERVICE: 8/17/2021  FACILITY: [facility]    CHIEF COMPLAINT:  Chronic medical management long-term care needs.      HISTORY OF PRESENT ILLNESS:   Ms. 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  She presents today for routine coordination of care and long-term care needs.  Patient stabilized from previous decline.  Staff reports she has poor appetite but is eating and up throughout most of the day.  Episodic nausea.  Patient shares frustrations with her poor mobility.  Denies any acute complaints.    PAST MEDICAL & SURGICAL HISTORY:   Past Medical History:   Diagnosis Date   • Arthritis    • Body piercing     ears   • Cancer (CMS/[facility])     breast cancer twice, basal cell carcinoma, ovarian   • CHF (congestive heart failure) (CMS/HCC) 2003   • Colostomy in place (CMS/[facility]) 10/17/2020   • Constipation    • COPD (chronic obstructive pulmonary disease) (CMS/[facility])    • Coronary artery disease    • Disease of thyroid gland    • DVT (deep venous thrombosis) (CMS/[facility])     Patient reported after CABG in 2008 and that she had DVT x2   • Elevated cholesterol    • GERD (gastroesophageal reflux disease)    • Hepatitis C     Patient reported she has had treatment   • History of bronchitis    • History of radiation therapy 12/15/2020    pelvis   • History of transfusion     Patient reported no prior reaction   • Hx of CABG 2008    Patient reported 2 vessel -  "reported MI prior to this procedure   • Hyperlipidemia    • Hypertension    • Injury of back    • Latex allergy    • Myocardial infarction (CMS/HCC) 11/2019    Patient reported \"very light\" and that she had medical attention [facility] in [facility] and had placement of 1 stent   • Seizures (CMS/HCC) 11/24/2020    Patient reported since 1997 - epileptic.  Patient reported last seizure activity while in nursing home apx 20 days ago   • Sleep apnea     Patient reported prior use of CPAP and none since 2017 after weight loss   • Stroke (CMS/[facility])     Patient reported TIA x3 (reported last TIA 2003, 2010, 2015) - reported no residual issues from TIA's   • Tattoo     x1   • Wears glasses     OTC glasses for reading      Past Surgical History:   Procedure Laterality Date   • BREAST SURGERY     • CARDIAC CATHETERIZATION  11/2019    one stent   • CARDIAC SURGERY      CABG two vessel 2008   • COLONOSCOPY     • COLONOSCOPY N/A 10/12/2020    Procedure: COLONOSCOPY;  Surgeon: Himanshu Shen MD;  Location: [facility] ENDOSCOPY;  Service: Gastroenterology;  Laterality: N/A;   • COLOSTOMY N/A 10/17/2020    Procedure: LAPAROTOMY EXPLORATORY, COLOSTOMY, LYSIS OF ADHESIONS;  Surgeon: Juana Winter MD;  Location: [facility] OR;  Service: Gynecology Oncology;  Laterality: N/A;   • DILATATION AND CURETTAGE     • HYSTERECTOMY     • MASTECTOMY Bilateral     1979, 1989   • OTHER SURGICAL HISTORY      Patient reported \"torin in the right leg\"   • PORTACATH PLACEMENT Right 11/25/2020    Procedure: INSERTION OF PORTACATH RIGHT SUBCLAVIAN;  Surgeon: Himanshu Shen MD;  Location: [facility] OR;  Service: General;  Laterality: Right;   • SKIN BIOPSY      reported twice (2009 left shoulder, 2020 face)   • VAGINAL BIOPSY N/A 10/12/2020    Procedure: VAGINAL BIOPSY;  Surgeon: Himanshu Shen MD;  Location: [facility] ENDOSCOPY;  Service: Gastroenterology;  Laterality: N/A;   • WISDOM TOOTH EXTRACTION           MEDICATIONS:  I have reviewed and " reconciled the patients medication list in the patients chart at the skilled nursing facility today.      ALLERGIES:  Allergies   Allergen Reactions   • Lisinopril Cough   • Losartan Unknown - High Severity     HAS STOMACH ULCERS   • Citrus Cough     Citrus blossoms   • Latex Itching   • Pollen Extract Unknown - Low Severity     RED, ITCHY EYES         SOCIAL HISTORY:  Social History     Socioeconomic History   • Marital status: Legally      Spouse name: Not on file   • Number of children: Not on file   • Years of education: Not on file   • Highest education level: Not on file   Tobacco Use   • Smoking status: Former Smoker     Years: 10.00     Types: Cigarettes     Quit date: 1995     Years since quittin.0   • Smokeless tobacco: Never Used   Substance and Sexual Activity   • Alcohol use: Never   • Drug use: Never   • Sexual activity: Defer       FAMILY HISTORY:  Family History   Problem Relation Age of Onset   • Cancer Mother    • Hypertension Mother    • Hyperlipidemia Mother    • Stroke Father        REVIEW OF SYSTEMS:    Generalized weakness, episodic nausea, impaired mobility, chronic pain, chronic lower extremity edema.  All other systems reviewed and are negative.    PHYSICAL EXAMINATION:     VITAL SIGNS:  /69   Pulse 76   Temp 97.4 °F (36.3 °C)   Resp 18   LMP  (LMP Unknown)   SpO2 96%      Constitutional: Chronically ill-appearing, lying in bed in no acute distress.  HENT:   Head: Normocephalic and atraumatic.   Eyes: Conjunctivae and EOM are normal. Pupils are equal, round, and reactive to light.   Neck: Normal range of motion. Neck supple. No JVD present.   Cardiovascular:  Regular rate and rhythm, normal heart sounds.   Pulmonary/Chest: Effort normal and breath sounds normal. No respiratory distress.  Abdominal: Soft. Bowel sounds are normal. Ascites noted.  Ostomy present.  Musculoskeletal: Normal range of motion. BLE edema.  Neurological: Alert and oriented at her baseline.     Skin: Skin is warm and dry.   Psychiatric: No agitation or restlessness, mood is appropriate.  Nursing note and vitals reviewed.    RECORDS REVIEW:   8/16: BUN 34, creatinine 2.0, hemoglobin 9.1, hematocrit 29.6    ASSESSMENT     Diagnoses and all orders for this visit:    1. Malignant neoplasm of both ovaries (CMS/HCC) (Primary)    2. Malignant neoplasm metastatic to other site (CMS/HCC)    3. History of DVT (deep vein thrombosis)    4. Chronic pain due to neoplasm    5. Fatigue, unspecified type    6. Impaired mobility and ADLs    7. Hospice care patient    8. Chronic kidney disease, unspecified CKD stage        PLAN  Patient continues under hospice care.  She had recent acute decline but has since stabilized.  Tolerating some p.o. intake and had a episodic nausea improved with Zofran.  She is frustrated with her impaired mobility, hoping to be able to ambulate again.  Her pain has been well controlled with current regimen.  I had discussion with patient in regards to her frustrations, have encouraged her to work with nursing staff to be out of bed.  Unfortunately, due to her poor prognosis and multiple comorbidities unknown if she will be ambulatory without assistance but will work with disciplines in regards to regaining as much function as she can.  Continue to monitor closely, contact myself or MD with any acute changes.  Continue to follow along with hospice as previously scheduled.  Notify them with any acute changes or poorly controlled symptoms.  As per goals of care discontinue routine lab surveillance and proceed with as needed.    [x]  Discussed Patient in detail with nursing/staff, addressed all needs today.     [x]  Plan of Care Reviewed   []  PT/OT Reviewed   []  Order Changes  []  Discharge Plans Reviewed  [x]  Advance Directive on file with Nursing Home.   [x]  POA on file with Nursing Home.    [x]  Code Status: DNR         Traci Archuleta PA-C.  8/30/2021

## 2024-06-13 NOTE — ANESTHESIA POSTPROCEDURE EVALUATION
Patient: Hailee Booker    Procedure Summary     Date: 10/12/20 Room / Location: [facility] ENDOSCOPY 2 / [facility] ENDOSCOPY    Anesthesia Start: 1323 Anesthesia Stop:     Procedures:       COLONOSCOPY (N/A )      VAGINAL BIOPSY (N/A Vagina) Diagnosis:       Pelvic mass      (Pelvic mass [R19.00])    Surgeon: Himanshu Shen MD Provider: Malvin Eduardo CRNA    Anesthesia Type: MAC ASA Status: 3          Anesthesia Type: MAC    Vitals  No vitals data found for the desired time range.          Post Anesthesia Care and Evaluation    Patient location during evaluation: PHASE II  Patient participation: complete - patient participated  Level of consciousness: awake  Pain score: 0  Pain management: adequate  Airway patency: patent  Anesthetic complications: No anesthetic complications  PONV Status: none  Cardiovascular status: acceptable  Respiratory status: acceptable and nasal cannula  Hydration status: acceptable    Comments: vsss resp spont, reflexes intact, responsive, report given to pacu nurse       Tabatha made aware pt will not be moving fwd with permanent SCS. Tabatha appreciative of call.

## (undated) DEVICE — HYBRID TUBING/CAP SET FOR OLYMPUS® SCOPES: Brand: ERBE

## (undated) DEVICE — NDL HYPO ECLPS SFTY 25G 1 1/2IN

## (undated) DEVICE — GLV SURG SENSICARE PI MIC PF SZ6 LF STRL

## (undated) DEVICE — ANTIBACTERIAL UNDYED BRAIDED (POLYGLACTIN 910), SYNTHETIC ABSORBABLE SUTURE: Brand: COATED VICRYL

## (undated) DEVICE — DECANT BG O JET

## (undated) DEVICE — Device

## (undated) DEVICE — PAD GRND REM POLYHESIVE A/ DISP

## (undated) DEVICE — SUT VIC 3/0 SH 27IN J416H

## (undated) DEVICE — SYR LL TP 10ML STRL

## (undated) DEVICE — SUT SILK 3/0 SH 30IN K832H

## (undated) DEVICE — UNDYED BRAIDED (POLYGLACTIN 910), SYNTHETIC ABSORBABLE SUTURE: Brand: COATED VICRYL

## (undated) DEVICE — PREMIUM DRY TRAY LF: Brand: MEDLINE INDUSTRIES, INC.

## (undated) DEVICE — GLV SURG SENSICARE W/ALOE PF LF 8.5 STRL

## (undated) DEVICE — MEDI-VAC YANKAUER SUCTION HANDLE W/BULBOUS TIP: Brand: CARDINAL HEALTH

## (undated) DEVICE — HARMONIC HD 1000I SHEARS, 20CM SHAFT LENGTH: Brand: HARMONIC

## (undated) DEVICE — SYR LUERLOK 5CC

## (undated) DEVICE — DRSNG WND BORDR/ADHS NONADHR/GZ LF 4X10IN STRL

## (undated) DEVICE — SUT PDS LP 1 TP1 96IN VIO PDP880GA

## (undated) DEVICE — CLAVICLE STRAP: Brand: DEROYAL

## (undated) DEVICE — SUT VIC 12X27 D8116 BX/12

## (undated) DEVICE — SUT VIC PLS CTD ANTIB 2/0 18IN VCP111G

## (undated) DEVICE — DRAIN JACKSON PRATT ROUND 15FR: Brand: CARDINAL HEALTH

## (undated) DEVICE — VLV SXN AIR/H2O ORCAPOD3 1P/U STRL

## (undated) DEVICE — DRSNG WND BORDR/ADHS NONADHR/GZ LF 2X2IN STRL

## (undated) DEVICE — RICH MAJOR PROCEDURE: Brand: MEDLINE INDUSTRIES, INC.

## (undated) DEVICE — CVR HNDL LIGHT RIGID

## (undated) DEVICE — DRAPE,UNDERBUTTOCKS,STERILE: Brand: MEDLINE

## (undated) DEVICE — GLV SURG DERMASSURE GRN LF PF SZ 6.5

## (undated) DEVICE — PROVIDES A STERILE INTERFACE BETWEEN THE OPERATING ROOM SURGICAL LAMPS (NON-STERILE) AND THE SURGEON OR NURSE (STERILE).: Brand: STERION®CLAMP COVER FABRIC

## (undated) DEVICE — DRAPE, LAVH, STERILE: Brand: MEDLINE

## (undated) DEVICE — NDL HYPO ECLPS SFTY 18G 1 1/2IN

## (undated) DEVICE — ADHS LIQ MASTISOL 2/3ML

## (undated) DEVICE — SYR LUERLOK 20CC BX/50

## (undated) DEVICE — JACKSON-PRATT 100CC BULB RESERVOIR: Brand: CARDINAL HEALTH

## (undated) DEVICE — SUT MONOCRYL PLS ANTIB UND 3/0  PS1 27IN

## (undated) DEVICE — LUBE JELLY PK/2.75GM STRL BX/144

## (undated) DEVICE — 1-PIECE DRAINABLE OSTOMY POUCH, FLEXTEND: Brand: PREMIER

## (undated) DEVICE — SKIN AFFIX SURG ADHESIVE 72/CS 0.55ML: Brand: MEDLINE

## (undated) DEVICE — LEX BASIC NO DRAPE: Brand: MEDLINE INDUSTRIES, INC.

## (undated) DEVICE — INTENDED FOR TISSUE SEPARATION, AND OTHER PROCEDURES THAT REQUIRE A SHARP SURGICAL BLADE TO PUNCTURE OR CUT.: Brand: BARD-PARKER ® CARBON RIB-BACK BLADES

## (undated) DEVICE — SPNG GZ STRL 2S 4X4 12PLY

## (undated) DEVICE — NDL HYPO ECLPS SFTY 22G 1 1/2IN

## (undated) DEVICE — 3M™ STERI-DRAPE™ INSTRUMENT POUCH 1018: Brand: STERI-DRAPE™

## (undated) DEVICE — STRIP,CLOSURE,WOUND,MEDI-STRIP,1/2X4: Brand: MEDLINE

## (undated) DEVICE — ENDOSCOPY PORT CONNECTOR FOR OLYMPUS® SCOPES: Brand: ERBE

## (undated) DEVICE — DRSNG SURESITE WNDW 4X4.5

## (undated) DEVICE — LASAR SWABS: Brand: DEROYAL